# Patient Record
Sex: FEMALE | Race: WHITE | NOT HISPANIC OR LATINO | Employment: FULL TIME | URBAN - METROPOLITAN AREA
[De-identification: names, ages, dates, MRNs, and addresses within clinical notes are randomized per-mention and may not be internally consistent; named-entity substitution may affect disease eponyms.]

---

## 2017-03-10 ENCOUNTER — GENERIC CONVERSION - ENCOUNTER (OUTPATIENT)
Dept: OTHER | Facility: OTHER | Age: 62
End: 2017-03-10

## 2017-04-12 ENCOUNTER — GENERIC CONVERSION - ENCOUNTER (OUTPATIENT)
Dept: OTHER | Facility: OTHER | Age: 62
End: 2017-04-12

## 2017-05-04 DIAGNOSIS — E83.52 HYPERCALCEMIA: ICD-10-CM

## 2017-05-04 DIAGNOSIS — M81.0 AGE-RELATED OSTEOPOROSIS WITHOUT CURRENT PATHOLOGICAL FRACTURE: ICD-10-CM

## 2017-05-08 ENCOUNTER — GENERIC CONVERSION - ENCOUNTER (OUTPATIENT)
Dept: OTHER | Facility: OTHER | Age: 62
End: 2017-05-08

## 2017-05-31 ENCOUNTER — ALLSCRIPTS OFFICE VISIT (OUTPATIENT)
Dept: OTHER | Facility: OTHER | Age: 62
End: 2017-05-31

## 2017-10-04 ENCOUNTER — GENERIC CONVERSION - ENCOUNTER (OUTPATIENT)
Dept: OTHER | Facility: OTHER | Age: 62
End: 2017-10-04

## 2017-10-04 ENCOUNTER — GENERIC CONVERSION - ENCOUNTER (OUTPATIENT)
Dept: FAMILY MEDICINE CLINIC | Facility: MEDICAL CENTER | Age: 62
End: 2017-10-04

## 2017-11-02 ENCOUNTER — TRANSCRIBE ORDERS (OUTPATIENT)
Dept: ADMINISTRATIVE | Facility: HOSPITAL | Age: 62
End: 2017-11-02

## 2017-11-02 DIAGNOSIS — Z13.820 SCREENING FOR OSTEOPOROSIS: ICD-10-CM

## 2017-11-02 DIAGNOSIS — Z12.31 VISIT FOR SCREENING MAMMOGRAM: Primary | ICD-10-CM

## 2018-01-10 NOTE — PROGRESS NOTES
Assessment    1  Hypercalcemia (275 42) (E83 52)   2  Osteoporosis (733 00) (M81 0)   3  Encounter for preventive health examination (V70 0) (Z00 00)   4  Hemorrhoids (455 6) (K64 9)   5  Encounter for routine gynecological examination (V72 31) (Z01 419)    Plan  Health Maintenance    · Zoster (Zostavax) (Zoster (Zostavax)); To be given at pharmacy  PMH: Colonoscopy (Fiberoptic), Encounter for routine gynecological examination    · Begin or continue regular aerobic exercise  Gradually work up to at least {count1}  sessions of {dur1} of exercise a week ; Status:Complete;   Done: [id number]    Zostavax  REcheck 3 months  Discussion/Summary    1  Labile HTN-will get BP cuff and check at home  RV if over 140/85  2  Hypercalcemia-resovled  Secondary to Ca supplement? REviewed dietary calcium   May have lactose intolerance  Will try Lactaid tablets  3  Osteoporosis-on Prolia  F/u with Rheum  4  -up to date  Advised Tdap and Zostavax  History of Present Illness  HM, Adult Female: The patient is being seen for a health maintenance and gynecology evaluation  General Health: The patient's health since the last visit is described as good  She has regular dental visits  She complains of vision problems  Vision care includes an eye examination more than a year ago  Lifestyle:  She consumes a diverse and healthy diet  (Eats vegetables  Tries to eat more fruits  Avoids breads  Eats cereals  Limits red meat  )   She exercises regularly (She does power walking at work and stair climbing  )  She exercises 3 or more times per week  She consumes alcohol  She reports never drinking alcohol  Caffeine 1 tea daily  Rare soda  Reproductive health: the patient is postmenopausal   she is not sexually active  Screening: Additional History: [facility] Mammogram 2017  Dexa scan 2016  Colonoscopy 2017 10 year f/u  HPI: Mark Jolley says she has been doing well  Seeing rheumatology for her osteoporosis  On Prolia injections     Had elevated calcium  Stopped it and now calcium normal   She gets bloating and diarrhea with yogurt and milk  She has been taking her BP at work  Getting elevated BP's Systolic 156'X  162/85 highest  She thinks it was from her cochlear implant  Had it adjusted and thinks it is better  Cut back on salt  Review of Systems    Genitourinary: no dysuria, no pelvic pain, no vaginal discharge, no incontinence and no unexplained vaginal bleeding  Active Problems    1  Asthma (493 90) (J45 909)   2  Colon cancer screening (V76 51) (Z12 11)   3  Encounter for routine gynecological examination (V72 31) (Z01 419)   4  Encounter for vitamin deficiency screening (V77 99) (Z13 21)   5  Hypercalcemia (275 42) (E83 52)   6  Irritable bowel syndrome (564 1) (K58 9)   7  Loss of hearing (389 9) (H91 90)   8  Osteoporosis (733 00) (M81 0)   9  Other screening mammogram (V76 12) (Z12 31)   10  Screening for osteoporosis (V82 81) (Z13 820)    Past Medical History    · Encounter for vitamin deficiency screening (V77 99) (Z13 21)   · History of Flu vaccine need (V04 81) (Z23)   · History of Lipid screening (V77 91) (Z13 220)   · History of Need for 23-polyvalent pneumococcal polysaccharide vaccine (V03 82) (Z23)   · History of Need for pneumococcal vaccination (V03 82) (Z23)   · History of Thyroid disorder screen (V77 0) (Z13 29)    Surgical History    · History of Colonoscopy (Fiberoptic)   · History of Hysterectomy    Family History  Mother    · Family history of Osteoporosis (V17 81)  Father    · Family history of Diabetes Mellitus (V18 0)   · Family history of Heart Disease (V17 49)   · Family history of Hypertension (V17 49)  Family History    · Family history of Family Health Status Of Mother - Alive   · Family history of Father  At Age 70    Social History    · Denied: History of Alcohol Use (History)   · Caffeine Use   · Never A Smoker    Current Meds   1  No Reported Medications Recorded    Allergies    1   [employer] Aspirin TABS   2  Codeine Sulfate TABS    Vitals   Recorded: 29UCO4056 01:01PM   Heart Rate 76   Respiration 16   Systolic 266   Diastolic 80   Height 4 ft 10 in   Weight 119 lb    BMI Calculated 24 87   BSA Calculated 1 46     Physical Exam    Constitutional   General appearance: No acute distress, well appearing and well nourished  Neck   Neck: Supple, symmetric, trachea midline, no masses  Thyroid: Normal, no thyromegaly  Pulmonary   Respiratory effort: No increased work of breathing or signs of respiratory distress  Auscultation of lungs: Clear to auscultation  Cardiovascular   Auscultation of heart: Normal rate and rhythm, normal S1 and S2, no murmurs  Carotid pulses: 2+ bilaterally  Abdominal aorta: Normal     Femoral pulses: 2+ bilaterally  Pedal pulses: 2+ bilaterally  Peripheral vascular exam: Normal     Examination of extremities for edema and/or varicosities: Normal     Chest   Breasts: Normal, no dimpling or skin changes appreciated  Palpation of breasts and axillae: Normal, no masses palpated  Abdomen   Abdomen: Non-tender, no masses  Liver and spleen: No hepatomegaly or splenomegaly  Anus, perineum, and rectum: Normal sphincter tone, no masses, no prolapse  Stool sample for occult blood: Negative  Genitourinary   External genitalia and vagina: Normal, no lesions appreciated  cuff normal    Uterus: Normal size, no tenderness, no masses  The uterus was absent  Adnexa/Parametria: Normal, no masses or tenderness  Nonpalpable  Lymphatic   Palpation of lymph nodes in neck: No lymphadenopathy  Palpation of lymph nodes in axillae: No lymphadenopathy  Palpation of lymph nodes in groin: No lymphadenopathy  Health Management  Colon cancer screening   COLONOSCOPY; every 10 years; Last 81Ybg5384; Next Due: 73Ezl0812; Active  Other screening mammogram   Digital Bilateral Screening Mammogram With CAD; every 1 year; Last 68TIX7912;  Next  Due: 86NGV0620; Overdue    Future Appointments    Date/Time Provider Specialty Site   09/25/2017 02:15 PM ISABEL Reyez   [address] [facility]     Signatures   Electronically signed by : ISABEL Cowart ; May 31 2017  9:48PM EST                       (Author)

## 2018-01-14 VITALS
RESPIRATION RATE: 16 BRPM | WEIGHT: 119 LBS | HEIGHT: 58 IN | SYSTOLIC BLOOD PRESSURE: 132 MMHG | HEART RATE: 76 BPM | DIASTOLIC BLOOD PRESSURE: 80 MMHG | BODY MASS INDEX: 24.98 KG/M2

## 2018-01-15 NOTE — PROGRESS NOTES
Assessment    1  Encounter for preventive health examination (V70 0) (Z00 00)   2  Osteoporosis (733 00) (M81 0)   3  Encounter for routine gynecological examination (V72 31) (Z01 419)   4  Asthma (493 90) (J45 909)    Plan  Osteoporosis    · * DXA BONE DENSITY SPINE HIP AND PELVIS; Status:Active; Requested  for:26May2016;      Obtain BW  Check Dexa scan  History of Present Illness  HM, Adult Female: The patient is being seen for a health maintenance and gynecology evaluation  General Health: The patient's health since the last visit is described as good  Lifestyle:  She consumes a diverse and healthy diet  (Eats fruits and vegetables and whole grains  Avoids red meat  Eats chicken  Dietary calcium not much due to lactose intolerance  No coffee  Iced tea 1-2 daily  )   She has weight concerns  (She lost about 8 lbs  but 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 as well as several illnesses  )   She exercises regularly  (Very active at her job with walking but no regular exercise)   She does not use tobacco  She denies alcohol use  Reproductive health:  she is not sexually active  Screening: Additional History: Edy Fish Mammogram 2016  Dexa scan 2013  Colonoscopy 2010 f/u at age 63  HPI: 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  She is doing well with her cochlear implant as far as her hearing is good although having headaches and tinnitus  Sees Dr Dara Miller        Review of Systems    Genitourinary: no pelvic pain, no vaginal discharge, no incontinence and no unexplained vaginal bleeding  Active Problems    1  Asthma (493 90) (J45 909)   2  Encounter for routine gynecological examination (V72 31) (Z01 419)   3  Encounter for vitamin deficiency screening (V77 99) (Z13 21)   4  Irritable bowel syndrome (564 1) (K58 9)   5  Loss of hearing (389 9) (H91 90)   6  Osteoporosis (733 00) (M81 0)   7  Other screening mammogram (V76 12) (Z12 31)   8   Screening for osteoporosis (V82 81) (I87 237)    Past Medical History    · Encounter for vitamin deficiency screening (V77 99) (Z13 21)   · History of Flu vaccine need (V04 81) (Z23)   · History of Lipid screening (V77 91) (Z13 220)   · History of Need for 23-polyvalent pneumococcal polysaccharide vaccine (V03 82) (Z23)   · History of Need for pneumococcal vaccination (V03 82) (Z23)   · History of Thyroid disorder screen (V77 0) (Z13 29)    Surgical History    · History of Colonoscopy (Fiberoptic)   · History of Hysterectomy    Family History  Mother    · Family history of Osteoporosis (V17 81)  Father    · Family history of Diabetes Mellitus (V18 0)   · Family history of Heart Disease (V17 49)   · Family history of Hypertension (V17 49)  Family History    · Family history of Family Health Status Of Mother - Alive   · Family history of Father  At Age 70    Social History    · Denied: History of Alcohol Use (History)   · Caffeine Use   · Never A Smoker    Current Meds   1  No Reported Medications Recorded    Allergies    1  [employer] Aspirin TABS   2  Codeine Sulfate TABS    Vitals   Recorded: 49DEZ9419 12:35PM   Heart Rate 90   Respiration 16   Systolic 994   Diastolic 84   Height 4 ft 10 in   Weight 116 lb 8 0 oz   BMI Calculated 24 35   BSA Calculated 1 44   LMP 1997     Physical Exam    Constitutional   General appearance: No acute distress, well appearing and well nourished  Head and Face   Head and face: Normal     Palpation of the face and sinuses: No sinus tenderness  Eyes   Conjunctiva and lids: No swelling, erythema or discharge  Pupils and irises: Equal, round, reactive to light  Ophthalmoscopic examination: Normal fundi and optic discs  Ears, Nose, Mouth, and Throat   External inspection of ears and nose: Normal     Otoscopic examination: Tympanic membranes translucent with normal light reflex  Canals patent without erythema      Hearing: Normal     Nasal mucosa, septum, and turbinates: Normal without edema or erythema  Lips, teeth, and gums: Normal, good dentition  Oropharynx: Normal with no erythema, edema, exudate or lesions  Neck   Neck: Supple, symmetric, trachea midline, no masses  Thyroid: Normal, no thyromegaly  Pulmonary   Respiratory effort: No increased work of breathing or signs of respiratory distress  Auscultation of lungs: Clear to auscultation  Cardiovascular   Auscultation of heart: Normal rate and rhythm, normal S1 and S2, no murmurs  Abdominal aorta: Normal     Femoral pulses: 2+ bilaterally  Pedal pulses: 2+ bilaterally  Examination of extremities for edema and/or varicosities: Normal     Chest   Breasts: Normal, no dimpling or skin changes appreciated  Palpation of breasts and axillae: Normal, no masses palpated  Abdomen   Abdomen: Non-tender, no masses  Liver and spleen: No hepatomegaly or splenomegaly  Anus, perineum, and rectum: Normal sphincter tone, no masses, no prolapse  Stool sample for occult blood: Negative  Genitourinary   Urethra: Normal, no discharge  Bladder: Not distended, no tenderness  Cervix: Normal, no lesions  Uterus: Normal size, no tenderness, no masses  Adnexa/Parametria: Normal, no masses or tenderness  Lymphatic   Palpation of lymph nodes in neck: No lymphadenopathy  Palpation of lymph nodes in groin: No lymphadenopathy  Results/Data  PHQ-2 Adult Depression Screening 22CBU3760 01:29PM Ange Scale     Test Name Result Flag Reference   PHQ-2 Adult Depression Score 0     Over the last two weeks, how often have you been bothered by any of the following problems? Little interest or pleasure in doing things: Not at all - 0  Feeling down, depressed, or hopeless: Not at all - 0   PHQ-2 Adult Depression Screening Negative         Health Management  Other screening mammogram   Digital Bilateral Screening Mammogram With CAD; every 1 year; Last [id number]; Next  Due: 86QGO0536;  Overdue    Future Appointments    Date/Time Provider Specialty Site   05/31/2017 01:00 PM ISABEL Miller   [address] [facility]     Signatures   Electronically signed by : ISABEL Winters ; May 27 2016 12:08AM EST                       (Author)

## 2018-01-22 VITALS
HEART RATE: 70 BPM | SYSTOLIC BLOOD PRESSURE: 138 MMHG | WEIGHT: 123.38 LBS | HEIGHT: 58 IN | DIASTOLIC BLOOD PRESSURE: 80 MMHG | BODY MASS INDEX: 25.9 KG/M2 | RESPIRATION RATE: 16 BRPM

## 2018-01-22 VITALS — DIASTOLIC BLOOD PRESSURE: 78 MMHG | SYSTOLIC BLOOD PRESSURE: 130 MMHG

## 2018-01-22 VITALS — SYSTOLIC BLOOD PRESSURE: 122 MMHG | DIASTOLIC BLOOD PRESSURE: 80 MMHG

## 2018-02-19 ENCOUNTER — OFFICE VISIT (OUTPATIENT)
Dept: FAMILY MEDICINE CLINIC | Facility: MEDICAL CENTER | Age: 63
End: 2018-02-19
Payer: COMMERCIAL

## 2018-02-19 ENCOUNTER — APPOINTMENT (OUTPATIENT)
Dept: RADIOLOGY | Facility: MEDICAL CENTER | Age: 63
End: 2018-02-19
Payer: COMMERCIAL

## 2018-02-19 VITALS
HEART RATE: 60 BPM | DIASTOLIC BLOOD PRESSURE: 78 MMHG | BODY MASS INDEX: 26.33 KG/M2 | WEIGHT: 126 LBS | RESPIRATION RATE: 16 BRPM | SYSTOLIC BLOOD PRESSURE: 126 MMHG

## 2018-02-19 DIAGNOSIS — M54.6 ACUTE BILATERAL THORACIC BACK PAIN: Primary | ICD-10-CM

## 2018-02-19 DIAGNOSIS — R39.9 URINARY SYMPTOM OR SIGN: ICD-10-CM

## 2018-02-19 DIAGNOSIS — M54.6 ACUTE BILATERAL THORACIC BACK PAIN: ICD-10-CM

## 2018-02-19 LAB
SL AMB  POCT GLUCOSE, UA: NORMAL
SL AMB LEUKOCYTE ESTERASE,UA: NORMAL
SL AMB POCT BILIRUBIN,UA: NORMAL
SL AMB POCT BLOOD,UA: NORMAL
SL AMB POCT KETONES,UA: NORMAL
SL AMB POCT NITRITE,UA: NORMAL
SL AMB POCT PH,UA: 6
SL AMB POCT SPECIFIC GRAVITY,UA: 1.01
SL AMB POCT URINE PROTEIN: NORMAL
SL AMB POCT UROBILINOGEN: 0.2

## 2018-02-19 PROCEDURE — 99213 OFFICE O/P EST LOW 20 MIN: CPT | Performed by: FAMILY MEDICINE

## 2018-02-19 PROCEDURE — 72072 X-RAY EXAM THORAC SPINE 3VWS: CPT

## 2018-02-19 PROCEDURE — 74018 RADEX ABDOMEN 1 VIEW: CPT

## 2018-02-19 PROCEDURE — 81002 URINALYSIS NONAUTO W/O SCOPE: CPT | Performed by: FAMILY MEDICINE

## 2018-02-28 ENCOUNTER — TELEPHONE (OUTPATIENT)
Dept: FAMILY MEDICINE CLINIC | Facility: MEDICAL CENTER | Age: 63
End: 2018-02-28

## 2018-02-28 NOTE — TELEPHONE ENCOUNTER
----- Message from Gladys Goff MD sent at 2/25/2018 10:18 PM EST -----  Notify x-ray shows degenerative changes in thoracic spine in the area we discussed  How she doing?

## 2018-02-28 NOTE — TELEPHONE ENCOUNTER
Jeffry York says she still has the discomfort when she wakes up in the morning  Tylenol does not help at all  Advil helps but can upset her stomach  What do you advise she do  She has been using heat as well  Was not sure if you were going to give her a prescription for something or not?

## 2018-03-01 DIAGNOSIS — M54.6 ACUTE LEFT-SIDED THORACIC BACK PAIN: Primary | ICD-10-CM

## 2018-03-01 RX ORDER — CYCLOBENZAPRINE HCL 5 MG
5 TABLET ORAL 3 TIMES DAILY PRN
Qty: 20 TABLET | Refills: 0 | Status: SHIPPED | OUTPATIENT
Start: 2018-03-01 | End: 2018-08-27 | Stop reason: ALTCHOICE

## 2018-03-05 ENCOUNTER — TELEPHONE (OUTPATIENT)
Dept: FAMILY MEDICINE CLINIC | Facility: MEDICAL CENTER | Age: 63
End: 2018-03-05

## 2018-03-05 NOTE — TELEPHONE ENCOUNTER
Pt also left several vm over the weekend stating she looked at her records on line and has a question about her xray

## 2018-03-05 NOTE — TELEPHONE ENCOUNTER
Patient called  She said medicine was called into wrong pharmacy  She uses [facility] in [facility]  She believes it was for a muscle relaxer, please call her

## 2018-03-08 NOTE — TELEPHONE ENCOUNTER
Danielle called back  She looked at the results on MyChart and saw the mention of mild lower levoscoliosis, she said she spoke with her friend who is a chiropractor and she suggested physical therapy for this  Is this something you think would help?

## 2018-03-12 NOTE — TELEPHONE ENCOUNTER
Thi sis usually an incidental finding --many people show this on Xray --and not felt to be a cause  of the pain at this age  However can't hurt to try PT for her back pain  Could also consider ortho referral to further clarify

## 2018-03-26 ENCOUNTER — TELEPHONE (OUTPATIENT)
Dept: FAMILY MEDICINE CLINIC | Facility: MEDICAL CENTER | Age: 63
End: 2018-03-26

## 2018-03-27 DIAGNOSIS — Z12.39 ENCOUNTER FOR SCREENING FOR MALIGNANT NEOPLASM OF BREAST: Primary | ICD-10-CM

## 2018-03-28 ENCOUNTER — HOSPITAL ENCOUNTER (OUTPATIENT)
Dept: MAMMOGRAPHY | Facility: MEDICAL CENTER | Age: 63
Discharge: HOME | End: 2018-03-28
Payer: COMMERCIAL

## 2018-03-28 DIAGNOSIS — Z12.31 VISIT FOR SCREENING MAMMOGRAM: ICD-10-CM

## 2018-03-29 ENCOUNTER — HOSPITAL ENCOUNTER (OUTPATIENT)
Dept: MAMMOGRAPHY | Facility: MEDICAL CENTER | Age: 63
Discharge: HOME | End: 2018-03-29
Payer: COMMERCIAL

## 2018-03-29 DIAGNOSIS — Z12.39 ENCOUNTER FOR SCREENING FOR MALIGNANT NEOPLASM OF BREAST: ICD-10-CM

## 2018-03-29 PROCEDURE — 77067 SCR MAMMO BI INCL CAD: CPT

## 2018-04-27 LAB — CALCIUM SERPL-MCNC: 10.2 MG/DL (ref 8.4–10.2)

## 2018-06-04 ENCOUNTER — OFFICE VISIT (OUTPATIENT)
Dept: FAMILY MEDICINE CLINIC | Facility: MEDICAL CENTER | Age: 63
End: 2018-06-04
Payer: COMMERCIAL

## 2018-06-04 VITALS
WEIGHT: 128 LBS | BODY MASS INDEX: 26.75 KG/M2 | RESPIRATION RATE: 18 BRPM | HEART RATE: 68 BPM | SYSTOLIC BLOOD PRESSURE: 148 MMHG | DIASTOLIC BLOOD PRESSURE: 80 MMHG

## 2018-06-04 DIAGNOSIS — R03.0 ELEVATED BP WITHOUT DIAGNOSIS OF HYPERTENSION: ICD-10-CM

## 2018-06-04 DIAGNOSIS — Z00.00 ROUTINE ADULT HEALTH MAINTENANCE: ICD-10-CM

## 2018-06-04 DIAGNOSIS — Z01.419 ENCOUNTER FOR GYNECOLOGICAL EXAMINATION WITHOUT ABNORMAL FINDING: ICD-10-CM

## 2018-06-04 DIAGNOSIS — E83.52 HYPERCALCEMIA: ICD-10-CM

## 2018-06-04 DIAGNOSIS — M81.0 OSTEOPOROSIS, UNSPECIFIED OSTEOPOROSIS TYPE, UNSPECIFIED PATHOLOGICAL FRACTURE PRESENCE: Primary | ICD-10-CM

## 2018-06-04 PROCEDURE — 99396 PREV VISIT EST AGE 40-64: CPT | Performed by: FAMILY MEDICINE

## 2018-06-04 NOTE — PROGRESS NOTES
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  Mammogram 2018   Dexa scan soon      I have reviewed the patient's medical history in detail and updated the computerized patient record  Review of Systems   Cardiovascular: Negative for chest pain, palpitations and leg swelling  Gets BP checked at work  Has been good  Genitourinary: Negative for difficulty urinating, frequency, vaginal bleeding, vaginal discharge and vaginal pain  Not sexually active  Musculoskeletal: Positive for arthralgias  Achiness in knees with climbing stairs  O: /80 (BP Location: Left arm, Patient Position: Sitting, Cuff Size: Adult)   Pulse 68   Resp 18   Wt 58 1 kg (128 lb)   BMI 26 75 kg/m²   Physical Exam   Constitutional: She appears well-developed and well-nourished  No distress  Neck: Carotid bruit is not present  No thyromegaly present  Cardiovascular: Normal rate, regular rhythm, normal heart sounds and intact distal pulses  Pulmonary/Chest: Effort normal and breath sounds normal    Abdominal: Soft  Bowel sounds are normal  She exhibits no mass  There is no hepatomegaly  There is no tenderness  Musculoskeletal: She exhibits no edema  Lymphadenopathy:     She has no cervical adenopathy  Breasts no masses or discharge  External genitalia normal  Vagina normal  Cervix normal no lesions  Uterus normal size shape and consistency  Adnexae non palpable  Rectal exam no masses stool  heme negative     Assessment  1  Gyn exam  2  Osteoporosis-per rheum  On Prolia  3  HM-up to date  Encouraged exercise  Plan  Check BW

## 2018-06-20 LAB
ABSOL LYMPHOCYTES (HISTORICAL): 2.7 K/UL (ref 0.5–4)
ALBUMIN SERPL BCP-MCNC: 4.2 G/DL (ref 3–5.2)
ALP SERPL-CCNC: 50 U/L (ref 43–122)
ALT SERPL W P-5'-P-CCNC: 32 U/L (ref 9–52)
ANION GAP SERPL CALCULATED.3IONS-SCNC: 7 MMOL/L (ref 5–14)
AST SERPL W P-5'-P-CCNC: 24 U/L (ref 14–36)
BASOPHILS # BLD AUTO: 0 K/UL (ref 0–0.1)
BASOPHILS # BLD AUTO: 1 % (ref 0–1)
BILIRUB SERPL-MCNC: 0.7 MG/DL
BUN SERPL-MCNC: 14 MG/DL (ref 5–25)
CALCIUM SERPL-MCNC: 10.2 MG/DL (ref 8.4–10.2)
CHLORIDE SERPL-SCNC: 106 MEQ/L (ref 97–108)
CHOLEST SERPL-MCNC: 211 MG/DL
CHOLEST/HDLC SERPL: 2.7 {RATIO}
CO2 SERPL-SCNC: 31 MMOL/L (ref 22–30)
CREATINE, SERUM (HISTORICAL): 0.8 MG/DL (ref 0.6–1.2)
DEPRECATED RDW RBC AUTO: 13.1 %
EGFR (HISTORICAL): >60 ML/MIN/1.73 M2
EOSINOPHIL # BLD AUTO: 0.1 K/UL (ref 0–0.4)
EOSINOPHIL NFR BLD AUTO: 1 % (ref 0–6)
GLUCOSE FASTING (HISTORICAL): 86 MG/DL (ref 70–99)
HCT VFR BLD AUTO: 39 % (ref 36–46)
HDLC SERPL-MCNC: 77 MG/DL
HGB BLD-MCNC: 12.9 G/DL (ref 12–16)
LDL/HDL RATIO (HISTORICAL): 1.4
LDLC SERPL CALC-MCNC: 109 MG/DL
LYMPHOCYTES NFR BLD AUTO: 34 % (ref 25–45)
MCH RBC QN AUTO: 30.6 PG (ref 26–34)
MCHC RBC AUTO-ENTMCNC: 33.2 % (ref 31–36)
MCV RBC AUTO: 92 FL (ref 80–100)
MONOCYTES # BLD AUTO: 0.6 K/UL (ref 0.2–0.9)
MONOCYTES NFR BLD AUTO: 7 % (ref 1–10)
NEUTROPHILS ABS COUNT (HISTORICAL): 4.5 K/UL (ref 1.8–7.8)
NEUTS SEG NFR BLD AUTO: 57 % (ref 45–65)
PLATELET # BLD AUTO: 242 K/MCL (ref 150–450)
POTASSIUM SERPL-SCNC: 4.5 MEQ/L (ref 3.6–5)
RBC # BLD AUTO: 4.23 M/MCL (ref 4–5.2)
SODIUM SERPL-SCNC: 144 MEQ/L (ref 137–147)
TOTAL PROTEIN (HISTORICAL): 7.2 G/DL (ref 5.9–8.4)
TRIGL SERPL-MCNC: 123 MG/DL
TSH SERPL DL<=0.05 MIU/L-ACNC: 3.53 UIU/ML (ref 0.47–4.68)
VLDLC SERPL CALC-MCNC: 25 MG/DL (ref 0–40)
WBC # BLD AUTO: 7.9 K/MCL (ref 4.5–11)

## 2018-06-25 ENCOUNTER — HOSPITAL ENCOUNTER (OUTPATIENT)
Dept: BONE DENSITY | Facility: CLINIC | Age: 63
Discharge: HOME | End: 2018-06-25
Payer: COMMERCIAL

## 2018-06-25 DIAGNOSIS — M81.0 SENILE OSTEOPOROSIS: ICD-10-CM

## 2018-06-25 PROCEDURE — 77080 DXA BONE DENSITY AXIAL: CPT

## 2018-07-31 ENCOUNTER — TELEPHONE (OUTPATIENT)
Dept: FAMILY MEDICINE CLINIC | Facility: MEDICAL CENTER | Age: 63
End: 2018-07-31

## 2018-07-31 ENCOUNTER — APPOINTMENT (OUTPATIENT)
Dept: LAB | Facility: HOSPITAL | Age: 63
End: 2018-07-31

## 2018-07-31 ENCOUNTER — TRANSCRIBE ORDERS (OUTPATIENT)
Dept: ADMINISTRATIVE | Facility: HOSPITAL | Age: 63
End: 2018-07-31

## 2018-07-31 DIAGNOSIS — Z00.8 HEALTH EXAMINATION IN POPULATION SURVEY: Primary | ICD-10-CM

## 2018-07-31 DIAGNOSIS — Z00.8 HEALTH EXAMINATION IN POPULATION SURVEY: ICD-10-CM

## 2018-07-31 LAB
CHOLEST SERPL-MCNC: 229 MG/DL
EST. AVERAGE GLUCOSE BLD GHB EST-MCNC: 108 MG/DL
HBA1C MFR BLD: 5.4 % (ref 4.2–6.3)
HDLC SERPL-MCNC: 74 MG/DL (ref 40–59)
LDLC SERPL CALC-MCNC: 125 MG/DL
NONHDLC SERPL-MCNC: 155 MG/DL
TRIGL SERPL-MCNC: 150 MG/DL

## 2018-07-31 PROCEDURE — 36415 COLL VENOUS BLD VENIPUNCTURE: CPT

## 2018-07-31 PROCEDURE — 80061 LIPID PANEL: CPT

## 2018-07-31 PROCEDURE — 83036 HEMOGLOBIN GLYCOSYLATED A1C: CPT

## 2018-07-31 NOTE — TELEPHONE ENCOUNTER
Pt has been receiving Prolia injections from Dr Grecia Irwin  The office called her and told her that they will no longer be doing the Prolia injections at the office  She has a follow up with you for next month  She will be due for her injection 2 weeks later, can she get them here? What is your recommendation?

## 2018-08-01 NOTE — TELEPHONE ENCOUNTER
I do not give Prolia injections  Did Breana Amezcua  office have any suggestions?   If she get a new rheumatologist?  Could also refer Endocrinology

## 2018-08-27 ENCOUNTER — OFFICE VISIT (OUTPATIENT)
Dept: FAMILY MEDICINE CLINIC | Facility: MEDICAL CENTER | Age: 63
End: 2018-08-27
Payer: COMMERCIAL

## 2018-08-27 VITALS
WEIGHT: 126 LBS | DIASTOLIC BLOOD PRESSURE: 80 MMHG | RESPIRATION RATE: 18 BRPM | BODY MASS INDEX: 27.18 KG/M2 | HEIGHT: 57 IN | SYSTOLIC BLOOD PRESSURE: 136 MMHG | HEART RATE: 64 BPM

## 2018-08-27 DIAGNOSIS — M81.0 OSTEOPOROSIS, UNSPECIFIED OSTEOPOROSIS TYPE, UNSPECIFIED PATHOLOGICAL FRACTURE PRESENCE: ICD-10-CM

## 2018-08-27 DIAGNOSIS — E66.3 OVERWEIGHT: Primary | ICD-10-CM

## 2018-08-27 PROCEDURE — 3008F BODY MASS INDEX DOCD: CPT | Performed by: FAMILY MEDICINE

## 2018-08-27 PROCEDURE — 99213 OFFICE O/P EST LOW 20 MIN: CPT | Performed by: FAMILY MEDICINE

## 2018-08-27 RX ORDER — MULTIVITAMIN
1 CAPSULE ORAL DAILY
COMMUNITY

## 2018-08-27 NOTE — PROGRESS NOTES
Theo Altamirano had a Dexa scan in June  Has osteoporosis of the hip  She is getting  Prolia injections per Dr Yimi Gastelum  She is no longer giving them and is trying to find a pharmacy that does  Thinks it will be [facility]  She had a "wierd experience'  She got a strawberry rhubarb pie while traveling  She got a buzzing  sensation over her whole body  No throat symptoms or rash  Had eaten that day  O: /80 (BP Location: Left arm, Patient Position: Sitting, Cuff Size: Adult)   Pulse 64   Resp 18   Ht 4' 9" (1 448 m)   Wt 57 2 kg (126 lb)   BMI 27 27 kg/m²    Neck no adenopathy thyromegaly  Chest clear  Cardiac regular rate without murmur    BW 07/31/2018  A1c 5 4 cholesterol 229 HDL 74    Assessment  1  Hyperlipidemia-mild-watch diet; lose weight  2  Osteoporosis-current DEXA scan was done after several years of Prolia treatment  She will follow up with Rheumatology  Plan  As above  Recheck 1 year

## 2018-09-26 ENCOUNTER — OFFICE VISIT (OUTPATIENT)
Dept: URGENT CARE | Age: 63
End: 2018-09-26
Payer: COMMERCIAL

## 2018-09-26 VITALS
HEIGHT: 57 IN | BODY MASS INDEX: 27.57 KG/M2 | SYSTOLIC BLOOD PRESSURE: 130 MMHG | HEART RATE: 60 BPM | DIASTOLIC BLOOD PRESSURE: 80 MMHG | OXYGEN SATURATION: 98 % | WEIGHT: 127.8 LBS | RESPIRATION RATE: 16 BRPM | TEMPERATURE: 97 F

## 2018-09-26 DIAGNOSIS — J02.9 ACUTE PHARYNGITIS, UNSPECIFIED ETIOLOGY: Primary | ICD-10-CM

## 2018-09-26 PROCEDURE — 99213 OFFICE O/P EST LOW 20 MIN: CPT | Performed by: PHYSICIAN ASSISTANT

## 2018-09-26 PROCEDURE — S9088 SERVICES PROVIDED IN URGENT: HCPCS | Performed by: PHYSICIAN ASSISTANT

## 2018-09-26 RX ORDER — AMOXICILLIN 500 MG/1
1000 CAPSULE ORAL EVERY 24 HOURS
Qty: 20 CAPSULE | Refills: 0 | Status: SHIPPED | OUTPATIENT
Start: 2018-09-26 | End: 2018-10-06

## 2018-09-26 NOTE — PROGRESS NOTES
[facility]        NAME: Delroy Malagon is a 63 y o  female  : 1955    MRN: [id number]  DATE: 2018  TIME: 9:20 AM    Assessment and Plan   Acute pharyngitis, unspecified etiology [J02 9]  1  Acute pharyngitis, unspecified etiology  amoxicillin (AMOXIL) 500 mg capsule         Patient Instructions     Take medications as directed  Follow up with PCP in 3-5 days  Proceed to  ER if symptoms worsen  Chief Complaint     Chief Complaint   Patient presents with    Sore Throat     Pt c/o sore throat for a few days  History of Present Illness       51-year-old female presents with 2 day history of sore throat  Patient reports mild cough also started today  Sore Throat    This is a new problem  The current episode started yesterday  The problem has been waxing and waning  Neither side of throat is experiencing more pain than the other  There has been no fever  The pain is moderate  Associated symptoms include coughing (Mild started today)  Pertinent negatives include no abdominal pain, congestion, diarrhea, ear discharge, headaches, hoarse voice, stridor, swollen glands or trouble swallowing  She has tried NSAIDs for the symptoms  The treatment provided no relief  Review of Systems   Review of Systems   Constitutional: Negative  HENT: Positive for sore throat  Negative for congestion, ear discharge, hoarse voice and trouble swallowing  Eyes: Negative  Respiratory: Positive for cough (Mild started today)  Negative for stridor  Cardiovascular: Negative  Gastrointestinal: Negative  Negative for abdominal pain and diarrhea  Musculoskeletal: Negative  Skin: Negative  Neurological: Negative  Negative for headaches           Current Medications       Current Outpatient Prescriptions:     Calcium Citrate-Vitamin D (CITRACAL + D PO), Take by mouth, Disp: , Rfl:     Denosumab (PROLIA SC), Inject under the skin, Disp: , Rfl:     Multiple Vitamin (MULTIVITAMIN) capsule, Take 1 capsule by mouth daily, Disp: , Rfl:     amoxicillin (AMOXIL) 500 mg capsule, Take 2 capsules (1,000 mg total) by mouth every 24 hours for 10 days, Disp: 20 capsule, Rfl: 0    Current Allergies     Allergies as of 09/26/2018 - Reviewed 09/26/2018   Allergen Reaction Noted    Aspirin  04/21/2012    Codeine  04/21/2012            The following portions of the patient's history were reviewed and updated as appropriate: allergies, current medications, past family history, past medical history, past social history, past surgical history and problem list      History reviewed  No pertinent past medical history  Past Surgical History:   Procedure Laterality Date    COLONOSCOPY  2017    COLONOSCOPY  2010    fiberoptic; follow up at age; 2017 12 year f/u    HYSTERECTOMY         Family History   Problem Relation Age of Onset    Osteoporosis Mother     Diabetes Father         mellitus    Heart disease Father     Hypertension Father          Medications have been verified  Objective   /80   Pulse 60   Temp (!) 97 °F (36 1 °C) (Tympanic)   Resp 16   Ht 4' 9" (1 448 m)   Wt 58 kg (127 lb 12 8 oz)   SpO2 98%   BMI 27 66 kg/m²        Physical Exam     Physical Exam   Constitutional: She is oriented to person, place, and time  She appears well-developed and well-nourished  No distress  HENT:   Head: Normocephalic and atraumatic  Right Ear: External ear normal    Left Ear: External ear normal    Nose: Nose normal    Mouth/Throat: Uvula is midline and mucous membranes are normal  Posterior oropharyngeal edema and posterior oropharyngeal erythema (Bright red) present  No oropharyngeal exudate or tonsillar abscesses  Eyes: Conjunctivae are normal  Right eye exhibits no discharge  Left eye exhibits no discharge  Neck: Normal range of motion  Neck supple  Cardiovascular: Normal rate, regular rhythm and intact distal pulses      Pulmonary/Chest: Effort normal  No respiratory distress  She has no wheezes  She has no rales  Musculoskeletal: Normal range of motion  Lymphadenopathy:     She has no cervical adenopathy  Neurological: She is alert and oriented to person, place, and time  Skin: Skin is warm and dry  Psychiatric: She has a normal mood and affect  Nursing note and vitals reviewed

## 2018-09-26 NOTE — PATIENT INSTRUCTIONS
Take medications as directed  Follow up with PCP in 3-5 days  Proceed to  ER if symptoms worsen  Pharyngitis   AMBULATORY CARE:   Pharyngitis , or sore throat, is inflammation of the tissues and structures in your pharynx (throat)  Pharyngitis is most often caused by bacteria  It may also be caused by a cold or flu virus  Other causes include smoking, allergies, or acid reflux  Signs and symptoms that may occur with pharyngitis:   · Sore throat or pain when you swallow    · Fever, chills, and body aches    · Hoarse or raspy voice    · Cough, runny or stuffy nose, itchy or watery eyes    · Headache    · Upset stomach and loss of appetite    · Mild neck stiffness    · Swollen glands that feel like hard lumps when you touch your neck    · White and yellow pus-filled blisters in the back of your throat  Call 911 for any of the following:   · You have trouble breathing or swallowing because your throat is swollen or sore  Seek care immediately if:   · You are drooling because it hurts too much to swallow  · Your fever is higher than 102? F (39?C) or lasts longer than 3 days  · You are confused  · You taste blood in your throat  Contact your healthcare provider if:   · Your throat pain gets worse  · You have a painful lump in your throat that does not go away after 5 days  · Your symptoms do not improve after 5 days  · You have questions or concerns about your condition or care  Treatment for pharyngitis:  Viral pharyngitis will go away on its own without treatment  Your sore throat should start to feel better in 3 to 5 days for both viral and bacterial infections  You may need any of the following:  · Antibiotics  treat a bacterial infection  · NSAIDs , such as ibuprofen, help decrease swelling, pain, and fever  NSAIDs can cause stomach bleeding or kidney problems in certain people  If you take blood thinner medicine, always ask your healthcare provider if NSAIDs are safe for you   Always read the medicine label and follow directions  · Acetaminophen  decreases pain and fever  It is available without a doctor's order  Ask how much to take and how often to take it  Follow directions  Acetaminophen can cause liver damage if not taken correctly  Manage your symptoms:   · Gargle salt water  Mix ¼ teaspoon salt in an 8 ounce glass of warm water and gargle  This may help decrease swelling in your throat  · Drink liquids as directed  You may need to drink more liquids than usual  Liquids may help soothe your throat and prevent dehydration  Ask how much liquid to drink each day and which liquids are best for you  · Use a cool-steam humidifier  to help moisten the air in your room and calm your cough  · Soothe your throat  with cough drops, ice, soft foods, or popsicles  Prevent the spread of pharyngitis:  Cover your mouth and nose when you cough or sneeze  Do not share food or drinks  Wash your hands often  Use soap and water  If soap and water are unavailable, use an alcohol based hand   Follow up with your healthcare provider as directed:  Write down your questions so you remember to ask them during your visits  © 2017 [address]  Information is for End User's use only and may not be sold, redistributed or otherwise used for commercial purposes  All illustrations and images included in CareNotes® are the copyrighted property of [employer]  or [employer]  The above information is an  only  It is not intended as medical advice for individual conditions or treatments  Talk to your doctor, nurse or pharmacist before following any medical regimen to see if it is safe and effective for you

## 2018-10-31 ENCOUNTER — APPOINTMENT (OUTPATIENT)
Dept: LAB | Facility: HOSPITAL | Age: 63
End: 2018-10-31
Payer: COMMERCIAL

## 2018-10-31 ENCOUNTER — TRANSCRIBE ORDERS (OUTPATIENT)
Dept: ADMINISTRATIVE | Facility: HOSPITAL | Age: 63
End: 2018-10-31

## 2018-10-31 DIAGNOSIS — R03.0 ELEVATED BP WITHOUT DIAGNOSIS OF HYPERTENSION: ICD-10-CM

## 2018-10-31 DIAGNOSIS — Z01.419 ENCOUNTER FOR GYNECOLOGICAL EXAMINATION WITHOUT ABNORMAL FINDING: ICD-10-CM

## 2018-10-31 DIAGNOSIS — Z00.00 ROUTINE ADULT HEALTH MAINTENANCE: ICD-10-CM

## 2018-10-31 DIAGNOSIS — M81.0 AGE-RELATED OSTEOPOROSIS WITHOUT CURRENT PATHOLOGICAL FRACTURE: ICD-10-CM

## 2018-10-31 DIAGNOSIS — M81.0 AGE-RELATED OSTEOPOROSIS WITHOUT CURRENT PATHOLOGICAL FRACTURE: Primary | ICD-10-CM

## 2018-10-31 DIAGNOSIS — M81.0 OSTEOPOROSIS, UNSPECIFIED OSTEOPOROSIS TYPE, UNSPECIFIED PATHOLOGICAL FRACTURE PRESENCE: ICD-10-CM

## 2018-10-31 DIAGNOSIS — E83.52 HYPERCALCEMIA: ICD-10-CM

## 2018-10-31 DIAGNOSIS — M54.5 LOW BACK PAIN, UNSPECIFIED BACK PAIN LATERALITY, UNSPECIFIED CHRONICITY, WITH SCIATICA PRESENCE UNSPECIFIED: ICD-10-CM

## 2018-10-31 LAB — CALCIUM SERPL-MCNC: 10 MG/DL (ref 8.4–10.2)

## 2018-10-31 PROCEDURE — 36415 COLL VENOUS BLD VENIPUNCTURE: CPT

## 2018-10-31 PROCEDURE — 82310 ASSAY OF CALCIUM: CPT

## 2019-01-08 ENCOUNTER — TELEPHONE (OUTPATIENT)
Dept: OBGYN CLINIC | Facility: HOSPITAL | Age: 64
End: 2019-01-08

## 2019-02-22 ENCOUNTER — OFFICE VISIT (OUTPATIENT)
Dept: URGENT CARE | Age: 64
End: 2019-02-22
Payer: COMMERCIAL

## 2019-02-22 VITALS
HEIGHT: 58 IN | TEMPERATURE: 97.2 F | WEIGHT: 127 LBS | HEART RATE: 75 BPM | SYSTOLIC BLOOD PRESSURE: 168 MMHG | OXYGEN SATURATION: 99 % | RESPIRATION RATE: 16 BRPM | DIASTOLIC BLOOD PRESSURE: 88 MMHG | BODY MASS INDEX: 26.66 KG/M2

## 2019-02-22 DIAGNOSIS — J06.9 ACUTE URI: Primary | ICD-10-CM

## 2019-02-22 PROCEDURE — 99213 OFFICE O/P EST LOW 20 MIN: CPT | Performed by: NURSE PRACTITIONER

## 2019-02-22 PROCEDURE — S9088 SERVICES PROVIDED IN URGENT: HCPCS | Performed by: NURSE PRACTITIONER

## 2019-02-22 NOTE — LETTER
February 22, 2019     Patient: Mila Humphrey   YOB: 1955   Date of Visit: 2/22/2019       To Whom it May Concern:    Jose Carcamo was seen in my clinic on 2/22/2019  She may return to work on Monday, February 25, 2019  If you have any questions or concerns, please don't hesitate to call           Sincerely,          CHET Forrest        CC: No Recipients

## 2019-02-22 NOTE — PROGRESS NOTES
[facility]        NAME: Jose Queen is a 66 y o  female  : 1955    MRN: [id number]  DATE: 2019  TIME: 10:09 AM    Assessment and Plan   Acute URI [J06 9]  1  Acute URI           Patient Instructions     May alternate Tylenol and Ibuprofen as needed  Encourage fluids and rest    Saline nasal spray as needed  Humidify bedroom  Salt water gargles  Chloraseptic spray and lozenges as needed  Consider adding anti-histamines daily, ie  Claritin or Zyrtec  Follow up with PCP in 3-5 days  Proceed to  ER if symptoms worsen  Chief Complaint     Chief Complaint   Patient presents with    Cold Like Symptoms     Pt states she's had a raw throat and itchy ears for a few days, then yesterday woke up with congestion, runny nose and cough  History of Present Illness       Patient presents in office with cold symptoms and bilateral ear pain, and sore throat x 2 days  + low grade temp last night  No chills  + body aches  + asthma, has not used an inhaler since childhood  Nonsmoker  + seasonal allergies  + flu vaccine this season  No prn meds used  Review of Systems   Review of Systems   Constitutional: Positive for fever  Negative for chills and fatigue  HENT: Positive for congestion, ear pain, rhinorrhea, sinus pressure, sinus pain and sore throat  Respiratory: Positive for cough  Negative for chest tightness, shortness of breath and wheezing  Gastrointestinal: Negative  Musculoskeletal: Negative for myalgias  Skin: Negative for wound           Current Medications       Current Outpatient Medications:     Calcium Citrate-Vitamin D (CITRACAL + D PO), Take by mouth, Disp: , Rfl:     Denosumab (PROLIA SC), Inject under the skin, Disp: , Rfl:     Multiple Vitamin (MULTIVITAMIN) capsule, Take 1 capsule by mouth daily, Disp: , Rfl:     Current Allergies     Allergies as of 2019 - Reviewed 2019   Allergen Reaction Noted    Aspirin  2012  Codeine  04/21/2012            The following portions of the patient's history were reviewed and updated as appropriate: allergies, current medications, past family history, past medical history, past social history, past surgical history and problem list      History reviewed  No pertinent past medical history  Past Surgical History:   Procedure Laterality Date    COCHLEAR IMPLANT      COLONOSCOPY  2017    COLONOSCOPY  2010    fiberoptic; follow up at age; 2017 12 year f/u    HYSTERECTOMY         Family History   Problem Relation Age of Onset    Osteoporosis Mother     Diabetes Father         mellitus    Heart disease Father     Hypertension Father          Medications have been verified  Objective   /88 Comment: manual BP  Pulse 75   Temp (!) 97 2 °F (36 2 °C) (Tympanic)   Resp 16   Ht 4' 9 5" (1 461 m)   Wt 57 6 kg (127 lb)   SpO2 99%   BMI 27 01 kg/m²        Physical Exam     Physical Exam   Constitutional: She is oriented to person, place, and time  Vital signs are normal  She appears well-developed and well-nourished  She is cooperative  Non-toxic appearance  She does not have a sickly appearance  She does not appear ill  No distress  HENT:   Head: Normocephalic and atraumatic  Right Ear: Hearing, tympanic membrane, external ear and ear canal normal    Left Ear: Hearing, tympanic membrane, external ear and ear canal normal    Nose: Nose normal  No mucosal edema, rhinorrhea or sinus tenderness  Right sinus exhibits no maxillary sinus tenderness and no frontal sinus tenderness  Left sinus exhibits no maxillary sinus tenderness and no frontal sinus tenderness  Mouth/Throat: Uvula is midline and mucous membranes are normal  Normal dentition  Posterior oropharyngeal erythema (minimal) present  No oropharyngeal exudate or posterior oropharyngeal edema  Eyes: Pupils are equal, round, and reactive to light   Conjunctivae, EOM and lids are normal  Right eye exhibits no discharge  Left eye exhibits no discharge  Neck: Trachea normal and normal range of motion  No thyroid mass and no thyromegaly present  Cardiovascular: Normal rate, regular rhythm, S1 normal, S2 normal, normal heart sounds, intact distal pulses and normal pulses  Exam reveals no gallop and no friction rub  No murmur heard  Pulmonary/Chest: Effort normal and breath sounds normal  No respiratory distress  She has no wheezes  She has no rhonchi  She has no rales  She exhibits no tenderness  Musculoskeletal: Normal range of motion  She exhibits no edema  Lymphadenopathy:     She has no cervical adenopathy  Neurological: She is alert and oriented to person, place, and time  Skin: Skin is warm and dry  No rash noted  She is not diaphoretic  Psychiatric: She has a normal mood and affect  Her behavior is normal  Thought content normal    Nursing note and vitals reviewed

## 2019-02-22 NOTE — PATIENT INSTRUCTIONS
May alternate Tylenol and Ibuprofen as needed  Encourage fluids and rest    Saline nasal spray as needed  Humidify bedroom  Salt water gargles  Chloraseptic spray and lozenges as needed  Consider adding anti-histamines daily, ie  Claritin or Zyrtec  F/U with PCP if symptoms persist/worsen or go to nearest emergency department if any signs of distress  Cold Symptoms   WHAT YOU NEED TO KNOW:   A cold is an infection caused by a virus  The infection causes your upper respiratory system to become inflamed  Common symptoms of a cold include sneezing, dry throat, a stuffy nose, headache, watery eyes, and a cough  Your cough may be dry, or you may cough up mucus  You may also have muscle aches, joint pain, and tiredness  Rarely, you may have a fever  Most colds go away without treatment  DISCHARGE INSTRUCTIONS:   Return to the emergency department if:   · You have increased tiredness and weakness  · You are unable to eat  · Your heart is beating much faster than usual for you  · You see white spots in the back of your throat and your neck is swollen and sore to the touch  · You see pinpoint or larger reddish-purple dots on your skin  Contact your healthcare provider if:   · You have a fever higher than 102°F (38 9°C)  · You have new or worsening shortness of breath  · You have thick nasal drainage for more than 2 days  · Your symptoms do not improve or get worse within 5 days  · You have questions or concerns about your condition or care  Medicines: The following medicines may be suggested by your healthcare provider to decrease your cold symptoms  These medicines are available without a doctor's order  Ask which medicines to take and when to take them  Follow directions  · NSAIDs or acetaminophen  help to bring down a fever or decrease pain  · Decongestants  help decrease nasal stuffiness  · Antihistamines  help decrease sneezing and a runny nose       · Cough suppressants  help decrease how much you cough  · Expectorants  help loosen mucus so you can cough it up  · Take your medicine as directed  Contact your healthcare provider if you think your medicine is not helping or if you have side effects  Tell him of her if you are allergic to any medicine  Keep a list of the medicines, vitamins, and herbs you take  Include the amounts, and when and why you take them  Bring the list or the pill bottles to follow-up visits  Carry your medicine list with you in case of an emergency  Symptom relief: The following may help relieve cold symptoms, such as a dry throat and congestion:  · Gargle with mouthwash or warm salt water as directed  · Suck on throat lozenges or hard candy  · Use a cold or warm vaporizer or humidifier to ease your breathing  · Rest for at least 2 days and then as needed to decrease tiredness and weakness  · Use petroleum based jelly around your nostrils to decrease irritation from blowing your nose  Drink liquids:  Liquids will help thin and loosen thick mucus so you can cough it up  Liquids will also keep you hydrated  Ask your healthcare provider which liquids are best for you and how much to drink each day  Prevent the spread of germs: You can spread your cold germs to others for at least 3 days after your symptoms start  Wash your hands often  Do not share items, such as eating utensils  Cover your nose and mouth when you cough or sneeze using the crook of your elbow instead of your hands  Throw used tissues in the garbage  Do not smoke:  Smoking may worsen your symptoms and increase the length of time you feel sick  Talk with your healthcare provider if you need help to stop smoking  Follow up with your healthcare provider as directed:  Write down your questions so you remember to ask them during your visits     © 2017 [address] [employer] Information is for End User's use only and may not be sold, redistributed or otherwise used for commercial purposes  All illustrations and images included in CareNotes® are the copyrighted property of [employer]  or [employer]  The above information is an  only  It is not intended as medical advice for individual conditions or treatments  Talk to your doctor, nurse or pharmacist before following any medical regimen to see if it is safe and effective for you

## 2019-03-04 PROBLEM — K13.70 ORAL MUCOSAL LESION: Status: ACTIVE | Noted: 2019-03-04

## 2019-03-04 PROBLEM — H90.3 SENSORINEURAL HEARING LOSS (SNHL) OF BOTH EARS: Status: ACTIVE | Noted: 2019-03-04

## 2019-03-04 PROBLEM — R04.0 EPISTAXIS: Status: ACTIVE | Noted: 2019-03-04

## 2019-05-02 ENCOUNTER — TRANSCRIBE ORDERS (OUTPATIENT)
Dept: ADMINISTRATIVE | Facility: HOSPITAL | Age: 64
End: 2019-05-02

## 2019-05-02 ENCOUNTER — APPOINTMENT (OUTPATIENT)
Dept: LAB | Facility: HOSPITAL | Age: 64
End: 2019-05-02
Payer: COMMERCIAL

## 2019-05-02 DIAGNOSIS — E55.9 VITAMIN D DEFICIENCY: ICD-10-CM

## 2019-05-02 DIAGNOSIS — M81.0 OSTEOPOROSIS, UNSPECIFIED OSTEOPOROSIS TYPE, UNSPECIFIED PATHOLOGICAL FRACTURE PRESENCE: ICD-10-CM

## 2019-05-02 DIAGNOSIS — M81.0 OSTEOPOROSIS, UNSPECIFIED OSTEOPOROSIS TYPE, UNSPECIFIED PATHOLOGICAL FRACTURE PRESENCE: Primary | ICD-10-CM

## 2019-05-02 LAB
ALBUMIN SERPL BCP-MCNC: 4.4 G/DL (ref 3–5.2)
ALP SERPL-CCNC: 47 U/L (ref 43–122)
ALT SERPL W P-5'-P-CCNC: 19 U/L (ref 9–52)
ANION GAP SERPL CALCULATED.3IONS-SCNC: 10 MMOL/L (ref 5–14)
AST SERPL W P-5'-P-CCNC: 26 U/L (ref 14–36)
BASOPHILS # BLD AUTO: 0 THOUSANDS/ΜL (ref 0–0.1)
BASOPHILS NFR BLD AUTO: 0 % (ref 0–1)
BILIRUB SERPL-MCNC: 0.5 MG/DL
BUN SERPL-MCNC: 13 MG/DL (ref 5–25)
CALCIUM SERPL-MCNC: 9.4 MG/DL (ref 8.4–10.2)
CHLORIDE SERPL-SCNC: 105 MMOL/L (ref 97–108)
CHOLEST SERPL-MCNC: 229 MG/DL
CO2 SERPL-SCNC: 28 MMOL/L (ref 22–30)
CREAT SERPL-MCNC: 0.83 MG/DL (ref 0.6–1.2)
EOSINOPHIL # BLD AUTO: 0.1 THOUSAND/ΜL (ref 0–0.4)
EOSINOPHIL NFR BLD AUTO: 1 % (ref 0–6)
ERYTHROCYTE [DISTWIDTH] IN BLOOD BY AUTOMATED COUNT: 13.3 %
GFR SERPL CREATININE-BSD FRML MDRD: 76 ML/MIN/1.73SQ M
GLUCOSE SERPL-MCNC: 87 MG/DL (ref 70–99)
HCT VFR BLD AUTO: 38.7 % (ref 36–46)
HDLC SERPL-MCNC: 76 MG/DL (ref 40–59)
HGB BLD-MCNC: 12.8 G/DL (ref 12–16)
LDLC SERPL CALC-MCNC: 110 MG/DL
LYMPHOCYTES # BLD AUTO: 2.5 THOUSANDS/ΜL (ref 0.5–4)
LYMPHOCYTES NFR BLD AUTO: 36 % (ref 25–45)
MCH RBC QN AUTO: 30.4 PG (ref 26–34)
MCHC RBC AUTO-ENTMCNC: 33 G/DL (ref 31–36)
MCV RBC AUTO: 92 FL (ref 80–100)
MONOCYTES # BLD AUTO: 0.5 THOUSAND/ΜL (ref 0.2–0.9)
MONOCYTES NFR BLD AUTO: 7 % (ref 1–10)
NEUTROPHILS # BLD AUTO: 3.8 THOUSANDS/ΜL (ref 1.8–7.8)
NEUTS SEG NFR BLD AUTO: 55 % (ref 45–65)
NONHDLC SERPL-MCNC: 153 MG/DL
PLATELET # BLD AUTO: 261 THOUSANDS/UL (ref 150–450)
PMV BLD AUTO: 9.1 FL (ref 8.9–12.7)
POTASSIUM SERPL-SCNC: 4.3 MMOL/L (ref 3.6–5)
PROT SERPL-MCNC: 7.4 G/DL (ref 5.9–8.4)
RBC # BLD AUTO: 4.2 MILLION/UL (ref 4–5.2)
SODIUM SERPL-SCNC: 143 MMOL/L (ref 137–147)
TRIGL SERPL-MCNC: 215 MG/DL
TSH SERPL DL<=0.05 MIU/L-ACNC: 3.7 UIU/ML (ref 0.47–4.68)
WBC # BLD AUTO: 6.9 THOUSAND/UL (ref 4.5–11)

## 2019-05-02 PROCEDURE — 84443 ASSAY THYROID STIM HORMONE: CPT

## 2019-05-02 PROCEDURE — 36415 COLL VENOUS BLD VENIPUNCTURE: CPT

## 2019-05-02 PROCEDURE — 85025 COMPLETE CBC W/AUTO DIFF WBC: CPT

## 2019-05-02 PROCEDURE — 80053 COMPREHEN METABOLIC PANEL: CPT

## 2019-05-02 PROCEDURE — 80061 LIPID PANEL: CPT

## 2019-05-13 ENCOUNTER — HOSPITAL ENCOUNTER (OUTPATIENT)
Dept: MAMMOGRAPHY | Facility: MEDICAL CENTER | Age: 64
Discharge: HOME | End: 2019-05-13
Payer: COMMERCIAL

## 2019-05-13 VITALS — BODY MASS INDEX: 27.29 KG/M2 | HEIGHT: 58 IN | WEIGHT: 130 LBS

## 2019-05-13 DIAGNOSIS — Z12.39 SCREENING FOR BREAST CANCER: ICD-10-CM

## 2019-05-13 DIAGNOSIS — Z12.39 SCREENING FOR BREAST CANCER: Primary | ICD-10-CM

## 2019-05-13 PROCEDURE — 77067 SCR MAMMO BI INCL CAD: CPT

## 2019-05-20 ENCOUNTER — TELEPHONE (OUTPATIENT)
Dept: FAMILY MEDICINE CLINIC | Facility: MEDICAL CENTER | Age: 64
End: 2019-05-20

## 2019-05-22 ENCOUNTER — EVALUATION (OUTPATIENT)
Dept: PHYSICAL THERAPY | Facility: MEDICAL CENTER | Age: 64
End: 2019-05-22
Payer: COMMERCIAL

## 2019-05-22 DIAGNOSIS — G89.29 CHRONIC BILATERAL LOW BACK PAIN WITHOUT SCIATICA: ICD-10-CM

## 2019-05-22 DIAGNOSIS — M54.2 NECK PAIN: Primary | ICD-10-CM

## 2019-05-22 DIAGNOSIS — M54.50 CHRONIC BILATERAL LOW BACK PAIN WITHOUT SCIATICA: ICD-10-CM

## 2019-05-22 PROCEDURE — 97161 PT EVAL LOW COMPLEX 20 MIN: CPT | Performed by: PHYSICAL THERAPIST

## 2019-05-23 ENCOUNTER — TRANSCRIBE ORDERS (OUTPATIENT)
Dept: PHYSICAL THERAPY | Facility: MEDICAL CENTER | Age: 64
End: 2019-05-23

## 2019-05-23 DIAGNOSIS — M54.2 NECK PAIN: Primary | ICD-10-CM

## 2019-05-29 ENCOUNTER — OFFICE VISIT (OUTPATIENT)
Dept: PHYSICAL THERAPY | Facility: MEDICAL CENTER | Age: 64
End: 2019-05-29
Payer: COMMERCIAL

## 2019-05-29 DIAGNOSIS — M54.50 CHRONIC BILATERAL LOW BACK PAIN WITHOUT SCIATICA: ICD-10-CM

## 2019-05-29 DIAGNOSIS — G89.29 CHRONIC BILATERAL LOW BACK PAIN WITHOUT SCIATICA: ICD-10-CM

## 2019-05-29 DIAGNOSIS — M54.2 NECK PAIN: Primary | ICD-10-CM

## 2019-05-29 PROCEDURE — 97110 THERAPEUTIC EXERCISES: CPT | Performed by: PHYSICAL THERAPIST

## 2019-05-29 PROCEDURE — 97012 MECHANICAL TRACTION THERAPY: CPT | Performed by: PHYSICAL THERAPIST

## 2019-06-03 ENCOUNTER — OFFICE VISIT (OUTPATIENT)
Dept: PHYSICAL THERAPY | Facility: MEDICAL CENTER | Age: 64
End: 2019-06-03
Payer: COMMERCIAL

## 2019-06-03 DIAGNOSIS — G89.29 CHRONIC BILATERAL LOW BACK PAIN WITHOUT SCIATICA: ICD-10-CM

## 2019-06-03 DIAGNOSIS — M54.2 NECK PAIN: Primary | ICD-10-CM

## 2019-06-03 DIAGNOSIS — M54.50 CHRONIC BILATERAL LOW BACK PAIN WITHOUT SCIATICA: ICD-10-CM

## 2019-06-03 PROCEDURE — 97110 THERAPEUTIC EXERCISES: CPT | Performed by: PHYSICAL THERAPIST

## 2019-06-03 PROCEDURE — 97140 MANUAL THERAPY 1/> REGIONS: CPT | Performed by: PHYSICAL THERAPIST

## 2019-06-03 PROCEDURE — 97012 MECHANICAL TRACTION THERAPY: CPT | Performed by: PHYSICAL THERAPIST

## 2019-06-05 ENCOUNTER — OFFICE VISIT (OUTPATIENT)
Dept: PHYSICAL THERAPY | Facility: MEDICAL CENTER | Age: 64
End: 2019-06-05
Payer: COMMERCIAL

## 2019-06-05 ENCOUNTER — OFFICE VISIT (OUTPATIENT)
Dept: DERMATOLOGY | Facility: CLINIC | Age: 64
End: 2019-06-05
Payer: COMMERCIAL

## 2019-06-05 ENCOUNTER — OFFICE VISIT (OUTPATIENT)
Dept: FAMILY MEDICINE CLINIC | Facility: MEDICAL CENTER | Age: 64
End: 2019-06-05
Payer: COMMERCIAL

## 2019-06-05 VITALS
DIASTOLIC BLOOD PRESSURE: 80 MMHG | WEIGHT: 127.5 LBS | HEIGHT: 57 IN | HEART RATE: 60 BPM | SYSTOLIC BLOOD PRESSURE: 130 MMHG | BODY MASS INDEX: 27.51 KG/M2 | RESPIRATION RATE: 16 BRPM

## 2019-06-05 VITALS — BODY MASS INDEX: 27.59 KG/M2 | HEIGHT: 57 IN | TEMPERATURE: 98.7 F | WEIGHT: 127.87 LBS

## 2019-06-05 DIAGNOSIS — M54.50 CHRONIC BILATERAL LOW BACK PAIN WITHOUT SCIATICA: ICD-10-CM

## 2019-06-05 DIAGNOSIS — D48.9 NEOPLASM OF UNCERTAIN BEHAVIOR: Primary | ICD-10-CM

## 2019-06-05 DIAGNOSIS — L82.1 SEBORRHEIC KERATOSIS: ICD-10-CM

## 2019-06-05 DIAGNOSIS — L85.3 XEROSIS CUTIS: ICD-10-CM

## 2019-06-05 DIAGNOSIS — G89.29 CHRONIC BILATERAL LOW BACK PAIN WITHOUT SCIATICA: ICD-10-CM

## 2019-06-05 DIAGNOSIS — M54.2 NECK PAIN: Primary | ICD-10-CM

## 2019-06-05 DIAGNOSIS — Z01.419 ENCOUNTER FOR GYNECOLOGICAL EXAMINATION WITHOUT ABNORMAL FINDING: ICD-10-CM

## 2019-06-05 DIAGNOSIS — Z00.00 ROUTINE ADULT HEALTH MAINTENANCE: Primary | ICD-10-CM

## 2019-06-05 DIAGNOSIS — D18.01 CHERRY ANGIOMA: ICD-10-CM

## 2019-06-05 DIAGNOSIS — R03.0 ELEVATED BP WITHOUT DIAGNOSIS OF HYPERTENSION: ICD-10-CM

## 2019-06-05 DIAGNOSIS — E83.52 HYPERCALCEMIA: ICD-10-CM

## 2019-06-05 PROCEDURE — 97012 MECHANICAL TRACTION THERAPY: CPT | Performed by: PHYSICAL THERAPIST

## 2019-06-05 PROCEDURE — 97140 MANUAL THERAPY 1/> REGIONS: CPT | Performed by: PHYSICAL THERAPIST

## 2019-06-05 PROCEDURE — 11102 TANGNTL BX SKIN SINGLE LES: CPT | Performed by: DERMATOLOGY

## 2019-06-05 PROCEDURE — 88305 TISSUE EXAM BY PATHOLOGIST: CPT | Performed by: PATHOLOGY

## 2019-06-05 PROCEDURE — 99396 PREV VISIT EST AGE 40-64: CPT | Performed by: FAMILY MEDICINE

## 2019-06-05 PROCEDURE — 99204 OFFICE O/P NEW MOD 45 MIN: CPT | Performed by: DERMATOLOGY

## 2019-06-05 PROCEDURE — 11103 TANGNTL BX SKIN EA SEP/ADDL: CPT | Performed by: DERMATOLOGY

## 2019-06-05 PROCEDURE — 97110 THERAPEUTIC EXERCISES: CPT | Performed by: PHYSICAL THERAPIST

## 2019-06-07 ENCOUNTER — OFFICE VISIT (OUTPATIENT)
Dept: PHYSICAL THERAPY | Facility: MEDICAL CENTER | Age: 64
End: 2019-06-07
Payer: COMMERCIAL

## 2019-06-07 DIAGNOSIS — M54.2 NECK PAIN: Primary | ICD-10-CM

## 2019-06-07 DIAGNOSIS — M54.50 CHRONIC BILATERAL LOW BACK PAIN WITHOUT SCIATICA: ICD-10-CM

## 2019-06-07 DIAGNOSIS — G89.29 CHRONIC BILATERAL LOW BACK PAIN WITHOUT SCIATICA: ICD-10-CM

## 2019-06-07 PROCEDURE — 97140 MANUAL THERAPY 1/> REGIONS: CPT

## 2019-06-07 PROCEDURE — 97012 MECHANICAL TRACTION THERAPY: CPT

## 2019-06-07 PROCEDURE — 97110 THERAPEUTIC EXERCISES: CPT

## 2019-06-10 ENCOUNTER — OFFICE VISIT (OUTPATIENT)
Dept: PHYSICAL THERAPY | Facility: MEDICAL CENTER | Age: 64
End: 2019-06-10
Payer: COMMERCIAL

## 2019-06-10 DIAGNOSIS — M54.50 CHRONIC BILATERAL LOW BACK PAIN WITHOUT SCIATICA: ICD-10-CM

## 2019-06-10 DIAGNOSIS — G89.29 CHRONIC BILATERAL LOW BACK PAIN WITHOUT SCIATICA: ICD-10-CM

## 2019-06-10 DIAGNOSIS — M54.2 NECK PAIN: Primary | ICD-10-CM

## 2019-06-10 PROCEDURE — 97140 MANUAL THERAPY 1/> REGIONS: CPT | Performed by: PHYSICAL THERAPIST

## 2019-06-11 ENCOUNTER — TELEPHONE (OUTPATIENT)
Dept: DERMATOLOGY | Facility: CLINIC | Age: 64
End: 2019-06-11

## 2019-06-11 ENCOUNTER — HOSPITAL ENCOUNTER (EMERGENCY)
Facility: HOSPITAL | Age: 64
Discharge: HOME | End: 2019-06-11
Attending: EMERGENCY MEDICINE | Admitting: EMERGENCY MEDICINE
Payer: COMMERCIAL

## 2019-06-11 VITALS
SYSTOLIC BLOOD PRESSURE: 167 MMHG | WEIGHT: 129.19 LBS | TEMPERATURE: 96.5 F | DIASTOLIC BLOOD PRESSURE: 71 MMHG | RESPIRATION RATE: 18 BRPM | BODY MASS INDEX: 27.96 KG/M2 | HEART RATE: 67 BPM | OXYGEN SATURATION: 99 %

## 2019-06-11 DIAGNOSIS — T14.8XXA WOUND INFECTION: ICD-10-CM

## 2019-06-11 DIAGNOSIS — T14.8XXA INFECTED WOUND: Primary | ICD-10-CM

## 2019-06-11 DIAGNOSIS — L08.9 WOUND INFECTION: ICD-10-CM

## 2019-06-11 DIAGNOSIS — Z51.89 VISIT FOR WOUND CHECK: Primary | ICD-10-CM

## 2019-06-11 DIAGNOSIS — L08.9 INFECTED WOUND: Primary | ICD-10-CM

## 2019-06-11 PROCEDURE — 90715 TDAP VACCINE 7 YRS/> IM: CPT | Performed by: EMERGENCY MEDICINE

## 2019-06-11 PROCEDURE — 99283 EMERGENCY DEPT VISIT LOW MDM: CPT

## 2019-06-11 PROCEDURE — 87070 CULTURE OTHR SPECIMN AEROBIC: CPT | Performed by: EMERGENCY MEDICINE

## 2019-06-11 PROCEDURE — 87205 SMEAR GRAM STAIN: CPT | Performed by: EMERGENCY MEDICINE

## 2019-06-11 PROCEDURE — 90471 IMMUNIZATION ADMIN: CPT

## 2019-06-11 PROCEDURE — 99281 EMR DPT VST MAYX REQ PHY/QHP: CPT | Performed by: EMERGENCY MEDICINE

## 2019-06-11 RX ADMIN — TETANUS TOXOID, REDUCED DIPHTHERIA TOXOID AND ACELLULAR PERTUSSIS VACCINE, ADSORBED 0.5 ML: 5; 2.5; 8; 8; 2.5 SUSPENSION INTRAMUSCULAR at 12:47

## 2019-06-12 ENCOUNTER — OFFICE VISIT (OUTPATIENT)
Dept: DERMATOLOGY | Facility: CLINIC | Age: 64
End: 2019-06-12
Payer: COMMERCIAL

## 2019-06-12 ENCOUNTER — OFFICE VISIT (OUTPATIENT)
Dept: PHYSICAL THERAPY | Facility: MEDICAL CENTER | Age: 64
End: 2019-06-12
Payer: COMMERCIAL

## 2019-06-12 VITALS — TEMPERATURE: 97.7 F | BODY MASS INDEX: 27 KG/M2 | HEIGHT: 58 IN

## 2019-06-12 DIAGNOSIS — M54.50 CHRONIC BILATERAL LOW BACK PAIN WITHOUT SCIATICA: ICD-10-CM

## 2019-06-12 DIAGNOSIS — M54.2 NECK PAIN: Primary | ICD-10-CM

## 2019-06-12 DIAGNOSIS — G89.29 CHRONIC BILATERAL LOW BACK PAIN WITHOUT SCIATICA: ICD-10-CM

## 2019-06-12 DIAGNOSIS — T14.8XXA WOUND INFECTION: Primary | ICD-10-CM

## 2019-06-12 DIAGNOSIS — L08.9 WOUND INFECTION: Primary | ICD-10-CM

## 2019-06-12 PROCEDURE — 97110 THERAPEUTIC EXERCISES: CPT | Performed by: PHYSICAL THERAPIST

## 2019-06-12 PROCEDURE — 99214 OFFICE O/P EST MOD 30 MIN: CPT | Performed by: DERMATOLOGY

## 2019-06-12 PROCEDURE — 97140 MANUAL THERAPY 1/> REGIONS: CPT | Performed by: PHYSICAL THERAPIST

## 2019-06-12 RX ORDER — CEPHALEXIN 500 MG/1
CAPSULE ORAL
Qty: 42 CAPSULE | Refills: 0 | Status: SHIPPED | OUTPATIENT
Start: 2019-06-12 | End: 2019-06-26

## 2019-06-13 LAB
BACTERIA WND AEROBE CULT: NORMAL
GRAM STN SPEC: NORMAL

## 2019-06-17 ENCOUNTER — OFFICE VISIT (OUTPATIENT)
Dept: PHYSICAL THERAPY | Facility: MEDICAL CENTER | Age: 64
End: 2019-06-17
Payer: COMMERCIAL

## 2019-06-17 DIAGNOSIS — M54.50 CHRONIC BILATERAL LOW BACK PAIN WITHOUT SCIATICA: ICD-10-CM

## 2019-06-17 DIAGNOSIS — G89.29 CHRONIC BILATERAL LOW BACK PAIN WITHOUT SCIATICA: ICD-10-CM

## 2019-06-17 DIAGNOSIS — M54.2 NECK PAIN: Primary | ICD-10-CM

## 2019-06-17 PROCEDURE — 97110 THERAPEUTIC EXERCISES: CPT | Performed by: PHYSICAL THERAPIST

## 2019-06-17 PROCEDURE — 97140 MANUAL THERAPY 1/> REGIONS: CPT | Performed by: PHYSICAL THERAPIST

## 2019-06-21 ENCOUNTER — APPOINTMENT (OUTPATIENT)
Dept: PHYSICAL THERAPY | Facility: MEDICAL CENTER | Age: 64
End: 2019-06-21
Payer: COMMERCIAL

## 2019-06-24 ENCOUNTER — TELEPHONE (OUTPATIENT)
Dept: DERMATOLOGY | Facility: CLINIC | Age: 64
End: 2019-06-24

## 2019-06-24 ENCOUNTER — CONSULT (OUTPATIENT)
Dept: HEMATOLOGY ONCOLOGY | Facility: CLINIC | Age: 64
End: 2019-06-24
Payer: COMMERCIAL

## 2019-06-24 ENCOUNTER — TELEPHONE (OUTPATIENT)
Dept: FAMILY MEDICINE CLINIC | Facility: MEDICAL CENTER | Age: 64
End: 2019-06-24

## 2019-06-24 VITALS
WEIGHT: 126 LBS | HEIGHT: 57 IN | TEMPERATURE: 98.6 F | BODY MASS INDEX: 27.18 KG/M2 | RESPIRATION RATE: 14 BRPM | DIASTOLIC BLOOD PRESSURE: 80 MMHG | HEART RATE: 67 BPM | SYSTOLIC BLOOD PRESSURE: 142 MMHG

## 2019-06-24 DIAGNOSIS — C44.712 BASAL CELL CARCINOMA (BCC) OF SKIN OF RIGHT LOWER EXTREMITY INCLUDING HIP: Primary | ICD-10-CM

## 2019-06-24 DIAGNOSIS — D03.72 MELANOMA IN SITU OF LEFT LOWER EXTREMITY INCLUDING HIP (HCC): ICD-10-CM

## 2019-06-24 PROCEDURE — 99245 OFF/OP CONSLTJ NEW/EST HI 55: CPT | Performed by: PHYSICIAN ASSISTANT

## 2019-07-01 ENCOUNTER — OFFICE VISIT (OUTPATIENT)
Dept: PHYSICAL THERAPY | Facility: MEDICAL CENTER | Age: 64
End: 2019-07-01
Payer: COMMERCIAL

## 2019-07-01 DIAGNOSIS — G89.29 CHRONIC BILATERAL LOW BACK PAIN WITHOUT SCIATICA: ICD-10-CM

## 2019-07-01 DIAGNOSIS — M54.50 CHRONIC BILATERAL LOW BACK PAIN WITHOUT SCIATICA: ICD-10-CM

## 2019-07-01 DIAGNOSIS — M54.2 NECK PAIN: Primary | ICD-10-CM

## 2019-07-01 PROCEDURE — 97140 MANUAL THERAPY 1/> REGIONS: CPT | Performed by: PHYSICAL THERAPIST

## 2019-07-01 PROCEDURE — 97110 THERAPEUTIC EXERCISES: CPT | Performed by: PHYSICAL THERAPIST

## 2019-07-01 PROCEDURE — 97112 NEUROMUSCULAR REEDUCATION: CPT | Performed by: PHYSICAL THERAPIST

## 2019-07-01 NOTE — PROGRESS NOTES
Daily Note     Today's date: 2019  Patient name: Darlene Rodgers  : 1955  MRN: [id number]  Referring provider: Mario Hamlin MD  Dx:   Encounter Diagnosis     ICD-10-CM    1  Neck pain M54 2    2  Chronic bilateral low back pain without sciatica M54 5     G89 29                   Subjective: Patient reports she is doing well  Her LBP is doing better, but she is still having some neck discomfort  She reports decreased ROM with L side bending and L rotation on the L side of her neck  Patient reported she felt less stiff after heat, STM, and stretching  Objective: See treatment diary below      Assessment: Patient's LB is responding well to previous treatments for this episode of care  Her neck motion is still limited during L rotation and L side bending causing pain on the L side of her neck with both motions  Patient has soft tissue restrictions in her L UT and subocciptal muscles  Continuing to work on her soft tissue restrictions will help decrease her neck pain and increase her ROM  Tolerated treatment well including introduction of cervical retraction exercise and stretches  Patient would benefit from continued PT to improve her ROM, decrease her pain, and to have an optimal return to function  Plan: Continue per plan of care        Daily Treatment Diary     Manual   6/3 6/5 6/7 6/10 6/12 6/17 7/1     Cervical opening mobilizations  Af AF AF AF AF AF      Suboccipital Release        KD     UT STM        KD                                   Exercise Diary              LTR 30 30 30 30  30 30      Bridges 2x10 2x10 3X10 3x10  3X10 3X10      Supine clamshells Purple  2X10 Purple  3x10 Purple  3x10 Purple 3x10  3X10 3X10      Thoracic rotation seated  20 20 20 20  20 20      Seated scap retraction Pink  2X10 Pink   3X10 Pink   3X10 Pink 3x10  Green  3X10 Green  3x10 3x10     Self SNAG w/ towel        10x     Supine Cervical retraction        5sec hold 10x Modalities              Lumbar traction 10 min   80#/40# 10 min  10 min same  10 min same         MHP 10 min post   410 min post MHP with TENS 2

## 2019-07-08 ENCOUNTER — APPOINTMENT (OUTPATIENT)
Dept: PHYSICAL THERAPY | Facility: MEDICAL CENTER | Age: 64
End: 2019-07-08
Payer: COMMERCIAL

## 2019-07-11 ENCOUNTER — CONSULT (OUTPATIENT)
Dept: SURGICAL ONCOLOGY | Facility: CLINIC | Age: 64
End: 2019-07-11
Payer: COMMERCIAL

## 2019-07-11 VITALS
TEMPERATURE: 98 F | DIASTOLIC BLOOD PRESSURE: 84 MMHG | RESPIRATION RATE: 16 BRPM | BODY MASS INDEX: 27.4 KG/M2 | WEIGHT: 127 LBS | SYSTOLIC BLOOD PRESSURE: 126 MMHG | HEIGHT: 57 IN

## 2019-07-11 DIAGNOSIS — C44.712 BASAL CELL CARCINOMA (BCC) OF SKIN OF RIGHT LOWER EXTREMITY INCLUDING HIP: ICD-10-CM

## 2019-07-11 DIAGNOSIS — D03.72 MELANOMA IN SITU OF LEFT LOWER EXTREMITY INCLUDING HIP (HCC): ICD-10-CM

## 2019-07-11 DIAGNOSIS — D03.72 MELANOMA IN SITU OF LEFT LOWER LEG (HCC): Primary | ICD-10-CM

## 2019-07-11 DIAGNOSIS — C44.712 BASAL CELL CARCINOMA (BCC) OF RIGHT LOWER LEG: ICD-10-CM

## 2019-07-11 PROCEDURE — 99244 OFF/OP CNSLTJ NEW/EST MOD 40: CPT | Performed by: SURGERY

## 2019-07-11 NOTE — PROGRESS NOTES
Surgical Oncology Consult       [facility] [address] [address] [phone]    Dolph Sale  1955  [id number]  [facility]  [address] [facility] [place]  [facility] [id number] [phone]    Diagnoses and all orders for this visit:    Melanoma in situ of left lower leg ([address] )    Basal cell carcinoma (BCC) of right lower leg    Basal cell carcinoma (BCC) of skin of right lower extremity including hip  -     Ambulatory referral to Surgical Oncology    Melanoma in situ of left lower extremity including hip [facility])  -     Ambulatory referral to Surgical Oncology        Chief Complaint   Patient presents with    Consult     Basal cell RLL,  melanoma in situ left shin  Pt states the lesion on her right leg was there her whole life, her mother told her it was a birthmark  Return in about 1 week (around 7/18/2019) for Procedure  No history exists  History of Present Illness:  81-year-old female who had 2 lesions on her lower legs for many years  She brought this to the attention of her dermatologist   Biopsy was performed  On the right lower leg, basal cell carcinoma was I identified  There is no comment regarding the margins  On the by there was melanoma in situ that was close to one of the lateral margins  She comes in now to discuss further therapy  She does have a history of blistering sunburns in the past   She denies any new abdominal pain, nausea, vomiting, back pain, bone pain, headaches, or cough  Review of Systems  Complete ROS Surg Onc:   Constitutional: The patient denies new or recent history of general fatigue, no recent weight loss, no change in appetite  Eyes: No complaints of visual problems, no scleral icterus     ENT: no complaints of ear pain, no hoarseness, no difficulty swallowing,  no tinnitus and no new masses in head, oral cavity, or neck  Cardiovascular: No complaints of chest pain, no palpitations, no ankle edema  Respiratory: No complaints of shortness of breath, no cough  Gastrointestinal: No complaints of jaundice, no bloody stools, no pale stools  Genitourinary: No complaints of dysuria, no hematuria, no nocturia, no frequent urination, no urethral discharge  Musculoskeletal: No complaints of weakness, paralysis, joint stiffness or arthralgias  Integumentary: No complaints of rash, no new lesions  Neurological: No complaints of convulsions, no seizures, no dizziness  Hematologic/Lymphatic: No complaints of easy bruising  Endocrine:  No hot or cold intolerance  No polydipsia, polyphagia, or polyuria  Allergy/immunology:  No environmental allergies  No food allergies  Not immunocompromised  Skin:  No pallor or rash  No wound  Patient Active Problem List   Diagnosis    Elevated BP without diagnosis of hypertension    Hypercalcemia    Osteoporosis    Sensorineural hearing loss (SNHL) of both ears    Oral mucosal lesion    Epistaxis    Melanoma in situ of left lower leg (HCC)    Basal cell carcinoma (BCC) of right lower leg     History reviewed  No pertinent past medical history    Past Surgical History:   Procedure Laterality Date    COCHLEAR IMPLANT      COLONOSCOPY  2017    COLONOSCOPY  2010    fiberoptic; follow up at age; 2017 10 year f/u    HYSTERECTOMY      TONSILLECTOMY       Family History   Problem Relation Age of Onset    Osteoporosis Mother     Diabetes Father         mellitus    Heart disease Father     Hypertension Father     Lung cancer Maternal Grandmother         76s    No Known Problems Paternal Grandmother     No Known Problems Maternal Aunt     Lung cancer Maternal Grandfather     Prostate cancer Paternal Grandfather     Colon cancer Other     Pancreatic cancer Other      Social History     Socioeconomic History    Marital status: Single     Spouse name: Not on file    Number of children: Not on file    Years of education: Not on file    Highest education level: Not on file   Occupational History    Not on file   Social Needs    Financial resource strain: Not on file    Food insecurity:     Worry: Not on file     Inability: Not on file    Transportation needs:     Medical: Not on file     Non-medical: Not on file   Tobacco Use    Smoking status: Never Smoker    Smokeless tobacco: Never Used   Substance and Sexual Activity    Alcohol use: No    Drug use: No    Sexual activity: Not on file   Lifestyle    Physical activity:     Days per week: Not on file     Minutes per session: Not on file    Stress: Not on file   Relationships    Social connections:     Talks on phone: Not on file     Gets together: Not on file     Attends Congregation service: Not on file     Active member of club or organization: Not on file     Attends meetings of clubs or organizations: Not on file     Relationship status: Not on file    Intimate partner violence:     Fear of current or ex partner: Not on file     Emotionally abused: Not on file     Physically abused: Not on file     Forced sexual activity: Not on file   Other Topics Concern    Not on file   Social History Narrative    Caffeine use       Current Outpatient Medications:     Calcium Citrate-Vitamin D (CITRACAL + D PO), Take by mouth, Disp: , Rfl:     Denosumab (PROLIA SC), Inject under the skin, Disp: , Rfl:     Multiple Vitamin (MULTIVITAMIN) capsule, Take 1 capsule by mouth daily, Disp: , Rfl:     mupirocin (BACTROBAN) 2 % ointment, Apply topically three times a day to affected area , Disp: 30 g, Rfl: 1  Allergies   Allergen Reactions    Codeine      Reaction Date: [id number];     Darvon [Propoxyphene]     Demerol [Meperidine]     Valium [Diazepam]     Aspirin      Reaction Date: [id number];      Vitals:    07/11/19 1443   BP: 126/84   Resp: 16   Temp: 98 °F (36 7 °C)       Physical Exam   Constitutional: General appearance: The Patient is well-developed and well-nourished who appears the stated age in no acute distress  Patient is pleasant and talkative  HEENT:  Normocephalic  Sclerae are anicteric  Mucous membranes are moist  Neck is supple without adenopathy  No JVD  Chest: The lungs are clear to auscultation  Cardiac: Heart is regular rate  Abdomen: Abdomen is soft, non-tender, non-distended and without masses  Extremities: There is no clubbing or cyanosis  There is no edema  Symmetric  Neuro: Grossly nonfocal  Gait is normal      Lymphatic: No evidence of cervical adenopathy bilaterally  No evidence of axillary adenopathy bilaterally  No evidence of inguinal adenopathy bilaterally  Skin: Warm, anicteric    there are healing biopsy sites on both lower extremities  No evidence of satellite lesions  Psych:  Patient is pleasant and talkative  Breasts:      Pathology:  Final Diagnosis   A  Skin, Right Medial Shin, shave biopsy:  - Basal cell carcinoma, multicentric type       B  Skin, Left Anterior Shin, shave biopsy:  - Melanoma in-situ; see note      Note:  Sections show an irregular single and nested melanocytic proliferation  On section planes studied, lesional cells are close to one lateral tissue edge          Interpretation performed at [facility], [address] [place] [address], [address]            Electronically signed by Lenora Mendoza MD on 6/12/2019 at  7:20 PM         Labs:      Imaging  No results found  I reviewed the above laboratory and imaging data  Discussion/Summary:  81-year-old female with melanoma in situ of the left leg and basal cell carcinoma of the right leg  For the melanoma in situ, she needs this widely excised with 5 mm margins  For the basal cell carcinoma, it is unclear based on the pathology report if this has been completely removed with negative margins    We have contacted the pathology Department to see if they can comment on the margin status  If that margin is negative, she will just need wide excision of the left leg melanoma in situ  If that margin is positive she will need wider excision  I am not sure that the skin will come together without significant tension so she will likely need to see Plastic surgery for coverage/closure  I explained the risks of wide excision of both of the sites to include bleeding, infection, recurrence, need for further surgery, and wound complications  Informed consent was obtained  I will contact her with the results of new pathology and at that time we will coordinate the treatment plans  She is agreeable to this  All her questions were answered

## 2019-07-11 NOTE — LETTER
July 11, 2019     Mari Fuller MD  [address] [facility] [address]    Patient: Margo Lemus   YOB: 1955   Date of Visit: 7/11/2019       Dear Dr Clarisa Poon: Thank you for referring Cornel Landrumers to me for evaluation  Below are my notes for this consultation  If you have questions, please do not hesitate to call me  I look forward to following your patient along with you  Sincerely,        Jessica Shetty MD        CC: MD Nidia Mcmanus PA-C Jennelle Casa, MD  7/11/2019  3:25 PM  Sign at close encounter               Surgical Oncology Consult       [address] [facility] [address] [address] [phone]    Margo Lemus  1955  [id number]  [address] [facility] Genaro Sanchez[address] [address] [phone]    Diagnoses and all orders for this visit:    Melanoma in situ of left lower leg ([address] )    Basal cell carcinoma (BCC) of right lower leg    Basal cell carcinoma (BCC) of skin of right lower extremity including hip  -     Ambulatory referral to Surgical Oncology    Melanoma in situ of left lower extremity including hip [facility])  -     Ambulatory referral to Surgical Oncology        Chief Complaint   Patient presents with    Consult     Basal cell RLL,  melanoma in situ left shin  Pt states the lesion on her right leg was there her whole life, her mother told her it was a birthmark  Return in about 1 week (around 7/18/2019) for Procedure  No history exists  History of Present Illness:  46-year-old female who had 2 lesions on her lower legs for many years  She brought this to the attention of her dermatologist   Biopsy was performed  On the right lower leg, basal cell carcinoma was I identified  There is no comment regarding the margins    On the by there was melanoma in situ that was close to one of the lateral margins  She comes in now to discuss further therapy  She does have a history of blistering sunburns in the past   She denies any new abdominal pain, nausea, vomiting, back pain, bone pain, headaches, or cough  Review of Systems  Complete ROS Surg Onc:   Constitutional: The patient denies new or recent history of general fatigue, no recent weight loss, no change in appetite  Eyes: No complaints of visual problems, no scleral icterus  ENT: no complaints of ear pain, no hoarseness, no difficulty swallowing,  no tinnitus and no new masses in head, oral cavity, or neck  Cardiovascular: No complaints of chest pain, no palpitations, no ankle edema  Respiratory: No complaints of shortness of breath, no cough  Gastrointestinal: No complaints of jaundice, no bloody stools, no pale stools  Genitourinary: No complaints of dysuria, no hematuria, no nocturia, no frequent urination, no urethral discharge  Musculoskeletal: No complaints of weakness, paralysis, joint stiffness or arthralgias  Integumentary: No complaints of rash, no new lesions  Neurological: No complaints of convulsions, no seizures, no dizziness  Hematologic/Lymphatic: No complaints of easy bruising  Endocrine:  No hot or cold intolerance  No polydipsia, polyphagia, or polyuria  Allergy/immunology:  No environmental allergies  No food allergies  Not immunocompromised  Skin:  No pallor or rash  No wound  Patient Active Problem List   Diagnosis    Elevated BP without diagnosis of hypertension    Hypercalcemia    Osteoporosis    Sensorineural hearing loss (SNHL) of both ears    Oral mucosal lesion    Epistaxis    Melanoma in situ of left lower leg (HCC)    Basal cell carcinoma (BCC) of right lower leg     History reviewed  No pertinent past medical history    Past Surgical History:   Procedure Laterality Date    COCHLEAR IMPLANT      COLONOSCOPY  2017    COLONOSCOPY  2010    fiberoptic; follow up at age; 2017 12 year f/u    HYSTERECTOMY      TONSILLECTOMY       Family History   Problem Relation Age of Onset    Osteoporosis Mother     Diabetes Father         mellitus    Heart disease Father     Hypertension Father     Lung cancer Maternal Grandmother         76s    No Known Problems Paternal Grandmother     No Known Problems Maternal Aunt     Lung cancer Maternal Grandfather     Prostate cancer Paternal Grandfather     Colon cancer Other     Pancreatic cancer Other      Social History     Socioeconomic History    Marital status: Single     Spouse name: Not on file    Number of children: Not on file    Years of education: Not on file    Highest education level: Not on file   Occupational History    Not on file   Social Needs    Financial resource strain: Not on file    Food insecurity:     Worry: Not on file     Inability: Not on file    Transportation needs:     Medical: Not on file     Non-medical: Not on file   Tobacco Use    Smoking status: Never Smoker    Smokeless tobacco: Never Used   Substance and Sexual Activity    Alcohol use: No    Drug use: No    Sexual activity: Not on file   Lifestyle    Physical activity:     Days per week: Not on file     Minutes per session: Not on file    Stress: Not on file   Relationships    Social connections:     Talks on phone: Not on file     Gets together: Not on file     Attends Buddhism service: Not on file     Active member of club or organization: Not on file     Attends meetings of clubs or organizations: Not on file     Relationship status: Not on file    Intimate partner violence:     Fear of current or ex partner: Not on file     Emotionally abused: Not on file     Physically abused: Not on file     Forced sexual activity: Not on file   Other Topics Concern    Not on file   Social History Narrative    Caffeine use       Current Outpatient Medications:     Calcium Citrate-Vitamin D (CITRACAL + D PO), Take by mouth, Disp: , Rfl:     Denosumab (PROLIA SC), Inject under the skin, Disp: , Rfl:     Multiple Vitamin (MULTIVITAMIN) capsule, Take 1 capsule by mouth daily, Disp: , Rfl:     mupirocin (BACTROBAN) 2 % ointment, Apply topically three times a day to affected area , Disp: 30 g, Rfl: 1  Allergies   Allergen Reactions    Codeine      Reaction Date: [id number];     Darvon [Propoxyphene]     Demerol [Meperidine]     Valium [Diazepam]     Aspirin      Reaction Date: [id number];      Vitals:    07/11/19 1443   BP: 126/84   Resp: 16   Temp: 98 °F (36 7 °C)       Physical Exam   Constitutional: General appearance: The Patient is well-developed and well-nourished who appears the stated age in no acute distress  Patient is pleasant and talkative  HEENT:  Normocephalic  Sclerae are anicteric  Mucous membranes are moist  Neck is supple without adenopathy  No JVD  Chest: The lungs are clear to auscultation  Cardiac: Heart is regular rate  Abdomen: Abdomen is soft, non-tender, non-distended and without masses  Extremities: There is no clubbing or cyanosis  There is no edema  Symmetric  Neuro: Grossly nonfocal  Gait is normal      Lymphatic: No evidence of cervical adenopathy bilaterally  No evidence of axillary adenopathy bilaterally  No evidence of inguinal adenopathy bilaterally  Skin: Warm, anicteric     there are healing biopsy sites on both lower extremities  No evidence of satellite lesions  Psych:  Patient is pleasant and talkative  Breasts:      Pathology:  Final Diagnosis   A  Skin, Right Medial Shin, shave biopsy:  - Basal cell carcinoma, multicentric type       B  Skin, Left Anterior Shin, shave biopsy:  - Melanoma in-situ; see note      Note:  Sections show an irregular single and nested melanocytic proliferation    On section planes studied, lesional cells are close to one lateral tissue edge          Interpretation performed at [facility], [address] [place] [address]            Electronically signed by Celestina Holland MD on 6/12/2019 at  7:20 PM         Labs:      Imaging  No results found  I reviewed the above laboratory and imaging data  Discussion/Summary:  28-year-old female with melanoma in situ of the left leg and basal cell carcinoma of the right leg  For the melanoma in situ, she needs this widely excised with 5 mm margins  For the basal cell carcinoma, it is unclear based on the pathology report if this has been completely removed with negative margins  We have contacted the pathology Department to see if they can comment on the margin status  If that margin is negative, she will just need wide excision of the left leg melanoma in situ  If that margin is positive she will need wider excision  I am not sure that the skin will come together without significant tension so she will likely need to see Plastic surgery for coverage/closure  I explained the risks of wide excision of both of the sites to include bleeding, infection, recurrence, need for further surgery, and wound complications  Informed consent was obtained  I will contact her with the results of new pathology and at that time we will coordinate the treatment plans  She is agreeable to this  All her questions were answered

## 2019-07-17 ENCOUNTER — PROCEDURE VISIT (OUTPATIENT)
Dept: SURGICAL ONCOLOGY | Facility: CLINIC | Age: 64
End: 2019-07-17
Payer: COMMERCIAL

## 2019-07-17 VITALS
HEART RATE: 59 BPM | HEIGHT: 57 IN | DIASTOLIC BLOOD PRESSURE: 80 MMHG | BODY MASS INDEX: 27.18 KG/M2 | RESPIRATION RATE: 16 BRPM | TEMPERATURE: 97.8 F | WEIGHT: 126 LBS | SYSTOLIC BLOOD PRESSURE: 128 MMHG

## 2019-07-17 DIAGNOSIS — D03.72 MELANOMA IN SITU OF LEFT LOWER LEG (HCC): Primary | ICD-10-CM

## 2019-07-17 PROCEDURE — 88305 TISSUE EXAM BY PATHOLOGIST: CPT | Performed by: PATHOLOGY

## 2019-07-17 PROCEDURE — 11402 EXC TR-EXT B9+MARG 1.1-2 CM: CPT | Performed by: SURGERY

## 2019-07-17 PROCEDURE — 12032 INTMD RPR S/A/T/EXT 2.6-7.5: CPT | Performed by: SURGERY

## 2019-07-17 NOTE — LETTER
July 17, 2019     Patient: Jada Vargas   YOB: 1955   Date of Visit: 7/17/2019       To Whom it May Concern:    So Camacho is under my professional care  She was seen in my office on 7/17/2019  She may return to work on 7/18/2019 without restrictions  If you have any questions or concerns, please don't hesitate to call           Sincerely,          Maggie Michaels MD        CC: No Recipients

## 2019-07-17 NOTE — PROGRESS NOTES
Surgical Oncology Follow Up       [facility]  [facility] [place]  [address] [facility] [address]  [facility] [address]    Danielle Sandoval  1955  [id number]  [address]  [facility] [place]  [address] [id number]    Diagnoses and all orders for this visit:    Melanoma in situ of left lower leg [facility])        Chief Complaint   Patient presents with    Follow-up     Procedure       Return in about 3 weeks (around 8/7/2019) for Office Visit, with Yu Thompson  Melanoma in situ of left lower leg ([facility] [address] )    6/5/2019 Initial Diagnosis     Melanoma in situ of left lower leg (HCC)        Basal cell carcinoma (BCC) of right lower leg    7/11/2019 Initial Diagnosis     Basal cell carcinoma (BCC) of right lower leg             History of Present Illness:  Patient returns for wide excision of her melanoma in situ  The pathology on the basal cell was reviewed and the margin was focally positive  Review of Systems  Complete ROS Surg Onc:   Complete ROS Surg Onc:   Constitutional: The patient denies new or recent history of general fatigue, no recent weight loss, no change in appetite  Eyes: No complaints of visual problems, no scleral icterus  ENT: no complaints of ear pain, no hoarseness, no difficulty swallowing,  no tinnitus and no new masses in head, oral cavity, or neck  Cardiovascular: No complaints of chest pain, no palpitations, no ankle edema  Respiratory: No complaints of shortness of breath, no cough  Gastrointestinal: No complaints of jaundice, no bloody stools, no pale stools  Genitourinary: No complaints of dysuria, no hematuria, no nocturia, no frequent urination, no urethral discharge  Musculoskeletal: No complaints of weakness, paralysis, joint stiffness or arthralgias  Integumentary: No complaints of rash, no new lesions     Neurological: No complaints of convulsions, no seizures, no dizziness  Hematologic/Lymphatic: No complaints of easy bruising  Endocrine:  No hot or cold intolerance  No polydipsia, polyphagia, or polyuria  Allergy/immunology:  No environmental allergies  No food allergies  Not immunocompromised  Skin:  No pallor or rash  No wound  Patient Active Problem List   Diagnosis    Elevated BP without diagnosis of hypertension    Hypercalcemia    Osteoporosis    Sensorineural hearing loss (SNHL) of both ears    Oral mucosal lesion    Epistaxis    Melanoma in situ of left lower leg (HCC)    Basal cell carcinoma (BCC) of right lower leg     No past medical history on file    Past Surgical History:   Procedure Laterality Date    COCHLEAR IMPLANT      COLONOSCOPY  2017    COLONOSCOPY  2010    fiberoptic; follow up at age; 2017 10 year f/u    HYSTERECTOMY      TONSILLECTOMY       Family History   Problem Relation Age of Onset    Osteoporosis Mother     Diabetes Father         mellitus    Heart disease Father     Hypertension Father     Lung cancer Maternal Grandmother         76s    No Known Problems Paternal Grandmother     No Known Problems Maternal Aunt     Lung cancer Maternal Grandfather     Prostate cancer Paternal Grandfather     Colon cancer Other     Pancreatic cancer Other      Social History     Socioeconomic History    Marital status: Single     Spouse name: Not on file    Number of children: Not on file    Years of education: Not on file    Highest education level: Not on file   Occupational History    Not on file   Social Needs    Financial resource strain: Not on file    Food insecurity:     Worry: Not on file     Inability: Not on file    Transportation needs:     Medical: Not on file     Non-medical: Not on file   Tobacco Use    Smoking status: Never Smoker    Smokeless tobacco: Never Used   Substance and Sexual Activity    Alcohol use: No    Drug use: No    Sexual activity: Not on file   Lifestyle    Physical activity:     Days per week: Not on file     Minutes per session: Not on file    Stress: Not on file   Relationships    Social connections:     Talks on phone: Not on file     Gets together: Not on file     Attends Jainism service: Not on file     Active member of club or organization: Not on file     Attends meetings of clubs or organizations: Not on file     Relationship status: Not on file    Intimate partner violence:     Fear of current or ex partner: Not on file     Emotionally abused: Not on file     Physically abused: Not on file     Forced sexual activity: Not on file   Other Topics Concern    Not on file   Social History Narrative    Caffeine use       Current Outpatient Medications:     Calcium Citrate-Vitamin D (CITRACAL + D PO), Take by mouth, Disp: , Rfl:     Denosumab (PROLIA SC), Inject under the skin, Disp: , Rfl:     Multiple Vitamin (MULTIVITAMIN) capsule, Take 1 capsule by mouth daily, Disp: , Rfl:     mupirocin (BACTROBAN) 2 % ointment, Apply topically three times a day to affected area , Disp: 30 g, Rfl: 1  Allergies   Allergen Reactions    Codeine      Reaction Date: [id number];     Darvon [Propoxyphene]     Demerol [Meperidine]     Valium [Diazepam]     Aspirin      Reaction Date: [id number];      Vitals:    07/17/19 1029   BP: 128/80   Pulse: 59   Resp: 16   Temp: 97 8 °F (36 6 °C)       Physical Exam  Constitutional: General appearance: The Patient is well-developed and well-nourished who appears the stated age in no acute distress  Patient is pleasant and talkative  HEENT:  Normocephalic  Sclerae are anicteric  Mucous membranes are moist      Extremities: There is no clubbing or cyanosis  There is no edema  Symmetric  Neuro: Grossly nonfocal  Gait is normal       Skin: Warm, anicteric    healing biopsy sites on both lower extremities  Psych:  Patient is pleasant and talkative  Breasts:        Pathology:    Labs:      Imaging  No results found    I reviewed the above laboratory and imaging data  Procedure:  Lesion measured 0 2 x 0 5 cm  Wide excision measured 1 2 x 3 3 cm  0 5 cm margins were mapped out circumferentially around the lesion  Under sterile conditions and local anesthesia, an elliptical incision measuring 1 2 x 3 3 cm was made  Using sharp dissection this was taken through the skin, subcutaneous tissue and down to the underlying fat  This was excised in total   Specimen was oriented  Wound was copiously irrigated  There was excellent hemostasis  Incision was approximated interrupted 2 0 Vicryl suture subcutaneously and interrupted 3 0 nylon for the skin  Steri-Strips and sterile dressings were applied  She tolerated this well  Discussion/Summary:  72-year-old female with melanoma in situ of the left lower extremity  She tolerated wide excision well  She was instructed on signs and symptoms of infection  We will see her again in the next 2-3 weeks for a wound check and to remove the sutures  In regard to the basal cell carcinoma 1 margin was focally positive on re-review of the pathology  I discussed with her that Mohs surgery may be best for her  This is not possible I will set her up to see plastics and we will plan on performing wide excision with possible skin graft  I will discuss this with Dr Odalis Marin  All of her questions were answered

## 2019-07-17 NOTE — LETTER
July 17, 2019     Lashay Farooq MD  [address] [facility] [address]    Patient: Jose Barrera   YOB: 1955   Date of Visit: 7/17/2019       Dear Dr Elza Landeros: Thank you for referring Brock Awad to me for evaluation  Below are my notes for this consultation  If you have questions, please do not hesitate to call me  I look forward to following your patient along with you  Sincerely,        Leonardo Bay MD        CC: MD Leonardo George MD  7/17/2019 11:11 AM  Sign at close encounter               Surgical Oncology Follow Up       [address] [facility]  [address] [facility] [address]  1955  [id number]  [address] [facility]  [facility] [place]  [address] [facility] [id number]    Diagnoses and all orders for this visit:    Melanoma in situ of left lower leg [facility])        Chief Complaint   Patient presents with    Follow-up     Procedure       Return in about 3 weeks (around 8/7/2019) for Office Visit, with Yu Thompson  Melanoma in situ of left lower leg ([facility] [address] )    6/5/2019 Initial Diagnosis     Melanoma in situ of left lower leg (HCC)        Basal cell carcinoma (BCC) of right lower leg    7/11/2019 Initial Diagnosis     Basal cell carcinoma (BCC) of right lower leg             History of Present Illness:  Patient returns for wide excision of her melanoma in situ  The pathology on the basal cell was reviewed and the margin was focally positive  Review of Systems  Complete ROS Surg Onc:   Complete ROS Surg Onc:   Constitutional: The patient denies new or recent history of general fatigue, no recent weight loss, no change in appetite  Eyes: No complaints of visual problems, no scleral icterus     ENT: no complaints of ear pain, no hoarseness, no difficulty swallowing,  no tinnitus and no new masses in head, oral cavity, or neck  Cardiovascular: No complaints of chest pain, no palpitations, no ankle edema  Respiratory: No complaints of shortness of breath, no cough  Gastrointestinal: No complaints of jaundice, no bloody stools, no pale stools  Genitourinary: No complaints of dysuria, no hematuria, no nocturia, no frequent urination, no urethral discharge  Musculoskeletal: No complaints of weakness, paralysis, joint stiffness or arthralgias  Integumentary: No complaints of rash, no new lesions  Neurological: No complaints of convulsions, no seizures, no dizziness  Hematologic/Lymphatic: No complaints of easy bruising  Endocrine:  No hot or cold intolerance  No polydipsia, polyphagia, or polyuria  Allergy/immunology:  No environmental allergies  No food allergies  Not immunocompromised  Skin:  No pallor or rash  No wound  Patient Active Problem List   Diagnosis    Elevated BP without diagnosis of hypertension    Hypercalcemia    Osteoporosis    Sensorineural hearing loss (SNHL) of both ears    Oral mucosal lesion    Epistaxis    Melanoma in situ of left lower leg (HCC)    Basal cell carcinoma (BCC) of right lower leg     No past medical history on file    Past Surgical History:   Procedure Laterality Date    COCHLEAR IMPLANT      COLONOSCOPY  2017    COLONOSCOPY  2010    fiberoptic; follow up at age; 2017 12 year f/u    HYSTERECTOMY      TONSILLECTOMY       Family History   Problem Relation Age of Onset    Osteoporosis Mother     Diabetes Father         mellitus    Heart disease Father     Hypertension Father     Lung cancer Maternal Grandmother         76s    No Known Problems Paternal Grandmother     No Known Problems Maternal Aunt     Lung cancer Maternal Grandfather     Prostate cancer Paternal Grandfather     Colon cancer Other     Pancreatic cancer Other      Social History     Socioeconomic History    Marital status: Single     Spouse name: Not on file    Number of children: Not on file    Years of education: Not on file    Highest education level: Not on file   Occupational History    Not on file   Social Needs    Financial resource strain: Not on file    Food insecurity:     Worry: Not on file     Inability: Not on file    Transportation needs:     Medical: Not on file     Non-medical: Not on file   Tobacco Use    Smoking status: Never Smoker    Smokeless tobacco: Never Used   Substance and Sexual Activity    Alcohol use: No    Drug use: No    Sexual activity: Not on file   Lifestyle    Physical activity:     Days per week: Not on file     Minutes per session: Not on file    Stress: Not on file   Relationships    Social connections:     Talks on phone: Not on file     Gets together: Not on file     Attends Jew service: Not on file     Active member of club or organization: Not on file     Attends meetings of clubs or organizations: Not on file     Relationship status: Not on file    Intimate partner violence:     Fear of current or ex partner: Not on file     Emotionally abused: Not on file     Physically abused: Not on file     Forced sexual activity: Not on file   Other Topics Concern    Not on file   Social History Narrative    Caffeine use       Current Outpatient Medications:     Calcium Citrate-Vitamin D (CITRACAL + D PO), Take by mouth, Disp: , Rfl:     Denosumab (PROLIA SC), Inject under the skin, Disp: , Rfl:     Multiple Vitamin (MULTIVITAMIN) capsule, Take 1 capsule by mouth daily, Disp: , Rfl:     mupirocin (BACTROBAN) 2 % ointment, Apply topically three times a day to affected area , Disp: 30 g, Rfl: 1  Allergies   Allergen Reactions    Codeine      Reaction Date: [id number];     Darvon [Propoxyphene]     Demerol [Meperidine]     Valium [Diazepam]     Aspirin      Reaction Date: [id number];      Vitals:    07/17/19 1029   BP: 128/80   Pulse: 59   Resp: 16   Temp: 97 8 °F (36 6 °C)       Physical Exam  Constitutional: General appearance: The Patient is well-developed and well-nourished who appears the stated age in no acute distress  Patient is pleasant and talkative  HEENT:  Normocephalic  Sclerae are anicteric  Mucous membranes are moist      Extremities: There is no clubbing or cyanosis  There is no edema  Symmetric  Neuro: Grossly nonfocal  Gait is normal       Skin: Warm, anicteric     healing biopsy sites on both lower extremities  Psych:  Patient is pleasant and talkative  Breasts:        Pathology:    Labs:      Imaging  No results found  I reviewed the above laboratory and imaging data  Procedure:  Lesion measured 0 2 x 0 5 cm  Wide excision measured 1 2 x 3 3 cm  0 5 cm margins were mapped out circumferentially around the lesion  Under sterile conditions and local anesthesia, an elliptical incision measuring 1 2 x 3 3 cm was made  Using sharp dissection this was taken through the skin, subcutaneous tissue and down to the underlying fat  This was excised in total   Specimen was oriented  Wound was copiously irrigated  There was excellent hemostasis  Incision was approximated interrupted 2 0 Vicryl suture subcutaneously and interrupted 3 0 nylon for the skin  Steri-Strips and sterile dressings were applied  She tolerated this well  Discussion/Summary:  71-year-old female with melanoma in situ of the left lower extremity  She tolerated wide excision well  She was instructed on signs and symptoms of infection  We will see her again in the next 2-3 weeks for a wound check and to remove the sutures  In regard to the basal cell carcinoma 1 margin was focally positive on re-review of the pathology  I discussed with her that Mohs surgery may be best for her  This is not possible I will set her up to see plastics and we will plan on performing wide excision with possible skin graft  I will discuss this with Dr Cassandra Aranda  All of her questions were answered

## 2019-07-18 ENCOUNTER — TELEPHONE (OUTPATIENT)
Dept: ENDOCRINOLOGY | Facility: CLINIC | Age: 64
End: 2019-07-18

## 2019-07-18 ENCOUNTER — TELEPHONE (OUTPATIENT)
Dept: DERMATOLOGY | Facility: CLINIC | Age: 64
End: 2019-07-18

## 2019-07-18 NOTE — TELEPHONE ENCOUNTER
Patient left a voicemail asking if Dr Chris Jane has contacted Dr Dilcia Ortega about a procedure  Please give her a call back at [phone]

## 2019-07-18 NOTE — TELEPHONE ENCOUNTER
Spoke with Ms Lashaun Farfan regarding Mohs surgery  We discussed going to Dr Milagros Perry as we are not currently set up to do Mohs  I notified her that Dr Milagros Perry is on vacation and will not be back until July 22nd  She is concerned that Dr Milagros Perry does not take her insurance  I discussed that I will speak with the office on july 22nd and let her know the answer  Pt understands plan

## 2019-07-23 ENCOUNTER — TELEPHONE (OUTPATIENT)
Dept: DERMATOLOGY | Facility: CLINIC | Age: 64
End: 2019-07-23

## 2019-07-23 NOTE — TELEPHONE ENCOUNTER
Left message regarding message from Ms Leah Segovia yesterday  [facility] that [facility] Sit[facility] has been removed  I also notified her that her information was sent over the Dr Melquiades Carcamo office and she should be hearing from them in the near future  Any questions or concerns she could give me a call

## 2019-08-02 ENCOUNTER — OFFICE VISIT (OUTPATIENT)
Dept: SURGICAL ONCOLOGY | Facility: CLINIC | Age: 64
End: 2019-08-02
Payer: COMMERCIAL

## 2019-08-02 VITALS
HEIGHT: 57 IN | TEMPERATURE: 98.5 F | RESPIRATION RATE: 14 BRPM | DIASTOLIC BLOOD PRESSURE: 80 MMHG | SYSTOLIC BLOOD PRESSURE: 140 MMHG | WEIGHT: 126.4 LBS | BODY MASS INDEX: 27.27 KG/M2 | HEART RATE: 51 BPM

## 2019-08-02 DIAGNOSIS — D03.72 MELANOMA IN SITU OF LEFT LOWER LEG (HCC): Primary | ICD-10-CM

## 2019-08-02 PROCEDURE — 99213 OFFICE O/P EST LOW 20 MIN: CPT | Performed by: NURSE PRACTITIONER

## 2019-08-02 NOTE — PROGRESS NOTES
Surgical Oncology Follow Up       [address] [facility]    [facility]  1955  [id number]    Chief Complaint   Patient presents with    Follow-up     1  Melanoma in situ of left lower leg ([address] )  - six-month follow-up visit with Dr Dianne Lam      Discussion/Summary:  Patient is a 21-year-old female who presented to our office with a new diagnoses of a left shin melanoma in situ and a right shin basal cell carcinoma  She underwent a wide excision of the melanoma in situ by Dr Dianne Lam on July 17, 2019  There was no residual malignancy identified on surgical pathology  Her sutures were removed today and Steri-Strips applied  Her wound is healing well  She will be following with her dermatologist closely and states she has an appointment with the next 3 months  She has an appointment scheduled in September for a Mohs procedure of her right shin for the basal cell carcinoma   We will plan to see the patient back in 6 months for a follow-up visit  I did explain that typically after this visit, she is followed by her dermatologist and will see us ruma Stone She is in agreement this plan  I have instructed her to call with any new concerns or symptoms  All of her questions were answered today  She was provided a copy of her pathology report for her records        History of Present Illness:        Melanoma in situ of left lower leg ([address] )    6/5/2019 Initial Diagnosis     Melanoma in situ of left lower leg ([address] )      7/17/2019 Surgery     Wide excision left fay (Dr Dianne Lam)    - Evidence or prior biopsy is seen  - No malignancy/residual lesion is seen        Basal cell carcinoma (BCC) of right lower leg    7/11/2019 Initial Diagnosis     Basal cell carcinoma (BCC) of right lower leg          -Interval History:  Patient presents today for a postop appointment after a wide excision of a melanoma in situ of the left shin by   Fantarusty Samuel   Her pathology revealed no residual malignancy  She has no new complaints today  She states that she is scheduled for a Mohs procedure of her basal cell carcinoma of the right shin in September  She states she will be following with her dermatologist within the next 3 months  Dermatologist: Dr Saúl Ovalles    Review of Systems:  Review of Systems   Constitutional: Negative for activity change, appetite change, chills, fatigue, fever and unexpected weight change  Cardiovascular: Negative for leg swelling  Skin: Positive for wound  Negative for color change and rash  Hematological: Negative for adenopathy  All other systems reviewed and are negative  Patient Active Problem List   Diagnosis    Elevated BP without diagnosis of hypertension    Hypercalcemia    Osteoporosis    Sensorineural hearing loss (SNHL) of both ears    Oral mucosal lesion    Epistaxis    Melanoma in situ of left lower leg (HCC)    Basal cell carcinoma (BCC) of right lower leg     No past medical history on file    Past Surgical History:   Procedure Laterality Date    COCHLEAR IMPLANT      COLONOSCOPY  2017    COLONOSCOPY  2010    fiberoptic; follow up at age; 2017 12 year f/u    HYSTERECTOMY      TONSILLECTOMY       Family History   Problem Relation Age of Onset    Osteoporosis Mother     Diabetes Father         mellitus    Heart disease Father     Hypertension Father     Lung cancer Maternal Grandmother         76s    No Known Problems Paternal Grandmother     No Known Problems Maternal Aunt     Lung cancer Maternal Grandfather     Prostate cancer Paternal Grandfather     Colon cancer Other     Pancreatic cancer Other      Social History     Socioeconomic History    Marital status: Single     Spouse name: Not on file    Number of children: Not on file    Years of education: Not on file    Highest education level: Not on file   Occupational History    Not on file   Social Needs    Financial resource strain: Not on file    Food insecurity:     Worry: Not on file     Inability: Not on file    Transportation needs:     Medical: Not on file     Non-medical: Not on file   Tobacco Use    Smoking status: Never Smoker    Smokeless tobacco: Never Used   Substance and Sexual Activity    Alcohol use: No    Drug use: No    Sexual activity: Not on file   Lifestyle    Physical activity:     Days per week: Not on file     Minutes per session: Not on file    Stress: Not on file   Relationships    Social connections:     Talks on phone: Not on file     Gets together: Not on file     Attends Protestant service: Not on file     Active member of club or organization: Not on file     Attends meetings of clubs or organizations: Not on file     Relationship status: Not on file    Intimate partner violence:     Fear of current or ex partner: Not on file     Emotionally abused: Not on file     Physically abused: Not on file     Forced sexual activity: Not on file   Other Topics Concern    Not on file   Social History Narrative    Caffeine use       Current Outpatient Medications:     Calcium Citrate-Vitamin D (CITRACAL + D PO), Take by mouth, Disp: , Rfl:     Denosumab (PROLIA SC), Inject under the skin, Disp: , Rfl:     Multiple Vitamin (MULTIVITAMIN) capsule, Take 1 capsule by mouth daily, Disp: , Rfl:     mupirocin (BACTROBAN) 2 % ointment, Apply topically three times a day to affected area , Disp: 30 g, Rfl: 1  Allergies   Allergen Reactions    Codeine      Reaction Date: [id number];     Darvon [Propoxyphene]     Demerol [Meperidine]     Valium [Diazepam]     Aspirin      Reaction Date: [id number];      Vitals:    08/02/19 0809   BP: 140/80   Pulse: (!) 51   Resp: 14   Temp: 98 5 °F (36 9 °C)       Physical Exam   Constitutional: She is oriented to person, place, and time  She appears well-developed and well-nourished  No distress  HENT:   Head: Normocephalic     Pulmonary/Chest: Effort normal  Neurological: She is alert and oriented to person, place, and time  Skin: Skin is warm and dry  No erythema  Left shin incision is well approximated without erythema or drainage  Nylon sutures were removed today and Steri-Strips and benzoin applied  Right shin wound  (BCC)without evidence of infection  Psychiatric: She has a normal mood and affect  Results:    Case Report   Surgical Pathology Report                         Case: [id number]                                    Authorizing Provider: Bettina Peacock MD           Collected:           07/17/2019 1227               Ordering Location:     [facility]     Received:            07/17/2019 1227                                      Surgical Oncology [place]                                                      Pathologist:           Monica Meza MD                                                             Specimen:    Skin, Other, left shin                                                                     Final Diagnosis   A  Skin of left shin:  - Evidence or prior biopsy is seen  - No malignancy/residual lesion is seen           Advance Care Planning/Advance Directives:  Discussed disease status, cancer treatment plans and/or cancer treatment goals with the patient

## 2019-09-03 ENCOUNTER — TRANSCRIBE ORDERS (OUTPATIENT)
Dept: ADMINISTRATIVE | Facility: HOSPITAL | Age: 64
End: 2019-09-03

## 2019-09-03 ENCOUNTER — APPOINTMENT (OUTPATIENT)
Dept: LAB | Facility: HOSPITAL | Age: 64
End: 2019-09-03

## 2019-09-03 DIAGNOSIS — Z00.8 HEALTH EXAMINATION IN POPULATION SURVEY: Primary | ICD-10-CM

## 2019-09-03 DIAGNOSIS — Z00.8 HEALTH EXAMINATION IN POPULATION SURVEY: ICD-10-CM

## 2019-09-03 LAB
CHOLEST SERPL-MCNC: 243 MG/DL
EST. AVERAGE GLUCOSE BLD GHB EST-MCNC: 105 MG/DL
HBA1C MFR BLD: 5.3 % (ref 4.2–6.3)
HDLC SERPL-MCNC: 65 MG/DL (ref 40–59)
LDLC SERPL CALC-MCNC: 153 MG/DL
NONHDLC SERPL-MCNC: 178 MG/DL
TRIGL SERPL-MCNC: 125 MG/DL

## 2019-09-03 PROCEDURE — 80061 LIPID PANEL: CPT

## 2019-09-03 PROCEDURE — 36415 COLL VENOUS BLD VENIPUNCTURE: CPT

## 2019-09-03 PROCEDURE — 83036 HEMOGLOBIN GLYCOSYLATED A1C: CPT

## 2019-09-10 ENCOUNTER — OFFICE VISIT (OUTPATIENT)
Dept: HEMATOLOGY ONCOLOGY | Facility: CLINIC | Age: 64
End: 2019-09-10
Payer: COMMERCIAL

## 2019-09-10 VITALS
WEIGHT: 127 LBS | BODY MASS INDEX: 27.4 KG/M2 | RESPIRATION RATE: 18 BRPM | OXYGEN SATURATION: 98 % | TEMPERATURE: 97.6 F | HEIGHT: 57 IN | DIASTOLIC BLOOD PRESSURE: 82 MMHG | HEART RATE: 68 BPM | SYSTOLIC BLOOD PRESSURE: 132 MMHG

## 2019-09-10 DIAGNOSIS — D03.72 MELANOMA IN SITU OF LEFT LOWER LEG (HCC): ICD-10-CM

## 2019-09-10 DIAGNOSIS — C44.712 BASAL CELL CARCINOMA (BCC) OF RIGHT LOWER LEG: Primary | ICD-10-CM

## 2019-09-10 PROCEDURE — 99214 OFFICE O/P EST MOD 30 MIN: CPT | Performed by: INTERNAL MEDICINE

## 2019-09-10 NOTE — PROGRESS NOTES
HEMATOLOGY / ONCOLOGY CLINIC NOTE    Primary Care Provider: Ratna Flores MD  Referring Provider:    MRN: [id number]  : 1955    Reason for Encounter:  Chief Complaint   Patient presents with    Follow-up         History of Hematology / Oncology Illness:     Guilherme Oro is a 66 y o  female who came in  to establish care with oncology      1, melanoma in the situ, right anterior shin  2, basal cell carcinoma , right medial shin, multicentric,  low risk overall     Assessment / Plan:       1  Basal cell carcinoma (BCC) of right lower leg  - continue to follow with Dermatology, no further workup or treatment needed from Oncology standpoint  Follow-up as needed the future  2  Melanoma in situ of left lower leg ([address] )  - as above    3, cancer screening  - up-to-date, done mammogram, had colonoscopy about 5 years ago number finding  Patient's family history of pancreatic cancer,  lung cancers in grandparents, who were smokers  25      minutes were spent face to face with patient with greater than 50% of the time spent in counseling or coordination of care including discussions of treatment instructions  All of the patient's questions were answered to their satisfactory during this discussion  Advised pt to call if there is any further questions  Interval History:     9/10/2019 :  Came for follow-up  Reported has been overall doing well, no lumps bumps, no other constitutional symptoms  No new skin lesion  Problem list:     Patient Active Problem List   Diagnosis    Elevated BP without diagnosis of hypertension    Hypercalcemia    Osteoporosis    Sensorineural hearing loss (SNHL) of both ears    Oral mucosal lesion    Epistaxis    Melanoma in situ of left lower leg (HCC)    Basal cell carcinoma (BCC) of right lower leg         PHYSICIAL EXAMINATION:     Vital Signs:   [unfilled]  Body mass index is 27 48 kg/m²    Body surface area is 1 48 meters squared  No major finding on physical examination    GEN: Alert, awake oriented x3, in no acute distress  HEENT- No pallor, icterus, cyanosis, no oral mucosal lesions,   LAD - no palpable cervical, clavicle, axillary, inguinal LAD  Heart- normal S1 S2, regular rate and rhythm, No murmur, rubs  Lungs- decreased breathing sound bilateral    Abdomen- soft, Non tender, bowel sounds present  Extremities- No cyanosis, clubbing, edema  Neuro- No focal neurological deficit           PAST MEDICAL HISTORY:   has no past medical history on file  PAST SURGICAL HISTORY:   has a past surgical history that includes Colonoscopy (2017); Colonoscopy (2010); Hysterectomy; Cochlear implant; and Tonsillectomy  CURRENT MEDICATIONS:   Current Outpatient Medications   Medication Sig Dispense Refill    Calcium Citrate-Vitamin D (CITRACAL + D PO) Take by mouth      Denosumab (PROLIA SC) Inject under the skin      Multiple Vitamin (MULTIVITAMIN) capsule Take 1 capsule by mouth daily       No current facility-administered medications for this visit  [unfilled]    SOCIAL HISTORY:   reports that she has never smoked  She has never used smokeless tobacco  She reports that she does not drink alcohol or use drugs  FAMILY HISTORY:  family history includes Colon cancer in her other; Diabetes in her father; Heart disease in her father; Hypertension in her father; Lung cancer in her maternal grandfather and maternal grandmother; No Known Problems in her maternal aunt and paternal grandmother; Osteoporosis in her mother; Pancreatic cancer in her other; Prostate cancer in her paternal grandfather  ALLERGIES:  is allergic to codeine; darvon [propoxyphene]; demerol [meperidine]; valium [diazepam]; and aspirin      REVIEW OF SYSTEMS:  Please note that a 14-point review of systems was performed to include Constitutional, HEENT, Respiratory, CVS, GI, , Musculoskeletal, Integumentary, Neurologic, Rheumatologic, Endocrinologic, Psychiatric, Lymphatic, and Hematologic/Oncologic systems were reviewed and are negative unless otherwise stated in HPI  Positive and negative findings pertinent to this evaluation are incorporated into the history of present illness              LAB:  Lab Results   Component Value Date    WBC 6 90 05/02/2019    HGB 12 8 05/02/2019    HCT 38 7 05/02/2019    MCV 92 05/02/2019     05/02/2019     Lab Results   Component Value Date     06/20/2018    SODIUM 143 05/02/2019    K 4 3 05/02/2019     05/02/2019    CO2 28 05/02/2019    ANIONGAP 7 06/20/2018    AGAP 10 05/02/2019    BUN 13 05/02/2019    CREATININE 0 83 05/02/2019    GLUC 87 05/02/2019    GLUF 86 06/20/2018    CALCIUM 9 4 05/02/2019    AST 26 05/02/2019    ALT 19 05/02/2019    ALKPHOS 47 05/02/2019    PROT 7 2 06/20/2018    TP 7 4 05/02/2019    BILITOT 0 7 06/20/2018    TBILI 0 50 05/02/2019    EGFR 76 05/02/2019       CBC with diff:       Invalid input(s):  RBC, TOTALCELLSCOUNTED, SEGS%, GRANS%, LYMPHS%, EOS%, BASO%, ABNEUT, ABGRANS, ABLYMPHS, ABMOMOS, ABEOS, ABBASO    CMP:      Invalid input(s): ALBUMIN    IMAGING:  No orders to display     No results found ]

## 2019-09-23 ENCOUNTER — TELEPHONE (OUTPATIENT)
Dept: FAMILY MEDICINE CLINIC | Facility: MEDICAL CENTER | Age: 64
End: 2019-09-23

## 2019-09-23 NOTE — TELEPHONE ENCOUNTER
----- Message from [employer] sent at 9/23/2019  1:39 PM EDT -----  Regarding: Non-Urgent Medical Question  Contact: [phone]  Hi  Was wondering if I can get an prescription for physical therapy for my neck,back and right hip     I had one previously from Dr Akilah Devlin,  but I am going back in for re evaluation on 10/07     Thank you   Danielle

## 2019-09-26 ENCOUNTER — TELEPHONE (OUTPATIENT)
Dept: SURGICAL ONCOLOGY | Facility: CLINIC | Age: 64
End: 2019-09-26

## 2019-09-26 NOTE — TELEPHONE ENCOUNTER
----- Message from Peterson Cook RN sent at 9/24/2019 12:30 PM EDT -----  Dr Suzie Chu said she should come in for f/u to talk about the other spot she needs taken care of  Please call her to make an appt     ----- Message -----  From: Erlin Francis MD  Sent: 9/20/2019   2:44 PM EDT  To: Peterson Cook RN    No   ----- Message -----  From: Peterson Cook RN  Sent: 9/20/2019   2:01 PM EDT  To: Erlin Francis MD    Pt went to Dr Calvin Rao for procedure and states Dr Calvin Rao refused to do it, is sending back to you  She said he was going to call you  I do see a note scanned into Epic  Did you speak to him?

## 2019-10-07 ENCOUNTER — EVALUATION (OUTPATIENT)
Dept: PHYSICAL THERAPY | Facility: MEDICAL CENTER | Age: 64
End: 2019-10-07
Payer: COMMERCIAL

## 2019-10-07 DIAGNOSIS — M25.551 RIGHT HIP PAIN: ICD-10-CM

## 2019-10-07 DIAGNOSIS — M54.2 NECK PAIN: Primary | ICD-10-CM

## 2019-10-07 PROCEDURE — 97161 PT EVAL LOW COMPLEX 20 MIN: CPT | Performed by: PHYSICAL THERAPIST

## 2019-10-08 NOTE — PROGRESS NOTES
PT Evaluation     Today's date: 10/7/2019  Patient name: Tri Juarez  : 1955  MRN: [id number]  Referring provider: Neal Navarro PT  Dx:   Encounter Diagnosis     ICD-10-CM    1  Neck pain M54 2    2  Right hip pain M25 551                   Assessment  Assessment details: Tri Juarez is a 66 y o  female was evaluated on 10/7/2019  for Neck pain  (primary encounter diagnosis)  Right hip pain  Tri Juarez has the above listed impairments resulting in functional deficits and negative impact to quality of life  Patient is appropriate for skilled PT intervention to promote maximal return to function and patient specific goals  Patient agrees with outlined treatment plan and all questions were answered to their satisfaction  Impairments: abnormal muscle firing, abnormal muscle tone, abnormal or restricted ROM, impaired physical strength, lacks appropriate home exercise program and pain with function  Understanding of Dx/Px/POC: good   Prognosis: good    Goals  Patient will successfully transition to home exercise program   Patient will be able to manage symptoms independently   '  No pain with work day  No pain with sleeping     Plan  Patient would benefit from: skilled PT  Referral necessary: No  Planned modality interventions: thermotherapy: hydrocollator packs  Planned therapy interventions: home exercise program, manual therapy, neuromuscular re-education, patient education, functional ROM exercises, strengthening, stretching, joint mobilization, graded activity, graded exercise, therapeutic exercise, body mechanics training, motor coordination training and activity modification  Frequency: 2x week  Duration in weeks: 12  Treatment plan discussed with: patient        Subjective Evaluation    History of Present Illness  Mechanism of injury: Tri Juarez is a 66 y o  female presenting to therapy with complaints of neck pain and right hip pain      Date of onset:  A month ago  Symptom AGGS:  Standing, squatting, turning head, looking down   Symptom EASES: rest  Prior Treatment:  None  Relevant PMH:  None     Occupation:    Activity goals: Pain free work, pain free sleep           Pain  Current pain ratin  At best pain ratin  At worst pain ratin  Quality: dull ache          Objective  Red flag screen (-)  Neurologic screen WNL  Cervical AROM: Moderate limitation in all directions with pain in flexion   TTP to UT and LS with muscle spasm  Poor DNF endurance and control    Hip ROM WNL  Pain with JOAQUIN Positioning  Poor hip extension and abduction strength   Severe TTP to glute medius and min         Precautions: NONE      Manual                                                                                   Exercise Diary                                                                                                                                                                                                                                                                                      Modalities

## 2019-10-09 ENCOUNTER — OFFICE VISIT (OUTPATIENT)
Dept: PHYSICAL THERAPY | Facility: MEDICAL CENTER | Age: 64
End: 2019-10-09
Payer: COMMERCIAL

## 2019-10-09 DIAGNOSIS — M54.2 NECK PAIN: Primary | ICD-10-CM

## 2019-10-09 DIAGNOSIS — M25.551 RIGHT HIP PAIN: ICD-10-CM

## 2019-10-09 PROCEDURE — 97140 MANUAL THERAPY 1/> REGIONS: CPT | Performed by: PHYSICAL THERAPIST

## 2019-10-09 PROCEDURE — 97110 THERAPEUTIC EXERCISES: CPT | Performed by: PHYSICAL THERAPIST

## 2019-10-09 NOTE — PROGRESS NOTES
Daily Note     Today's date: 10/9/2019  Patient name: Alyse Clemente  : 1955  MRN: [id number]  Referring provider: Riana Valle, PT  Dx:   Encounter Diagnosis     ICD-10-CM    1  Neck pain M54 2    2  Right hip pain M25 551                   Pt 1 on 1 from 530 to 600    Subjective: States her pain in her R buttox is the only thing that bothers her right now  Says its worse with prolonged sitting  Has numbness and tingling in her R foot as well  Objective: See treatment diary below        Assessment: Tolerated treatment fair  Considerable neural tension noted during session which was addressed with abby  Had some difficulty with exercises due to pain, also has some difficulty lying supine, was unable to describe why this is difficulty for her  Need frequent cues to perform exercises properly  During session pt reported onset of neck pain while lying down  Reduced pain with moist heat post session  Would benefit from continued PT      Plan: Continue per plan of care        Precautions: NONE      Manual  10/9            Glut, piriformis stretching L 6'            Sciatic nerve glides L 4'                                                       Exercise Diary  10/9            Hamstring stretch 10"x6            Chin tucks 10x 5" standing            Rows, ext              Bridge             Hip abd 2x10             Clamshell  2x10 in SL            Levator scap stretch 10" x4 b/l                                                                                                                                                                                         Modalities  10/9            Hot pack 10' post

## 2019-10-11 ENCOUNTER — APPOINTMENT (OUTPATIENT)
Dept: PHYSICAL THERAPY | Facility: MEDICAL CENTER | Age: 64
End: 2019-10-11
Payer: COMMERCIAL

## 2019-10-14 ENCOUNTER — OFFICE VISIT (OUTPATIENT)
Dept: PHYSICAL THERAPY | Facility: MEDICAL CENTER | Age: 64
End: 2019-10-14
Payer: COMMERCIAL

## 2019-10-14 DIAGNOSIS — M54.2 NECK PAIN: ICD-10-CM

## 2019-10-14 DIAGNOSIS — M25.551 RIGHT HIP PAIN: Primary | ICD-10-CM

## 2019-10-14 PROCEDURE — 97140 MANUAL THERAPY 1/> REGIONS: CPT | Performed by: PHYSICAL THERAPIST

## 2019-10-14 PROCEDURE — 97110 THERAPEUTIC EXERCISES: CPT | Performed by: PHYSICAL THERAPIST

## 2019-10-14 NOTE — PROGRESS NOTES
Daily Note     Today's date: 10/14/2019  Patient name: Mary Menezes  : 1955  MRN: [id number]  Referring provider: Angelina Ford PT  Dx:   Encounter Diagnosis     ICD-10-CM    1  Right hip pain M25 551    2  Neck pain M54 2                   Subjective: Danielle reports that her hip has not been bothering her as much  Neck continues to bother her       Objective: See treatment diary below      Assessment: Tolerated treatment well  Patient with difficulty with stretching due to muscle spasm  Will progress as pain allows      Plan: Continue per plan of care        Precautions: NONE      Manual  10/9 10/14           Glut, piriformis stretching L 6' AF           Sciatic nerve glides L 4'            UT TPR  AF                                         Exercise Diary  10/9            Hamstring stretch 10"x6            Chin tucks 10x 5" standing            Rows, ext              Bridge             Hip abd 2x10             Clamshell  2x10 in SL            Levator scap stretch 10" x4 b/l            Supine cervical rotations  20           Seated UT stretch  10 sec  X5             Seated LS stretch  10 sec  X5                                                                                                                                                 Modalities  10/9            Hot pack 10' post 10

## 2019-10-16 ENCOUNTER — OFFICE VISIT (OUTPATIENT)
Dept: PHYSICAL THERAPY | Facility: MEDICAL CENTER | Age: 64
End: 2019-10-16
Payer: COMMERCIAL

## 2019-10-16 DIAGNOSIS — M54.2 NECK PAIN: Primary | ICD-10-CM

## 2019-10-16 DIAGNOSIS — M25.551 RIGHT HIP PAIN: ICD-10-CM

## 2019-10-16 PROCEDURE — 97140 MANUAL THERAPY 1/> REGIONS: CPT | Performed by: PHYSICAL THERAPIST

## 2019-10-16 PROCEDURE — 97110 THERAPEUTIC EXERCISES: CPT | Performed by: PHYSICAL THERAPIST

## 2019-10-16 NOTE — PROGRESS NOTES
Daily Note     Today's date: 10/16/2019  Patient name: Camryn Chandler  : 1955  MRN: [id number]  Referring provider: Hola Hargrove PT  Dx:   Encounter Diagnosis     ICD-10-CM    1  Neck pain M54 2    2  Right hip pain M25 551                   Subjective: Danielle reports that she continues to struggle with muscle spasms       Objective: See treatment diary below      Assessment: Tolerated treatment well  Patient gradually improving neck mobility and reduced spasm  progress as able      Plan: Continue per plan of care        Precautions: NONE      Manual  10/9 10/14 10/16          Glut, piriformis stretching L 6' AF           Sciatic nerve glides L 4'            UT TPR  AF AF                                        Exercise Diary  10/9            Hamstring stretch 10"x6            Chin tucks 10x 5" standing            Rows, ext              Bridge             Hip abd 2x10             Clamshell  2x10 in SL            Levator scap stretch 10" x4 b/l            Supine cervical rotations  20 30          Seated UT stretch  10 sec  X5   10X5          Seated LS stretch  10 sec  X5 10X5          Scap retractions   30                                                                                                                                   Modalities  10/9            Hot pack 10' post 10

## 2019-10-18 ENCOUNTER — OFFICE VISIT (OUTPATIENT)
Dept: PHYSICAL THERAPY | Facility: MEDICAL CENTER | Age: 64
End: 2019-10-18
Payer: COMMERCIAL

## 2019-10-18 DIAGNOSIS — M54.2 NECK PAIN: Primary | ICD-10-CM

## 2019-10-18 DIAGNOSIS — M25.551 RIGHT HIP PAIN: ICD-10-CM

## 2019-10-18 PROCEDURE — 97110 THERAPEUTIC EXERCISES: CPT | Performed by: PHYSICAL THERAPIST

## 2019-10-18 PROCEDURE — 97140 MANUAL THERAPY 1/> REGIONS: CPT | Performed by: PHYSICAL THERAPIST

## 2019-10-18 NOTE — PROGRESS NOTES
Daily Note     Today's date: 10/18/2019  Patient name: Elver Ballesteros  : 1955  MRN: [id number]  Referring provider: Krzysztof Rodriguez, PT  Dx:   Encounter Diagnosis     ICD-10-CM    1  Neck pain M54 2    2  Right hip pain M25 551                   Subjective: Danielle reports that she is getting some relief from the spasms but still feels very limited       Objective: See treatment diary below      Assessment: Tolerated treatment well  Patient exhibited good technique with therapeutic exercises and would benefit from continued PT      Plan: Continue per plan of care        Precautions: NONE      Manual  10/9 10/14 10/16 10/18         Glut, piriformis stretching L 6' AF           Sciatic nerve glides L 4'            UT TPR  AF AF AF                                       Exercise Diary  10/9            Hamstring stretch 10"x6            Chin tucks 10x 5" standing            Rows, ext              Bridge             Hip abd 2x10             Clamshell  2x10 in SL            Levator scap stretch 10" x4 b/l            Supine cervical rotations  20 30 30         Seated UT stretch  10 sec  X5   10X5 10 sec  x5         Seated LS stretch  10 sec  X5 10X5 10X5         Scap retractions   30 30                                                                                                                                  Modalities  10/9            Hot pack 10' post 10

## 2019-10-21 ENCOUNTER — APPOINTMENT (OUTPATIENT)
Dept: PHYSICAL THERAPY | Facility: MEDICAL CENTER | Age: 64
End: 2019-10-21
Payer: COMMERCIAL

## 2019-10-25 ENCOUNTER — OFFICE VISIT (OUTPATIENT)
Dept: SURGICAL ONCOLOGY | Facility: CLINIC | Age: 64
End: 2019-10-25
Payer: COMMERCIAL

## 2019-10-25 VITALS
HEIGHT: 57 IN | TEMPERATURE: 96.1 F | RESPIRATION RATE: 16 BRPM | DIASTOLIC BLOOD PRESSURE: 80 MMHG | BODY MASS INDEX: 28.05 KG/M2 | HEART RATE: 64 BPM | SYSTOLIC BLOOD PRESSURE: 140 MMHG | WEIGHT: 130 LBS

## 2019-10-25 DIAGNOSIS — C44.712 BASAL CELL CARCINOMA (BCC) OF RIGHT LOWER LEG: Primary | ICD-10-CM

## 2019-10-25 PROCEDURE — 99213 OFFICE O/P EST LOW 20 MIN: CPT | Performed by: SURGERY

## 2019-10-25 NOTE — LETTER
October 25, 2019     Kevin Sicard, MD  [facility] [address]    Patient: Jose Marks   YOB: 1955   Date of Visit: 10/25/2019       Dear Dr Darshana Adame: Thank you for referring Billie Porter to me for evaluation  Below are my notes for this consultation  If you have questions, please do not hesitate to call me  I look forward to following your patient along with you  Sincerely,        Oscar Pinedo MD        CC: MD Oscar Piña MD  10/25/2019  9:12 AM  Incomplete               Surgical Oncology Follow Up       [address] [facility]  [facility] [place]  [address]  [facility]  [place] [address]    Danielle Sandoval  1955  [id number]  [address]  [facility] [place]  [address]  [facility] [place] [address]    Diagnoses and all orders for this visit:    Basal cell carcinoma (BCC) of right lower leg        Chief Complaint   Patient presents with    Follow-up     Pt is here for f/u to discuss right chin BCC       No follow-ups on file  Melanoma in situ of left lower leg ([address] )    6/5/2019 Initial Diagnosis     Melanoma in situ of left lower leg ([address] )      7/17/2019 Surgery     Wide excision left fay (Dr Reyes Romero)    - Evidence or prior biopsy is seen  - No malignancy/residual lesion is seen        Basal cell carcinoma (BCC) of right lower leg    7/11/2019 Initial Diagnosis     Basal cell carcinoma (BCC) of right lower leg             History of Present Illness:  Patient returns because she was going to be having Mohs surgery for basal cell carcinoma on the right medial shin  Unfortunately, this could not be performed secondary to with the patient states were insurance issues  She comes in now to discuss excision      Review of Systems  Complete ROS Surg Onc:   Complete ROS Surg Onc:   Constitutional: The patient denies new or recent history of general fatigue, no recent weight loss, no change in appetite  Eyes: No complaints of visual problems, no scleral icterus  ENT: no complaints of ear pain, no hoarseness, no difficulty swallowing,  no tinnitus and no new masses in head, oral cavity, or neck  Cardiovascular: No complaints of chest pain, no palpitations, no ankle edema  Respiratory: No complaints of shortness of breath, no cough  Gastrointestinal: No complaints of jaundice, no bloody stools, no pale stools  Genitourinary: No complaints of dysuria, no hematuria, no nocturia, no frequent urination, no urethral discharge  Musculoskeletal: No complaints of weakness, paralysis, joint stiffness or arthralgias  Integumentary: No complaints of rash, no new lesions  Neurological: No complaints of convulsions, no seizures, no dizziness  Hematologic/Lymphatic: No complaints of easy bruising  Endocrine:  No hot or cold intolerance  No polydipsia, polyphagia, or polyuria  Allergy/immunology:  No environmental allergies  No food allergies  Not immunocompromised  Skin:  No pallor or rash  No wound  Patient Active Problem List   Diagnosis    Elevated BP without diagnosis of hypertension    Hypercalcemia    Osteoporosis    Sensorineural hearing loss (SNHL) of both ears    Oral mucosal lesion    Epistaxis    Melanoma in situ of left lower leg (HCC)    Basal cell carcinoma (BCC) of right lower leg     No past medical history on file    Past Surgical History:   Procedure Laterality Date    COCHLEAR IMPLANT      COLONOSCOPY  2017    COLONOSCOPY  2010    fiberoptic; follow up at age; 2017 10 year f/u    HYSTERECTOMY      TONSILLECTOMY       Family History   Problem Relation Age of Onset    Osteoporosis Mother     Diabetes Father         mellitus    Heart disease Father     Hypertension Father     Lung cancer Maternal Grandmother         76s    No Known Problems Paternal Grandmother     No Known Problems Maternal Aunt     Lung cancer Maternal Grandfather     Prostate cancer Paternal Grandfather     Colon cancer Other     Pancreatic cancer Other      Social History     Socioeconomic History    Marital status: Single     Spouse name: Not on file    Number of children: Not on file    Years of education: Not on file    Highest education level: Not on file   Occupational History    Not on file   Social Needs    Financial resource strain: Not on file    Food insecurity:     Worry: Not on file     Inability: Not on file    Transportation needs:     Medical: Not on file     Non-medical: Not on file   Tobacco Use    Smoking status: Never Smoker    Smokeless tobacco: Never Used   Substance and Sexual Activity    Alcohol use: No    Drug use: No    Sexual activity: Not on file   Lifestyle    Physical activity:     Days per week: Not on file     Minutes per session: Not on file    Stress: Not on file   Relationships    Social connections:     Talks on phone: Not on file     Gets together: Not on file     Attends [employer] service: Not on file     Active member of club or organization: Not on file     Attends meetings of clubs or organizations: Not on file     Relationship status: Not on file    Intimate partner violence:     Fear of current or ex partner: Not on file     Emotionally abused: Not on file     Physically abused: Not on file     Forced sexual activity: Not on file   Other Topics Concern    Not on file   Social History Narrative    Caffeine use       Current Outpatient Medications:     Calcium Citrate-Vitamin D (CITRACAL + D PO), Take by mouth, Disp: , Rfl:     Denosumab (PROLIA SC), Inject under the skin, Disp: , Rfl:     Multiple Vitamin (MULTIVITAMIN) capsule, Take 1 capsule by mouth daily, Disp: , Rfl:   Allergies   Allergen Reactions    Codeine      Reaction Date: [id number];     Darvon [Propoxyphene]     Demerol [Meperidine]     Valium [Diazepam]     Aspirin      Reaction Date: [id number];      Vitals: 10/25/19 0834   BP: 140/80   Pulse: 64   Resp: 16   Temp: (!) 96 1 °F (35 6 °C)       Physical Exam  Constitutional: General appearance: The Patient is well-developed and well-nourished who appears the stated age in no acute distress  Patient is pleasant and talkative  HEENT:  Normocephalic  Sclerae are anicteric  Mucous membranes are moist  Neck is supple without adenopathy  No JVD  Chest: The lungs are clear to auscultation  Cardiac: Heart is regular rate  Abdomen: Abdomen is soft, non-tender, non-distended and without masses  Extremities: There is no clubbing or cyanosis  There is no edema  Symmetric  Neuro: Grossly nonfocal  Gait is normal      Lymphatic: No evidence of cervical adenopathy bilaterally  Skin: Warm, anicteric  There is a patch of raised scaled skin on the right lower leg consistent with her biopsy site of basal cell carcinoma  Psych:  Patient is pleasant and talkative  Breasts:        Pathology:  Final Diagnosis   A  Skin, Right Medial Shin, shave biopsy:  - Basal cell carcinoma, multicentric type       B  Skin, Left Anterior Shin, shave biopsy:  - Melanoma in-situ; see note      Note:  Sections show an irregular single and nested melanocytic proliferation  On section planes studied, lesional cells are close to one lateral tissue edge          Interpretation performed at [facility], [address] [place] [address], [address]            Electronically signed by Anuradha Whalen MD on 6/12/2019 at  7:20 PM         Labs:      Imaging  No results found  I reviewed the above laboratory and imaging data  Discussion/Summary:  A 82-year-old female who underwent excision of a melanoma in situ of her left lower extremity  She comes in now with a basal cell carcinoma that was unable to be removed with Moh's  I have recommended that she undergo wide excision of the basal cell carcinoma of the right leg    The risks were explained including bleeding, infection, need for further surgery, recurrence, and wound complications  Informed consent was obtained  She will need Plastic surgery for wound coverage, either skin graft or rotation flap  We will arrange for this and I will see her again at the time surgery  She is agreeable to this  All her questions were answered

## 2019-10-25 NOTE — PROGRESS NOTES
Surgical Oncology Follow Up       [address] [facility]  [facility] [place]  [address] [facility] [address]  [place] [address]    Danielle Sandoval  1955  [id number]  [address] [facility]  [facility] [place]  [address] [address] [place] [address]    Diagnoses and all orders for this visit:    Basal cell carcinoma (BCC) of right lower leg        Chief Complaint   Patient presents with    Follow-up     Pt is here for f/u to discuss right chin BCC       No follow-ups on file  Melanoma in situ of left lower leg ([address] )    6/5/2019 Initial Diagnosis     Melanoma in situ of left lower leg ([facility] [address] )      7/17/2019 Surgery     Wide excision left fay (Dr Altagracia Desai)    - Evidence or prior biopsy is seen  - No malignancy/residual lesion is seen        Basal cell carcinoma (BCC) of right lower leg    7/11/2019 Initial Diagnosis     Basal cell carcinoma (BCC) of right lower leg             History of Present Illness:  Patient returns because she was going to be having Mohs surgery for basal cell carcinoma on the right medial shin  Unfortunately, this could not be performed secondary to with the patient states were insurance issues  She comes in now to discuss excision  Review of Systems  Complete ROS Surg Onc:   Complete ROS Surg Onc:   Constitutional: The patient denies new or recent history of general fatigue, no recent weight loss, no change in appetite  Eyes: No complaints of visual problems, no scleral icterus  ENT: no complaints of ear pain, no hoarseness, no difficulty swallowing,  no tinnitus and no new masses in head, oral cavity, or neck  Cardiovascular: No complaints of chest pain, no palpitations, no ankle edema  Respiratory: No complaints of shortness of breath, no cough  Gastrointestinal: No complaints of jaundice, no bloody stools, no pale stools     Genitourinary: No complaints of dysuria, no hematuria, no nocturia, no frequent urination, no urethral discharge  Musculoskeletal: No complaints of weakness, paralysis, joint stiffness or arthralgias  Integumentary: No complaints of rash, no new lesions  Neurological: No complaints of convulsions, no seizures, no dizziness  Hematologic/Lymphatic: No complaints of easy bruising  Endocrine:  No hot or cold intolerance  No polydipsia, polyphagia, or polyuria  Allergy/immunology:  No environmental allergies  No food allergies  Not immunocompromised  Skin:  No pallor or rash  No wound  Patient Active Problem List   Diagnosis    Elevated BP without diagnosis of hypertension    Hypercalcemia    Osteoporosis    Sensorineural hearing loss (SNHL) of both ears    Oral mucosal lesion    Epistaxis    Melanoma in situ of left lower leg (HCC)    Basal cell carcinoma (BCC) of right lower leg     No past medical history on file    Past Surgical History:   Procedure Laterality Date    COCHLEAR IMPLANT      COLONOSCOPY  2017    COLONOSCOPY  2010    fiberoptic; follow up at age; 2017 12 year f/u    HYSTERECTOMY      TONSILLECTOMY       Family History   Problem Relation Age of Onset    Osteoporosis Mother     Diabetes Father         mellitus    Heart disease Father     Hypertension Father     Lung cancer Maternal Grandmother         76s    No Known Problems Paternal Grandmother     No Known Problems Maternal Aunt     Lung cancer Maternal Grandfather     Prostate cancer Paternal Grandfather     Colon cancer Other     Pancreatic cancer Other      Social History     Socioeconomic History    Marital status: Single     Spouse name: Not on file    Number of children: Not on file    Years of education: Not on file    Highest education level: Not on file   Occupational History    Not on file   Social Needs    Financial resource strain: Not on file    Food insecurity:     Worry: Not on file     Inability: Not on file    Transportation needs:     Medical: Not on file Non-medical: Not on file   Tobacco Use    Smoking status: Never Smoker    Smokeless tobacco: Never Used   Substance and Sexual Activity    Alcohol use: No    Drug use: No    Sexual activity: Not on file   Lifestyle    Physical activity:     Days per week: Not on file     Minutes per session: Not on file    Stress: Not on file   Relationships    Social connections:     Talks on phone: Not on file     Gets together: Not on file     Attends [employer] service: Not on file     Active member of club or organization: Not on file     Attends meetings of clubs or organizations: Not on file     Relationship status: Not on file    Intimate partner violence:     Fear of current or ex partner: Not on file     Emotionally abused: Not on file     Physically abused: Not on file     Forced sexual activity: Not on file   Other Topics Concern    Not on file   Social History Narrative    Caffeine use       Current Outpatient Medications:     Calcium Citrate-Vitamin D (CITRACAL + D PO), Take by mouth, Disp: , Rfl:     Denosumab (PROLIA SC), Inject under the skin, Disp: , Rfl:     Multiple Vitamin (MULTIVITAMIN) capsule, Take 1 capsule by mouth daily, Disp: , Rfl:   Allergies   Allergen Reactions    Codeine      Reaction Date: [id number];     Darvon [Propoxyphene]     Demerol [Meperidine]     Valium [Diazepam]     Aspirin      Reaction Date: [id number];      Vitals:    10/25/19 0834   BP: 140/80   Pulse: 64   Resp: 16   Temp: (!) 96 1 °F (35 6 °C)       Physical Exam  Constitutional: General appearance: The Patient is well-developed and well-nourished who appears the stated age in no acute distress  Patient is pleasant and talkative  HEENT:  Normocephalic  Sclerae are anicteric  Mucous membranes are moist  Neck is supple without adenopathy  No JVD  Chest: The lungs are clear to auscultation  Cardiac: Heart is regular rate  Abdomen: Abdomen is soft, non-tender, non-distended and without masses  Extremities: There is no clubbing or cyanosis  There is no edema  Symmetric  Neuro: Grossly nonfocal  Gait is normal      Lymphatic: No evidence of cervical adenopathy bilaterally  Skin: Warm, anicteric  There is a patch of raised scaled skin on the right lower leg consistent with her biopsy site of basal cell carcinoma  Psych:  Patient is pleasant and talkative  Breasts:        Pathology:  Final Diagnosis   A  Skin, Right Medial Shin, shave biopsy:  - Basal cell carcinoma, multicentric type       B  Skin, Left Anterior Shin, shave biopsy:  - Melanoma in-situ; see note      Note:  Sections show an irregular single and nested melanocytic proliferation  On section planes studied, lesional cells are close to one lateral tissue edge          Interpretation performed at [facility], [address] [place] [address], [address]            Electronically signed by Michelle Valentine MD on 6/12/2019 at  7:20 PM         Labs:      Imaging  No results found  I reviewed the above laboratory and imaging data  Discussion/Summary:  A 72-year-old female who underwent excision of a melanoma in situ of her left lower extremity  She comes in now with a basal cell carcinoma that was unable to be removed with Moh's  I have recommended that she undergo wide excision of the basal cell carcinoma of the right leg  The risks were explained including bleeding, infection, need for further surgery, recurrence, and wound complications  Informed consent was obtained  She will need Plastic surgery for wound coverage, either skin graft or rotation flap  We will arrange for this and I will see her again at the time surgery  She is agreeable to this  All her questions were answered

## 2019-10-28 ENCOUNTER — APPOINTMENT (OUTPATIENT)
Dept: PHYSICAL THERAPY | Facility: MEDICAL CENTER | Age: 64
End: 2019-10-28
Payer: COMMERCIAL

## 2019-10-30 ENCOUNTER — OFFICE VISIT (OUTPATIENT)
Dept: PHYSICAL THERAPY | Facility: MEDICAL CENTER | Age: 64
End: 2019-10-30
Payer: COMMERCIAL

## 2019-10-30 DIAGNOSIS — M54.2 NECK PAIN: Primary | ICD-10-CM

## 2019-10-30 DIAGNOSIS — M25.551 RIGHT HIP PAIN: ICD-10-CM

## 2019-10-30 PROCEDURE — 97110 THERAPEUTIC EXERCISES: CPT | Performed by: PHYSICAL THERAPIST

## 2019-10-30 PROCEDURE — 97140 MANUAL THERAPY 1/> REGIONS: CPT | Performed by: PHYSICAL THERAPIST

## 2019-10-31 ENCOUNTER — APPOINTMENT (OUTPATIENT)
Dept: LAB | Facility: HOSPITAL | Age: 64
End: 2019-10-31
Payer: COMMERCIAL

## 2019-10-31 ENCOUNTER — TRANSCRIBE ORDERS (OUTPATIENT)
Dept: ADMINISTRATIVE | Facility: HOSPITAL | Age: 64
End: 2019-10-31

## 2019-10-31 DIAGNOSIS — M81.0 AGE-RELATED OSTEOPOROSIS WITHOUT CURRENT PATHOLOGICAL FRACTURE: Primary | ICD-10-CM

## 2019-10-31 DIAGNOSIS — M81.0 AGE-RELATED OSTEOPOROSIS WITHOUT CURRENT PATHOLOGICAL FRACTURE: ICD-10-CM

## 2019-10-31 LAB — CALCIUM SERPL-MCNC: 8.8 MG/DL (ref 8.4–10.2)

## 2019-10-31 PROCEDURE — 82310 ASSAY OF CALCIUM: CPT

## 2019-10-31 PROCEDURE — 36415 COLL VENOUS BLD VENIPUNCTURE: CPT

## 2019-10-31 NOTE — PROGRESS NOTES
Daily Note     Today's date: 10/30/2019  Patient name: Caroline Toro  : 1955  MRN: [id number]  Referring provider: Dina Oliver PT  Dx:   Encounter Diagnosis     ICD-10-CM    1  Neck pain M54 2    2  Right hip pain M25 551                   Subjective: Danielle reports her neck spasmed at work today       Objective: See treatment diary below      Assessment: Tolerated treatment fair  Patient remains high irritability with low tolerance  If not improvement in next 1-2 weeks, refer to pain management      Plan: Continue per plan of care        Precautions: NONE      Manual  10/9 10/14 10/16 10/18 10        Glut, piriformis stretching L 6' AF           Sciatic nerve glides L 4'            UT TPR  AF AF AF AF                                      Exercise Diary  10/9            Hamstring stretch 10"x6            Chin tucks 10x 5" standing            Rows, ext              Bridge             Hip abd 2x10             Clamshell  2x10 in SL            Levator scap stretch 10" x4 b/l            Supine cervical rotations  20 30 30         Seated UT stretch  10 sec  X5   10X5 10 sec  x5 10 sec  5        Seated LS stretch  10 sec  X5 10X5 10X5 10 sec X5        Scap retractions   30 30                                                                                                                                  Modalities  10/9            Hot pack 10' post 10

## 2019-11-06 ENCOUNTER — OFFICE VISIT (OUTPATIENT)
Dept: PHYSICAL THERAPY | Facility: MEDICAL CENTER | Age: 64
End: 2019-11-06
Payer: COMMERCIAL

## 2019-11-06 DIAGNOSIS — M54.2 NECK PAIN: Primary | ICD-10-CM

## 2019-11-06 PROCEDURE — 97110 THERAPEUTIC EXERCISES: CPT | Performed by: PHYSICAL THERAPIST

## 2019-11-06 PROCEDURE — 97140 MANUAL THERAPY 1/> REGIONS: CPT | Performed by: PHYSICAL THERAPIST

## 2019-11-07 ENCOUNTER — TELEPHONE (OUTPATIENT)
Dept: FAMILY MEDICINE CLINIC | Facility: MEDICAL CENTER | Age: 64
End: 2019-11-07

## 2019-11-07 NOTE — TELEPHONE ENCOUNTER
----- Message from Dagoberto Velasquez MD sent at 11/7/2019  9:02 AM EST -----  This calcium was ordered by Ibeth Guillen  Will this go to her?

## 2019-11-07 NOTE — PROGRESS NOTES
Daily Note     Today's date: 2019  Patient name: Alyse Clemente  : 1955  MRN: [id number]  Referring provider: Riana Valle, PT  Dx:   Encounter Diagnosis     ICD-10-CM    1  Neck pain M54 2                   Subjective: Danielle reports that her neck has been feeling pretty good, hip is worsened along with low back      Objective: See treatment diary below      Assessment: Tolerated treatment well  Patient exhibited good technique with therapeutic exercises and would benefit from continued PT      Plan: Continue per plan of care        Precautions: NONE      Manual  10/9 10/14 10/16 10/18 10/30 11/6       Glut, piriformis stretching L 6' AF    AF       Sciatic nerve glides L 4'            UT TPR  AF AF AF AF                                      Exercise Diary  10/9            Hamstring stretch 10"x6     20 sec  X5       Chin tucks 10x 5" standing            Rows, ext              Bridge      20       Hip abd 2x10             Clamshell  2x10 in SL            Levator scap stretch 10" x4 b/l            Supine cervical rotations  20 30 30         Seated UT stretch  10 sec  X5   10X5 10 sec  x5 10 sec  5        Seated LS stretch  10 sec  X5 10X5 10X5 10 sec X5        Scap retractions   30 30         LTR on towel roll      30       SKC      10 sec  X5                                                                                                      Modalities  10/9            Hot pack 10' post 10

## 2019-11-08 ENCOUNTER — APPOINTMENT (OUTPATIENT)
Dept: PHYSICAL THERAPY | Facility: MEDICAL CENTER | Age: 64
End: 2019-11-08
Payer: COMMERCIAL

## 2019-11-11 NOTE — TELEPHONE ENCOUNTER
Blood work from September shows an A1c and lipid profile  Was this from [address] [place] [address]? I just happened to see it when looking at the calcium  If she was not notify by Employee Health then let her know it was 200 which is higher than it has been  Will need to discuss this further and we can do that at her appointment in February if she wants  In the meantime she should be watching her diet

## 2019-11-11 NOTE — TELEPHONE ENCOUNTER
Marcio Schrader does not have an appointment with you until June of next year  Feb appt is with Dr Lipscomb Been  She is aware of cholesterol level and yes this was recommended labs from Employee health  They did not address  So if you need to discuss she will need to schedule a sooner appointment  Please advise?

## 2019-12-16 ENCOUNTER — TELEPHONE (OUTPATIENT)
Dept: DERMATOLOGY | Facility: CLINIC | Age: 64
End: 2019-12-16

## 2020-01-17 ENCOUNTER — TELEPHONE (OUTPATIENT)
Dept: DERMATOLOGY | Facility: CLINIC | Age: 65
End: 2020-01-17

## 2020-01-17 NOTE — TELEPHONE ENCOUNTER
Called patient and LVM to call us back to schedule a follow up appointment with Dr Christian Prather

## 2020-01-28 ENCOUNTER — OFFICE VISIT (OUTPATIENT)
Dept: FAMILY MEDICINE CLINIC | Facility: MEDICAL CENTER | Age: 65
End: 2020-01-28
Payer: COMMERCIAL

## 2020-01-28 VITALS
BODY MASS INDEX: 28.22 KG/M2 | SYSTOLIC BLOOD PRESSURE: 132 MMHG | HEIGHT: 57 IN | WEIGHT: 130.8 LBS | HEART RATE: 87 BPM | OXYGEN SATURATION: 98 % | DIASTOLIC BLOOD PRESSURE: 84 MMHG

## 2020-01-28 DIAGNOSIS — E78.5 DYSLIPIDEMIA: Primary | ICD-10-CM

## 2020-01-28 PROCEDURE — 99213 OFFICE O/P EST LOW 20 MIN: CPT | Performed by: FAMILY MEDICINE

## 2020-01-28 PROCEDURE — 1036F TOBACCO NON-USER: CPT | Performed by: FAMILY MEDICINE

## 2020-01-28 NOTE — PROGRESS NOTES
Casey Tinoco is here for for f/u of her BW  Avoids pork  Red meat 3 days a week  Not much sweets  Admits to too much fast food  FH: Heart attack age 69    O: /84 (BP Location: Left arm, Patient Position: Sitting, Cuff Size: Adult)   Pulse 87   Ht 4' 9" (1 448 m)   Wt 59 3 kg (130 lb 12 8 oz)   LMP 06/01/1989 (Exact Date) Comment: hysterectomy, total  SpO2 98%   BMI 28 30 kg/m²     BW blood work 9/3/19   Cholesterol 243   HDL 65       Assessment  Hyperlipidemia-her cardiovascular risk is 4 5%  We discussed importance of low-fat diet and regular exercise  Will continue to monitor  Plan  Lipid profile in the fall

## 2020-01-29 ENCOUNTER — TELEPHONE (OUTPATIENT)
Dept: SURGICAL ONCOLOGY | Facility: CLINIC | Age: 65
End: 2020-01-29

## 2020-02-11 ENCOUNTER — OFFICE VISIT (OUTPATIENT)
Dept: DERMATOLOGY | Facility: CLINIC | Age: 65
End: 2020-02-11
Payer: COMMERCIAL

## 2020-02-11 VITALS — HEIGHT: 57 IN | WEIGHT: 130.2 LBS | BODY MASS INDEX: 28.09 KG/M2 | TEMPERATURE: 97.8 F

## 2020-02-11 DIAGNOSIS — D18.01 CHERRY ANGIOMA: ICD-10-CM

## 2020-02-11 DIAGNOSIS — Z86.006 HISTORY OF MELANOMA IN SITU: ICD-10-CM

## 2020-02-11 DIAGNOSIS — D48.9 NEOPLASM OF UNCERTAIN BEHAVIOR: ICD-10-CM

## 2020-02-11 DIAGNOSIS — C44.91 SUPERFICIAL BASAL CELL CARCINOMA: Primary | ICD-10-CM

## 2020-02-11 DIAGNOSIS — L82.1 SEBORRHEIC KERATOSIS: ICD-10-CM

## 2020-02-11 PROCEDURE — 11102 TANGNTL BX SKIN SINGLE LES: CPT | Performed by: DERMATOLOGY

## 2020-02-11 PROCEDURE — 88305 TISSUE EXAM BY PATHOLOGIST: CPT | Performed by: STUDENT IN AN ORGANIZED HEALTH CARE EDUCATION/TRAINING PROGRAM

## 2020-02-11 PROCEDURE — 99214 OFFICE O/P EST MOD 30 MIN: CPT | Performed by: DERMATOLOGY

## 2020-02-11 RX ORDER — FLUOROURACIL 50 MG/G
CREAM TOPICAL
Qty: 40 G | Refills: 0 | Status: SHIPPED | OUTPATIENT
Start: 2020-02-11 | End: 2020-06-08 | Stop reason: ALTCHOICE

## 2020-02-11 NOTE — PATIENT INSTRUCTIONS
1  SEBORRHEIC KERATOSIS; NON-INFLAMED      Assessment and Plan:  Based on a thorough discussion of this condition and the management approach to it (including a comprehensive discussion of the known risks, side effects and potential benefits of treatment), the patient (family) agrees to implement the following specific plan:   Benign no treatment required  Seborrheic Keratosis  A seborrheic keratosis is a harmless warty spot that appears during adult life as a common sign of skin aging  Seborrheic keratoses can arise on any area of skin, covered or uncovered, with the usual exception of the palms and soles  They do not arise from mucous membranes  Seborrheic keratoses can have highly variable appearance  Seborrheic keratoses are extremely common  It has been estimated that over 90% of adults over the age of 61 years have one or more of them  They occur in males and females of all races, typically beginning to erupt in the 35s or 45s  They are uncommon under the age of 21 years  The precise cause of seborrhoeic keratoses is not known  Seborrhoeic keratoses are considered degenerative in nature  As time goes by, seborrheic keratoses tend to become more numerous  Some people inherit a tendency to develop a very large number of them; some people may have hundreds of them  The name "seborrheic keratosis" is misleading, because these lesions are not limited to a seborrhoeic distribution (scalp, mid-face, chest, upper back), nor are they formed from sebaceous glands, nor are they associated with sebum -- which is greasy    Seborrheic keratosis may also be called "SK," "Seb K," "basal cell papilloma," "senile wart," or "barnacle "      Researchers have noted:   Eruptive seborrhoeic keratoses can follow sunburn or dermatitis   Skin friction may be the reason they appear in body folds   Viral cause (e g , human papillomavirus) seems unlikely   Stable and clonal mutations or activation of FRFR3, PIK3CA, SAÚL, AKT1 and EGFR genes are found in seborrhoeic keratoses   Seborrhoeic keratosis can arise from solar lentigo   FRFR3 mutations also arise in solar lentigines  These mutations are associated with increased age and location on the head and neck, suggesting a role of ultraviolet radiation in these lesions   Seborrheic keratoses do not harbour tumour suppressor gene mutations   Epidermal growth factor receptor inhibitors, which are used to treat some cancers, often result in an increase in verrucal (warty) keratoses  There is no easy way to remove multiple lesions on a single occasion  Unless a specific lesion is "inflamed" and is causing pain or stinging/burning or is bleeding, most insurance companies do not authorize treatment  2  CHERRY ANGIOMAS      Assessment and Plan:  Based on a thorough discussion of this condition and the management approach to it (including a comprehensive discussion of the known risks, side effects and potential benefits of treatment), the patient (family) agrees to implement the following specific plan:   Benign no treatment required  Assessment and Plan:    Cherry angioma, also known as [employer] spots, are benign vascular skin lesions  A "cherry angioma" is a firm red, blue or purple papule, 0 1-1 cm in diameter  When thrombosed, they can appear black in colour until evaluated with a dermatoscope when the red or purple colour is more easily seen  Cherry angioma may develop on any part of the body but most often appear on the scalp, face, lips and trunk  An angioma is due to proliferating endothelial cells; these are the cells that line the inside of a blood vessel  Angiomas can arise in early life or later in life; the most common type of angioma is a cherry angioma  Cherry angiomas are very common in males and females of any age or race  They are more noticeable in white skin than in skin of colour   They markedly increase in number from about the age of 40  There may be a family history of similar lesions  Eruptive cherry angiomas have been rarely reported to be associated with internal malignancy  The cause of angiomas is unknown  Genetic analysis of cherry angiomas has shown that they frequently carry specific somatic missense mutations in the GNAQ and GNA11 (Q209H) genes, which are involved in other vascular and melanocytic proliferations  Cherry angioma is usually diagnosed clinically and no investigations are necessary for the majority of lesions  It has a characteristic red-clod or lobular pattern on dermatoscopy (called lacunar pattern using conventional pattern analysis)  When there is uncertainty about the diagnosis, a biopsy may be performed  The angioma is composed of venules in a thickened papillary dermis  Collagen bundles may be prominent between the lobules  Cherry angiomas are harmless, so they do not usually have to be treated  Occasionally, they are removed to exclude a malignant skin lesion such as a nodular melanoma or because they are irritated or bleeding (and a subsequent risk for infection)  To decrease friction over the lesions, we recommend Neutrogena Daily Defense SPF 50+ at least 3 times a day  3  NEOPLASM OF UNCERTAIN BEHAVIOR OF SKIN        Assessment and Plan:   I have discussed with the patient that a sample of skin via a "skin biopsy would be potentially helpful to further make a specific diagnosis under the microscope   Based on a thorough discussion of this condition and the management approach to it (including a comprehensive discussion of the known risks, side effects and potential benefits of treatment), the patient (family) agrees to implement the following specific plan:    o Procedure:  Skin Biopsy    After a thorough discussion of treatment options and risk/benefits/alternatives (including but not limited to local pain, scarring, dyspigmentation, blistering, possible superinfection, and inability to confirm a diagnosis via histopathology), verbal and written consent were obtained and portion of the rash was biopsied for tissue sample  See below for consent that was obtained from patient and subsequent Procedure Note  PROCEDURE SHAVE BIOPSY NOTE:        After obtaining informed consent  at which time there was a discussion about the purpose of biopsy  and low risks of infection and bleeding  The area was prepped and draped in the usual fashion  Anesthesia was obtained with 1% lidocaine with epinephrine  A shave biopsy to an appropriate sampling depth was obtained with a sterile blade (such as a 15-blade or DermaBlade)  The resulting wound was covered with surgical ointment and bandaged appropriately  The patient tolerated the procedure well without complications and was without signs of functional compromise  Specimen has been sent for review by Dermatopathology  Standard post-procedure care has been explained and has been included in written form within the patient's copy of Informed Consent  INFORMED CONSENT DISCUSSION AND POST-OPERATIVE INSTRUCTIONS FOR PATIENT    I   RATIONALE FOR PROCEDURE  I understand that a skin biopsy allows the Dermatologist to test a lesion or rash under the microscope to obtain a diagnosis  It usually involves numbing the area with numbing medication and removing a small piece of skin; sometimes the area will be closed with sutures  In this specific procedure, sutures are not usually needed  If any sutures are placed, then they are usually need to be removed in 2 weeks or less  I understand that my Dermatologist recommends that a skin "shave" biopsy be performed today  A local anesthetic, similar to the kind that a dentist uses when filling a cavity, will be injected with a very small needle into the skin area to be sampled  The injected skin and tissue underneath "will go to sleep and become numb so no pain should be felt afterwards    An instrument shaped like a tiny "razor blade" (shave biopsy instrument) will be used to cut a small piece of tissue and skin from the area so that a sample of tissue can be taken and examined more closely under the microscope  A slight amount of bleeding will occur, but it will be stopped with direct pressure and a pressure bandage and any other appropriate methods  I understands that a scar will form where the wound was created  Surgical ointment will be applied to help protect the wound  Sutures are not usually needed  II   RISKS AND POTENTIAL COMPLICATIONS   I understand the risks and potential complications of a skin biopsy include but are not limited to the following:   Bleeding   Infection   Pain   Scar/keloid   Skin discoloration   Incomplete Removal   Recurrence   Nerve Damage/Numbness/Loss of Function   Allergic Reaction to Anesthesia   Biopsies are diagnostic procedures and based on findings additional treatment or evaluation may be required   Loss or destruction of specimen resulting in no additional findings    My Dermatologist has explained to me the nature of the condition, the nature of the procedure, and the benefits to be reasonably expected compared with alternative approaches  My Dermatologist has discussed the likelihood of major risks or complications of this procedure including the specific risks listed above, such as bleeding, infection, and scarring/keloid  I understand that a scar is expected after this procedure  I understand that my physician cannot predict if the scar will form a "keloid," which extends beyond the borders of the wound that is created  A keloid is a thick, painful, and bumpy scar  A keloid can be difficult to treat, as it does not always respond well to therapy, which includes injecting cortisone directly into the keloid every few weeks  While this usually reduces the pain and size of the scar, it does not eliminate it        I understand that photographs may be taken before and after the procedure  These will be maintained as part of the medical providers confidential records and may not be made available to me  I further authorize the medical provider to use the photographs for teaching purposes or to illustrate scientific papers, books, or lectures if in his/her judgment, medical research, education, or science may benefit from its use  I have had an opportunity to fully inquire about the risks and benefits of this procedure and its alternatives  I have been given ample time and opportunity to ask questions and to seek a second opinion if I wished to do so  I acknowledge that there have specifically been no guarantees as to the cosmetic results from the procedure  I am aware that with any procedure there is always the possibility of an unexpected complication  III  POST-PROCEDURAL CARE (WHAT YOU WILL NEED TO DO "AFTER THE BIOPSY" TO OPTIMIZE HEALING)     Keep the area clean and dry  Try NOT to remove the bandage or get it wet for the first 24 hours   Gently clean the area and apply surgical ointment (such as Vaseline petrolatum ointment, which is available "over the counter" and not a prescription) to the biopsy site for up to 2 weeks straight  This acts to protect the wound from the outside world   Sutures are not usually placed in this procedure  If any sutures were placed, return for suture removal as instructed (generally 1 week for the face, 2 weeks for the body)   Take Acetaminophen (Tylenol) for discomfort, if no contraindications  Ibuprofen or aspirin could make bleeding worse   Call our office immediately for signs of infection: fever, chills, increased redness, warmth, tenderness, discomfort/pain, or pus or foul smell coming from the wound  WHAT TO DO IF THERE IS ANY BLEEDING? If a small amount of bleeding is noticed, place a clean cloth over the area and apply firm pressure for ten minutes    Check the wound after 10 minutes of direct pressure  If bleeding persists, try one more time for an additional 10 minutes of direct pressure on the area  If the bleeding becomes heavier or does not stop after the second attempt, or if you have any other questions about this procedure, then please call your [facility] Dermatologist by calling [phone] (SKIN)  I hereby acknowledge that I have reviewed and verified the site with my Dermatologist and have requested and authorized my Dermatologist to proceed with the procedure  4 Superficial basal cell carcinoma      Assessment and Plan:  Based on a thorough discussion of this condition and the management approach to it (including a comprehensive discussion of the known risks, side effects and potential benefits of treatment), the patient (family) agrees to implement the following specific plan:   Prescribed EFUDEX 5% cream  Apply topically to right inner shin TWICE a day for TWO - FOUR weeks  Please call us back after treating area for TWO weeks to check on how area is doing  Assessment and Plan:    What is basal cell carcinoma? Basal cell carcinoma (BCC) is a common, locally invasive, keratinocytic, or non-melanoma, skin cancer  It is also known as rodent ulcer and basalioma  Patients with BCC often develop multiple primary tumours over time  Who gets basal cell carcinoma? Risk factors for BCC include:  Wendi Cohn Age and sex: BCCs are particularly prevalent in elderly males   However, they also affect females and younger adults    Previous BCC or other form of skin cancer (squamous cell carcinoma, melanoma)    Sun damage (photoaging, actinic keratoses)    Repeated prior episodes of sunburn    Fair skin, blue eyes and blond or red hair--note; BCC can also affect darker skin types    Previous cutaneous injury, thermal burn, disease (eg cutaneous lupus, sebaceous naevus)    Inherited syndromes: BCC is a particular problem for families with basal cell naevus syndrome (Gorlin syndrome), Waaob-Gneaé-Odrvngxg syndrome, Rombo syndrome, Oley syndrome and xeroderma pigmentosum    Other risk factors include ionising radiation, exposure to arsenic, and immune suppression due to disease or medicines    What causes basal cell carcinoma? The cause of BCC is multifactorial    Most often, there are DNA mutations in the patched [facility]) tumour suppressor gene, part of hedgehog signalling pathway    These may be triggered by exposure to ultraviolet radiation    Various spontaneous and inherited gene defects predispose to [address]    What are the clinical features of basal cell carcinoma? BCC is a locally invasive skin tumour  The main characteristics are:   Slowly growing plaque or nodule    Skin coloured, pink or pigmented    Varies in size from a few millimetres to several centimetres in diameter    Spontaneous bleeding or ulceration  BCC is very rarely a threat to life  A tiny proportion of BCCs grow rapidly, invade deeply, and/or metastasise to local lymph nodes  Types of basal cell carcinoma  There are several distinct clinical types of BCC, and over 20 histological growth patterns of BCC    Nodular BCC   Most common type of facial BCC    Shiny or pearly nodule with a smooth surface    May have central depression or ulceration, so its edges appear rolled    Blood vessels cross its surface    Cystic variant is soft, with jelly-like contents    Micronodular, microcystic and infiltrative types are potentially aggressive subtypes    Also known as nodulocystic carcinoma  Superficial BCC   Most common type in younger adults    Most common type on upper trunk and shoulders    Slightly scaly, irregular plaque    Thin, translucent rolled border    Multiple microerosions  Morphoeaform BCC   Usually found in mid-facial sites    Waxy, scar-like plaque with indistinct borders    Wide and deep subclinical extension    May infiltrate cutaneous nerves (perineural spread)    Also known as morpheic, morphoeiform or sclerosing BCC  Basosquamous carcinoma   Mixed basal cell carcinoma (BCC) and squamous cell carcinoma (SCC)    Infiltrative growth pattern    Potentially more aggressive than other forms of BCC    Also known as basisquamous carcinoma and mixed basal-squamous cell carcinoma       Complications of basal cell carcinoma    Recurrent BCC  Recurrence of BCC after initial treatment is not uncommon  Characteristics of recurrent BCC often include:  o Incomplete excision or narrow margins at primary excision   o Morphoeic, micronodular, and infiltrative subtypes   o Location on head and neck    Advanced BCC  Advanced BCCs are large, often neglected tumours  o They may be several centimetres in diameter   o They may be deeply infiltrating into tissues below the skin   o They are difficult or impossible to treat surgically    Metastatic BCC  o Very rare   o Primary tumour is often large, neglected or recurrent, located on head and neck, with aggressive subtype   o May have had multiple prior treatments   o May arise in site exposed to ionising radiation   o Can be fatal    How is basal cell carcinoma diagnosed? BCC is diagnosed clinically by the presence of a slowly enlarging skin lesion with typical appearance  The diagnosis and  by a diagnostic biopsy or following excision  Some typical superficial BCCs on trunk and limbs are clinically diagnosed and have non-surgical treatment without histology  What is the treatment for primary basal cell carcinoma? The treatment for a [address] depends on its type, size and location, the number to be treated, patient factors, and the preference or expertise of the doctor  Most BCCs are treated surgically  Long-term follow-up is recommended to check for new lesions and recurrence; the latter may be unnecessary if histology has reported wide clear margins  Excision biopsy  Excision means the lesion is cut out and the skin stitched up     Most appropriate treatment for nodular, infiltrative and morphoeic BCCs    Should include 3 to 5 mm margin of normal skin around the tumour    Very large lesions may require flap or skin graft to repair the defect    Pathologist will report deep and lateral margins    Further surgery is recommended for lesions that are incompletely excised    Mohs micrographically controlled excision  Mohs micrographically controlled surgery involves examining carefully marked excised tissue under the microscope, layer by layer, to ensure complete excision   Very high cure rates achieved by trained Mohs surgeons    Used in high-risk areas of the face around eyes, lips and nose    Suitable for ill-defined, morphoeic, infiltrative and recurrent subtypes    Large defects are repaired by flap or skin graft    Superficial skin surgery  Superficial skin surgery comprises shave, curettage, and electrocautery  It is a rapid technique using local anaesthesia and does not require sutures   Suitable for small, well-defined nodular or superficial BCCs    Lesions are usually located on trunk or limbs    Wound is left open to heal by secondary intention    Moist wound dressings lead to healing within a few weeks    Eventual scar quality variable    Cryotherapy  Cryotherapy is the treatment of a superficial skin lesion by freezing it, usually with liquid nitrogen   Suitable for small superficial BCCs on covered areas of trunk and limbs    Best avoided for BCCs on head and neck, and distal to knees    Double freeze-thaw technique    Results in a blister that crusts over and heals within several weeks   Leaves permanent white mendoza    Photodynamic therapy  Photodynamic therapy (PDT) refers to a technique in which [address] is treated with a photosensitising chemical, and exposed to light several hours later     Topical photosensitisers include aminolevulinic acid lotion and methyl aminolevulinate cream    Suitable for low-risk small, superficial BCCs    Best avoided if tumour in site at high risk of recurrence    Results in inflammatory reaction, maximal 3-4 days after procedure    Treatment repeated 7 days after initial treatment    Excellent cosmetic results    Imiquimod cream  Imiquimod is an immune response modifier   Best used for superficial BCCs less than 2 cm diameter    Applied three to five times each week, for 6-16 weeks    Results in a variable inflammatory reaction, maximal at three weeks    Minimal scarring is usual    Fluorouracil cream  5-Fluorouracil cream is a topical cytotoxic agent   Used to treat small superficial basal cell carcinomas    Requires prolonged course, eg twice daily for 6-12 weeks    Causes inflammatory reaction    Has high recurrence rates    Radiotherapy  Radiotherapy or X-ray treatment can be used to treat primary BCCs or as adjunctive treatment if margins are incomplete   Mainly used if surgery is not suitable    Best avoided in young patients and in genetic conditions predisposing to skin cancer    Best cosmetic results achieved using multiple fractions    Typically, patient attends once-weekly for several weeks    Causes inflammatory reaction followed by scar    Risk of radiodermatitis, late recurrence, and new tumours    What is the treatment for advanced or metastatic basal cell carcinoma? Locally advanced primary, recurrent or metastatic BCC requires multidisciplinary consultation  Often a combination of treatments is used   Surgery    Radiotherapy    Targeted therapy  Targeted therapy refers to the hedgehog signalling pathway inhibitors, vismodegib and sonidegib  These drugs have some important risks and side effects  How can basal cell carcinoma be prevented? The most important way to prevent BCC is to avoid sunburn  This is especially important in childhood and early life   Fair skinned individuals and those with a personal or family history of BCC should protect their skin from sun exposure daily, year-round and lifelong   Stay indoors or under the shade in the middle of the day    Wear covering clothing    Apply high protection factor SPF50+ broad-spectrum sunscreens generously to exposed skin if outdoors    Avoid indoor tanning (sun beds, solaria)   Oral nicotinamide (vitamin B3) in a dose of 500 mg twice daily may reduce the number and severity of BCCs  What is the outlook for basal cell carcinoma? Most BCCs are cured by treatment  Cure is most likely if treatment is undertaken when the lesion is small  About 50% of people with BCC develop a second one within 3 years of the first  They are also at increased risk of other skin cancers, especially melanoma  Regular self-skin examinations and long-term annual skin checks by an experienced health professional are recommended  Treatment options for this lesion were reviewed and discussed  It was elected to proceed with Efudex 5% cream and will procced if needed with MOHS surgery in May  Treatment was scheduled  Start Efudex today for 2-4 weeks  5  HISTORY OF MELANOMA        Assessment and Plan:  Based on a thorough discussion of this condition and the management approach to it (including a comprehensive discussion of the known risks, side effects and potential benefits of treatment), the patient (family) agrees to implement the following specific plan:   Monitor for any changes   Follow up every 3 months for skin exams  What happens at follow-up? The main purpose of follow-up is to detect recurrences early (metastatic melanoma), but it also offers an opportunity to diagnose a new primary melanoma at the first possible opportunity  A second invasive melanoma occurs in 5-10% of melanoma patients and a new melanoma in situ is diagnosed in more than 20% of melanoma patients  Our practice makes the following recommendations for follow-up for patients with invasive melanoma     At-least "monthly" self-skin examinations    Routine skin checks by a board certified dermatologist   Follow-up intervals are "every 3 months" within 2 years of a new melanoma diagnosis; "every 6 months" between 2-4 years of a new melanoma diagnosis; and "annually" after 4 years of a new melanoma diagnosis   Individual patient's needs should be considered before an appropriate follow-up is offered   Provide education and support to help the patient adjust to their illness    Follow-up appointments should include:   A check of the scar where the primary melanoma was removed   Checking the regional lymph nodes   A general skin examination   A full physical examination at least annually by your primary care physician    In those with more advanced primary disease, follow-up may include:   Blood tests   Imaging: ultrasound, X-ray, CT, MRI and PET scan  Most tests are not worthwhile for patients with stage 1 or 2 melanoma unless there are signs or symptoms of disease recurrence or metastasis  No tests are necessary for healthy patients who have remained well for five years or longer after removal of their melanoma  What is the outlook for patients with melanoma?  Melanoma in situ is cured by excision because it has no potential to spread around the body   The risk of spread and ultimate death from invasive melanoma depends on several factors, but the main one is the Breslow thickness of the melanoma at the time it was surgically removed   Metastases are rare for melanomas < 0 75 mm and the risk for tumours 0 75-1 mm thick is about 5%  The risk steadily increases with thickness so that melanomas > 4 mm have a risk of metastasis of about 40%  Melanoma is a potentially serious type of skin cancer, in which there is uncontrolled growth of melanocytes (pigment cells)  Melanoma is sometimes called malignant melanoma  Normal melanocytes are found in the basal layer of the epidermis (the outer layer of skin)   Melanocytes produce a protein called melanin, which protects skin cells by absorbing ultraviolet (UV) radiation  Melanocytes are found in equal numbers in black and white skin, but melanocytes in black skin produce much more melanin  People with dark brown or black skin are very much less likely to be damaged by UV radiation than those with white skin

## 2020-02-11 NOTE — PROGRESS NOTES
[facility] Follow Up Note    Patient Name: Jose Marks  Encounter Date: 02/11/2020    Today's Chief Concerns:   Concern #1:  Follow up skin exam       Current Medications:    Current Outpatient Medications:     Calcium Citrate-Vitamin D (CITRACAL + D PO), Take by mouth, Disp: , Rfl:     Denosumab (PROLIA SC), Inject under the skin, Disp: , Rfl:     Multiple Vitamin (MULTIVITAMIN) capsule, Take 1 capsule by mouth daily, Disp: , Rfl:     CONSTITUTIONAL:   Vitals:    02/11/20 0801   Temp: 97 8 °F (36 6 °C)   TempSrc: Tympanic   Weight: 59 1 kg (130 lb 3 2 oz)   Height: 4' 9" (1 448 m)         Specific Alerts:    Have you been seen by a [facility] Dermatologist in the last 3 years? YES    Are you pregnant or planning to become pregnant? No    Are you currently or planning to be nursing or breast feeding? No    Allergies   Allergen Reactions    Codeine      Reaction Date: [id number];     Darvon [Propoxyphene]     Demerol [Meperidine]     Valium [Diazepam]     Aspirin      Reaction Date: [id number]; May we call your Preferred Phone number to discuss your specific medical information? YES    May we leave a detailed message that includes your specific medical information? YES    Have you traveled outside of the [facility] in the past 3 months? No    Do you currently have a pacemaker or defibrillator? No    Do you have any artificial heart valves, joints, plates, screws, rods, stents, pins, etc? No   - If Yes, were any placed within the last 2 years? Do you require any medications prior to a surgical procedure? No   - If Yes, for which procedure? - If Yes, what medications to you require? Are you taking any medications that cause you to bleed more easily ("blood thinners") No    Have you ever experienced a rapid heartbeat with epinephrine? No    Have you ever been treated with "gold" (gold sodium thiomalate) therapy?  No    [facility] can help with wrinkles, "laugh lines," facial volume loss, "double chin," "love handles," age spots, and more  Are you interested in learning today about some of the skin enhancement procedures that we offer? (If Yes, please provide more detail) No    Review of Systems:  Have you recently had or currently have any of the following? · Fever or chills: No  · Night Sweats: No  · Headaches: YES  · Weight Gain: No  · Weight Loss: No  · Blurry Vision: No  · Nausea: No  · Vomiting: No  · Diarrhea: No  · Blood in Stool: No  · Abdominal Pain: No  · Itchy Skin: No  · Painful Joints: No  · Swollen Joints: No  · Muscle Pain: No  · Irregular Mole: No  · Sun Burn: YES  · Dry Skin: No  · Skin Color Changes: No  · Scar or Keloid: No  · Cold Sores/Fever Blisters: No  · Bacterial Infections/MRSA: No  · Anxiety: No  · Depression: No  · Suicidal or Homicidal Thoughts: No      PSYCH: Normal mood and affect  EYES: Normal conjunctiva  ENT: Normal lips and oral mucosa  CARDIOVASCULAR: No edema  RESPIRATORY: Normal respirations  HEME/LYMPH/IMMUNO:  No regional lymphadenopathy except as noted below in ASSESSMENT AND PLAN BY DIAGNOSIS    FULL ORGAN SYSTEM SKIN EXAM (SKIN)  Hair, Scalp, Ears, Face Normal except as noted below in Assessment   Neck, Cervical Chain Nodes Normal except as noted below in Assessment   Right Arm/Hand/Fingers Normal except as noted below in Assessment   Left Arm/Hand/Fingers Normal except as noted below in Assessment   Chest/Breasts/Axillae Viewed areas Normal except as noted below in Assessment   Abdomen, Umbilicus Normal except as noted below in Assessment   Back/Spine Normal except as noted below in Assessment       Right Leg, Foot, Toes Normal except as noted below in Assessment   Left Leg, Foot, Toes Normal except as noted below in Assessment         1   SEBORRHEIC KERATOSIS; NON-INFLAMED    Physical Exam:   Anatomic Location Affected:  Multiple on [place] and back area   Morphological Description:  Flat and raised, waxy, smooth to warty textured, yellow to brownish-grey to dark brown to blackish, discrete, "stuck-on" appearing papules   Pertinent Positives:   Pertinent Negatives: Additional History of Present Condition:  Patient reports new bumps on the skin  Denies itch, burn, pain, bleeding or ulceration  Present constantly; nothing seems to make it worse or better  No prior treatment  Assessment and Plan:  Based on a thorough discussion of this condition and the management approach to it (including a comprehensive discussion of the known risks, side effects and potential benefits of treatment), the patient (family) agrees to implement the following specific plan:   Benign no treatment required  Seborrheic Keratosis  A seborrheic keratosis is a harmless warty spot that appears during adult life as a common sign of skin aging  Seborrheic keratoses can arise on any area of skin, covered or uncovered, with the usual exception of the palms and soles  They do not arise from mucous membranes  Seborrheic keratoses can have highly variable appearance  Seborrheic keratoses are extremely common  It has been estimated that over 90% of adults over the age of 61 years have one or more of them  They occur in males and females of all races, typically beginning to erupt in the 35s or 45s  They are uncommon under the age of 21 years  The precise cause of seborrhoeic keratoses is not known  Seborrhoeic keratoses are considered degenerative in nature  As time goes by, seborrheic keratoses tend to become more numerous  Some people inherit a tendency to develop a very large number of them; some people may have hundreds of them  The name "seborrheic keratosis" is misleading, because these lesions are not limited to a seborrhoeic distribution (scalp, mid-face, chest, upper back), nor are they formed from sebaceous glands, nor are they associated with sebum -- which is greasy    Seborrheic keratosis may also be called "SK," "Seb K," "basal cell papilloma," "senile wart," or "barnacle "      Researchers have noted:   Eruptive seborrhoeic keratoses can follow sunburn or dermatitis   Skin friction may be the reason they appear in body folds   Viral cause (e g , human papillomavirus) seems unlikely   Stable and clonal mutations or activation of FRFR3, PIK3CA, SAÚL, AKT1 and EGFR genes are found in seborrhoeic keratoses   Seborrhoeic keratosis can arise from solar lentigo   FRFR3 mutations also arise in solar lentigines  These mutations are associated with increased age and location on the head and neck, suggesting a role of ultraviolet radiation in these lesions   Seborrheic keratoses do not harbour tumour suppressor gene mutations   Epidermal growth factor receptor inhibitors, which are used to treat some cancers, often result in an increase in verrucal (warty) keratoses  There is no easy way to remove multiple lesions on a single occasion  Unless a specific lesion is "inflamed" and is causing pain or stinging/burning or is bleeding, most insurance companies do not authorize treatment  2  LINDSEY ANGIOMAS    Physical Exam:   Anatomic Location Affected:  Trunk and extremities    Morphological Description:  Scattered cherry red, 1-4 mm papules   Pertinent Positives:   Pertinent Negatives: Additional History of Present Condition:  Present for years  Assessment and Plan:  Based on a thorough discussion of this condition and the management approach to it (including a comprehensive discussion of the known risks, side effects and potential benefits of treatment), the patient (family) agrees to implement the following specific plan:   Benign no treatment required  Assessment and Plan:    Cherry angioma, also known as [employer] spots, are benign vascular skin lesions  A "cherry angioma" is a firm red, blue or purple papule, 0 1-1 cm in diameter   When thrombosed, they can appear black in colour until evaluated with a dermatoscope when the red or purple colour is more easily seen  Cherry angioma may develop on any part of the body but most often appear on the scalp, face, lips and trunk  An angioma is due to proliferating endothelial cells; these are the cells that line the inside of a blood vessel  Angiomas can arise in early life or later in life; the most common type of angioma is a cherry angioma  Cherry angiomas are very common in males and females of any age or race  They are more noticeable in white skin than in skin of colour  They markedly increase in number from about the age of 36  There may be a family history of similar lesions  Eruptive cherry angiomas have been rarely reported to be associated with internal malignancy  The cause of angiomas is unknown  Genetic analysis of cherry angiomas has shown that they frequently carry specific somatic missense mutations in the GNAQ and GNA11 (Q209H) genes, which are involved in other vascular and melanocytic proliferations  Cherry angioma is usually diagnosed clinically and no investigations are necessary for the majority of lesions  It has a characteristic red-clod or lobular pattern on dermatoscopy (called lacunar pattern using conventional pattern analysis)  When there is uncertainty about the diagnosis, a biopsy may be performed  The angioma is composed of venules in a thickened papillary dermis  Collagen bundles may be prominent between the lobules  Cherry angiomas are harmless, so they do not usually have to be treated  Occasionally, they are removed to exclude a malignant skin lesion such as a nodular melanoma or because they are irritated or bleeding (and a subsequent risk for infection)  To decrease friction over the lesions, we recommend Neutrogena Daily Defense SPF 50+ at least 3 times a day        3  NEOPLASM OF UNCERTAIN BEHAVIOR OF SKIN    Physical Exam:   (Anatomic Location); (Size and Morphological Description); (Differential Diagnosis):  o A; Right Alar Groove;  0 3cm lucent papule  Differential Diagnosis: Basal Cell Carcinoma versus Nevus   Pertinent Positives:   Pertinent Negatives: Additional History of Present Condition:  Patient has had lesion for over a year and stated it appeared as a pimple but has not gone away since it appeared  Assessment and Plan:   I have discussed with the patient that a sample of skin via a "skin biopsy would be potentially helpful to further make a specific diagnosis under the microscope   Based on a thorough discussion of this condition and the management approach to it (including a comprehensive discussion of the known risks, side effects and potential benefits of treatment), the patient (family) agrees to implement the following specific plan:    o Procedure:  Skin Biopsy  After a thorough discussion of treatment options and risk/benefits/alternatives (including but not limited to local pain, scarring, dyspigmentation, blistering, possible superinfection, and inability to confirm a diagnosis via histopathology), verbal and written consent were obtained and portion of the rash was biopsied for tissue sample  See below for consent that was obtained from patient and subsequent Procedure Note  PROCEDURE SHAVE BIOPSY NOTE:     Performing Physician: Dr Rcok   Anatomic Location; Clinical Description with size (cm); Pre-Op Diagnosis:   o A; Right Alar Groove;  0 3cm lucent papule  Differential Diagnosis: Basal Cell Carcinoma versus Nevus   Post-op diagnosis: Same      Local anesthesia: 1% xylocaine with epi       Topical anesthesia: None     Hemostasis: Aluminum chloride       After obtaining informed consent  at which time there was a discussion about the purpose of biopsy  and low risks of infection and bleeding  The area was prepped and draped in the usual fashion  Anesthesia was obtained with 1% lidocaine with epinephrine   A shave biopsy to an appropriate sampling depth was obtained with a sterile blade (such as a 15-blade or DermaBlade)  The resulting wound was covered with surgical ointment and bandaged appropriately  The patient tolerated the procedure well without complications and was without signs of functional compromise  Specimen has been sent for review by Dermatopathology  Standard post-procedure care has been explained and has been included in written form within the patient's copy of Informed Consent  INFORMED CONSENT DISCUSSION AND POST-OPERATIVE INSTRUCTIONS FOR PATIENT    I   RATIONALE FOR PROCEDURE  I understand that a skin biopsy allows the Dermatologist to test a lesion or rash under the microscope to obtain a diagnosis  It usually involves numbing the area with numbing medication and removing a small piece of skin; sometimes the area will be closed with sutures  In this specific procedure, sutures are not usually needed  If any sutures are placed, then they are usually need to be removed in 2 weeks or less  I understand that my Dermatologist recommends that a skin "shave" biopsy be performed today  A local anesthetic, similar to the kind that a dentist uses when filling a cavity, will be injected with a very small needle into the skin area to be sampled  The injected skin and tissue underneath "will go to sleep and become numb so no pain should be felt afterwards  An instrument shaped like a tiny "razor blade" (shave biopsy instrument) will be used to cut a small piece of tissue and skin from the area so that a sample of tissue can be taken and examined more closely under the microscope  A slight amount of bleeding will occur, but it will be stopped with direct pressure and a pressure bandage and any other appropriate methods  I understands that a scar will form where the wound was created  Surgical ointment will be applied to help protect the wound  Sutures are not usually needed      II   RISKS AND POTENTIAL COMPLICATIONS   I understand the risks and potential complications of a skin biopsy include but are not limited to the following:   Bleeding   Infection   Pain   Scar/keloid   Skin discoloration   Incomplete Removal   Recurrence   Nerve Damage/Numbness/Loss of Function   Allergic Reaction to Anesthesia   Biopsies are diagnostic procedures and based on findings additional treatment or evaluation may be required   Loss or destruction of specimen resulting in no additional findings    My Dermatologist has explained to me the nature of the condition, the nature of the procedure, and the benefits to be reasonably expected compared with alternative approaches  My Dermatologist has discussed the likelihood of major risks or complications of this procedure including the specific risks listed above, such as bleeding, infection, and scarring/keloid  I understand that a scar is expected after this procedure  I understand that my physician cannot predict if the scar will form a "keloid," which extends beyond the borders of the wound that is created  A keloid is a thick, painful, and bumpy scar  A keloid can be difficult to treat, as it does not always respond well to therapy, which includes injecting cortisone directly into the keloid every few weeks  While this usually reduces the pain and size of the scar, it does not eliminate it  I understand that photographs may be taken before and after the procedure  These will be maintained as part of the medical providers confidential records and may not be made available to me  I further authorize the medical provider to use the photographs for teaching purposes or to illustrate scientific papers, books, or lectures if in his/her judgment, medical research, education, or science may benefit from its use  I have had an opportunity to fully inquire about the risks and benefits of this procedure and its alternatives     I have been given ample time and opportunity to ask questions and to seek a second opinion if I wished to do so  I acknowledge that there have specifically been no guarantees as to the cosmetic results from the procedure  I am aware that with any procedure there is always the possibility of an unexpected complication  III  POST-PROCEDURAL CARE (WHAT YOU WILL NEED TO DO "AFTER THE BIOPSY" TO OPTIMIZE HEALING)     Keep the area clean and dry  Try NOT to remove the bandage or get it wet for the first 24 hours   Gently clean the area and apply surgical ointment (such as Vaseline petrolatum ointment, which is available "over the counter" and not a prescription) to the biopsy site for up to 2 weeks straight  This acts to protect the wound from the outside world   Sutures are not usually placed in this procedure  If any sutures were placed, return for suture removal as instructed (generally 1 week for the face, 2 weeks for the body)   Take Acetaminophen (Tylenol) for discomfort, if no contraindications  Ibuprofen or aspirin could make bleeding worse   Call our office immediately for signs of infection: fever, chills, increased redness, warmth, tenderness, discomfort/pain, or pus or foul smell coming from the wound  WHAT TO DO IF THERE IS ANY BLEEDING? If a small amount of bleeding is noticed, place a clean cloth over the area and apply firm pressure for ten minutes  Check the wound after 10 minutes of direct pressure  If bleeding persists, try one more time for an additional 10 minutes of direct pressure on the area  If the bleeding becomes heavier or does not stop after the second attempt, or if you have any other questions about this procedure, then please call your [facility]'s Dermatologist by calling [phone] (SKIN)  I hereby acknowledge that I have reviewed and verified the site with my Dermatologist and have requested and authorized my Dermatologist to proceed with the procedure                4 Superficial basal cell carcinoma  Physical Exam:   Anatomic Location Affected:  Right inner shin    Morphological Description:  2 3cm superfical basal cell right inner calf   Pertinent Positives:   Pertinent Negatives: Additional History of Present Condition:  Present for over 6 months patient states she was going to get removed but wants MOHS     Assessment and Plan:  Based on a thorough discussion of this condition and the management approach to it (including a comprehensive discussion of the known risks, side effects and potential benefits of treatment), the patient (family) agrees to implement the following specific plan:   Prescribed EFUDEX 5% cream  Apply topically to right inner shin TWICE a day for TWO - FOUR weeks  Please call us back after treating area for TWO weeks to check on how area is doing   I dicussed excisional surgery especially MOHS would have highest cure rate  However given that its superficial 5FU has potential to treat lesion  I noted blistering and redness can occur with this agent and if no response MOHS will still be required  Assessment and Plan:    What is basal cell carcinoma? Basal cell carcinoma (BCC) is a common, locally invasive, keratinocytic, or non-melanoma, skin cancer  It is also known as rodent ulcer and basalioma  Patients with BCC often develop multiple primary tumours over time  Who gets basal cell carcinoma? Risk factors for BCC include:  Jason Avery Age and sex: BCCs are particularly prevalent in elderly males   However, they also affect females and younger adults    Previous BCC or other form of skin cancer (squamous cell carcinoma, melanoma)    Sun damage (photoaging, actinic keratoses)    Repeated prior episodes of sunburn    Fair skin, blue eyes and blond or red hair--note; BCC can also affect darker skin types    Previous cutaneous injury, thermal burn, disease (eg cutaneous lupus, sebaceous naevus)    Inherited syndromes: BCC is a particular problem for families with basal cell naevus syndrome (Gorlin syndrome), Ajnnw-Udosé-Fpowluyq syndrome, Rombo syndrome, Oley syndrome and xeroderma pigmentosum    Other risk factors include ionising radiation, exposure to arsenic, and immune suppression due to disease or medicines    What causes basal cell carcinoma? The cause of BCC is multifactorial    Most often, there are DNA mutations in the patched [facility]) tumour suppressor gene, part of hedgehog signalling pathway    These may be triggered by exposure to ultraviolet radiation    Various spontaneous and inherited gene defects predispose to [facility]    What are the clinical features of basal cell carcinoma? BCC is a locally invasive skin tumour  The main characteristics are:   Slowly growing plaque or nodule    Skin coloured, pink or pigmented    Varies in size from a few millimetres to several centimetres in diameter    Spontaneous bleeding or ulceration  BCC is very rarely a threat to life  A tiny proportion of BCCs grow rapidly, invade deeply, and/or metastasise to local lymph nodes  Types of basal cell carcinoma  There are several distinct clinical types of BCC, and over 20 histological growth patterns of BCC    Nodular BCC   Most common type of facial BCC    Shiny or pearly nodule with a smooth surface    May have central depression or ulceration, so its edges appear rolled    Blood vessels cross its surface    Cystic variant is soft, with jelly-like contents    Micronodular, microcystic and infiltrative types are potentially aggressive subtypes    Also known as nodulocystic carcinoma  Superficial BCC   Most common type in younger adults    Most common type on upper trunk and shoulders    Slightly scaly, irregular plaque    Thin, translucent rolled border    Multiple microerosions  Morphoeaform BCC   Usually found in mid-facial sites    Waxy, scar-like plaque with indistinct borders    Wide and deep subclinical extension    May infiltrate cutaneous nerves (perineural spread)  Also known as morpheic, morphoeiform or sclerosing BCC  Basosquamous carcinoma   Mixed basal cell carcinoma (BCC) and squamous cell carcinoma (SCC)    Infiltrative growth pattern    Potentially more aggressive than other forms of BCC    Also known as basisquamous carcinoma and mixed basal-squamous cell carcinoma       Complications of basal cell carcinoma    Recurrent BCC  Recurrence of BCC after initial treatment is not uncommon  Characteristics of recurrent BCC often include:  o Incomplete excision or narrow margins at primary excision   o Morphoeic, micronodular, and infiltrative subtypes   o Location on head and neck    Advanced BCC  Advanced BCCs are large, often neglected tumours  o They may be several centimetres in diameter   o They may be deeply infiltrating into tissues below the skin   o They are difficult or impossible to treat surgically    Metastatic BCC  o Very rare   o Primary tumour is often large, neglected or recurrent, located on head and neck, with aggressive subtype   o May have had multiple prior treatments   o May arise in site exposed to ionising radiation   o Can be fatal    How is basal cell carcinoma diagnosed? BCC is diagnosed clinically by the presence of a slowly enlarging skin lesion with typical appearance  The diagnosis and  by a diagnostic biopsy or following excision  Some typical superficial BCCs on trunk and limbs are clinically diagnosed and have non-surgical treatment without histology  What is the treatment for primary basal cell carcinoma? The treatment for a [address] depends on its type, size and location, the number to be treated, patient factors, and the preference or expertise of the doctor  Most BCCs are treated surgically  Long-term follow-up is recommended to check for new lesions and recurrence; the latter may be unnecessary if histology has reported wide clear margins      Excision biopsy  Excision means the lesion is cut out and the skin stitched up   Gupta Most appropriate treatment for nodular, infiltrative and morphoeic BCCs    Should include 3 to 5 mm margin of normal skin around the tumour    Very large lesions may require flap or skin graft to repair the defect    Pathologist will report deep and lateral margins    Further surgery is recommended for lesions that are incompletely excised    Mohs micrographically controlled excision  Mohs micrographically controlled surgery involves examining carefully marked excised tissue under the microscope, layer by layer, to ensure complete excision   Very high cure rates achieved by trained Mohs surgeons    Used in high-risk areas of the face around eyes, lips and nose    Suitable for ill-defined, morphoeic, infiltrative and recurrent subtypes    Large defects are repaired by flap or skin graft    Superficial skin surgery  Superficial skin surgery comprises shave, curettage, and electrocautery  It is a rapid technique using local anaesthesia and does not require sutures   Suitable for small, well-defined nodular or superficial BCCs    Lesions are usually located on trunk or limbs    Wound is left open to heal by secondary intention    Moist wound dressings lead to healing within a few weeks    Eventual scar quality variable    Cryotherapy  Cryotherapy is the treatment of a superficial skin lesion by freezing it, usually with liquid nitrogen   Suitable for small superficial BCCs on covered areas of trunk and limbs    Best avoided for BCCs on head and neck, and distal to knees    Double freeze-thaw technique    Results in a blister that crusts over and heals within several weeks   Leaves permanent white mendoza    Photodynamic therapy  Photodynamic therapy (PDT) refers to a technique in which [address] is treated with a photosensitising chemical, and exposed to light several hours later     Topical photosensitisers include aminolevulinic acid lotion and methyl aminolevulinate cream    Suitable for low-risk small, superficial BCCs    Best avoided if tumour in site at high risk of recurrence    Results in inflammatory reaction, maximal 3-4 days after procedure    Treatment repeated 7 days after initial treatment    Excellent cosmetic results    Imiquimod cream  Imiquimod is an immune response modifier   Best used for superficial BCCs less than 2 cm diameter    Applied three to five times each week, for 6-16 weeks    Results in a variable inflammatory reaction, maximal at three weeks    Minimal scarring is usual    Fluorouracil cream  5-Fluorouracil cream is a topical cytotoxic agent   Used to treat small superficial basal cell carcinomas    Requires prolonged course, eg twice daily for 6-12 weeks    Causes inflammatory reaction    Has high recurrence rates    Radiotherapy  Radiotherapy or X-ray treatment can be used to treat primary BCCs or as adjunctive treatment if margins are incomplete   Mainly used if surgery is not suitable    Best avoided in young patients and in genetic conditions predisposing to skin cancer    Best cosmetic results achieved using multiple fractions    Typically, patient attends once-weekly for several weeks    Causes inflammatory reaction followed by scar    Risk of radiodermatitis, late recurrence, and new tumours    What is the treatment for advanced or metastatic basal cell carcinoma? Locally advanced primary, recurrent or metastatic BCC requires multidisciplinary consultation  Often a combination of treatments is used   Surgery    Radiotherapy    Targeted therapy  Targeted therapy refers to the hedgehog signalling pathway inhibitors, vismodegib and sonidegib  These drugs have some important risks and side effects  How can basal cell carcinoma be prevented? The most important way to prevent BCC is to avoid sunburn  This is especially important in childhood and early life   Fair skinned individuals and those with a personal or family history of BCC should protect their skin from sun exposure daily, year-round and lifelong   Stay indoors or under the shade in the middle of the day    Wear covering clothing    Apply high protection factor SPF50+ broad-spectrum sunscreens generously to exposed skin if outdoors    Avoid indoor tanning (sun beds, solaria)   Oral nicotinamide (vitamin B3) in a dose of 500 mg twice daily may reduce the number and severity of BCCs  What is the outlook for basal cell carcinoma? Most BCCs are cured by treatment  Cure is most likely if treatment is undertaken when the lesion is small  About 50% of people with BCC develop a second one within 3 years of the first  They are also at increased risk of other skin cancers, especially melanoma  Regular self-skin examinations and long-term annual skin checks by an experienced health professional are recommended  Treatment options for this lesion were reviewed and discussed  It was elected to proceed with Efudex 5% cream for 2-4 weeks and then will proceed with MOHS surgery in the future  Treatment was scheduled  Start Efudex 5% cream today  5  HISTORY OF MELANOMA OF IN SITU     Physical Exam:   Anatomic Location Affected:  Left Anterior [place] Morphological Description of Scar:  Well healed    Year Treated: 07/17/2019   TNM Classification: Not available    Suspected Recurrence: no   Regional adenopathy: no    Additional History of Present Condition:      Assessment and Plan:  Based on a thorough discussion of this condition and the management approach to it (including a comprehensive discussion of the known risks, side effects and potential benefits of treatment), the patient (family) agrees to implement the following specific plan:   Monitor for any changes of ne growths  What happens at follow-up?   The main purpose of follow-up is to detect recurrences early (metastatic melanoma), but it also offers an opportunity to diagnose a new primary melanoma at the first possible opportunity  A second invasive melanoma occurs in 5-10% of melanoma patients and a new melanoma in situ is diagnosed in more than 20% of melanoma patients  Our practice makes the following recommendations for follow-up for patients with invasive melanoma   At-least "monthly" self-skin examinations    Routine skin checks by a board certified dermatologist   Follow-up intervals are "every 3 months" within 2 years of a new melanoma diagnosis; "every 6 months" between 2-4 years of a new melanoma diagnosis; and "annually" after 4 years of a new melanoma diagnosis   Individual patient's needs should be considered before an appropriate follow-up is offered   Provide education and support to help the patient adjust to their illness    Follow-up appointments should include:   A check of the scar where the primary melanoma was removed   Checking the regional lymph nodes   A general skin examination   A full physical examination at least annually by your primary care physician    In those with more advanced primary disease, follow-up may include:   Blood tests   Imaging: ultrasound, X-ray, CT, MRI and PET scan  Most tests are not worthwhile for patients with stage 1 or 2 melanoma unless there are signs or symptoms of disease recurrence or metastasis  No tests are necessary for healthy patients who have remained well for five years or longer after removal of their melanoma  What is the outlook for patients with melanoma?  Melanoma in situ is cured by excision because it has no potential to spread around the body   The risk of spread and ultimate death from invasive melanoma depends on several factors, but the main one is the Breslow thickness of the melanoma at the time it was surgically removed   Metastases are rare for melanomas < 0 75 mm and the risk for tumours 0 75-1 mm thick is about 5%   The risk steadily increases with thickness so that melanomas > 4 mm have a risk of metastasis of about 40%  Melanoma is a potentially serious type of skin cancer, in which there is uncontrolled growth of melanocytes (pigment cells)  Melanoma is sometimes called malignant melanoma  Normal melanocytes are found in the basal layer of the epidermis (the outer layer of skin)  Melanocytes produce a protein called melanin, which protects skin cells by absorbing ultraviolet (UV) radiation  Melanocytes are found in equal numbers in black and white skin, but melanocytes in black skin produce much more melanin  People with dark brown or black skin are very much less likely to be damaged by UV radiation than those with white skin      Scribe Attestation    I,:   Genna Roman MA am acting as a scribe while in the presence of the attending physician :        I,:   Reji Machado MD personally performed the services described in this documentation    as scribed in my presence :

## 2020-02-11 NOTE — LETTER
February 11, 2020     Patient: Paul Colbert   YOB: 1955   Date of Visit: 2/11/2020       To Whom it May Concern:    Sulma Vidal is under my professional care  She was seen in my office on 2/11/2020  She may return to work on 2/11/2020  If you have any questions or concerns, please don't hesitate to call           Sincerely,          Andrea Harper MD        CC: No Recipients

## 2020-02-11 NOTE — LETTER
February 11, 2020     Patient: Randall Herzog   YOB: 1955   Date of Visit: 2/11/2020       To Whom it May Concern:    Gia Stevenson is under my professional care  She was seen in my office on 2/11/2020  She may return to school on 2/11/2020  If you have any questions or concerns, please don't hesitate to call           Sincerely,          Janay Busby MD        CC: No Recipients

## 2020-02-14 ENCOUNTER — TELEPHONE (OUTPATIENT)
Dept: DERMATOLOGY | Facility: CLINIC | Age: 65
End: 2020-02-14

## 2020-02-14 NOTE — TELEPHONE ENCOUNTER
Patient was called to follow up on her visit with Dr Gianna Quiñones on 02 14 2020  Message was left for patient to call us back with any questions or concerns

## 2020-02-18 NOTE — RESULT ENCOUNTER NOTE
I left message with patients voice mail that there is additional lesion to treat  When she calls back can advised that biopsy of right nostril did show a basal cell  I appears to be slow growing and has been present for years  At some point it will need treated as it will continue to slowly grow  Options include referral to outside [facility] provider or wait till [facility] lab opens in May    (I tentatively place on MOHS list for [facility])

## 2020-03-03 ENCOUNTER — OFFICE VISIT (OUTPATIENT)
Dept: URGENT CARE | Age: 65
End: 2020-03-03
Payer: COMMERCIAL

## 2020-03-03 VITALS
BODY MASS INDEX: 27.83 KG/M2 | OXYGEN SATURATION: 98 % | DIASTOLIC BLOOD PRESSURE: 80 MMHG | TEMPERATURE: 98.1 F | WEIGHT: 129 LBS | HEIGHT: 57 IN | HEART RATE: 62 BPM | RESPIRATION RATE: 18 BRPM | SYSTOLIC BLOOD PRESSURE: 187 MMHG

## 2020-03-03 DIAGNOSIS — J01.90 ACUTE NON-RECURRENT SINUSITIS, UNSPECIFIED LOCATION: Primary | ICD-10-CM

## 2020-03-03 LAB
FLUAV RNA NPH QL NAA+PROBE: NORMAL
FLUBV RNA NPH QL NAA+PROBE: NORMAL
RSV RNA NPH QL NAA+PROBE: NORMAL

## 2020-03-03 PROCEDURE — G0382 LEV 3 HOSP TYPE B ED VISIT: HCPCS | Performed by: PHYSICIAN ASSISTANT

## 2020-03-03 PROCEDURE — 87631 RESP VIRUS 3-5 TARGETS: CPT | Performed by: PHYSICIAN ASSISTANT

## 2020-03-03 RX ORDER — AMOXICILLIN AND CLAVULANATE POTASSIUM 875; 125 MG/1; MG/1
1 TABLET, FILM COATED ORAL EVERY 12 HOURS SCHEDULED
Qty: 20 TABLET | Refills: 0 | Status: SHIPPED | OUTPATIENT
Start: 2020-03-03 | End: 2020-03-13

## 2020-03-03 NOTE — PATIENT INSTRUCTIONS

## 2020-03-03 NOTE — PROGRESS NOTES
[facility]        NAME: Chauncey Peacock is a 60 y o  female  : 1955    MRN: [id number]  DATE: March 3, 2020  TIME: 9:02 AM    Assessment and Plan   Acute non-recurrent sinusitis, unspecified location [J01 90]  1  Acute non-recurrent sinusitis, unspecified location  Influenza A/B and RSV by PCR    amoxicillin-clavulanate (AUGMENTIN) 875-125 mg per tablet         Patient Instructions       Follow up with PCP in 3-5 days  Proceed to  ER if symptoms worsen  Chief Complaint     Chief Complaint   Patient presents with    Cold Like Symptoms     pt reports head and chest congestion for 2 days  +sore throat  No fever reported  History of Present Illness       Pt with sinus congestion and pressurefor about 1 week  Pt with several episodes of this over past winter     Sinusitis   This is a new problem  The current episode started in the past 7 days  The problem is unchanged  There has been no fever  The pain is mild  Associated symptoms include coughing and sinus pressure  Pertinent negatives include no congestion, diaphoresis, ear pain, headaches, hoarse voice, neck pain, shortness of breath, sneezing, sore throat or swollen glands  Past treatments include nothing  The treatment provided no relief  Review of Systems   Review of Systems   Constitutional: Negative  Negative for diaphoresis  HENT: Positive for rhinorrhea, sinus pressure and sinus pain  Negative for congestion, ear pain, hoarse voice, sneezing and sore throat  Eyes: Negative  Respiratory: Positive for cough  Negative for shortness of breath  Cardiovascular: Negative  Gastrointestinal: Negative  Endocrine: Negative  Genitourinary: Negative  Musculoskeletal: Negative  Negative for neck pain  Skin: Negative  Allergic/Immunologic: Negative  Neurological: Negative  Negative for headaches  Hematological: Negative  Psychiatric/Behavioral: Negative      All other systems reviewed and are negative  Current Medications       Current Outpatient Medications:     Calcium Citrate-Vitamin D (CITRACAL + D PO), Take by mouth, Disp: , Rfl:     Denosumab (PROLIA SC), Inject under the skin, Disp: , Rfl:     fluorouracil (EFUDEX) 5 % cream, Apply topically to right inner shin Two times a day for 2-4 weeks  , Disp: 40 g, Rfl: 0    Multiple Vitamin (MULTIVITAMIN) capsule, Take 1 capsule by mouth daily, Disp: , Rfl:     amoxicillin-clavulanate (AUGMENTIN) 875-125 mg per tablet, Take 1 tablet by mouth every 12 (twelve) hours for 10 days, Disp: 20 tablet, Rfl: 0    Current Allergies     Allergies as of 03/03/2020 - Reviewed 03/03/2020   Allergen Reaction Noted    Codeine  04/21/2012    Darvon [propoxyphene]  07/11/2019    Demerol [meperidine]  07/11/2019    Valium [diazepam]  07/11/2019    Aspirin  04/21/2012            The following portions of the patient's history were reviewed and updated as appropriate: allergies, current medications, past family history, past medical history, past social history, past surgical history and problem list      History reviewed  No pertinent past medical history  Past Surgical History:   Procedure Laterality Date    COCHLEAR IMPLANT      COLONOSCOPY  2017    COLONOSCOPY  2010    fiberoptic; follow up at age; 2017 12 year f/u    HYSTERECTOMY      TONSILLECTOMY         Family History   Problem Relation Age of Onset    Osteoporosis Mother     Diabetes Father         mellitus    Heart disease Father     Hypertension Father     Lung cancer Maternal Grandmother         76s    No Known Problems Paternal Grandmother     No Known Problems Maternal Aunt     Lung cancer Maternal Grandfather     Prostate cancer Paternal Grandfather     Colon cancer Other     Pancreatic cancer Other          Medications have been verified          Objective   BP (!) 187/80   Pulse 62   Temp 98 1 °F (36 7 °C)   Resp 18   Ht 4' 9" (1 448 m)   Wt 58 5 kg (129 lb)   LMP 06/01/1989 (Exact Date) Comment: hysterectomy, total  SpO2 98%   BMI 27 92 kg/m²        Physical Exam     Physical Exam   Constitutional: She is oriented to person, place, and time  She appears well-developed and well-nourished  HENT:   Head: Normocephalic and atraumatic  Right Ear: External ear normal    Left Ear: External ear normal    Mouth/Throat: Oropharynx is clear and moist    Boggy nasal mucosa max sinus tender to palp    Eyes: Pupils are equal, round, and reactive to light  Conjunctivae and EOM are normal    Neck: Normal range of motion  Neck supple  Cardiovascular: Normal rate, regular rhythm and normal heart sounds  Pulmonary/Chest: Effort normal and breath sounds normal    Abdominal: Soft  Bowel sounds are normal    Musculoskeletal: Normal range of motion  Neurological: She is alert and oriented to person, place, and time  Skin: Skin is warm  Capillary refill takes less than 2 seconds  Psychiatric: She has a normal mood and affect  Her behavior is normal    Nursing note and vitals reviewed

## 2020-03-03 NOTE — LETTER
March 3, 2020     Patient: Camryn Chandler   YOB: 1955   Date of Visit: 3/3/2020       To Whom It May Concern: It is my medical opinion that Jackie Canas may return to work on 3/4/2020  If you have any questions or concerns, please don't hesitate to call           Sincerely,        Afua Pizarro PA-C    CC: No Recipients

## 2020-03-04 ENCOUNTER — TELEPHONE (OUTPATIENT)
Dept: FAMILY MEDICINE CLINIC | Facility: MEDICAL CENTER | Age: 65
End: 2020-03-04

## 2020-03-04 DIAGNOSIS — H90.3 SENSORINEURAL HEARING LOSS (SNHL) OF BOTH EARS: Primary | ICD-10-CM

## 2020-03-04 NOTE — TELEPHONE ENCOUNTER
----- Message from [address] [place] [address] sent at 3/4/2020  8:43 AM EST -----  Regarding: Referral Request  Contact: [phone]  Hi Dr Federico Arboleda    I just found out that I need a referral for me to be seen @ [facility]  I have a appointment on 03/16/2020   I am seen a new doctor since Dr Terry Warren is no longer there      Cecelia Santos

## 2020-03-21 NOTE — TELEPHONE ENCOUNTER
Need to know what concern is REQUESTING PHYSICIAN  Jacob Blanco MD    REASON FOR CONSULTATION  I am seeing Dieter LAZO Humberto at the request of Dr. Blanco/Dr. Echeverria for my opinion regarding evaluation and management of prostate cancer.    HISTORY OF PRESENT ILLNESS    The patient is a 76 year old male with the following oncology history    1. The patient is a 76 year old male with multiple pulmonary details above who presented to primary care physician with complaints of right shoulder pain x-ray from 02/06/2020 showed ovoid 2 cm expansile sclerotic area involving the right 6th rib.  Subsequently nuclear medicine bone scan was obtained 02/21/2020 and showed findings suggestive of osseous metastatic disease involving the spine and number of ribs and likely left femur.  PET-CT scan was ordered by primary care physician 02/24/2020, PET scan reported as very large mildly hypermetabolic mass in the pelvis with epicenter around the prostate gland seems to yasmeen invade the adjacent rectum as well as urinary bladder most compatible with prostate cancer, findings of fairly extensive mildly hypermetabolic osseous metastatic disease involving the axial skeleton.  Numerous pathologically enlarged lymph nodes throughout the pelvis.  Patient was referred to Urology by primary care physician and initiated on Casodex with plans for Lupron in the near future.  MRI lumbar spine from 02/26/2020 reported as large mass involving the L1-L2 L3 vertebral bodies with large extraosseous component of the right side resulting in severe narrowing of the right lateral neural foramina L1-L2.  Severe narrowing of L2-L3 neuro phenomena as well as spinal canal narrowing.  PSA from 02/06/2020 is 1160 ng/mL  2. IR guided right pelvic mass biopsy 03/02/2020 consistent with prostatic adenocarcinoma.  3. On palliative radiotherapy to cervical and lumbar spine.  4. Patient was initiated on Lupron on 03/10/2020.    Subjective:   Patient is admitted with weakness, dehydration, diarrhea.   Primary team has consulted Oncology for ongoing management of prostate cancer.      PAST MEDICAL HISTORY:    Malignant neoplasm (CMS/HCC)                                    Comment: Prostate    Metastasis (CMS/HCC)                                          Arthritis                                                     Prostate cancer metastatic to multiple sites (* 2/18/2020       Comment:  2/06/20: 2 cm sclerotic area R. 6th rib. bone                scan 2/21/20 osseous mets dis - spine, ribs, L.               femur.  PET-CT 2/24/20,  hypermetabolic mass                 Pelvis,prostate gland ,invade  rectum ,urinary                bladder c/w prostate CA, osseous mets dis-                axial skeleton ,LN's  pelvis.  MRI LS 2/26/20                rmass  L1-L2 L3 vertebral bodies with large                extraosseous component o, L1-L2.   PSA f                02/06/20 :1160 ng/mL .IR guided right     Metastasis to spinal cord (CMS/HCC)             3/4/2020        Comment:  2/06/20: 2 cm sclerotic area R. 6th rib. bone                scan 2/21/20 osseous mets dis - spine, ribs, L.               femur.  PET-CT 2/24/20,  hypermetabolic mass                 Pelvis,prostate gland ,invade  rectum ,urinary                bladder c/w prostate CA, osseous mets dis-                axial skeleton ,LN's  pelvis.  MRI LS 2/26/20                rmass  L1-L2 L3 vertebral bodies with large                extraosseous component o, L1-L2.   PSA f                02/06/20 :1160 ng/mL .IR guided right     S/P palliative radiation therapy                3/18/2020       Comment:  2/06/20: 2 cm sclerotic area R. 6th rib. bone                scan 2/21/20 osseous mets dis - spine, ribs, L.               femur.  PET-CT 2/24/20,  hypermetabolic mass                 Pelvis,prostate gland ,invade  rectum ,urinary                bladder c/w prostate CA, osseous mets dis-                axial skeleton ,LN's  pelvis.  MRI LS 2/26/20                 rmass  L1-L2 L3 vertebral bodies with large                extraosseous component o, L1-L2.   PSA f                02/06/20 :1160 ng/mL .IR guided right      PAST SURGICAL HISTORY:    TONSILLECTOMY                                                 CYST REMOVAL                                                  CYST REMOVAL                                                    Comment: chest     FAMILY HISTORY:  Family History   Problem Relation Age of Onset   • Cancer Mother         breast   • Patient is unaware of any medical problems Father    • Patient is unaware of any medical problems Sister    • Patient is unaware of any medical problems Brother    • Cancer Paternal Aunt         leukemia       SOCIAL HISTORY:  Social History     Tobacco Use   • Smoking status: Never Smoker   • Smokeless tobacco: Never Used   Substance Use Topics   • Alcohol use: Not Currently     Alcohol/week: 2.0 standard drinks     Types: 2 Cans of beer per week     Frequency: 2-3 times a week     Drinks per session: 1 or 2     Binge frequency: Daily or almost daily   • Drug use: Never       REVIEW OF SYSTEMS:  Dyan Velasco RN  3/20/2020  5:47 AM  Signed  Pt found to have fine rhonchi lung sounds in beginning of writer's shift, this morning pt found to have coarse crackles throughout lung fields. Pt receiving 0.9% NS at 100ml/hr continuous. Pt tolerating PO fluids well, although minimal appetite. Dr Blanco paged at this time regarding new pt status. Continuous fluids paused until page returned, only IV ABX infusing at this time.   Awaiting orders, will continue to monitor.     Shaunna Guillory RN  3/19/2020  2:49 PM  Signed  Writer spoke with pts wife who was concerned that her  would not take his morning medications. Pts wife talked to the pt on the phone and was able to talk him into taking his medications.    Shaunna Guillory RN  3/19/2020 12:03 PM  Signed  Pt refusing to take morning medications. Dr. Echeverria notified. Pt  educated on importance of taking his medications.       MEDICATIONS:  Current Facility-Administered Medications   Medication Dose Route Frequency Provider Last Rate Last Dose   • loperamide (IMODIUM) capsule 2 mg  2 mg Oral PRN Sergey Echeverria MD   2 mg at 03/19/20 1707   • cholestyramine/aspartame (QUESTRAN LIGHT) packet 4 g  4 g Oral BID Sergey Echeverria MD   4 g at 03/20/20 0759   • dexamethasone (DECADRON) tablet 4 mg  4 mg Oral Daily with breakfast Jacob Blanco MD   4 mg at 03/20/20 0759   • sodium chloride (PF) 0.9 % injection 2 mL  2 mL Intracatheter 2 times per day Jacob Blanco MD   2 mL at 03/20/20 2042   • sodium chloride 0.9 % flush bag 25 mL  25 mL Intravenous PRN Jacob Blanco MD       • ondansetron (ZOFRAN) injection 4 mg  4 mg Intravenous BID PRN Jacob Blanco MD       • HYDROcodone-acetaminophen (NORCO) 5-325 MG per tablet 1 tablet  1 tablet Oral Q4H PRN Jacob Blanco MD       • acetaminophen (TYLENOL) tablet 650 mg  650 mg Oral Q4H PRN Jacob Blanco MD       • famotidine (PEPCID) tablet 20 mg  20 mg Oral Daily Jacob Blanco MD   20 mg at 03/20/20 0759       ALLERGIES:  ALLERGIES:  No Known Allergies    OBJECTIVE:    Vitals:     Vital Last Value 24 Hour Range   Temperature 97.4 °F (36.3 °C) (03/20/20 2217) Temp  Min: 97.4 °F (36.3 °C)  Max: 98.2 °F (36.8 °C)   Pulse 88 (03/20/20 2217) Pulse  Min: 85  Max: 96   Respiratory 18 (03/20/20 2217) Resp  Min: 18  Max: 18   Non-Invasive  Blood Pressure 135/66 (03/20/20 2217) BP  Min: 111/64  Max: 135/66   Pulse Oximetry 97 % (03/20/20 2217) SpO2  Min: 95 %  Max: 97 %     Vital Today Admitted   Weight 73.2 kg (03/21/20 0526) Weight: 65.8 kg (03/18/20 1710)   Height N/A Height: 5' 10\" (177.8 cm) (03/18/20 1710)       Physical exam not performed due to current coronavirus outbreak, social distancing.    LAB:  Lab Results   Component Value Date    WBC 1.5 (L) 03/21/2020    HGB 13.9 03/21/2020    HCT 41.2 03/21/2020    PLT 33 (L) 03/21/2020    GLUCOSE 137 (H)  03/21/2020    CREATININE 0.60 (L) 03/21/2020    GPT 26 03/18/2020          DIAGNOSTIC STUDIES:  Chest x-ray 03/18/2020-no acute process.      Patient Active Problem List   Diagnosis   • Prostate cancer metastatic to multiple sites (CMS/HCC)-Bones, lymph nodes, rectum, urinary bladder   • Metastasis to spinal cord (CMS/HCC)   • Generalized weakness   • Enteritis secondary to radiation therapy   • S/P palliative radiation therapy   • Anorexia   • Protein calorie malnutrition (CMS/HCC)   • Fall at home   • Thrombocytopenia (CMS/HCC)   • Neutropenia due to irradiation (CMS/HCC)   • Dehydration   • Acute kidney injury (CMS/HCC)   • Hyponatremia       ASSESSMENT:  1. Metastatic prostate cancer, status post palliative radiotherapy to the spine.  2. Diarrhea.  3. Dehydration secondary to diarrhea.  4. Acute kidney injury.  5. Generalized weakness.  6. Leukopenia, grade 2 neutropenia.  7. Thrombocytopenia.      PLAN:  1. For metastatic prostate cancer, patient is status post palliative radiotherapy to spine, on Lupron and Casodex.  2. For diarrhea, continue symptomatic management.  Infectious workup as per the hospitalist service.  On IV fluids.  3. For acute kidney injury, likely secondary to diarrhea-dehydration, continue management as per hospitalist service.  4. For leukopenia, thrombocytopenia, patient was initially empirically covered with antibiotic therapy.  Cultures have remained negative.  Continue to monitor CBC with diff daily.  This could be secondary to recent diarrhea/enteritis.  His total leukocyte count, ANC is trending up.  Additional workup will be obtained if continues remain cytopenic.  5. For generalized weakness, recommend PT OT evaluation.      Plan of care was discussed with Dr. Echeverria.    Thank you for letting me participate in the patient's care.  Please feel free to contact me with any questions and/or concerns            Please do not bill.

## 2020-04-30 ENCOUNTER — TRANSCRIBE ORDERS (OUTPATIENT)
Dept: ADMINISTRATIVE | Facility: HOSPITAL | Age: 65
End: 2020-04-30

## 2020-04-30 DIAGNOSIS — Z12.31 ENCOUNTER FOR SCREENING MAMMOGRAM FOR MALIGNANT NEOPLASM OF BREAST: Primary | ICD-10-CM

## 2020-05-11 ENCOUNTER — PROCEDURE VISIT (OUTPATIENT)
Dept: DERMATOLOGY | Facility: CLINIC | Age: 65
End: 2020-05-11
Payer: COMMERCIAL

## 2020-05-11 VITALS
HEART RATE: 64 BPM | BODY MASS INDEX: 26.45 KG/M2 | WEIGHT: 126 LBS | TEMPERATURE: 97.4 F | DIASTOLIC BLOOD PRESSURE: 72 MMHG | HEIGHT: 58 IN | SYSTOLIC BLOOD PRESSURE: 122 MMHG

## 2020-05-11 DIAGNOSIS — C44.311 BASAL CELL CARCINOMA (BCC) OF RIGHT ALA NASI: Primary | ICD-10-CM

## 2020-05-11 PROCEDURE — 14060 TIS TRNFR E/N/E/L 10 SQ CM/<: CPT | Performed by: DERMATOLOGY

## 2020-05-11 PROCEDURE — 17311 MOHS 1 STAGE H/N/HF/G: CPT | Performed by: DERMATOLOGY

## 2020-05-11 PROCEDURE — 17312 MOHS ADDL STAGE: CPT | Performed by: DERMATOLOGY

## 2020-05-12 ENCOUNTER — TELEPHONE (OUTPATIENT)
Dept: DERMATOLOGY | Facility: CLINIC | Age: 65
End: 2020-05-12

## 2020-05-20 ENCOUNTER — OFFICE VISIT (OUTPATIENT)
Dept: DERMATOLOGY | Facility: CLINIC | Age: 65
End: 2020-05-20

## 2020-05-20 DIAGNOSIS — Z48.02 ENCOUNTER FOR REMOVAL OF SUTURES: Primary | ICD-10-CM

## 2020-05-20 PROCEDURE — RECHECK: Performed by: DERMATOLOGY

## 2020-05-29 ENCOUNTER — TELEPHONE (OUTPATIENT)
Dept: FAMILY MEDICINE CLINIC | Facility: MEDICAL CENTER | Age: 65
End: 2020-05-29

## 2020-06-01 DIAGNOSIS — E78.5 HYPERLIPIDEMIA, UNSPECIFIED HYPERLIPIDEMIA TYPE: Primary | ICD-10-CM

## 2020-06-01 DIAGNOSIS — R03.0 ELEVATED BP WITHOUT DIAGNOSIS OF HYPERTENSION: ICD-10-CM

## 2020-06-05 ENCOUNTER — APPOINTMENT (OUTPATIENT)
Dept: LAB | Facility: HOSPITAL | Age: 65
End: 2020-06-05
Payer: COMMERCIAL

## 2020-06-05 DIAGNOSIS — R03.0 ELEVATED BP WITHOUT DIAGNOSIS OF HYPERTENSION: ICD-10-CM

## 2020-06-05 DIAGNOSIS — E78.5 HYPERLIPIDEMIA, UNSPECIFIED HYPERLIPIDEMIA TYPE: ICD-10-CM

## 2020-06-05 LAB
ALBUMIN SERPL BCP-MCNC: 4.4 G/DL (ref 3–5.2)
ALP SERPL-CCNC: 73 U/L (ref 43–122)
ALT SERPL W P-5'-P-CCNC: 31 U/L (ref 9–52)
ANION GAP SERPL CALCULATED.3IONS-SCNC: 8 MMOL/L (ref 5–14)
AST SERPL W P-5'-P-CCNC: 31 U/L (ref 14–36)
BASOPHILS # BLD AUTO: 0 THOUSANDS/ΜL (ref 0–0.1)
BASOPHILS NFR BLD AUTO: 0 % (ref 0–1)
BILIRUB SERPL-MCNC: 0.6 MG/DL
BUN SERPL-MCNC: 14 MG/DL (ref 5–25)
CALCIUM SERPL-MCNC: 9.4 MG/DL (ref 8.4–10.2)
CHLORIDE SERPL-SCNC: 103 MMOL/L (ref 97–108)
CHOLEST SERPL-MCNC: 245 MG/DL
CO2 SERPL-SCNC: 27 MMOL/L (ref 22–30)
CREAT SERPL-MCNC: 0.82 MG/DL (ref 0.6–1.2)
EOSINOPHIL # BLD AUTO: 0.1 THOUSAND/ΜL (ref 0–0.4)
EOSINOPHIL NFR BLD AUTO: 2 % (ref 0–6)
ERYTHROCYTE [DISTWIDTH] IN BLOOD BY AUTOMATED COUNT: 12.9 %
GFR SERPL CREATININE-BSD FRML MDRD: 77 ML/MIN/1.73SQ M
GLUCOSE P FAST SERPL-MCNC: 97 MG/DL (ref 70–99)
HCT VFR BLD AUTO: 36.2 % (ref 36–46)
HDLC SERPL-MCNC: 67 MG/DL
HGB BLD-MCNC: 12.2 G/DL (ref 12–16)
LDLC SERPL CALC-MCNC: 144 MG/DL
LYMPHOCYTES # BLD AUTO: 3.4 THOUSANDS/ΜL (ref 0.5–4)
LYMPHOCYTES NFR BLD AUTO: 51 % (ref 25–45)
MCH RBC QN AUTO: 30.8 PG (ref 26–34)
MCHC RBC AUTO-ENTMCNC: 33.8 G/DL (ref 31–36)
MCV RBC AUTO: 91 FL (ref 80–100)
MONOCYTES # BLD AUTO: 0.4 THOUSAND/ΜL (ref 0.2–0.9)
MONOCYTES NFR BLD AUTO: 6 % (ref 1–10)
NEUTROPHILS # BLD AUTO: 2.7 THOUSANDS/ΜL (ref 1.8–7.8)
NEUTS SEG NFR BLD AUTO: 41 % (ref 45–65)
NONHDLC SERPL-MCNC: 178 MG/DL
PLATELET # BLD AUTO: 255 THOUSANDS/UL (ref 150–450)
PMV BLD AUTO: 9.4 FL (ref 8.9–12.7)
POTASSIUM SERPL-SCNC: 3.4 MMOL/L (ref 3.6–5)
PROT SERPL-MCNC: 7.5 G/DL (ref 5.9–8.4)
RBC # BLD AUTO: 3.98 MILLION/UL (ref 4–5.2)
SODIUM SERPL-SCNC: 138 MMOL/L (ref 137–147)
TRIGL SERPL-MCNC: 171 MG/DL
TSH SERPL DL<=0.05 MIU/L-ACNC: 7.81 UIU/ML (ref 0.47–4.68)
WBC # BLD AUTO: 6.7 THOUSAND/UL (ref 4.5–11)

## 2020-06-05 PROCEDURE — 84443 ASSAY THYROID STIM HORMONE: CPT

## 2020-06-05 PROCEDURE — 80053 COMPREHEN METABOLIC PANEL: CPT

## 2020-06-05 PROCEDURE — 36415 COLL VENOUS BLD VENIPUNCTURE: CPT

## 2020-06-05 PROCEDURE — 85025 COMPLETE CBC W/AUTO DIFF WBC: CPT

## 2020-06-05 PROCEDURE — 80061 LIPID PANEL: CPT

## 2020-06-08 ENCOUNTER — OFFICE VISIT (OUTPATIENT)
Dept: FAMILY MEDICINE CLINIC | Facility: MEDICAL CENTER | Age: 65
End: 2020-06-08
Payer: COMMERCIAL

## 2020-06-08 VITALS
SYSTOLIC BLOOD PRESSURE: 118 MMHG | WEIGHT: 124 LBS | BODY MASS INDEX: 26.75 KG/M2 | TEMPERATURE: 98.5 F | DIASTOLIC BLOOD PRESSURE: 70 MMHG | HEART RATE: 87 BPM | HEIGHT: 57 IN

## 2020-06-08 DIAGNOSIS — Z23 NEED FOR SHINGLES VACCINE: ICD-10-CM

## 2020-06-08 DIAGNOSIS — E78.5 DYSLIPIDEMIA: ICD-10-CM

## 2020-06-08 DIAGNOSIS — Z13.29 THYROID DISORDER SCREEN: ICD-10-CM

## 2020-06-08 DIAGNOSIS — E78.5 HYPERLIPIDEMIA, UNSPECIFIED HYPERLIPIDEMIA TYPE: Primary | ICD-10-CM

## 2020-06-08 DIAGNOSIS — Z00.00 ROUTINE ADULT HEALTH MAINTENANCE: ICD-10-CM

## 2020-06-08 DIAGNOSIS — E66.3 OVERWEIGHT: ICD-10-CM

## 2020-06-08 DIAGNOSIS — M81.0 OSTEOPOROSIS, UNSPECIFIED OSTEOPOROSIS TYPE, UNSPECIFIED PATHOLOGICAL FRACTURE PRESENCE: ICD-10-CM

## 2020-06-08 PROCEDURE — 99396 PREV VISIT EST AGE 40-64: CPT | Performed by: FAMILY MEDICINE

## 2020-06-15 ENCOUNTER — APPOINTMENT (OUTPATIENT)
Dept: LAB | Facility: HOSPITAL | Age: 65
End: 2020-06-15
Payer: COMMERCIAL

## 2020-06-15 ENCOUNTER — TRANSCRIBE ORDERS (OUTPATIENT)
Dept: LAB | Facility: HOSPITAL | Age: 65
End: 2020-06-15

## 2020-06-15 DIAGNOSIS — E78.5 HYPERLIPIDEMIA, UNSPECIFIED HYPERLIPIDEMIA TYPE: ICD-10-CM

## 2020-06-15 DIAGNOSIS — M81.0 SENILE OSTEOPOROSIS: ICD-10-CM

## 2020-06-15 DIAGNOSIS — Z13.29 THYROID DISORDER SCREEN: ICD-10-CM

## 2020-06-15 DIAGNOSIS — M81.0 SENILE OSTEOPOROSIS: Primary | ICD-10-CM

## 2020-06-15 LAB
CALCIUM SERPL-MCNC: 10 MG/DL (ref 8.4–10.2)
T3FREE SERPL-MCNC: 2.65 PG/ML (ref 2.3–4.2)
T4 FREE SERPL-MCNC: 1.06 NG/DL (ref 0.76–1.46)
TSH SERPL DL<=0.05 MIU/L-ACNC: 7.7 UIU/ML (ref 0.47–4.68)

## 2020-06-15 PROCEDURE — 84439 ASSAY OF FREE THYROXINE: CPT

## 2020-06-15 PROCEDURE — 86800 THYROGLOBULIN ANTIBODY: CPT

## 2020-06-15 PROCEDURE — 84443 ASSAY THYROID STIM HORMONE: CPT | Performed by: FAMILY MEDICINE

## 2020-06-15 PROCEDURE — 84481 FREE ASSAY (FT-3): CPT

## 2020-06-15 PROCEDURE — 82310 ASSAY OF CALCIUM: CPT

## 2020-06-15 PROCEDURE — 36415 COLL VENOUS BLD VENIPUNCTURE: CPT

## 2020-06-15 PROCEDURE — 86376 MICROSOMAL ANTIBODY EACH: CPT

## 2020-06-16 LAB
THYROGLOB AB SERPL-ACNC: <1 IU/ML (ref 0–0.9)
THYROPEROXIDASE AB SERPL-ACNC: 9 IU/ML (ref 0–34)

## 2020-06-17 ENCOUNTER — HOSPITAL ENCOUNTER (OUTPATIENT)
Dept: MAMMOGRAPHY | Facility: MEDICAL CENTER | Age: 65
Discharge: HOME | End: 2020-06-17
Payer: COMMERCIAL

## 2020-06-17 VITALS — WEIGHT: 124 LBS | BODY MASS INDEX: 26.75 KG/M2 | HEIGHT: 57 IN

## 2020-06-17 DIAGNOSIS — Z12.31 ENCOUNTER FOR SCREENING MAMMOGRAM FOR MALIGNANT NEOPLASM OF BREAST: ICD-10-CM

## 2020-06-17 PROCEDURE — 77063 BREAST TOMOSYNTHESIS BI: CPT

## 2020-06-17 PROCEDURE — 77067 SCR MAMMO BI INCL CAD: CPT

## 2020-06-25 ENCOUNTER — TELEPHONE (OUTPATIENT)
Dept: FAMILY MEDICINE CLINIC | Facility: MEDICAL CENTER | Age: 65
End: 2020-06-25

## 2020-07-06 ENCOUNTER — TELEPHONE (OUTPATIENT)
Dept: FAMILY MEDICINE CLINIC | Facility: MEDICAL CENTER | Age: 65
End: 2020-07-06

## 2020-07-06 DIAGNOSIS — Z13.29 THYROID DISORDER SCREEN: ICD-10-CM

## 2020-07-06 DIAGNOSIS — E78.5 HYPERLIPIDEMIA, UNSPECIFIED HYPERLIPIDEMIA TYPE: Primary | ICD-10-CM

## 2020-07-06 DIAGNOSIS — R79.89 ABNORMAL TSH: ICD-10-CM

## 2020-07-06 NOTE — TELEPHONE ENCOUNTER
----- Message from [address]  Lori sent at 7/3/2020 10:15 PM EDT -----  Regarding: RE:lab results  Contact: [phone]  Hi Charna Boas for not responding sooner, need to know what symptoms I need to look for? The only symptoms I gave Dr Claudia Looney on my last visit was fatigue and stressed out from my working the 3rd shift and not getting enough sleep or should I say irregular sleep patterns   Thanks  ----- Message -----  From: Melinda Yoon  Sent: 6/30/2020  2:27 PM EDT  To: Samantha Sandoval  Subject: lab results        [address]  Lori  Female, 60 y  o , 1955  MRN:   [id number]  Phone:   [phone] (OTHER PHONE)  PCP:   Eliecer Becker MD  Coverage:   [address] ADMIN/ST [address] [facility] Eliecer Becker MD)  06/16/2021 at 8:15 AM  Status Change Notification       You 22 hours ago (3:48 PM)             Please  Call the office or respond back to this message   ----- Message from Eliecer Becker MD sent at 6/25/2020  9:28 AM EDT -----  Notify repeat thyr oid studies still show elevated TSH but antibodies a T3 and T4 do not confirm hypothyroidism  If she has no symptoms I would recommend we follow it  And also consult endocrinology if she would like  If not I would repeat a TSH in 6 months or notify me if she gets symptoms

## 2020-07-14 ENCOUNTER — HOSPITAL ENCOUNTER (OUTPATIENT)
Dept: MAMMOGRAPHY | Facility: CLINIC | Age: 65
Discharge: HOME | End: 2020-07-14
Payer: COMMERCIAL

## 2020-07-14 DIAGNOSIS — R92.8 ABNORMAL SCREENING MAMMOGRAM: ICD-10-CM

## 2020-07-14 PROCEDURE — 76642 ULTRASOUND BREAST LIMITED: CPT

## 2020-08-17 ENCOUNTER — HOSPITAL ENCOUNTER (OUTPATIENT)
Dept: BONE DENSITY | Facility: CLINIC | Age: 65
Discharge: HOME | End: 2020-08-17
Payer: COMMERCIAL

## 2020-08-17 DIAGNOSIS — Z13.820 SCREENING FOR OSTEOPOROSIS: ICD-10-CM

## 2020-08-17 PROCEDURE — 77080 DXA BONE DENSITY AXIAL: CPT

## 2020-09-21 ENCOUNTER — TELEPHONE (OUTPATIENT)
Dept: FAMILY MEDICINE CLINIC | Facility: MEDICAL CENTER | Age: 65
End: 2020-09-21

## 2020-09-21 NOTE — TELEPHONE ENCOUNTER
----- Message from [place] [address] sent at 9/20/2020  1:44 AM EDT -----  Regarding: Non-Urgent Medical Question  Contact: [phone]  Hi  I was wondering if I can get a script for an urinalysis,  I think I may have UTI  I am showing symptoms urination burn/itching and discomfort with discoloration in my urine plus my lower back/pelvic has been hurting too       Thanks  [employer]

## 2020-09-22 NOTE — TELEPHONE ENCOUNTER
LMOM to let pt know she would need an appt  Offered to set up an appt with Dr Marina Flores, or she could seek treatment at urgent care  Asked her to call office if she would like to see Dr Marina Flores today

## 2020-10-13 ENCOUNTER — APPOINTMENT (OUTPATIENT)
Dept: LAB | Facility: HOSPITAL | Age: 65
End: 2020-10-13
Payer: COMMERCIAL

## 2020-10-13 DIAGNOSIS — R79.89 ABNORMAL TSH: ICD-10-CM

## 2020-10-13 LAB — TSH SERPL DL<=0.05 MIU/L-ACNC: 2.15 UIU/ML (ref 0.47–4.68)

## 2020-10-13 PROCEDURE — 36415 COLL VENOUS BLD VENIPUNCTURE: CPT

## 2020-10-13 PROCEDURE — 84443 ASSAY THYROID STIM HORMONE: CPT

## 2020-10-19 ENCOUNTER — TELEPHONE (OUTPATIENT)
Dept: FAMILY MEDICINE CLINIC | Facility: MEDICAL CENTER | Age: 65
End: 2020-10-19

## 2020-12-18 ENCOUNTER — TRANSCRIBE ORDERS (OUTPATIENT)
Dept: ADMINISTRATIVE | Facility: HOSPITAL | Age: 65
End: 2020-12-18

## 2020-12-18 ENCOUNTER — HOSPITAL ENCOUNTER (OUTPATIENT)
Dept: RADIOLOGY | Facility: HOSPITAL | Age: 65
Discharge: HOME | End: 2020-12-18
Payer: COMMERCIAL

## 2020-12-18 DIAGNOSIS — M25.569 ARTHRALGIA OF LOWER LEG, UNSPECIFIED LATERALITY: Primary | ICD-10-CM

## 2020-12-18 DIAGNOSIS — M25.569 ARTHRALGIA OF LOWER LEG, UNSPECIFIED LATERALITY: ICD-10-CM

## 2020-12-18 PROCEDURE — 73562 X-RAY EXAM OF KNEE 3: CPT

## 2020-12-21 ENCOUNTER — IMMUNIZATIONS (OUTPATIENT)
Dept: FAMILY MEDICINE CLINIC | Facility: HOSPITAL | Age: 65
End: 2020-12-21
Payer: COMMERCIAL

## 2020-12-21 DIAGNOSIS — Z23 ENCOUNTER FOR IMMUNIZATION: ICD-10-CM

## 2020-12-21 PROCEDURE — 0001A SARS-COV-2 / COVID-19 MRNA VACCINE (PFIZER-BIONTECH) 30 MCG: CPT

## 2020-12-21 PROCEDURE — 91300 SARS-COV-2 / COVID-19 MRNA VACCINE (PFIZER-BIONTECH) 30 MCG: CPT

## 2021-01-04 ENCOUNTER — LAB (OUTPATIENT)
Dept: LAB | Facility: HOSPITAL | Age: 66
End: 2021-01-04
Payer: COMMERCIAL

## 2021-01-04 ENCOUNTER — TRANSCRIBE ORDERS (OUTPATIENT)
Dept: LAB | Facility: HOSPITAL | Age: 66
End: 2021-01-04

## 2021-01-04 DIAGNOSIS — M81.0 AGE-RELATED OSTEOPOROSIS WITHOUT CURRENT PATHOLOGICAL FRACTURE: Primary | ICD-10-CM

## 2021-01-04 DIAGNOSIS — M81.0 AGE-RELATED OSTEOPOROSIS WITHOUT CURRENT PATHOLOGICAL FRACTURE: ICD-10-CM

## 2021-01-04 LAB — CALCIUM SERPL-MCNC: 9.6 MG/DL (ref 8.4–10.2)

## 2021-01-04 PROCEDURE — 82310 ASSAY OF CALCIUM: CPT

## 2021-01-04 PROCEDURE — 36415 COLL VENOUS BLD VENIPUNCTURE: CPT

## 2021-01-11 ENCOUNTER — IMMUNIZATIONS (OUTPATIENT)
Dept: FAMILY MEDICINE CLINIC | Facility: HOSPITAL | Age: 66
End: 2021-01-11

## 2021-01-11 DIAGNOSIS — Z23 ENCOUNTER FOR IMMUNIZATION: ICD-10-CM

## 2021-01-11 PROCEDURE — 91300 SARS-COV-2 / COVID-19 MRNA VACCINE (PFIZER-BIONTECH) 30 MCG: CPT

## 2021-01-11 PROCEDURE — 0002A SARS-COV-2 / COVID-19 MRNA VACCINE (PFIZER-BIONTECH) 30 MCG: CPT

## 2021-03-25 ENCOUNTER — TELEPHONE (OUTPATIENT)
Dept: FAMILY MEDICINE CLINIC | Facility: MEDICAL CENTER | Age: 66
End: 2021-03-25

## 2021-03-25 DIAGNOSIS — R03.0 ELEVATED BP WITHOUT DIAGNOSIS OF HYPERTENSION: ICD-10-CM

## 2021-03-25 DIAGNOSIS — E83.52 HYPERCALCEMIA: Primary | ICD-10-CM

## 2021-06-10 ENCOUNTER — OFFICE VISIT (OUTPATIENT)
Dept: DENTISTRY | Facility: CLINIC | Age: 66
End: 2021-06-10

## 2021-06-10 DIAGNOSIS — Z01.20 ENCOUNTER FOR DENTAL EXAMINATION: Primary | ICD-10-CM

## 2021-06-10 PROCEDURE — D0140 LIMITED ORAL EVALUATION - PROBLEM FOCUSED: HCPCS | Performed by: DENTIST

## 2021-06-10 PROCEDURE — D0220 INTRAORAL - PERIAPICAL FIRST RADIOGRAPHIC IMAGE: HCPCS | Performed by: DENTIST

## 2021-06-10 NOTE — PROGRESS NOTES
Pt presented to clinic for CHARLY with CC:"My tooth on the upper right hurts after eating some smoothies "    Reviewed med hx  Pt is ASA II  Pt states they have sensitivity after eating some smoothies  Sensitivity to cold  PA taken and clinical exam performed  No pathology found on radiograph  Large mesial/distal fracture found clinically  Lingual portion is mobile  Pt informed that tooth is not restorable  Pt given treatment options including no treatment, extraction, and restoration of space with bridge, essix retainer, implant, etc  Pt would like an implant down the road and an essix for the time being  Pt given referral to OS for extraction, list of offices, and copy of radiograph  U/L impressions taken with alginate for fabrication of essix retainer  We will call pt for  of retainer once finished  Palatal cusp, while mobile, was not deemed an aspiration risk       Dr Jarrett Courser  Dr Paz Coto

## 2021-06-29 ENCOUNTER — APPOINTMENT (OUTPATIENT)
Dept: LAB | Facility: HOSPITAL | Age: 66
End: 2021-06-29
Payer: COMMERCIAL

## 2021-06-29 LAB
ALBUMIN SERPL BCP-MCNC: 4.3 G/DL (ref 3–5.2)
ALP SERPL-CCNC: 56 U/L (ref 43–122)
ALT SERPL W P-5'-P-CCNC: 20 U/L
ANION GAP SERPL CALCULATED.3IONS-SCNC: 11 MMOL/L (ref 5–14)
AST SERPL W P-5'-P-CCNC: 28 U/L (ref 14–36)
BASOPHILS # BLD AUTO: 0 THOUSANDS/ΜL (ref 0–0.1)
BASOPHILS NFR BLD AUTO: 0 % (ref 0–1)
BILIRUB SERPL-MCNC: 0.4 MG/DL
BUN SERPL-MCNC: 15 MG/DL (ref 5–25)
CALCIUM SERPL-MCNC: 9.5 MG/DL (ref 8.4–10.2)
CHLORIDE SERPL-SCNC: 105 MMOL/L (ref 97–108)
CHOLEST SERPL-MCNC: 244 MG/DL
CO2 SERPL-SCNC: 28 MMOL/L (ref 22–30)
CREAT SERPL-MCNC: 0.77 MG/DL (ref 0.6–1.2)
EOSINOPHIL # BLD AUTO: 0.1 THOUSAND/ΜL (ref 0–0.4)
EOSINOPHIL NFR BLD AUTO: 1 % (ref 0–6)
ERYTHROCYTE [DISTWIDTH] IN BLOOD BY AUTOMATED COUNT: 13.5 %
GFR SERPL CREATININE-BSD FRML MDRD: 82 ML/MIN/1.73SQ M
GLUCOSE P FAST SERPL-MCNC: 94 MG/DL (ref 70–99)
HCT VFR BLD AUTO: 38.3 % (ref 36–46)
HDLC SERPL-MCNC: 72 MG/DL
HGB BLD-MCNC: 12.5 G/DL (ref 12–16)
LDLC SERPL CALC-MCNC: 144 MG/DL
LYMPHOCYTES # BLD AUTO: 3.1 THOUSANDS/ΜL (ref 0.5–4)
LYMPHOCYTES NFR BLD AUTO: 37 % (ref 25–45)
MCH RBC QN AUTO: 31.7 PG (ref 26–34)
MCHC RBC AUTO-ENTMCNC: 32.7 G/DL (ref 31–36)
MCV RBC AUTO: 97 FL (ref 80–100)
MONOCYTES # BLD AUTO: 0.5 THOUSAND/ΜL (ref 0.2–0.9)
MONOCYTES NFR BLD AUTO: 7 % (ref 1–10)
NEUTROPHILS # BLD AUTO: 4.7 THOUSANDS/ΜL (ref 1.8–7.8)
NEUTS SEG NFR BLD AUTO: 56 % (ref 45–65)
NONHDLC SERPL-MCNC: 172 MG/DL
PLATELET # BLD AUTO: 263 THOUSANDS/UL (ref 150–450)
PMV BLD AUTO: 9.2 FL (ref 8.9–12.7)
POTASSIUM SERPL-SCNC: 4.1 MMOL/L (ref 3.6–5)
PROT SERPL-MCNC: 7.7 G/DL (ref 5.9–8.4)
RBC # BLD AUTO: 3.95 MILLION/UL (ref 4–5.2)
SODIUM SERPL-SCNC: 144 MMOL/L (ref 137–147)
TRIGL SERPL-MCNC: 138 MG/DL
TSH SERPL DL<=0.05 MIU/L-ACNC: 4.93 UIU/ML (ref 0.47–4.68)
WBC # BLD AUTO: 8.5 THOUSAND/UL (ref 4.5–11)

## 2021-06-29 PROCEDURE — 85025 COMPLETE CBC W/AUTO DIFF WBC: CPT

## 2021-06-29 PROCEDURE — 80053 COMPREHEN METABOLIC PANEL: CPT

## 2021-06-29 PROCEDURE — 80061 LIPID PANEL: CPT

## 2021-06-29 PROCEDURE — 84443 ASSAY THYROID STIM HORMONE: CPT

## 2021-06-29 PROCEDURE — 36415 COLL VENOUS BLD VENIPUNCTURE: CPT

## 2021-06-30 ENCOUNTER — OFFICE VISIT (OUTPATIENT)
Dept: FAMILY MEDICINE CLINIC | Facility: MEDICAL CENTER | Age: 66
End: 2021-06-30
Payer: COMMERCIAL

## 2021-06-30 VITALS
WEIGHT: 120.4 LBS | BODY MASS INDEX: 25.97 KG/M2 | DIASTOLIC BLOOD PRESSURE: 80 MMHG | SYSTOLIC BLOOD PRESSURE: 158 MMHG | HEIGHT: 57 IN | RESPIRATION RATE: 16 BRPM | TEMPERATURE: 98.4 F | HEART RATE: 76 BPM

## 2021-06-30 DIAGNOSIS — Z00.00 ROUTINE ADULT HEALTH MAINTENANCE: ICD-10-CM

## 2021-06-30 DIAGNOSIS — E78.5 HYPERLIPIDEMIA, UNSPECIFIED HYPERLIPIDEMIA TYPE: ICD-10-CM

## 2021-06-30 DIAGNOSIS — M81.0 OSTEOPOROSIS, UNSPECIFIED OSTEOPOROSIS TYPE, UNSPECIFIED PATHOLOGICAL FRACTURE PRESENCE: ICD-10-CM

## 2021-06-30 DIAGNOSIS — R20.0 ARM NUMBNESS LEFT: Primary | ICD-10-CM

## 2021-06-30 DIAGNOSIS — R79.89 ABNORMAL TSH: ICD-10-CM

## 2021-06-30 DIAGNOSIS — Z12.31 SCREENING MAMMOGRAM, ENCOUNTER FOR: ICD-10-CM

## 2021-06-30 PROCEDURE — 99396 PREV VISIT EST AGE 40-64: CPT | Performed by: FAMILY MEDICINE

## 2021-06-30 NOTE — PROGRESS NOTES
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  HH: No soda  Caffeine occ Dietary calcium some daily  Exercise very active at work  Mammogram 2020  Dexa scan 2020  Eye checkup due   Colonoscopy 2017 No polyps     She complains of tingling in her left arm from above the elbow to her wrist  Doesn't wake from sleep    No aggravating factors  Occurs frequently  Uncomfortable  No hand symptoms  Has a history of neck pain  Bad tight pain  Has been to PT for that  Occurs frequently  She gets occasional discomfort in the lower right rib which is brief and positional   Review of Systems   Constitutional: Negative for chills and fever  HENT: Negative for congestion  Cardiovascular: Negative for chest pain and palpitations  Gastrointestinal: Positive for diarrhea  Gets occasional diarrhea related to the colloid supplement   Skin: Negative for rash  Neurological: Negative for dizziness  O: /80 (Cuff Size: Standard)   Pulse 76   Temp 98 4 °F (36 9 °C)   Resp 16   Ht 4' 8 75" (1 441 m)   Wt 54 6 kg (120 lb 6 4 oz)   LMP 06/01/1989 (Exact Date) Comment: hysterectomy, total  BMI 26 28 kg/m²    BP by me 144/78 left   146/78 right  Physical Exam  Constitutional:       Appearance: She is well-developed  HENT:      Head: Normocephalic  Right Ear: Tympanic membrane and external ear normal       Left Ear: Tympanic membrane and external ear normal       Nose: Nose normal    Eyes:      General: No scleral icterus  Conjunctiva/sclera: Conjunctivae normal       Pupils: Pupils are equal, round, and reactive to light  Neck:      Thyroid: No thyroid mass or thyromegaly  Vascular: No carotid bruit  Cardiovascular:      Rate and Rhythm: Normal rate and regular rhythm  Pulses: Normal pulses  Femoral pulses are 2+ on the right side and 2+ on the left side         Popliteal pulses are 2+ on the right side and 2+ on the left side  Dorsalis pedis pulses are 2+ on the right side and 2+ on the left side  Posterior tibial pulses are 2+ on the right side and 2+ on the left side  Heart sounds: Normal heart sounds  Pulmonary:      Effort: Pulmonary effort is normal  No respiratory distress  Breath sounds: Normal breath sounds  No wheezing or rales  Abdominal:      General: Bowel sounds are normal  There is no distension  Palpations: Abdomen is soft  There is no mass  Tenderness: There is no abdominal tenderness  Musculoskeletal:         General: Normal range of motion  Cervical back: Normal range of motion and neck supple  Right lower leg: No edema  Left lower leg: No edema  Lymphadenopathy:      Head:      Right side of head: No submandibular or occipital adenopathy  Left side of head: No submandibular or occipital adenopathy  Cervical: No cervical adenopathy  Upper Body:      Right upper body: No supraclavicular adenopathy  Left upper body: No supraclavicular adenopathy  Skin:     Findings: No lesion or rash  Neurological:      Mental Status: She is oriented to person, place, and time  Psychiatric:         Behavior: Behavior normal       Ext  Motor and reflexes are normal in the upper extremities  She does have reduced pinprick sensation over the forearm    BW   06/29/2021   CBC CMP lipid profile TSH   TSH   4 93   cholesterol 244  LDL  144    Assessment   1  Health maintenance - up-to-date with breast and colorectal cancer screening  Immunizations are up-to-date including COVID and Shingrix      She is due for routine gynecologic exam   2  Dyslipidemia- stable  ASCVD risk is 6 6%  Continue to watch diet  Repeat 1 year   3  Subclinical hypothyroidism -TSH is actually improved;  Negative thyroid antibodies   4  Elevated blood pressure -noted on exam today however   Is in pain due to dental problems     Continue her weight loss  Will continue to monitor  Reassess in 6 months  5  Hearing loss -cochlear implant on the left-sees ENT   6    Left upper extremity paresthesia- -will refer Orthopedics    7  Osteoporosis-management per Rheumatology with Prolia injections; Will be getting a DEXA scan next year    Plan      Mammogram Ortho referral   Recheck 6 months for gyn exam and follow-up of blood pressure

## 2021-07-08 ENCOUNTER — APPOINTMENT (OUTPATIENT)
Dept: LAB | Facility: HOSPITAL | Age: 66
End: 2021-07-08
Payer: COMMERCIAL

## 2021-07-08 DIAGNOSIS — M81.0 AGE-RELATED OSTEOPOROSIS WITHOUT CURRENT PATHOLOGICAL FRACTURE: ICD-10-CM

## 2021-07-08 LAB — CALCIUM SERPL-MCNC: 10.3 MG/DL (ref 8.4–10.2)

## 2021-07-08 PROCEDURE — 36415 COLL VENOUS BLD VENIPUNCTURE: CPT

## 2021-07-08 PROCEDURE — 82310 ASSAY OF CALCIUM: CPT

## 2021-07-30 ENCOUNTER — APPOINTMENT (OUTPATIENT)
Dept: LAB | Facility: HOSPITAL | Age: 66
End: 2021-07-30
Payer: COMMERCIAL

## 2021-07-30 DIAGNOSIS — M81.0 AGE-RELATED OSTEOPOROSIS WITHOUT CURRENT PATHOLOGICAL FRACTURE: ICD-10-CM

## 2021-07-30 LAB — CALCIUM SERPL-MCNC: 10.2 MG/DL (ref 8.4–10.2)

## 2021-07-30 PROCEDURE — 36415 COLL VENOUS BLD VENIPUNCTURE: CPT

## 2021-07-30 PROCEDURE — 82310 ASSAY OF CALCIUM: CPT

## 2021-09-10 ENCOUNTER — CONSULT (OUTPATIENT)
Dept: OBGYN CLINIC | Facility: HOSPITAL | Age: 66
End: 2021-09-10
Payer: COMMERCIAL

## 2021-09-10 VITALS
HEART RATE: 54 BPM | BODY MASS INDEX: 26.41 KG/M2 | DIASTOLIC BLOOD PRESSURE: 73 MMHG | SYSTOLIC BLOOD PRESSURE: 166 MMHG | WEIGHT: 122.4 LBS | HEIGHT: 57 IN

## 2021-09-10 DIAGNOSIS — M54.12 RADICULOPATHY, CERVICAL REGION: Primary | ICD-10-CM

## 2021-09-10 DIAGNOSIS — R20.0 ARM NUMBNESS LEFT: ICD-10-CM

## 2021-09-10 PROCEDURE — 99244 OFF/OP CNSLTJ NEW/EST MOD 40: CPT | Performed by: ORTHOPAEDIC SURGERY

## 2021-09-10 NOTE — PROGRESS NOTES
ASSESSMENT/PLAN:    Assessment:   Cervical radiculopathy LUE    Plan:   The patient has an examination consistent with cervical radiculopathy  I have discussed with the patient the pathophysiology of this diagnosis and reviewed how the examination correlates with this diagnosis  Treatment options were discussed at length and after discussing these treatment options,I recommend the patient start a course of physical therapy  Referral was placed for the patient to follow up with Dr Simba Valadez one of our spine specialists     Follow Up:  PRN    _____________________________________________________  CHIEF COMPLAINT:  Chief Complaint   Patient presents with    Left Elbow - Numbness, Tingling         SUBJECTIVE:  Jose Elias Guerrero is a 63 y o  female who presents with Numbness to the left arm  This started  9 month(s) ago as Insidious  Patient was seen by her PCP who referred her here today  Patient does report a history of thoracic and cervical pain         PAST MEDICAL HISTORY:  Past Medical History:   Diagnosis Date    Basal cell carcinoma 02/11/2020    Right alar groove       PAST SURGICAL HISTORY:  Past Surgical History:   Procedure Laterality Date    COCHLEAR IMPLANT      COLONOSCOPY  2017    COLONOSCOPY  2010    fiberoptic; follow up at age; 2017 10 year f/u    HYSTERECTOMY      age 30    MOHS SURGERY  05/11/2020     Right alar groove    OOPHORECTOMY      age 30   Fausto Reno SKIN BIOPSY      TONSILLECTOMY         FAMILY HISTORY:  Family History   Problem Relation Age of Onset    Osteoporosis Mother     Diabetes Father         mellitus    Heart disease Father     Hypertension Father     Lung cancer Maternal Grandmother         76s    No Known Problems Paternal Grandmother     No Known Problems Maternal Aunt     Lung cancer Maternal Grandfather     Prostate cancer Paternal Grandfather     Colon cancer Other     Pancreatic cancer Other        SOCIAL HISTORY:  Social History     Tobacco Use    Smoking status: Never Smoker    Smokeless tobacco: Never Used   Vaping Use    Vaping Use: Never used   Substance Use Topics    Alcohol use: No    Drug use: No       MEDICATIONS:    Current Outpatient Medications:     Calcium Carbonate-Vit D-Min (CALCIUM 600+D PLUS MINERALS PO), , Disp: , Rfl:     Denosumab (PROLIA SC), Inject under the skin, Disp: , Rfl:     Multiple Vitamin (MULTIVITAMIN) capsule, Take 1 capsule by mouth daily, Disp: , Rfl:     ALLERGIES:  Allergies   Allergen Reactions    Codeine      Reaction Date: [id number];     Darvon [Propoxyphene]     Demerol [Meperidine]     Valium [Diazepam]     Aspirin      Reaction Date: [id number];        REVIEW OF SYSTEMS:  Pertinent items are noted in HPI  A comprehensive review of systems was negative      LABS:  HgA1c:   Lab Results   Component Value Date    HGBA1C 5 3 09/03/2019     BMP:   Lab Results   Component Value Date    CALCIUM 10 2 07/30/2021     06/20/2018    K 4 1 06/29/2021    CO2 28 06/29/2021     06/29/2021    BUN 15 06/29/2021    CREATININE 0 77 06/29/2021         _____________________________________________________  PHYSICAL EXAMINATION:  Vital signs: Ht 4' 9" (1 448 m)   Wt 55 5 kg (122 lb 6 4 oz)   LMP 06/01/1989 (Exact Date) Comment: hysterectomy, total  BMI 26 49 kg/m²   General: well developed and well nourished, alert, oriented times 3 and appears comfortable  Psychiatric: Normal  HEENT: Trachea Midline, No torticollis  Cardiovascular: No discernable arrhythmia  Pulmonary: No wheezing or stridor  Abdomen: No rebound or guarding  Extremities: No peripheral edema  Skin: No masses, erythema, lacerations, fluctation, ulcerations  Neurovascular: Pulses Intact    MUSCULOSKELETAL EXAMINATION:  Left upper extremity:  Full elbow, hand and wrist ROM    Cervical WNL, Thoracic outlet WNL  deltoid 5/5, biceps 5/5, triceps 5/5, wrist flexion 4+/5, wrist extension 5/5, full elbow and wrist ROM, AIN 4/5, intrinsic 4-/5 (5/5 R)  FDS right 5/5, Left 4+/5, positive edmonds's sign left, Negative lia's  Right ring trigger  _____________________________________________________  STUDIES REVIEWED:  No Studies to review      PROCEDURES PERFORMED:  Procedures  No Procedures performed today   Scribe Attestation    I,:  Prakash Whitten am acting as a scribe while in the presence of the attending physician :       I,:  Rad Baez MD personally performed the services described in this documentation    as scribed in my presence :

## 2021-09-17 ENCOUNTER — EVALUATION (OUTPATIENT)
Dept: PHYSICAL THERAPY | Facility: MEDICAL CENTER | Age: 66
End: 2021-09-17
Payer: COMMERCIAL

## 2021-09-17 DIAGNOSIS — M54.12 RADICULOPATHY, CERVICAL REGION: Primary | ICD-10-CM

## 2021-09-17 DIAGNOSIS — R20.0 ARM NUMBNESS LEFT: ICD-10-CM

## 2021-09-17 PROCEDURE — 97110 THERAPEUTIC EXERCISES: CPT | Performed by: PHYSICAL THERAPIST

## 2021-09-17 PROCEDURE — 97161 PT EVAL LOW COMPLEX 20 MIN: CPT | Performed by: PHYSICAL THERAPIST

## 2021-09-17 PROCEDURE — 97140 MANUAL THERAPY 1/> REGIONS: CPT | Performed by: PHYSICAL THERAPIST

## 2021-09-17 NOTE — PROGRESS NOTES
PT Evaluation     Today's date: 2021  Patient name: Darlene Rodgers  : 1955  MRN: [id number]  Referring provider: Erinn Torre MD  Dx:   Encounter Diagnoses   Name Primary? 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  Pt  will benefit from skilled PT services that includes manual therapy techniques to enhance tissue extensibility, neuromuscular re-education to facilitate motor control, therapeutic exercise to increase functional mobility, and modalities prn to reduce pain and inflammation  Impairments: abnormal muscle firing, abnormal or restricted ROM, abnormal movement, impaired physical strength, lacks appropriate home exercise program, pain with function, poor posture  and poor body mechanics  Barriers to therapy: Chronicity of symptoms  Understanding of Dx/Px/POC: good   Prognosis: good    Goals  Short Term Goals: to be achieved by 4 weeks  1) Patient to be independent with basic HEP  2) Decrease pain to 3/10 at its worst   3) Increase cervical spine ROM to [facility] in all deficient planes  4) Patient to report decreased pain at work  Long Term Goals: to be achieved by discharge  1) FOTO equal to or greater than goal status  2) Patient to be independent with comprehensive HEP  3) Abolish pain for improved quality of life  4) Cervical spine ROM WNL all planes to improve a/iadls  5) Patient to report return to PLOF and job requirements without pain at time of discharge      Plan  Patient would benefit from: PT eval  Planned modality interventions: thermotherapy: hydrocollator packs  Planned therapy interventions: joint mobilization, manual therapy, neuromuscular re-education, patient education, postural training, therapeutic exercise, flexibility, functional ROM exercises, home exercise program, body mechanics training, strengthening and stretching  Frequency: 1-2x/week  Duration in weeks: 8  Treatment plan discussed with: patient      Subjective Evaluation    History of Present Illness  Mechanism of injury: Patient reports that she began having L UE pain at work ~9 months ago intermittently  Patient notes pain from the left lateral shoulder to the left forearm region  Patient describes the pain as squeezing and throbbing  It comes and goes throughout the day  Patient also states that she has occasional neck pain, depending on how she sleeps  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  Pain  Current pain rating: 3  At best pain ratin  At worst pain rating: 10  Location: L UE  Quality: tight, squeezing and throbbing  Alleviating factors: Alleve  Diagnostic Tests  No diagnostic tests performed  Treatments  Previous treatment: physical therapy  Current treatment: physical therapy  Patient Goals  Patient goals for therapy: decreased pain, independence with ADLs/IADLs, increased motion and increased strength        Objective     Concurrent Complaints  Negative for night pain, disturbed sleep, dizziness, faints, headaches, nausea/motion sickness, tinnitus, trouble swallowing, difficulty breathing, shortness of breath, respiratory pain, visual change, cardiac problem, kidney problem, gallbladder problem, stomach problem, ulcer, appendix problem, spleen problem, pancreas problem, history of cancer, history of trauma and infection    Static Posture     Shoulders  Rounded  Postural Observations  Seated posture: poor  Standing posture: fair        Palpation   Left   No palpable tenderness to the deltoid  Hypertonic in the levator scapulae and upper trapezius  Tenderness of the suboccipitals  Trigger point to levator scapulae and upper trapezius  Tenderness   Cervical Spine   Tenderness in the facet joint (C5-C7 L)       Neurological Testing     Sensation   Cervical/Thoracic   Left   Intact: light touch    Right   Intact: light touch    Reflexes   Left   Biceps (C5/C6): normal (2+)  Brachioradialis (C6): normal (2+)  Triceps (C7): normal (2+)  Oates's reflex: negative    Right   Biceps (C5/C6): normal (2+)  Brachioradialis (C6): normal (2+)  Triceps (C7): normal (2+)  Oates's reflex: negative    Active Range of Motion   Cervical/Thoracic Spine       Cervical    Flexion:  with pain Restriction level: minimal  Extension:  Restriction level: minimal  Left lateral flexion:  Restriction level: minimal  Right lateral flexion:  with pain Restriction level moderate  Left rotation:  with pain Restriction level: minimal  Right rotation:  [facility]    Thoracic    Extension:  Restriction level: moderate    Passive Range of Motion     Additional Passive Range of Motion Details  Pain and hypomobility at C5-T1 c/ UPAs on the left    Joint Play   Joints within functional limits: C2, C3 and C4     Hypomobile: C5, C6, C7, T1, T2, T3, T4, T5, T6 and T7     Pain: C5, C6, C7 and T1     Strength/Myotome Testing   Cervical Spine   Neck flexion: 5    Left   Interossei strength (t1): 5  Neck lateral flexion (C3): 5    Right   Interossei strength (t1): 5  Neck lateral flexion (C3): 5    Left Shoulder     Planes of Motion   Abduction: 5     Isolated Muscles   Upper trapezius: 5     Right Shoulder     Planes of Motion   Abduction: 5     Isolated Muscles   Upper trapezius: 5     Left Elbow   Flexion: 5  Extension: 5    Right Elbow   Flexion: 5  Extension: 5    Left Wrist/Hand   Wrist extension: 5  Wrist flexion: 5  Thumb extension: 5    Right Wrist/Hand   Wrist extension: 5  Wrist flexion: 5  Thumb extension: 5    Tests   Cervical Positive lumbar distraction test   Negative alar ligament test and Sharp-Edgardo test      Left   Positive Spurling's Test A     Negative cervical flexion-rotation test      Right   Negative cervical flexion-rotation test      General Comments:    Upper quarter screen   Shoulder: unremarkable  Elbow: unremarkable  Hand/wrist: unremarkable  Neuro Exam:     Headaches   Patient reports headaches: No         Precautions:  Osteoporosis    Manuals 9/17            UPAs C5-T1 AZ                                                   Neuro Re-Ed             Scap ret 20x5s                                                   Ther Ex             UT/LS str 3x10s                                                                                                       Ther Activity                                       Gait Training                                       Modalities             RAZIA Allen, PT  9/17/2021,7:17 AM

## 2021-09-20 ENCOUNTER — OFFICE VISIT (OUTPATIENT)
Dept: URGENT CARE | Age: 66
End: 2021-09-20
Payer: COMMERCIAL

## 2021-09-20 VITALS
DIASTOLIC BLOOD PRESSURE: 72 MMHG | RESPIRATION RATE: 18 BRPM | SYSTOLIC BLOOD PRESSURE: 122 MMHG | BODY MASS INDEX: 26.32 KG/M2 | OXYGEN SATURATION: 99 % | HEART RATE: 74 BPM | HEIGHT: 57 IN | WEIGHT: 122 LBS | TEMPERATURE: 97.2 F

## 2021-09-20 DIAGNOSIS — L03.213 PERIORBITAL CELLULITIS OF RIGHT EYE: Primary | ICD-10-CM

## 2021-09-20 PROCEDURE — G0382 LEV 3 HOSP TYPE B ED VISIT: HCPCS | Performed by: PHYSICIAN ASSISTANT

## 2021-09-20 RX ORDER — SULFAMETHOXAZOLE AND TRIMETHOPRIM 800; 160 MG/1; MG/1
1 TABLET ORAL EVERY 12 HOURS SCHEDULED
Qty: 10 TABLET | Refills: 0 | Status: SHIPPED | OUTPATIENT
Start: 2021-09-20 | End: 2021-09-25

## 2021-09-20 RX ORDER — OFLOXACIN 3 MG/ML
1 SOLUTION/ DROPS OPHTHALMIC 4 TIMES DAILY
Qty: 5 ML | Refills: 0 | Status: SHIPPED | OUTPATIENT
Start: 2021-09-20 | End: 2021-11-29

## 2021-09-20 NOTE — PATIENT INSTRUCTIONS
Use ofloxacin drops as directed 4 times a day for the next 5 days  Bactrim as prescribed 1 tablet every 12 hours for the next 5 days  Apply warm compresses or ice as needed for swelling and pain  if symptoms are not improved in 2-3 days follow-up with PCP, or eye care professional     If symptoms worsen report to the emergency room  Periorbital Cellulitis in Adults   AMBULATORY CARE:   Periorbital cellulitis  is an infection of the skin and tissues in the front of your eye  The infection can quickly cause vision problems  It can spread to your brain and cause meningitis  Periorbital cellulitis must be treated immediately to prevent serious complications  Common signs and symptoms:  Signs and symptoms are usually seen on one eye  You may have any of the following:  · Red, swollen eyelids    · Difficulty opening your eye    · Eyelids that feel warm and hard    · Tenderness of your eyelids    · Fever    Seek care immediately if:   · You lose vision in your infected eye  · You have vision problems, such as double vision  · You have difficulty moving your eyeball  · You cannot close your eye due to swelling  · You have a fever  · You develop a headache and are vomiting  · You have a stiff neck  Contact your healthcare provider if:   · Your symptoms do not get better within 24 hours of treatment  · Your symptoms return  · You have questions or concerns about your condition or care  Treatment for periorbital cellulitis  is antibiotic medicine to fight a bacterial infection  You may need to be admitted to the hospital if you develop a severe infection  Follow up with your healthcare provider as directed: You may be referred to other healthcare providers  You may also need more treatment  Write down your questions so you remember to ask them during your visits  Keep your eyes clean:  Clean your eyes with warm water daily and as needed   Wash your hands with soap and water before and after you clean your eyes  Protect your eyes:  Do not scratch or rub your eyes  © Copyright [employer] 2021 Information is for End User's use only and may not be sold, redistributed or otherwise used for commercial purposes  All illustrations and images included in CareNotes® are the copyrighted property of [employer]  or Alphonso Cerna  The above information is an  only  It is not intended as medical advice for individual conditions or treatments  Talk to your doctor, nurse or pharmacist before following any medical regimen to see if it is safe and effective for you

## 2021-09-20 NOTE — PROGRESS NOTES
[facility]        NAME: Sejal Clarke is a 63 y o  female  : 1955    MRN: [id number]  DATE: 2021  TIME: 11:09 AM    Assessment and Plan   Periorbital cellulitis of right eye [S90 378]  1  Periorbital cellulitis of right eye  sulfamethoxazole-trimethoprim (BACTRIM DS) 800-160 mg per tablet    ofloxacin (OCUFLOX) 0 3 % ophthalmic solution     Patient with apparent periorbital cellulitis  Will be started on Bactrim and ofloxacin drops to treat  Patient is instructed to follow up with her primary care doctor in 2-3 days if symptoms are improved  She reports emergency room if symptoms worsen or any visual disturbance develops  Patient Instructions     Patient Instructions     Use ofloxacin drops as directed 4 times a day for the next 5 days  Bactrim as prescribed 1 tablet every 12 hours for the next 5 days  Apply warm compresses or ice as needed for swelling and pain  if symptoms are not improved in 2-3 days follow-up with PCP, or eye care professional     If symptoms worsen report to the emergency room  Periorbital Cellulitis in Adults   AMBULATORY CARE:   Periorbital cellulitis  is an infection of the skin and tissues in the front of your eye  The infection can quickly cause vision problems  It can spread to your brain and cause meningitis  Periorbital cellulitis must be treated immediately to prevent serious complications  Common signs and symptoms:  Signs and symptoms are usually seen on one eye  You may have any of the following:  · Red, swollen eyelids    · Difficulty opening your eye    · Eyelids that feel warm and hard    · Tenderness of your eyelids    · Fever    Seek care immediately if:   · You lose vision in your infected eye  · You have vision problems, such as double vision  · You have difficulty moving your eyeball  · You cannot close your eye due to swelling  · You have a fever  · You develop a headache and are vomiting      · You have a stiff neck  Contact your healthcare provider if:   · Your symptoms do not get better within 24 hours of treatment  · Your symptoms return  · You have questions or concerns about your condition or care  Treatment for periorbital cellulitis  is antibiotic medicine to fight a bacterial infection  You may need to be admitted to the hospital if you develop a severe infection  Follow up with your healthcare provider as directed: You may be referred to other healthcare providers  You may also need more treatment  Write down your questions so you remember to ask them during your visits  Keep your eyes clean:  Clean your eyes with warm water daily and as needed  Wash your hands with soap and water before and after you clean your eyes  Protect your eyes:  Do not scratch or rub your eyes  © Copyright [employer] 2021 Information is for End User's use only and may not be sold, redistributed or otherwise used for commercial purposes  All illustrations and images included in CareNotes® are the copyrighted property of [employer] D A M , Inc  or [facility]  The above information is an  only  It is not intended as medical advice for individual conditions or treatments  Talk to your doctor, nurse or pharmacist before following any medical regimen to see if it is safe and effective for you  Follow up with PCP in 3-5 days  Proceed to  ER if symptoms worsen  Chief Complaint     Chief Complaint   Patient presents with    Eye Problem     right eye pain since yesterday with some swelling below eye  Denies double vision or blurred vision   Denies injury         History of Present Illness        57-year-old female presents with complaint of swelling /bruising to the right lower eyelid since yesterday  Patient reports minimal pain and states she has some discomfort in the area  Patient denies any itching redness, photophobia, vision changes, or upper respiratory symptoms    Patient denies it reports some mild watery discharge from the eye  She states that she has a 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 but these symptoms are different than what they were then  She denies any fevers chills no other concerns or complaints today  Review of Systems   Review of Systems   Constitutional: Negative for chills and fever  HENT: Negative for congestion, rhinorrhea and sore throat  Eyes: Positive for pain and discharge  Negative for photophobia, redness, itching and visual disturbance  Respiratory: Negative for cough            Current Medications       Current Outpatient Medications:     Calcium Carbonate-Vit D-Min (CALCIUM 600+D PLUS MINERALS PO), , Disp: , Rfl:     Denosumab (PROLIA SC), Inject under the skin, Disp: , Rfl:     Multiple Vitamin (MULTIVITAMIN) capsule, Take 1 capsule by mouth daily, Disp: , Rfl:     ofloxacin (OCUFLOX) 0 3 % ophthalmic solution, Administer 1 drop to the right eye 4 (four) times a day, Disp: 5 mL, Rfl: 0    sulfamethoxazole-trimethoprim (BACTRIM DS) 800-160 mg per tablet, Take 1 tablet by mouth every 12 (twelve) hours for 5 days, Disp: 10 tablet, Rfl: 0    Current Allergies     Allergies as of 09/20/2021 - Reviewed 09/17/2021   Allergen Reaction Noted    Codeine  04/21/2012    Darvon [propoxyphene]  07/11/2019    Demerol [meperidine]  07/11/2019    Valium [diazepam]  07/11/2019    Aspirin  04/21/2012            The following portions of the patient's history were reviewed and updated as appropriate: allergies, current medications, past family history, past medical history, past social history, past surgical history and problem list      Past Medical History:   Diagnosis Date    Basal cell carcinoma 02/11/2020    Right alar groove    HL (hearing loss)     Osteoporosis        Past Surgical History:   Procedure Laterality Date    COCHLEAR IMPLANT      COLONOSCOPY  2017    COLONOSCOPY  2010    fiberoptic; follow up at age; 2017 10 year f/u    HYSTERECTOMY      age 30    MOHS SURGERY  05/11/2020     Right alar groove    OOPHORECTOMY      age 30    SKIN BIOPSY      TONSILLECTOMY         Family History   Problem Relation Age of Onset    Osteoporosis Mother     Diabetes Father         mellitus    Heart disease Father     Hypertension Father     Lung cancer Maternal Grandmother         76s    No Known Problems Paternal Grandmother     No Known Problems Maternal Aunt     Lung cancer Maternal Grandfather     Prostate cancer Paternal Grandfather     Colon cancer Other     Pancreatic cancer Other          Medications have been verified  Objective   /72 (BP Location: Right arm, Patient Position: Sitting)   Pulse 74   Temp (!) 97 2 °F (36 2 °C) (Tympanic)   Resp 18   Ht 4' 9" (1 448 m)   Wt 55 3 kg (122 lb)   LMP 06/01/1989 (Exact Date) Comment: hysterectomy, total  SpO2 99%   BMI 26 40 kg/m²   Patient's last menstrual period was 06/01/1989 (exact date)  Physical Exam     Physical Exam  Vitals and nursing note reviewed  Constitutional:       General: She is awake  She is not in acute distress  Appearance: Normal appearance  She is well-developed and well-groomed  She is not ill-appearing, toxic-appearing or diaphoretic  HENT:      Head: Normocephalic and atraumatic  Right Ear: Hearing and external ear normal       Left Ear: Hearing and external ear normal    Eyes:      General: Lids are normal  Vision grossly intact  Gaze aligned appropriately  Cardiovascular:      Rate and Rhythm: Normal rate  Pulmonary:      Effort: Pulmonary effort is normal       Comments: Patient is speaking in full sentences with no increased respiratory effort  No audible wheezing or stridor  Musculoskeletal:      Cervical back: Normal range of motion  Skin:     General: Skin is warm and dry  Neurological:      Mental Status: She is alert and oriented to person, place, and time        Coordination: Coordination is intact  Gait: Gait is intact  Psychiatric:         Attention and Perception: Attention and perception normal          Mood and Affect: Mood and affect normal          Speech: Speech normal          Behavior: Behavior normal  Behavior is cooperative  Note: Portions of this record may have been created with voice recognition software  Occasional wrong word or "sound a like" substitutions may have occurred due to the inherent limitations of voice recognition software  Please read the chart carefully and recognize, using context, where substitutions have occurred  *

## 2021-09-27 ENCOUNTER — HOSPITAL ENCOUNTER (OUTPATIENT)
Dept: MAMMOGRAPHY | Facility: CLINIC | Age: 66
Discharge: HOME | End: 2021-09-27
Payer: COMMERCIAL

## 2021-09-27 DIAGNOSIS — Z12.31 ENCOUNTER FOR SCREENING MAMMOGRAM FOR MALIGNANT NEOPLASM OF BREAST: ICD-10-CM

## 2021-09-27 DIAGNOSIS — Z12.31 SCREENING MAMMOGRAM, ENCOUNTER FOR: ICD-10-CM

## 2021-09-27 PROCEDURE — 77067 SCR MAMMO BI INCL CAD: CPT

## 2021-09-27 PROCEDURE — 77063 BREAST TOMOSYNTHESIS BI: CPT

## 2021-10-07 ENCOUNTER — APPOINTMENT (OUTPATIENT)
Dept: PHYSICAL THERAPY | Facility: MEDICAL CENTER | Age: 66
End: 2021-10-07
Payer: COMMERCIAL

## 2021-10-07 ENCOUNTER — HOSPITAL ENCOUNTER (OUTPATIENT)
Dept: ULTRASOUND IMAGING | Facility: CLINIC | Age: 66
Discharge: HOME | End: 2021-10-07
Payer: COMMERCIAL

## 2021-10-07 VITALS — BODY MASS INDEX: 26.32 KG/M2 | HEIGHT: 57 IN | WEIGHT: 122 LBS

## 2021-10-07 DIAGNOSIS — R92.8 ABNORMAL SCREENING MAMMOGRAM: ICD-10-CM

## 2021-10-07 PROCEDURE — 76642 ULTRASOUND BREAST LIMITED: CPT

## 2021-10-12 ENCOUNTER — APPOINTMENT (OUTPATIENT)
Dept: PHYSICAL THERAPY | Facility: MEDICAL CENTER | Age: 66
End: 2021-10-12
Payer: COMMERCIAL

## 2021-10-25 ENCOUNTER — APPOINTMENT (OUTPATIENT)
Dept: PHYSICAL THERAPY | Facility: MEDICAL CENTER | Age: 66
End: 2021-10-25
Payer: COMMERCIAL

## 2021-10-28 ENCOUNTER — OFFICE VISIT (OUTPATIENT)
Dept: PHYSICAL THERAPY | Facility: MEDICAL CENTER | Age: 66
End: 2021-10-28
Payer: COMMERCIAL

## 2021-10-28 DIAGNOSIS — M54.12 CERVICAL RADICULOPATHY: Primary | ICD-10-CM

## 2021-10-28 PROCEDURE — 97110 THERAPEUTIC EXERCISES: CPT | Performed by: PHYSICAL THERAPIST

## 2021-10-28 PROCEDURE — 97140 MANUAL THERAPY 1/> REGIONS: CPT | Performed by: PHYSICAL THERAPIST

## 2021-11-01 ENCOUNTER — OFFICE VISIT (OUTPATIENT)
Dept: PHYSICAL THERAPY | Facility: MEDICAL CENTER | Age: 66
End: 2021-11-01
Payer: COMMERCIAL

## 2021-11-01 DIAGNOSIS — M54.12 CERVICAL RADICULOPATHY: Primary | ICD-10-CM

## 2021-11-01 PROCEDURE — 97110 THERAPEUTIC EXERCISES: CPT | Performed by: PHYSICAL THERAPIST

## 2021-11-01 PROCEDURE — 97012 MECHANICAL TRACTION THERAPY: CPT | Performed by: PHYSICAL THERAPIST

## 2021-11-01 PROCEDURE — 97140 MANUAL THERAPY 1/> REGIONS: CPT | Performed by: PHYSICAL THERAPIST

## 2021-11-05 ENCOUNTER — OFFICE VISIT (OUTPATIENT)
Dept: PHYSICAL THERAPY | Facility: MEDICAL CENTER | Age: 66
End: 2021-11-05
Payer: COMMERCIAL

## 2021-11-05 DIAGNOSIS — M54.12 CERVICAL RADICULOPATHY: Primary | ICD-10-CM

## 2021-11-05 PROCEDURE — 97140 MANUAL THERAPY 1/> REGIONS: CPT | Performed by: PHYSICAL THERAPIST

## 2021-11-05 PROCEDURE — 97110 THERAPEUTIC EXERCISES: CPT | Performed by: PHYSICAL THERAPIST

## 2021-11-08 ENCOUNTER — APPOINTMENT (OUTPATIENT)
Dept: PHYSICAL THERAPY | Facility: MEDICAL CENTER | Age: 66
End: 2021-11-08
Payer: COMMERCIAL

## 2021-11-12 ENCOUNTER — OFFICE VISIT (OUTPATIENT)
Dept: PHYSICAL THERAPY | Facility: MEDICAL CENTER | Age: 66
End: 2021-11-12
Payer: COMMERCIAL

## 2021-11-12 DIAGNOSIS — M54.12 CERVICAL RADICULOPATHY: Primary | ICD-10-CM

## 2021-11-12 PROCEDURE — 97110 THERAPEUTIC EXERCISES: CPT | Performed by: PHYSICAL THERAPIST

## 2021-11-12 PROCEDURE — 97140 MANUAL THERAPY 1/> REGIONS: CPT | Performed by: PHYSICAL THERAPIST

## 2021-11-12 PROCEDURE — 97112 NEUROMUSCULAR REEDUCATION: CPT | Performed by: PHYSICAL THERAPIST

## 2021-11-15 ENCOUNTER — OFFICE VISIT (OUTPATIENT)
Dept: PHYSICAL THERAPY | Facility: MEDICAL CENTER | Age: 66
End: 2021-11-15
Payer: COMMERCIAL

## 2021-11-15 DIAGNOSIS — M54.12 CERVICAL RADICULOPATHY: Primary | ICD-10-CM

## 2021-11-15 PROCEDURE — 97110 THERAPEUTIC EXERCISES: CPT | Performed by: PHYSICAL THERAPIST

## 2021-11-15 PROCEDURE — 97140 MANUAL THERAPY 1/> REGIONS: CPT | Performed by: PHYSICAL THERAPIST

## 2021-11-19 ENCOUNTER — OFFICE VISIT (OUTPATIENT)
Dept: PHYSICAL THERAPY | Facility: MEDICAL CENTER | Age: 66
End: 2021-11-19
Payer: COMMERCIAL

## 2021-11-19 DIAGNOSIS — M54.12 CERVICAL RADICULOPATHY: Primary | ICD-10-CM

## 2021-11-19 PROCEDURE — 97110 THERAPEUTIC EXERCISES: CPT | Performed by: PHYSICAL THERAPIST

## 2021-11-19 PROCEDURE — 97140 MANUAL THERAPY 1/> REGIONS: CPT | Performed by: PHYSICAL THERAPIST

## 2021-11-19 PROCEDURE — 97012 MECHANICAL TRACTION THERAPY: CPT | Performed by: PHYSICAL THERAPIST

## 2021-11-22 ENCOUNTER — OFFICE VISIT (OUTPATIENT)
Dept: PHYSICAL THERAPY | Facility: MEDICAL CENTER | Age: 66
End: 2021-11-22
Payer: COMMERCIAL

## 2021-11-22 DIAGNOSIS — M54.12 CERVICAL RADICULOPATHY: Primary | ICD-10-CM

## 2021-11-22 PROCEDURE — 97012 MECHANICAL TRACTION THERAPY: CPT | Performed by: PHYSICAL THERAPIST

## 2021-11-22 PROCEDURE — 97140 MANUAL THERAPY 1/> REGIONS: CPT | Performed by: PHYSICAL THERAPIST

## 2021-11-22 PROCEDURE — 97110 THERAPEUTIC EXERCISES: CPT | Performed by: PHYSICAL THERAPIST

## 2021-11-26 ENCOUNTER — OFFICE VISIT (OUTPATIENT)
Dept: PHYSICAL THERAPY | Facility: MEDICAL CENTER | Age: 66
End: 2021-11-26
Payer: COMMERCIAL

## 2021-11-26 DIAGNOSIS — M54.12 CERVICAL RADICULOPATHY: Primary | ICD-10-CM

## 2021-11-26 PROCEDURE — 97110 THERAPEUTIC EXERCISES: CPT | Performed by: PHYSICAL THERAPIST

## 2021-11-26 PROCEDURE — 97140 MANUAL THERAPY 1/> REGIONS: CPT | Performed by: PHYSICAL THERAPIST

## 2021-11-26 PROCEDURE — 97012 MECHANICAL TRACTION THERAPY: CPT | Performed by: PHYSICAL THERAPIST

## 2021-11-29 ENCOUNTER — OFFICE VISIT (OUTPATIENT)
Dept: PHYSICAL THERAPY | Facility: MEDICAL CENTER | Age: 66
End: 2021-11-29
Payer: COMMERCIAL

## 2021-11-29 DIAGNOSIS — M54.12 CERVICAL RADICULOPATHY: Primary | ICD-10-CM

## 2021-11-29 PROCEDURE — 97110 THERAPEUTIC EXERCISES: CPT | Performed by: PHYSICAL THERAPIST

## 2021-11-29 PROCEDURE — 97012 MECHANICAL TRACTION THERAPY: CPT | Performed by: PHYSICAL THERAPIST

## 2021-11-29 PROCEDURE — 97112 NEUROMUSCULAR REEDUCATION: CPT | Performed by: PHYSICAL THERAPIST

## 2021-11-29 PROCEDURE — 97140 MANUAL THERAPY 1/> REGIONS: CPT | Performed by: PHYSICAL THERAPIST

## 2021-12-01 ENCOUNTER — CONSULT (OUTPATIENT)
Dept: PAIN MEDICINE | Facility: CLINIC | Age: 66
End: 2021-12-01
Payer: COMMERCIAL

## 2021-12-01 ENCOUNTER — HOSPITAL ENCOUNTER (OUTPATIENT)
Dept: RADIOLOGY | Facility: HOSPITAL | Age: 66
Discharge: HOME | End: 2021-12-01
Attending: ANESTHESIOLOGY
Payer: COMMERCIAL

## 2021-12-01 VITALS
BODY MASS INDEX: 27.7 KG/M2 | HEART RATE: 67 BPM | DIASTOLIC BLOOD PRESSURE: 70 MMHG | WEIGHT: 128 LBS | SYSTOLIC BLOOD PRESSURE: 136 MMHG

## 2021-12-01 DIAGNOSIS — M50.120 CERVICAL DISC DISORDER WITH RADICULOPATHY OF MID-CERVICAL REGION: Primary | ICD-10-CM

## 2021-12-01 DIAGNOSIS — M46.1 SACROILIITIS (HCC): ICD-10-CM

## 2021-12-01 DIAGNOSIS — R20.0 ARM NUMBNESS LEFT: ICD-10-CM

## 2021-12-01 PROCEDURE — 73522 X-RAY EXAM HIPS BI 3-4 VIEWS: CPT

## 2021-12-01 PROCEDURE — 99244 OFF/OP CNSLTJ NEW/EST MOD 40: CPT | Performed by: ANESTHESIOLOGY

## 2021-12-01 PROCEDURE — 72110 X-RAY EXAM L-2 SPINE 4/>VWS: CPT

## 2021-12-05 ENCOUNTER — HOSPITAL ENCOUNTER (OUTPATIENT)
Dept: CT IMAGING | Facility: HOSPITAL | Age: 66
Discharge: HOME | End: 2021-12-05
Payer: COMMERCIAL

## 2021-12-05 DIAGNOSIS — M50.120 CERVICAL DISC DISORDER WITH RADICULOPATHY OF MID-CERVICAL REGION: ICD-10-CM

## 2021-12-05 PROCEDURE — 72125 CT NECK SPINE W/O DYE: CPT

## 2021-12-05 PROCEDURE — G1004 CDSM NDSC: HCPCS

## 2021-12-07 ENCOUNTER — TELEPHONE (OUTPATIENT)
Dept: PAIN MEDICINE | Facility: CLINIC | Age: 66
End: 2021-12-07

## 2021-12-18 ENCOUNTER — IMMUNIZATIONS (OUTPATIENT)
Dept: FAMILY MEDICINE CLINIC | Facility: HOSPITAL | Age: 66
End: 2021-12-18

## 2021-12-18 DIAGNOSIS — Z23 ENCOUNTER FOR IMMUNIZATION: Primary | ICD-10-CM

## 2021-12-18 PROCEDURE — 0001A COVID-19 PFIZER VACC 0.3 ML: CPT

## 2021-12-18 PROCEDURE — 91300 COVID-19 PFIZER VACC 0.3 ML: CPT

## 2022-01-05 ENCOUNTER — OFFICE VISIT (OUTPATIENT)
Dept: FAMILY MEDICINE CLINIC | Facility: MEDICAL CENTER | Age: 67
End: 2022-01-05
Payer: COMMERCIAL

## 2022-01-05 VITALS
BODY MASS INDEX: 27.61 KG/M2 | HEIGHT: 57 IN | SYSTOLIC BLOOD PRESSURE: 132 MMHG | RESPIRATION RATE: 16 BRPM | HEART RATE: 68 BPM | DIASTOLIC BLOOD PRESSURE: 74 MMHG | TEMPERATURE: 98.6 F | WEIGHT: 128 LBS

## 2022-01-05 DIAGNOSIS — R03.0 ELEVATED BP WITHOUT DIAGNOSIS OF HYPERTENSION: Primary | ICD-10-CM

## 2022-01-05 PROCEDURE — 99213 OFFICE O/P EST LOW 20 MIN: CPT | Performed by: FAMILY MEDICINE

## 2022-01-05 NOTE — PROGRESS NOTES
Margarito Varghese is here for 6 month followup  She was advised 6 months recheck 4 gyn exam and follow-up of a elevated blood pressure  She declines gyn exam today  She said her BP has been good at other doctors visits  She has been seeing Rheumatology for her osteoporosis  Getting Prolia injections  She has been seeing pain management doctorJaylen for cervical radiculopathy     Recent CT scan done  Was offered steroid injection which she declined  She questions if her symptoms are exacerbated by the tight band for her mask  She is going to visit her family in [place] and Brandi is hoping to see if this helps her neck pain without the mask  O: /74 (BP Location: Left arm, Patient Position: Sitting, Cuff Size: Adult)   Pulse 68   Temp 98 6 °F (37 °C)   Resp 16   Ht 4' 9" (1 448 m)   Wt 58 1 kg (128 lb)   LMP 06/01/1989 (Exact Date) Comment: hysterectomy, total  BMI 27 70 kg/m²   BP by me 132/78    Assessment  Elevated blood pressure-improved  Advised to continue to monitor    Plan  As above    Revisit for routine  gyn exam

## 2022-01-17 ENCOUNTER — HOSPITAL ENCOUNTER (EMERGENCY)
Facility: HOSPITAL | Age: 67
Discharge: HOME | End: 2022-01-17
Attending: EMERGENCY MEDICINE | Admitting: EMERGENCY MEDICINE
Payer: COMMERCIAL

## 2022-01-17 ENCOUNTER — APPOINTMENT (EMERGENCY)
Dept: CT IMAGING | Facility: HOSPITAL | Age: 67
End: 2022-01-17
Payer: COMMERCIAL

## 2022-01-17 VITALS
SYSTOLIC BLOOD PRESSURE: 157 MMHG | WEIGHT: 128 LBS | HEART RATE: 66 BPM | OXYGEN SATURATION: 99 % | DIASTOLIC BLOOD PRESSURE: 82 MMHG | RESPIRATION RATE: 16 BRPM | TEMPERATURE: 97.8 F | BODY MASS INDEX: 27.7 KG/M2
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
## 2022-01-17 DIAGNOSIS — S09.90XA CLOSED HEAD INJURY: ICD-10-CM

## 2022-01-17 DIAGNOSIS — S00.03XA SCALP HEMATOMA: ICD-10-CM

## 2022-01-17 PROCEDURE — 99282 EMERGENCY DEPT VISIT SF MDM: CPT | Performed by: EMERGENCY MEDICINE

## 2022-01-17 PROCEDURE — 99283 EMERGENCY DEPT VISIT LOW MDM: CPT

## 2022-01-17 PROCEDURE — G1004 CDSM NDSC: HCPCS

## 2022-01-17 PROCEDURE — 70450 CT HEAD/BRAIN W/O DYE: CPT

## 2022-01-17 RX ORDER — NAPROXEN 500 MG/1
250 TABLET ORAL ONCE
Status: COMPLETED | OUTPATIENT
Start: 2022-01-17 | End: 2022-01-17

## 2022-01-17 RX ADMIN — NAPROXEN 250 MG: 500 TABLET ORAL at 07:37

## 2022-01-17 NOTE — Clinical Note
Harvey Ulrich was seen and treated in our emergency department on 1/17/2022  Diagnosis:     Hailehadleyn    She may return on this date: 01/20/2022         If you have any questions or concerns, please don't hesitate to call        Ulises Carcamo, DO    ______________________________           _______________          _______________  Hospital Representative                              Date                                Time

## 2022-01-17 NOTE — ED PROVIDER NOTES
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 and fell  She states she fell backwards hitting the back of her head  She has had a significant headache since then, no loss of consciousness  She notes no bleeding but says she has a large hematoma on the back of her head  She was able to walk back to this facility to get checked into the ER for care  Denies taking any blood thinners  No neck pain  No chest pain shortness of breath nausea vomiting or diarrhea  Pain is worse with touching the area, slightly better with rest   No pain medicine taken for arrival           Prior to Admission Medications   Prescriptions Last Dose Informant Patient Reported? Taking?    Calcium Carbonate-Vit D-Min (CALCIUM 600+D PLUS MINERALS PO)   Yes No   Denosumab (PROLIA SC)  Self Yes No   Sig: Inject under the skin   Multiple Vitamin (MULTIVITAMIN) capsule  Self Yes No   Sig: Take 1 capsule by mouth daily      Facility-Administered Medications: None       Past Medical History:   Diagnosis Date    Basal cell carcinoma 02/11/2020    Right alar groove    HL (hearing loss)     Osteoporosis        Past Surgical History:   Procedure Laterality Date    COCHLEAR IMPLANT      COLONOSCOPY  2017    COLONOSCOPY  2010    fiberoptic; follow up at age; 2017 10 year f/u    HYSTERECTOMY      age 30    MOHS SURGERY  05/11/2020     Right alar groove    OOPHORECTOMY      age 30   [facility] SKIN BIOPSY      TONSILLECTOMY         Family History   Problem Relation Age of Onset    Osteoporosis Mother     Diabetes Father         mellitus    Heart disease Father     Hypertension Father     Lung cancer Maternal Grandmother         76s    No Known Problems Paternal Grandmother     No Known Problems Maternal Aunt     Lung cancer Maternal Grandfather     Prostate cancer Paternal Grandfather     Colon cancer Other     Pancreatic cancer Other      I have reviewed and agree with the history as documented  E-Cigarette/Vaping    E-Cigarette Use Never User      E-Cigarette/Vaping Substances    Nicotine No     THC No     CBD No     Flavoring No     Other No     Unknown No      Social History     Tobacco Use    Smoking status: Never Smoker    Smokeless tobacco: Never Used   Vaping Use    Vaping Use: Never used   Substance Use Topics    Alcohol use: No    Drug use: No       Review of Systems   Constitutional: Negative  Negative for chills and fever  HENT: Negative  Negative for rhinorrhea, sore throat, trouble swallowing and voice change  Eyes: Negative  Negative for pain and visual disturbance  Respiratory: Negative  Negative for cough, shortness of breath and wheezing  Cardiovascular: Negative  Negative for chest pain and palpitations  Gastrointestinal: Negative for abdominal pain, diarrhea, nausea and vomiting  Genitourinary: Negative  Negative for dysuria and frequency  Musculoskeletal: Negative  Negative for neck pain and neck stiffness  Skin: Negative  Negative for rash  Neurological: Positive for headaches  Negative for dizziness, speech difficulty, weakness, light-headedness and numbness  Physical Exam  Physical Exam  Vitals and nursing note reviewed  Constitutional:       General: She is not in acute distress  Appearance: She is well-developed  HENT:      Head: Normocephalic and atraumatic  Eyes:      Conjunctiva/sclera: Conjunctivae normal       Pupils: Pupils are equal, round, and reactive to light  Neck:      Trachea: No tracheal deviation  Cardiovascular:      Rate and Rhythm: Normal rate and regular rhythm     Pulmonary:      Effort: Pulmonary effort is normal  No respiratory distress  Breath sounds: Normal breath sounds  No wheezing or rales  Abdominal:      General: Bowel sounds are normal  There is no distension  Palpations: Abdomen is soft  Tenderness: There is no abdominal tenderness  There is no guarding or rebound  Musculoskeletal:         General: No tenderness or deformity  Normal range of motion  Cervical back: Normal range of motion and neck supple  Skin:     General: Skin is warm and dry  Capillary Refill: Capillary refill takes less than 2 seconds  Findings: No rash  Neurological:      Mental Status: She is alert and oriented to person, place, and time  Psychiatric:         Behavior: Behavior normal          Vital Signs  ED Triage Vitals   Temperature Pulse Respirations Blood Pressure SpO2   01/17/22 0713 01/17/22 0713 01/17/22 0713 01/17/22 0713 01/17/22 0713   97 8 °F (36 6 °C) 75 18 (!) 219/96 99 %      Temp Source Heart Rate Source Patient Position - Orthostatic VS BP Location FiO2 (%)   01/17/22 0713 01/17/22 0713 01/17/22 0713 01/17/22 0713 --   Oral Monitor Sitting Left arm       Pain Score       01/17/22 0737       10 - Worst Possible Pain           Vitals:    01/17/22 0713 01/17/22 0743 01/17/22 0833   BP: (!) 219/96 (!) 193/78 157/82   Pulse: 75 85 66   Patient Position - Orthostatic VS: Sitting Lying Lying         Visual Acuity      ED Medications  Medications   naproxen (NAPROSYN) tablet 250 mg (250 mg Oral Given 1/17/22 0737)       Diagnostic Studies  Results Reviewed     None                 CT head without contrast   Final Result by Jennifer Sal MD (01/17 2153)      1  Mild occipital scalp swelling  No subjacent skull fracture, intracranial hemorrhage, or other acute intracranial findings  2   Moderate periventricular white matter hypodensities have progressed from 2013  These are still most likely chronic given symmetry                      Workstation performed: [id number] Procedures  Procedures         ED Course                               SBIRT 22yo+      Most Recent Value   SBIRT (22 yo +)    In order to provide better care to our patients, we are screening all of our patients for alcohol and drug use  Would it be okay to ask you these screening questions? No Filed at: 01/17/2022 2065                    [facility]  Number of Diagnoses or Management Options  Closed head injury  Fall  Scalp hematoma  Diagnosis management comments: Patient requesting naproxen for pain management  Will obtain CT of the head  No CT evaluation of the neck as it has been cleared by nexus criteria  I have reviewed the patient's visit and any testing done in the emergency department  They have verbalized their understanding of any testing done today and have no further questions or concerns regarding their care in the emergency room  They will follow up with their primary care physician as well as with any specialist in their discharge instructions  Strict return precautions were discussed  Amount and/or Complexity of Data Reviewed  Tests in the radiology section of CPT®: ordered and reviewed        Disposition  Final diagnoses:   Fall   Closed head injury   Scalp hematoma     Time reflects when diagnosis was documented in both MDM as applicable and the Disposition within this note     Time User Action Codes Description Comment    1/17/2022  8:27 AM [facility] Add [H47  XXXA] Fall     1/17/2022  8:27 AM [facility] Add [S09 90XA] Closed head injury     1/17/2022  8:27 AM [facility] Add [S00 03XA] Scalp hematoma       ED Disposition     ED Disposition Condition Date/Time Comment    Discharge Stable Mon Jan 17, 2022  8:27 AM Kristina Sandoval discharge to home/self care              Follow-up Information     Follow up With Specialties Details Why Contact Info    Gordo Vogt MD [place] [address]  [address] [address]  [phone] Discharge Medication List as of 1/17/2022  8:30 AM      CONTINUE these medications which have NOT CHANGED    Details   Calcium Carbonate-Vit D-Min (CALCIUM 600+D PLUS MINERALS PO) Historical Med      Denosumab (PROLIA SC) Inject under the skin, Historical Med      Multiple Vitamin (MULTIVITAMIN) capsule Take 1 capsule by mouth daily, Historical Med             No discharge procedures on file      PDMP Review     None          ED Provider  Electronically Signed by           Kenyetta Mancera DO  01/17/22 2468

## 2022-01-21 ENCOUNTER — APPOINTMENT (OUTPATIENT)
Dept: LAB | Facility: HOSPITAL | Age: 67
End: 2022-01-21
Payer: COMMERCIAL

## 2022-01-21 DIAGNOSIS — M81.0 AGE-RELATED OSTEOPOROSIS WITHOUT CURRENT PATHOLOGICAL FRACTURE: ICD-10-CM

## 2022-01-21 LAB — CALCIUM SERPL-MCNC: 10 MG/DL (ref 8.4–10.2)

## 2022-01-21 PROCEDURE — 82310 ASSAY OF CALCIUM: CPT

## 2022-01-21 PROCEDURE — 36415 COLL VENOUS BLD VENIPUNCTURE: CPT

## 2022-03-23 ENCOUNTER — OFFICE VISIT (OUTPATIENT)
Dept: PAIN MEDICINE | Facility: CLINIC | Age: 67
End: 2022-03-23
Payer: COMMERCIAL

## 2022-03-23 VITALS
WEIGHT: 128 LBS | DIASTOLIC BLOOD PRESSURE: 75 MMHG | BODY MASS INDEX: 27.7 KG/M2 | SYSTOLIC BLOOD PRESSURE: 169 MMHG | HEART RATE: 53 BPM

## 2022-03-23 DIAGNOSIS — M46.1 SACROILIITIS (HCC): ICD-10-CM

## 2022-03-23 DIAGNOSIS — G89.4 CHRONIC PAIN SYNDROME: Primary | ICD-10-CM

## 2022-03-23 DIAGNOSIS — M25.511 ACUTE PAIN OF RIGHT SHOULDER: ICD-10-CM

## 2022-03-23 PROCEDURE — 99214 OFFICE O/P EST MOD 30 MIN: CPT | Performed by: ANESTHESIOLOGY

## 2022-03-23 RX ORDER — MELOXICAM 7.5 MG/1
7.5 TABLET ORAL DAILY
Qty: 30 TABLET | Refills: 2 | Status: SHIPPED | OUTPATIENT
Start: 2022-03-23 | End: 2022-06-28

## 2022-03-23 NOTE — PROGRESS NOTES
Assessment:  1  Chronic pain syndrome    2  Sacroiliitis ([address] )    3  Acute pain of right shoulder        Plan:  The patient is experiencing chronic pain in her lower back but also acute pain of her right shoulder following a recent fall  At this time, I suspect rotator cuff tendinitis as she does have full range of motion but pain with extremes of motion  I will start her on meloxicam 7 5 mg daily with a meal to help with inflammation and pain  She has had elevated blood pressure on recent office visit so I advised her to check her blood pressure once a day at the same time to ensure that she is not noticing an increase in the pressure  My impressions and treatment recommendations were discussed in detail with the patient who verbalized understanding and had no further questions  Discharge instructions were provided  I personally saw and examined the patient and I agree with the above discussed plan of care  No orders of the defined types were placed in this encounter  New Medications Ordered This Visit   Medications    meloxicam (Mobic) 7 5 mg tablet     Sig: Take 1 tablet (7 5 mg total) by mouth daily     Dispense:  30 tablet     Refill:  2       History of Present Illness:  Jose Pena is a 61 y o  female who presents for a follow up office visit in regards to Back Pain and Shoulder Pain (right shoulder)  The patient fell last month on an outstretched arm and has been experiencing pain in her right shoulder especially with reaching for overhead items  Symptoms are moderate to severe rated 5/10 on numeric rating scale  She also continues to experience lower back pain that is dull/aching  I have personally reviewed and/or updated the patient's past medical history, past surgical history, family history, social history, current medications, allergies, and vital signs today  Review of Systems   Respiratory: Negative for shortness of breath  Cardiovascular: Negative for chest pain  Gastrointestinal: Negative for constipation, diarrhea, nausea and vomiting  Musculoskeletal: Positive for back pain, gait problem and joint swelling  Negative for arthralgias and myalgias  Skin: Negative for rash  Neurological: Negative for dizziness, seizures and weakness  All other systems reviewed and are negative        Patient Active Problem List   Diagnosis    Elevated BP without diagnosis of hypertension    Hypercalcemia    Osteoporosis    Sensorineural hearing loss (SNHL) of both ears    Oral mucosal lesion    Epistaxis    Melanoma in situ of left lower leg (HCC)    Basal cell carcinoma (BCC) of right lower leg       Past Medical History:   Diagnosis Date    Basal cell carcinoma 02/11/2020    Right alar groove    HL (hearing loss)     Osteoporosis        Past Surgical History:   Procedure Laterality Date    COCHLEAR IMPLANT      COLONOSCOPY  2017    COLONOSCOPY  2010    fiberoptic; follow up at age; 2017 10 year f/u    HYSTERECTOMY      age 30    MOHS SURGERY  05/11/2020     Right alar groove    OOPHORECTOMY      age 30   [facility] SKIN BIOPSY      TONSILLECTOMY         Family History   Problem Relation Age of Onset    Osteoporosis Mother     Diabetes Father         mellitus    Heart disease Father     Hypertension Father     Lung cancer Maternal Grandmother         76s    No Known Problems Paternal Grandmother     No Known Problems Maternal Aunt     Lung cancer Maternal Grandfather     Prostate cancer Paternal Grandfather     Colon cancer Other     Pancreatic cancer Other        Social History     Occupational History    Not on file   Tobacco Use    Smoking status: Never Smoker    Smokeless tobacco: Never Used   Vaping Use    Vaping Use: Never used   Substance and Sexual Activity    Alcohol use: No    Drug use: No    Sexual activity: Not on file       Current Outpatient Medications on File Prior to Visit   Medication Sig    Calcium Carbonate-Vit D-Min (CALCIUM 600+D PLUS MINERALS PO)     Denosumab (PROLIA SC) Inject under the skin    Multiple Vitamin (MULTIVITAMIN) capsule Take 1 capsule by mouth daily     No current facility-administered medications on file prior to visit  Allergies   Allergen Reactions    Codeine      Reaction Date: [id number];     Darvon [Propoxyphene]     Demerol [Meperidine]     Valium [Diazepam]     Aspirin      Reaction Date: [id number];        Physical Exam:    /75   Pulse (!) 53   Wt 58 1 kg (128 lb)   LMP 06/01/1989 (Exact Date) Comment: hysterectomy, total  BMI 27 70 kg/m²     Constitutional:normal, well developed, well nourished, alert, in no distress and non-toxic and no overt pain behavior    Eyes:anicteric  HEENT:grossly intact  Neck:supple, symmetric, trachea midline and no masses   Pulmonary:even and unlabored  Cardiovascular:No edema or pitting edema present  Skin:Normal without rashes or lesions and well hydrated  Psychiatric:Mood and affect appropriate  Neurologic:Cranial Nerves II-XII grossly intact  Musculoskeletal:Examination of the right shoulder reveals full range of motion but pain with flexion greater than 90° and extension greater than 105°

## 2022-03-28 ENCOUNTER — OFFICE VISIT (OUTPATIENT)
Dept: FAMILY MEDICINE CLINIC | Facility: MEDICAL CENTER | Age: 67
End: 2022-03-28
Payer: COMMERCIAL

## 2022-03-28 VITALS
SYSTOLIC BLOOD PRESSURE: 124 MMHG | HEART RATE: 62 BPM | DIASTOLIC BLOOD PRESSURE: 78 MMHG | HEIGHT: 57 IN | TEMPERATURE: 98.2 F | OXYGEN SATURATION: 98 % | BODY MASS INDEX: 28.61 KG/M2 | WEIGHT: 132.6 LBS

## 2022-03-28 DIAGNOSIS — Z12.31 SCREENING MAMMOGRAM, ENCOUNTER FOR: ICD-10-CM

## 2022-03-28 DIAGNOSIS — M81.0 OSTEOPOROSIS, UNSPECIFIED OSTEOPOROSIS TYPE, UNSPECIFIED PATHOLOGICAL FRACTURE PRESENCE: Primary | ICD-10-CM

## 2022-03-28 DIAGNOSIS — Z01.419 ENCOUNTER FOR GYNECOLOGICAL EXAMINATION WITHOUT ABNORMAL FINDING: ICD-10-CM

## 2022-03-28 PROCEDURE — S0610 ANNUAL GYNECOLOGICAL EXAMINA: HCPCS | Performed by: FAMILY MEDICINE

## 2022-03-28 NOTE — PROGRESS NOTES
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  Colonoscopy 2017 No polyps 10 year f/u   Dexa scan 2020  Mammogram 2021    GYN  No vaginal discharge, bleeding, itching, burnign  Feels like she gets a vaginal discharge bu there is none  No urinary incontinence   Had hysterectomy age 30      O: /78   Pulse 62   Temp 98 2 °F (36 8 °C)   Ht 4' 9" (1 448 m)   Wt 60 1 kg (132 lb 9 6 oz)   LMP 06/01/1989 (Exact Date) Comment: hysterectomy, total  SpO2 98%   BMI 28 69 kg/m²    BP by me 124/78  Physical Exam  Constitutional:       General: She is not in acute distress  Appearance: She is well-developed  Neck:      Thyroid: No thyromegaly  Vascular: No carotid bruit  Cardiovascular:      Rate and Rhythm: Normal rate and regular rhythm  Heart sounds: Normal heart sounds  Pulmonary:      Effort: Pulmonary effort is normal       Breath sounds: Normal breath sounds  Abdominal:      General: Bowel sounds are normal       Palpations: Abdomen is soft  There is no hepatomegaly or mass  Tenderness: There is no abdominal tenderness  Lymphadenopathy:      Cervical: No cervical adenopathy  Breasts no masses or discharge  External genitalia normal  Vagina normal  Cervix cuff normal  Uterus normal size shape and consistency  Adnexae non palpable  Rectal exam no masses stool  heme negative    Assessment  1  Postmenopausal female-routine gyn exam   Reviewed Pap smear screening diet lines as well as breast cancer screening guidelines  No Pap due to hysterectomy  2 HM-immunizations are up-to-date  Blood work will be done per Nubiason  3  Osteoporosis-management per Rheumatology  Due for DEXA scan this year; requests order  Plan  Mammogram   DEXA scan  Wellness blood work  Recheck 1 year

## 2022-04-27 PROBLEM — Z96.21 COCHLEAR IMPLANT STATUS: Status: ACTIVE | Noted: 2022-04-27

## 2022-06-16 ENCOUNTER — OFFICE VISIT (OUTPATIENT)
Dept: OBGYN CLINIC | Facility: CLINIC | Age: 67
End: 2022-06-16
Payer: COMMERCIAL

## 2022-06-16 VITALS
BODY MASS INDEX: 28.48 KG/M2 | SYSTOLIC BLOOD PRESSURE: 155 MMHG | HEIGHT: 57 IN | HEART RATE: 67 BPM | WEIGHT: 132 LBS | DIASTOLIC BLOOD PRESSURE: 79 MMHG

## 2022-06-16 DIAGNOSIS — M65.341 TRIGGER FINGER, RIGHT RING FINGER: Primary | ICD-10-CM

## 2022-06-16 PROCEDURE — 99214 OFFICE O/P EST MOD 30 MIN: CPT | Performed by: PHYSICIAN ASSISTANT

## 2022-06-16 NOTE — PROGRESS NOTES
Patient Name:  El Oh  MRN:  [id number]    Assessment & Plan     Right ring finger trigger finger  1  Offered corticosteroid injection into the right ring finger A1 pulley  Patient declined  2  Patient wishes to discuss surgical intervention with regards to the ring finger trigger finger  Recommend evaluation by Dr Caty Levine for definitive management  3  Patient also requested blood work to test for rheumatoid arthritis  RF factor ordered today  If positive recommend evaluation by Rheumatology    Chief Complaint     Right hand pain    History of the Present Illness     Danielle Milan is a 61 y o  right-hand-dominant female reports to the office today for evaluation of her right ring finger  She notes an onset of pain approximately one year ago  She denies any injury or trauma  She notes pain at the base of the finger with associated swelling  She notes associated clicking and popping with range of motion and occasional locking and triggering as well  She currently takes nothing for pain  Pain is worse with increased use  No numbness or tingling  No fevers or chills  Patient is requesting a right hand x-ray today  Physical Exam     /79   Pulse 67   Ht 4' 9" (1 448 m)   Wt 59 9 kg (132 lb)   LMP 06/01/1989 (Exact Date) Comment: hysterectomy, total  BMI 28 56 kg/m²     Right hand:  No gross deformity  Skin intact  No erythema ecchymosis or swelling  Tenderness to palpation at the base of the right ring finger with associated palpable nodule with a locally  Full digital range of motion with clicking and locking and triggering and locking evident  Sensation intact distally  Brisk capillary refill  Eyes: Anicteric sclerae  ENT: Trachea midline  Lungs: Normal respiratory effort  CV: Capillary refill is less than 2 seconds  Skin: Intact without erythema  Lymph: No palpable lymphadenopathy  Neuro: Sensation is grossly intact to light touch    Psych: Mood and affect are appropriate  Data Review     I have personally reviewed pertinent films in PACS, and my interpretation follows:    X-rays right hand 22:  No acute osseous abnormalities  No fracture or dislocation noted  Mild degenerative changes thumb MCP joint  Past Medical History:   Diagnosis Date    Arthritis     Asthma     Basal cell carcinoma 2020    Right alar groove    HL (hearing loss)     Osteoporosis     Shingles        Past Surgical History:   Procedure Laterality Date    COCHLEAR IMPLANT      COLONOSCOPY      COLONOSCOPY      fiberoptic; follow up at age; 2017 10 year f/u    HYSTERECTOMY      age 30    MOHS SURGERY  2020     Right alar groove    OOPHORECTOMY      age 30   [facility] SKIN BIOPSY      TONSILLECTOMY         Allergies   Allergen Reactions    Codeine      Reaction Date: ;     Darvon [Propoxyphene]     Demerol [Meperidine]     Valium [Diazepam]     Aspirin      Reaction Date: ;        Current Outpatient Medications on File Prior to Visit   Medication Sig Dispense Refill    Calcium Carbonate-Vit D-Min (CALCIUM 600+D PLUS MINERALS PO)       Denosumab (PROLIA SC) Inject under the skin      meloxicam (Mobic) 7 5 mg tablet Take 1 tablet (7 5 mg total) by mouth daily 30 tablet 2    Multiple Vitamin (MULTIVITAMIN) capsule Take 1 capsule by mouth daily       No current facility-administered medications on file prior to visit         Social History     Tobacco Use    Smoking status: Never Smoker    Smokeless tobacco: Never Used   Vaping Use    Vaping Use: Never used   Substance Use Topics    Alcohol use: No    Drug use: No       Family History   Problem Relation Age of Onset    Osteoporosis Mother     Hearing loss Mother         she just found out    Diabetes Father             Heart disease Father             Hypertension Father             Alcohol abuse Father             Dementia Father     Stroke Father     Lung cancer Maternal Grandmother         76s    Cancer Maternal Grandmother             Heart disease Paternal Grandmother             Diabetes Paternal Grandmother             No Known Problems Maternal Aunt     Lung cancer Maternal Grandfather     Cancer Maternal Grandfather             Hearing loss Maternal Grandfather     Prostate cancer Paternal Grandfather             Cancer Paternal Grandfather             Colon cancer Other     Pancreatic cancer Other     Alcohol abuse Brother     Hypertension Brother     Diabetes Brother        Review of Systems     As stated in the HPI  All other systems reviewed and are negative

## 2022-06-17 ENCOUNTER — APPOINTMENT (OUTPATIENT)
Dept: LAB | Facility: HOSPITAL | Age: 67
End: 2022-06-17
Payer: COMMERCIAL

## 2022-06-17 DIAGNOSIS — M65.341 TRIGGER FINGER, RIGHT RING FINGER: Primary | ICD-10-CM

## 2022-06-17 LAB — RHEUMATOID FACT SER QL LA: NEGATIVE

## 2022-06-17 PROCEDURE — 36415 COLL VENOUS BLD VENIPUNCTURE: CPT

## 2022-06-17 PROCEDURE — 86430 RHEUMATOID FACTOR TEST QUAL: CPT

## 2022-06-28 ENCOUNTER — OFFICE VISIT (OUTPATIENT)
Dept: FAMILY MEDICINE CLINIC | Facility: CLINIC | Age: 67
End: 2022-06-28
Payer: COMMERCIAL

## 2022-06-28 VITALS
HEART RATE: 55 BPM | DIASTOLIC BLOOD PRESSURE: 80 MMHG | WEIGHT: 137.6 LBS | RESPIRATION RATE: 18 BRPM | OXYGEN SATURATION: 99 % | SYSTOLIC BLOOD PRESSURE: 140 MMHG | BODY MASS INDEX: 29.68 KG/M2 | HEIGHT: 57 IN | TEMPERATURE: 97.1 F

## 2022-06-28 DIAGNOSIS — Z11.4 SCREENING FOR HIV (HUMAN IMMUNODEFICIENCY VIRUS): ICD-10-CM

## 2022-06-28 DIAGNOSIS — M81.0 OSTEOPOROSIS, UNSPECIFIED OSTEOPOROSIS TYPE, UNSPECIFIED PATHOLOGICAL FRACTURE PRESENCE: ICD-10-CM

## 2022-06-28 DIAGNOSIS — H90.3 SENSORINEURAL HEARING LOSS (SNHL) OF BOTH EARS: ICD-10-CM

## 2022-06-28 DIAGNOSIS — R03.0 ELEVATED BP WITHOUT DIAGNOSIS OF HYPERTENSION: Primary | ICD-10-CM

## 2022-06-28 DIAGNOSIS — Z11.59 NEED FOR HEPATITIS C SCREENING TEST: ICD-10-CM

## 2022-06-28 DIAGNOSIS — C44.712 BASAL CELL CARCINOMA (BCC) OF RIGHT LOWER LEG: ICD-10-CM

## 2022-06-28 DIAGNOSIS — D03.72 MELANOMA IN SITU OF LEFT LOWER LEG (HCC): ICD-10-CM

## 2022-06-28 DIAGNOSIS — Z00.00 ANNUAL PHYSICAL EXAM: ICD-10-CM

## 2022-06-28 DIAGNOSIS — E78.2 MIXED HYPERLIPIDEMIA: ICD-10-CM

## 2022-06-28 DIAGNOSIS — E03.8 SUBCLINICAL HYPOTHYROIDISM: ICD-10-CM

## 2022-06-28 PROCEDURE — 99204 OFFICE O/P NEW MOD 45 MIN: CPT | Performed by: FAMILY MEDICINE

## 2022-06-28 NOTE — PROGRESS NOTES
FAMILY PRACTICE OFFICE VISIT    NAME: Danielle Sandoval    AGE: 61 y o  SEX: female  : 1955   MRN: [id number]    DATE: 2022  TIME: 11:10 AM    Assessment and Plan   1  Elevated BP without diagnosis of hypertension  No h/o taking meds in the past  Limit salt and caffeine intake  Pt reports to [facility] htn  Return in 6 mos  Try to keep track as outpt in the interim  CAN DO PE AT RETURN VISIT AS WELL      2  Melanoma in situ of left lower leg ([address] )  Follows with derm q 6 mos  3  Basal cell carcinoma (BCC) of right lower leg      4  Osteoporosis, unspecified osteoporosis type, unspecified pathological fracture presence  Gets prolia injections  And scheduled for dexa next week      5  Sensorineural hearing loss (SNHL) of both ears  Sees ent  H/o cochlear implant  - 2014  Discussed need for vaccines  - ie: meningitis - pt states that she has had vaccines against encapsulated orgnanisms    6  Subclinical hypothyroidism  Slightly elevated TSH  Repeat ordered  (-) thyroid antibodies in 2020      7  Mixed hyperlipidemia  Fasting labs  Patient to call for results if he/she does not hear from us      Colonoscopy up to date- last done   mammo and pap up to date    Patient Instructions     Would discourage use of ariadna on a regular basis - as it can increase risk of GI bleed    Consider discussing other treatment options with pain management  Could also consider AQUA therapy    And can also consider CBD cream topically applied to painful areas    Try to get cc of vaccines that were given for cochlear implant to dr Daniel Soria: pneumonia, meningitis  We need to have them in the chart    Fasting labs  Patient to call for results if he/she does not hear from us        Wellness Visit for Adults   AMBULATORY CARE:   A wellness visit  is when you see your healthcare provider to get screened for health problems  Your healthcare provider will also give you advice on how to stay healthy   Write down your questions so you remember to ask them  Ask your healthcare provider how often you should have a wellness visit  What happens at a wellness visit:  Your healthcare provider will ask about your health, and your family history of health problems  This includes high blood pressure, heart disease, and cancer  He or she will ask if you have symptoms that concern you, if you smoke, and about your mood  You may also be asked about your intake of medicines, supplements, food, and alcohol  Any of the following may be done:  · Your weight  will be checked  Your height may also be checked so your body mass index (BMI) can be calculated  Your BMI shows if you are at a healthy weight  · Your blood pressure  and heart rate will be checked  Your temperature may also be checked  · Blood and urine tests  may be done  Blood tests may be done to check your cholesterol levels  Abnormal cholesterol levels increase your risk for heart disease and stroke  You may also need a blood or urine test to check for diabetes if you are at increased risk  Urine tests may be done to look for signs of an infection or kidney disease  · A physical exam  includes checking your heartbeat and lungs with a stethoscope  Your healthcare provider may also check your skin to look for sun damage  · Screening tests  may be recommended  A screening test is done to check for diseases that may not cause symptoms  The screening tests you may need depend on your age, gender, family history, and lifestyle habits  For example, colorectal screening may be recommended if you are 48years old or older  Screening tests you need if you are a woman:   · A Pap smear  is used to screen for cervical cancer  Pap smears are usually done every 3 to 5 years depending on your age  You may need them more often if you have had abnormal Pap smear test results in the past  Ask your healthcare provider how often you should have a Pap smear      · A mammogram  is an x-ray of your breasts to screen for breast cancer  Experts recommend mammograms every 2 years starting at age 48 years  You may need a mammogram at age 52 years or younger if you have an increased risk for breast cancer  Talk to your healthcare provider about when you should start having mammograms and how often you need them  Vaccines you may need:   · Get an influenza vaccine  every year  The influenza vaccine protects you from the flu  Several types of viruses cause the flu  The viruses change over time, so new vaccines are made each year  · Get a tetanus-diphtheria (Td) booster vaccine  every 10 years  This vaccine protects you against tetanus and diphtheria  Tetanus is a severe infection that may cause painful muscle spasms and lockjaw  Diphtheria is a severe bacterial infection that causes a thick covering in the back of your mouth and throat  · Get a human papillomavirus (HPV) vaccine  if you are female and aged 23 to 32 or male 23 to 24 and never received it  This vaccine protects you from HPV infection  HPV is the most common infection spread by sexual contact  HPV may also cause vaginal, penile, and anal cancers  · Get a pneumococcal vaccine  if you are aged 72 years or older  The pneumococcal vaccine is an injection given to protect you from pneumococcal disease  Pneumococcal disease is an infection caused by pneumococcal bacteria  The infection may cause pneumonia, meningitis, or an ear infection  · Get a shingles vaccine  if you are 60 or older, even if you have had shingles before  The shingles vaccine is an injection to protect you from the varicella-zoster virus  This is the same virus that causes chickenpox  Shingles is a painful rash that develops in people who had chickenpox or have been exposed to the virus  How to eat healthy:  My Plate is a model for planning healthy meals  It shows the types and amounts of foods that should go on your plate   Fruits and vegetables make up about half of your plate, and grains and protein make up the other half  A serving of dairy is included on the side of your plate  The amount of calories and serving sizes you need depends on your age, gender, weight, and height  Examples of healthy foods are listed below:  · Eat a variety of vegetables  such as dark green, red, and orange vegetables  You can also include canned vegetables low in sodium (salt) and frozen vegetables without added butter or sauces  · Eat a variety of fresh fruits , canned fruit in 100% juice, frozen fruit, and dried fruit  · Include whole grains  At least half of the grains you eat should be whole grains  Examples include whole-wheat bread, wheat pasta, brown rice, and whole-grain cereals such as oatmeal     · Eat a variety of protein foods such as seafood (fish and shellfish), lean meat, and poultry without skin (turkey and chicken)  Examples of lean meats include pork leg, shoulder, or tenderloin, and beef round, sirloin, tenderloin, and extra lean ground beef  Other protein foods include eggs and egg substitutes, beans, peas, soy products, nuts, and seeds  · Choose low-fat dairy products such as skim or 1% milk or low-fat yogurt, cheese, and cottage cheese  · Limit unhealthy fats  such as butter, hard margarine, and shortening  Exercise:  Exercise at least 30 minutes per day on most days of the week  Some examples of exercise include walking, biking, dancing, and swimming  You can also fit in more physical activity by taking the stairs instead of the elevator or parking farther away from stores  Include muscle strengthening activities 2 days each week  Regular exercise provides many health benefits  It helps you manage your weight, and decreases your risk for type 2 diabetes, heart disease, stroke, and high blood pressure  Exercise can also help improve your mood  Ask your healthcare provider about the best exercise plan for you         General health and safety guidelines: · Do not smoke  Nicotine and other chemicals in cigarettes and cigars can cause lung damage  Ask your healthcare provider for information if you currently smoke and need help to quit  E-cigarettes or smokeless tobacco still contain nicotine  Talk to your healthcare provider before you use these products  · Limit alcohol  A drink of alcohol is 12 ounces of beer, 5 ounces of wine, or 1½ ounces of liquor  · Lose weight, if needed  Being overweight increases your risk of certain health conditions  These include heart disease, high blood pressure, type 2 diabetes, and certain types of cancer  · Protect your skin  Do not sunbathe or use tanning beds  Use sunscreen with a SPF 15 or higher  Apply sunscreen at least 15 minutes before you go outside  Reapply sunscreen every 2 hours  Wear protective clothing, hats, and sunglasses when you are outside  · Drive safely  Always wear your seatbelt  Make sure everyone in your car wears a seatbelt  A seatbelt can save your life if you are in an accident  Do not use your cell phone when you are driving  This could distract you and cause an accident  Pull over if you need to make a call or send a text message  · Practice safe sex  Use latex condoms if are sexually active and have more than one partner  Your healthcare provider may recommend screening tests for sexually transmitted infections (STIs)  · Wear helmets, lifejackets, and protective gear  Always wear a helmet when you ride a bike or motorcycle, go skiing, or play sports that could cause a head injury  Wear protective equipment when you play sports  Wear a lifejacket when you are on a boat or doing water sports  © Copyright [employer] 2022 Information is for End User's use only and may not be sold, redistributed or otherwise used for commercial purposes   All illustrations and images included in CareNotes® are the copyrighted property of A D A M , Inc  or Alphonso Cerna  The above information is an  only  It is not intended as medical advice for individual conditions or treatments  Talk to your doctor, nurse or pharmacist before following any medical regimen to see if it is safe and effective for you  Chief Complaint     Chief Complaint   Patient presents with    New Patient Visit       History of Present Illness   Danielle Sandoval is a 61y o -year-old female who presents today for routine visit - is a new patient  Follows with rheumatology for osteoporosis  And sees spine specialist - went for PT for neck pain (radiating into arm left)  Pt states that PT did not help  Takes ASA with caffeine (ariadna) which has 500 mg ASA in it - and is taking once daily  She was given rx for mobic by spine center in 3/2022- but she felt queasy or sleepy - so stopped taking  Pt denies h/o GI bleed or ulcer        Review of Systems   Review of Systems   Constitutional: Negative for fever  HENT: Positive for hearing loss  Respiratory: Negative for cough, shortness of breath and wheezing  Cardiovascular: Negative for chest pain and palpitations  Musculoskeletal: Positive for arthralgias and neck pain  Pain from neck to base of spine  Also seeing dr mandel: trigger finger - 4th digit right hand  Pt has pain at work if not taking anything for pain prior    Pt works in [employer] for [employer]  Has known osteoporosis - no h/o fractures  Psychiatric/Behavioral: Negative for dysphoric mood, self-injury and suicidal ideas  The patient is not nervous/anxious          Active Problem List     Patient Active Problem List   Diagnosis    Elevated BP without diagnosis of hypertension    Hypercalcemia    Osteoporosis    Sensorineural hearing loss (SNHL) of both ears    Oral mucosal lesion    Epistaxis    Melanoma in situ of left lower leg (HCC)    Basal cell carcinoma (BCC) of right lower leg    Cochlear implant status         Past Medical History:  Past Medical History:   Diagnosis Date    Arthritis     Asthma     Basal cell carcinoma 2020    Right alar groove    HL (hearing loss)     Osteoporosis     Shingles        Past Surgical History:  Past Surgical History:   Procedure Laterality Date    COCHLEAR IMPLANT      COLONOSCOPY      COLONOSCOPY      fiberoptic; follow up at age; 2017 10 year f/u    HYSTERECTOMY      age 30    MOHS SURGERY  2020     Right alar groove    OOPHORECTOMY      age 30   [facility] SKIN BIOPSY      TONSILLECTOMY         Family History:  Family History   Problem Relation Age of Onset    Osteoporosis Mother     Hearing loss Mother         she just found out    Diabetes Father             Heart disease Father             Hypertension Father             Alcohol abuse Father             Dementia Father     Stroke Father             Lung cancer Maternal Grandmother         76s    Cancer Maternal Grandmother             Heart disease Paternal Grandmother             Diabetes Paternal Grandmother             No Known Problems Maternal Aunt     Lung cancer Maternal Grandfather     Cancer Maternal Grandfather             Hearing loss Maternal Grandfather     Prostate cancer Paternal Grandfather             Cancer Paternal Grandfather             Colon cancer Other     Pancreatic cancer Other     Alcohol abuse Brother     Hypertension Brother     Diabetes Brother        Social History:  Social History     Socioeconomic History    Marital status: Single     Spouse name: Not on file    Number of children: Not on file    Years of education: Not on file    Highest education level: Not on file   Occupational History    Not on file   Tobacco Use    Smoking status: Never Smoker    Smokeless tobacco: Never Used   Vaping Use    Vaping Use: Never used   Substance and Sexual Activity    Alcohol use: No    Drug use: No    Sexual activity: Never   Other Topics Concern    Not on file   Social History Narrative    Caffeine use     Social Determinants of Health     Financial Resource Strain: Not on file   Food Insecurity: Not on file   Transportation Needs: Not on file   Physical Activity: Not on file   Stress: Not on file   Social Connections: Not on file   Intimate Partner Violence: Not on file   Housing Stability: Not on file       Objective     Vitals:    06/28/22 1101   BP: 140/80   Pulse: 55   Resp: 18   Temp: (!) 97 1 °F (36 2 °C)   SpO2: 99%     Wt Readings from Last 3 Encounters:   06/28/22 62 4 kg (137 lb 9 6 oz)   06/16/22 59 9 kg (132 lb)   05/05/22 59 9 kg (132 lb)       Physical Exam  Vitals and nursing note reviewed  Constitutional:       General: She is not in acute distress  Appearance: Normal appearance  She is not ill-appearing or toxic-appearing  Eyes:      General: No scleral icterus  Neck:      Vascular: No carotid bruit  Comments: No thyromegaly  Cardiovascular:      Rate and Rhythm: Normal rate and regular rhythm  Pulses: Normal pulses  Heart sounds: Normal heart sounds  No murmur heard  Pulmonary:      Effort: Pulmonary effort is normal  No respiratory distress  Breath sounds: Normal breath sounds  No wheezing, rhonchi or rales  Abdominal:      General: Abdomen is flat  There is no distension  Palpations: Abdomen is soft  There is no mass  Tenderness: There is no abdominal tenderness  There is no guarding or rebound  Musculoskeletal:      Cervical back: Neck supple  Right lower leg: No edema  Left lower leg: No edema  Lymphadenopathy:      Cervical: No cervical adenopathy  Skin:     General: Skin is warm  Coloration: Skin is not jaundiced  Neurological:      General: No focal deficit present  Mental Status: She is alert and oriented to person, place, and time     Psychiatric:         Mood and Affect: Mood normal          Behavior: Behavior normal  Thought Content: Thought content normal          Judgment: Judgment normal          Pertinent Laboratory/Diagnostic Studies:  Lab Results   Component Value Date    BUN 15 06/29/2021    CREATININE 0 77 06/29/2021    CALCIUM 10 0 01/21/2022     06/20/2018    K 4 1 06/29/2021    CO2 28 06/29/2021     06/29/2021     Lab Results   Component Value Date    ALT 20 06/29/2021    AST 28 06/29/2021    ALKPHOS 56 06/29/2021    BILITOT 0 7 06/20/2018       Lab Results   Component Value Date    WBC 8 50 06/29/2021    HGB 12 5 06/29/2021    HCT 38 3 06/29/2021    MCV 97 06/29/2021     06/29/2021       No results found for: TSH    Lab Results   Component Value Date    CHOL 211 (H) 06/20/2018     Lab Results   Component Value Date    TRIG 138 06/29/2021     Lab Results   Component Value Date    HDL 72 06/29/2021     Lab Results   Component Value Date    LDLCALC 144 (H) 06/29/2021     Lab Results   Component Value Date    HGBA1C 5 3 09/03/2019       Results for orders placed or performed in visit on 06/17/22   RF Screen w/ Reflex to Titer   Result Value Ref Range    Rheumatoid Factor Negative Negative       No orders of the defined types were placed in this encounter  ALLERGIES:  Allergies   Allergen Reactions    Codeine      Reaction Date: [id number];     Darvon [Propoxyphene]     Demerol [Meperidine]     Valium [Diazepam]     Aspirin      Reaction Date: [id number];        Current Medications     Current Outpatient Medications   Medication Sig Dispense Refill    Calcium Carbonate-Vit D-Min (CALCIUM 600+D PLUS MINERALS PO)       Denosumab (PROLIA SC) Inject under the skin      meloxicam (Mobic) 7 5 mg tablet Take 1 tablet (7 5 mg total) by mouth daily 30 tablet 2    Multiple Vitamin (MULTIVITAMIN) capsule Take 1 capsule by mouth daily       No current facility-administered medications for this visit           Health Maintenance     Health Maintenance   Topic Date Due    Hepatitis C Screening  Never done  HIV Screening  Never done    BMI: Followup Plan  06/08/2021    PT PLAN OF CARE  10/17/2021    COVID-19 Vaccine (4 - Booster for cliniq.ly series) 04/18/2022    Annual Physical  06/30/2022    Pneumococcal Vaccine: Pediatrics (0 to 5 Years) and At-Risk Patients (6 to 59 Years) (3 - PPSV23 or PCV20) 10/11/2022    Depression Screening  01/05/2023    BMI: Adult  06/16/2023    Breast Cancer Screening: Mammogram  09/27/2023    Colorectal Cancer Screening  04/12/2027    DTaP,Tdap,and Td Vaccines (3 - Td or Tdap) 06/11/2029    Influenza Vaccine  Completed    HIB Vaccine  Aged Out    Hepatitis B Vaccine  Aged Out    IPV Vaccine  Aged Out    Hepatitis A Vaccine  Aged Out    Meningococcal ACWY Vaccine  Aged Out    HPV Vaccine  Aged Out     Immunization History   Administered Date(s) Administered    COVID-19 PFIZER VACCINE 0 3 ML IM 12/21/2020, 01/11/2021, 12/18/2021    H1N1, All Formulations 11/10/2009    INFLUENZA 10/24/2018, 10/20/2020, 10/03/2021    Influenza, seasonal, injectable 10/13/2017    Pneumococcal Conjugate 13-Valent 04/17/2014    Pneumococcal Polysaccharide PPV23 05/29/2014    Tdap 06/03/2017, 06/11/2019    Zoster 10/13/2017    Zoster Vaccine Recombinant 10/27/2020, 02/11/2021          Heaven Moore DO

## 2022-06-28 NOTE — PATIENT INSTRUCTIONS
Would discourage use of ariadna on a regular basis - as it can increase risk of GI bleed    Consider discussing other treatment options with pain management  Could also consider AQUA therapy    And can also consider CBD cream topically applied to painful areas    Try to get cc of vaccines that were given for cochlear implant to dr Wiliam Maldonado: pneumonia, meningitis  We need to have them in the chart    Fasting labs  Patient to call for results if he/she does not hear from us        Wellness Visit for Adults   AMBULATORY CARE:   A wellness visit  is when you see your healthcare provider to get screened for health problems  Your healthcare provider will also give you advice on how to stay healthy  Write down your questions so you remember to ask them  Ask your healthcare provider how often you should have a wellness visit  What happens at a wellness visit:  Your healthcare provider will ask about your health, and your family history of health problems  This includes high blood pressure, heart disease, and cancer  He or she will ask if you have symptoms that concern you, if you smoke, and about your mood  You may also be asked about your intake of medicines, supplements, food, and alcohol  Any of the following may be done: Your weight  will be checked  Your height may also be checked so your body mass index (BMI) can be calculated  Your BMI shows if you are at a healthy weight  Your blood pressure  and heart rate will be checked  Your temperature may also be checked  Blood and urine tests  may be done  Blood tests may be done to check your cholesterol levels  Abnormal cholesterol levels increase your risk for heart disease and stroke  You may also need a blood or urine test to check for diabetes if you are at increased risk  Urine tests may be done to look for signs of an infection or kidney disease  A physical exam  includes checking your heartbeat and lungs with a stethoscope   Your healthcare provider may also check your skin to look for sun damage  Screening tests  may be recommended  A screening test is done to check for diseases that may not cause symptoms  The screening tests you may need depend on your age, gender, family history, and lifestyle habits  For example, colorectal screening may be recommended if you are 48years old or older  Screening tests you need if you are a woman:   A Pap smear  is used to screen for cervical cancer  Pap smears are usually done every 3 to 5 years depending on your age  You may need them more often if you have had abnormal Pap smear test results in the past  Ask your healthcare provider how often you should have a Pap smear  A mammogram  is an x-ray of your breasts to screen for breast cancer  Experts recommend mammograms every 2 years starting at age 48 years  You may need a mammogram at age 52 years or younger if you have an increased risk for breast cancer  Talk to your healthcare provider about when you should start having mammograms and how often you need them  Vaccines you may need:   Get an influenza vaccine  every year  The influenza vaccine protects you from the flu  Several types of viruses cause the flu  The viruses change over time, so new vaccines are made each year  Get a tetanus-diphtheria (Td) booster vaccine  every 10 years  This vaccine protects you against tetanus and diphtheria  Tetanus is a severe infection that may cause painful muscle spasms and lockjaw  Diphtheria is a severe bacterial infection that causes a thick covering in the back of your mouth and throat  Get a human papillomavirus (HPV) vaccine  if you are female and aged 23 to 32 or male 23 to 24 and never received it  This vaccine protects you from HPV infection  HPV is the most common infection spread by sexual contact  HPV may also cause vaginal, penile, and anal cancers  Get a pneumococcal vaccine  if you are aged 72 years or older   The pneumococcal vaccine is an injection given to protect you from pneumococcal disease  Pneumococcal disease is an infection caused by pneumococcal bacteria  The infection may cause pneumonia, meningitis, or an ear infection  Get a shingles vaccine  if you are 60 or older, even if you have had shingles before  The shingles vaccine is an injection to protect you from the varicella-zoster virus  This is the same virus that causes chickenpox  Shingles is a painful rash that develops in people who had chickenpox or have been exposed to the virus  How to eat healthy:  My Plate is a model for planning healthy meals  It shows the types and amounts of foods that should go on your plate  Fruits and vegetables make up about half of your plate, and grains and protein make up the other half  A serving of dairy is included on the side of your plate  The amount of calories and serving sizes you need depends on your age, gender, weight, and height  Examples of healthy foods are listed below:  Eat a variety of vegetables  such as dark green, red, and orange vegetables  You can also include canned vegetables low in sodium (salt) and frozen vegetables without added butter or sauces  Eat a variety of fresh fruits , canned fruit in 100% juice, frozen fruit, and dried fruit  Include whole grains  At least half of the grains you eat should be whole grains  Examples include whole-wheat bread, wheat pasta, brown rice, and whole-grain cereals such as oatmeal     Eat a variety of protein foods such as seafood (fish and shellfish), lean meat, and poultry without skin (turkey and chicken)  Examples of lean meats include pork leg, shoulder, or tenderloin, and beef round, sirloin, tenderloin, and extra lean ground beef  Other protein foods include eggs and egg substitutes, beans, peas, soy products, nuts, and seeds  Choose low-fat dairy products such as skim or 1% milk or low-fat yogurt, cheese, and cottage cheese      Limit unhealthy fats  such as butter, hard margarine, and shortening  Exercise:  Exercise at least 30 minutes per day on most days of the week  Some examples of exercise include walking, biking, dancing, and swimming  You can also fit in more physical activity by taking the stairs instead of the elevator or parking farther away from stores  Include muscle strengthening activities 2 days each week  Regular exercise provides many health benefits  It helps you manage your weight, and decreases your risk for type 2 diabetes, heart disease, stroke, and high blood pressure  Exercise can also help improve your mood  Ask your healthcare provider about the best exercise plan for you  General health and safety guidelines:   Do not smoke  Nicotine and other chemicals in cigarettes and cigars can cause lung damage  Ask your healthcare provider for information if you currently smoke and need help to quit  E-cigarettes or smokeless tobacco still contain nicotine  Talk to your healthcare provider before you use these products  Limit alcohol  A drink of alcohol is 12 ounces of beer, 5 ounces of wine, or 1½ ounces of liquor  Lose weight, if needed  Being overweight increases your risk of certain health conditions  These include heart disease, high blood pressure, type 2 diabetes, and certain types of cancer  Protect your skin  Do not sunbathe or use tanning beds  Use sunscreen with a SPF 15 or higher  Apply sunscreen at least 15 minutes before you go outside  Reapply sunscreen every 2 hours  Wear protective clothing, hats, and sunglasses when you are outside  Drive safely  Always wear your seatbelt  Make sure everyone in your car wears a seatbelt  A seatbelt can save your life if you are in an accident  Do not use your cell phone when you are driving  This could distract you and cause an accident  Pull over if you need to make a call or send a text message  Practice safe sex    Use latex condoms if are sexually active and have more than one partner  Your healthcare provider may recommend screening tests for sexually transmitted infections (STIs)  Wear helmets, lifejackets, and protective gear  Always wear a helmet when you ride a bike or motorcycle, go skiing, or play sports that could cause a head injury  Wear protective equipment when you play sports  Wear a lifejacket when you are on a boat or doing water sports  © Copyright [address] 2022 Information is for End User's use only and may not be sold, redistributed or otherwise used for commercial purposes  All illustrations and images included in CareNotes® are the copyrighted property of A D A M [employer]  or [address] [place], [place] [address]   The above information is an  only  It is not intended as medical advice for individual conditions or treatments  Talk to your doctor, nurse or pharmacist before following any medical regimen to see if it is safe and effective for you

## 2022-07-13 ENCOUNTER — HOSPITAL ENCOUNTER (OUTPATIENT)
Dept: RADIOLOGY | Facility: HOSPITAL | Age: 67
Discharge: HOME | End: 2022-07-13
Payer: COMMERCIAL

## 2022-07-13 DIAGNOSIS — M54.2 CERVICALGIA: ICD-10-CM

## 2022-07-13 DIAGNOSIS — M54.59 OTHER LOW BACK PAIN: ICD-10-CM

## 2022-07-13 PROCEDURE — 72050 X-RAY EXAM NECK SPINE 4/5VWS: CPT

## 2022-07-13 PROCEDURE — 72110 X-RAY EXAM L-2 SPINE 4/>VWS: CPT

## 2022-07-14 ENCOUNTER — OFFICE VISIT (OUTPATIENT)
Dept: OBGYN CLINIC | Facility: CLINIC | Age: 67
End: 2022-07-14
Payer: COMMERCIAL

## 2022-07-14 VITALS
DIASTOLIC BLOOD PRESSURE: 85 MMHG | BODY MASS INDEX: 30.63 KG/M2 | HEIGHT: 57 IN | WEIGHT: 142 LBS | HEART RATE: 62 BPM | SYSTOLIC BLOOD PRESSURE: 170 MMHG

## 2022-07-14 DIAGNOSIS — M65.341 TRIGGER FINGER, RIGHT RING FINGER: Primary | ICD-10-CM

## 2022-07-14 PROCEDURE — 99213 OFFICE O/P EST LOW 20 MIN: CPT | Performed by: ORTHOPAEDIC SURGERY

## 2022-07-14 NOTE — PROGRESS NOTES
Assessment:  1  Trigger finger, right ring finger       Patient Active Problem List   Diagnosis    Elevated BP without diagnosis of hypertension    Hypercalcemia    Osteoporosis    Sensorineural hearing loss (SNHL) of both ears    Oral mucosal lesion    Epistaxis    Melanoma in situ of left lower leg (HCC)    Basal cell carcinoma (BCC) of right lower leg    Cochlear implant status    Subclinical hypothyroidism    Mixed hyperlipidemia    Trigger finger, right ring finger           Plan      Etiology of trigger finger was discussed at length today with use of a hand drawn diagram  Both surgical and nonsurgical treatments of trigger finger including risks and benefits were discussed at length with patient  Post op expectations were discussed at length with patient as well  She would like to take some time to consider her options prior to proceeding because she does not have a ride for surgery and she does not want to consider a CSI due to being unable to determine the exact relief she will have  She does express her concern about anaesthesia causing nausea post op and she was advised to discuss this with the anaesthesiologist if she would like to proceed with trigger finger release  She was advised that to set up surgery she would need to set up another apt to sign a consent  Subjective:     Patient ID:    Chief Complaint:Danielle Sandoval 61 y o  female      HPI    Patient comes in today with regards to right ring tigger finger  She reports painful catching at times  She states that she manually unlocks her finger often  The patient reports that the pain is in the right ring finger  and has been going on for years and worsening  The pain is rated at 0 at its best and 10 at its worst  The pain is described as painful  It is worsened with typing, and is made better with limiting use  The patient has taken ASA for treatment        The following portions of the patient's history were reviewed and updated as appropriate: allergies, current medications, past family history, past social history, past surgical history and problem list         Social History     Socioeconomic History    Marital status: Single     Spouse name: Not on file    Number of children: Not on file    Years of education: Not on file    Highest education level: Not on file   Occupational History    Not on file   Tobacco Use    Smoking status: Never Smoker    Smokeless tobacco: Never Used   Vaping Use    Vaping Use: Never used   Substance and Sexual Activity    Alcohol use: No    Drug use: No    Sexual activity: Never   Other Topics Concern    Not on file   Social History Narrative    Caffeine use     Social Determinants of Health     Financial Resource Strain: Not on file   Food Insecurity: Not on file   Transportation Needs: Not on file   Physical Activity: Not on file   Stress: Not on file   Social Connections: Not on file   Intimate Partner Violence: Not on file   Housing Stability: Not on file     Past Medical History:   Diagnosis Date    Arthritis     Asthma     Basal cell carcinoma 02/11/2020    Right alar groove    HL (hearing loss)     Osteoporosis     Shingles      Past Surgical History:   Procedure Laterality Date    COCHLEAR IMPLANT      COLONOSCOPY  2017    COLONOSCOPY  2010    fiberoptic; follow up at age; 2017 10 year f/u    HYSTERECTOMY      age 30    MOHS SURGERY  05/11/2020     Right alar groove    OOPHORECTOMY      age 30   Tomie Coop SKIN BIOPSY      TONSILLECTOMY       Allergies   Allergen Reactions    Codeine      Reaction Date: [id number];     Darvon [Propoxyphene]     Demerol [Meperidine]     Valium [Diazepam]     Aspirin      Reaction Date: [id number];      Current Outpatient Medications on File Prior to Visit   Medication Sig Dispense Refill    Calcium Carbonate-Vit D-Min (CALCIUM 600+D PLUS MINERALS PO)       Denosumab (PROLIA SC) Inject under the skin      Multiple Vitamin (MULTIVITAMIN) capsule Take 1 capsule by mouth daily       No current facility-administered medications on file prior to visit  Objective:    Review of Systems   Constitutional: Negative for chills, fever and unexpected weight change  HENT: Negative for hearing loss, nosebleeds and sore throat  Eyes: Negative for pain, redness and visual disturbance  Respiratory: Negative for cough, shortness of breath and wheezing  Cardiovascular: Negative for chest pain, palpitations and leg swelling  Gastrointestinal: Negative for abdominal pain, nausea and vomiting  Endocrine: Negative for polydipsia and polyuria  Genitourinary: Negative for dysuria and hematuria  Skin: Negative for rash and wound  Neurological: Negative for dizziness and headaches  Psychiatric/Behavioral: Negative for decreased concentration, dysphoric mood and suicidal ideas  The patient is not nervous/anxious  Right Hand Exam     Comments:  Tender to palpation over the A1 pully of the right ring finger  No obvious catching or locking on exam            Physical Exam  Constitutional:       Appearance: She is well-developed  HENT:      Head: Normocephalic  Eyes:      Conjunctiva/sclera: Conjunctivae normal    Cardiovascular:      Rate and Rhythm: Normal rate  Pulmonary:      Effort: Pulmonary effort is normal       Breath sounds: Normal breath sounds  Musculoskeletal:      Cervical back: Normal range of motion  Skin:     General: Skin is warm and dry  Neurological:      Mental Status: She is alert        Motor: Weakness:    Psychiatric:         Behavior: Behavior normal          Procedures  No Procedures performed today        Portions of the record may have been created with voice recognition software   Occasional wrong word or "sound a like" substitutions may have occurred due to the inherent limitations of voice recognition software   Read the chart carefully and recognize, using context, where substitutions have occurred

## 2022-07-15 ENCOUNTER — OFFICE VISIT (OUTPATIENT)
Dept: URGENT CARE | Age: 67
End: 2022-07-15
Payer: COMMERCIAL

## 2022-07-15 VITALS
RESPIRATION RATE: 16 BRPM | DIASTOLIC BLOOD PRESSURE: 82 MMHG | OXYGEN SATURATION: 99 % | TEMPERATURE: 98.8 F | HEART RATE: 69 BPM | SYSTOLIC BLOOD PRESSURE: 130 MMHG

## 2022-07-15 DIAGNOSIS — J06.9 ACUTE URI: Primary | ICD-10-CM

## 2022-07-15 DIAGNOSIS — R52 BODY ACHES: ICD-10-CM

## 2022-07-15 LAB
SARS-COV-2 AG UPPER RESP QL IA: NEGATIVE
VALID CONTROL: NORMAL

## 2022-07-15 PROCEDURE — 87811 SARS-COV-2 COVID19 W/OPTIC: CPT | Performed by: PHYSICIAN ASSISTANT

## 2022-07-15 PROCEDURE — 99213 OFFICE O/P EST LOW 20 MIN: CPT | Performed by: PHYSICIAN ASSISTANT

## 2022-07-15 RX ORDER — BENZONATATE 100 MG/1
100 CAPSULE ORAL 3 TIMES DAILY PRN
Qty: 20 CAPSULE | Refills: 0 | Status: SHIPPED | OUTPATIENT
Start: 2022-07-15 | End: 2022-09-09 | Stop reason: ALTCHOICE

## 2022-07-15 NOTE — LETTER
July 15, 2022     Patient: Edgar Calvillo   YOB: 1955   Date of Visit: 7/15/2022       To Whom it May Concern:    Felecia John was seen in my clinic on 7/15/2022  Please excuse illness  If you have any questions or concerns, please don't hesitate to call           Sincerely,          Fanny Noyola PA-C        CC: No Recipients

## 2022-07-15 NOTE — PROGRESS NOTES
[facility]        NAME: Kelsey Bautista is a 61 y o  female  : 1955    MRN: [id number]  DATE: July 15, 2022  TIME: 2:16 PM    Assessment and Plan   Acute URI [J06 9]  1  Acute URI  benzonatate (TESSALON PERLES) 100 mg capsule   2  Body aches  Poct Covid 19 Rapid Antigen Test         Patient Instructions       Follow up with PCP in 3-5 days  Proceed to  ER if symptoms worsen  Chief Complaint     Chief Complaint   Patient presents with    Cold Like Symptoms     Yesterday had nausea  This AM has body aches, headaches, sore throat starting  Covid vaccinated  History of Present Illness       HPI    Review of Systems   Review of Systems   Constitutional: Negative for chills, fatigue and fever  HENT: Positive for congestion, postnasal drip and sore throat  Negative for ear pain, hearing loss, sinus pressure and sinus pain  Eyes: Negative for pain and discharge  Respiratory: Negative for chest tightness and shortness of breath  Cardiovascular: Negative for chest pain  Gastrointestinal: Negative for abdominal pain, constipation, nausea and vomiting  Genitourinary: Negative for difficulty urinating  Musculoskeletal: Negative for arthralgias and myalgias  Skin: Negative for rash  Neurological: Negative for dizziness and headaches  Psychiatric/Behavioral: Negative for behavioral problems           Current Medications       Current Outpatient Medications:     benzonatate (TESSALON PERLES) 100 mg capsule, Take 1 capsule (100 mg total) by mouth 3 (three) times a day as needed for cough, Disp: 20 capsule, Rfl: 0    Denosumab (PROLIA SC), Inject under the skin, Disp: , Rfl:     Multiple Vitamin (MULTIVITAMIN) capsule, Take 1 capsule by mouth daily, Disp: , Rfl:     Calcium Carbonate-Vit D-Min (CALCIUM 600+D PLUS MINERALS PO), , Disp: , Rfl:     Current Allergies     Allergies as of 07/15/2022 - Reviewed 07/15/2022   Allergen Reaction Noted    Codeine  2012    Darvon [propoxyphene]  2019    Demerol [meperidine]  2019    Valium [diazepam]  2019    Aspirin  2012            The following portions of the patient's history were reviewed and updated as appropriate: allergies, current medications, past family history, past medical history, past social history, past surgical history and problem list      Past Medical History:   Diagnosis Date    Arthritis     Asthma     Basal cell carcinoma 2020    Right alar groove    HL (hearing loss)     Osteoporosis     Shingles        Past Surgical History:   Procedure Laterality Date    COCHLEAR IMPLANT      COLONOSCOPY      COLONOSCOPY      fiberoptic; follow up at age; 2017 10 year f/u    HYSTERECTOMY      age 30    MOHS SURGERY  2020     Right alar groove    OOPHORECTOMY      age 30   Charlene Stout SKIN BIOPSY      TONSILLECTOMY         Family History   Problem Relation Age of Onset    Osteoporosis Mother     Hearing loss Mother         she just found out    Diabetes Father             Heart disease Father             Hypertension Father             Alcohol abuse Father             Dementia Father     Stroke Father             Lung cancer Maternal Grandmother         76s    Cancer Maternal Grandmother             Heart disease Paternal Grandmother             Diabetes Paternal Grandmother             No Known Problems Maternal Aunt     Lung cancer Maternal Grandfather     Cancer Maternal Grandfather             Hearing loss Maternal Grandfather     Prostate cancer Paternal Grandfather             Cancer Paternal Grandfather             Colon cancer Other     Pancreatic cancer Other     Alcohol abuse Brother     Hypertension Brother     Diabetes Brother          Medications have been verified          Objective   /82   Pulse 69   Temp 98 8 °F (37 1 °C)   Resp 16   LMP 1989 (Exact Date) Comment: hysterectomy, total  SpO2 99%   Patient's last menstrual period was 06/01/1989 (exact date)  Physical Exam     Physical Exam  Vitals and nursing note reviewed  Constitutional:       Appearance: Normal appearance  HENT:      Head: Normocephalic and atraumatic  Mouth/Throat:      Mouth: Mucous membranes are moist       Pharynx: Posterior oropharyngeal erythema present  No pharyngeal swelling  Eyes:      Extraocular Movements: Extraocular movements intact  Cardiovascular:      Rate and Rhythm: Normal rate and regular rhythm  Pulses: Normal pulses  Heart sounds: Normal heart sounds  Pulmonary:      Effort: Pulmonary effort is normal       Breath sounds: Normal breath sounds  Skin:     General: Skin is warm  Capillary Refill: Capillary refill takes less than 2 seconds  Neurological:      Mental Status: She is alert

## 2022-07-15 NOTE — PATIENT INSTRUCTIONS
Upper Respiratory Infection   WHAT YOU NEED TO KNOW:   An upper respiratory infection is also called a cold  It can affect your nose, throat, ears, and sinuses  Cold symptoms are usually worst for the first 3 to 5 days  Most people get better in 7 to 14 days  You may continue to cough for 2 to 3 weeks  Colds are caused by viruses and do not get better with antibiotics  DISCHARGE INSTRUCTIONS:   Call your local emergency number (911 in the [address] [place] [address],[address]) if:   You have chest pain or trouble breathing  Return to the emergency department if:   You have a fever over 102ºF (39ºC)  Call your doctor if:   You have a low fever  Your sore throat gets worse or you see white or yellow spots in your throat  Your symptoms get worse after 3 to 5 days or are not better in 14 days  You have a rash anywhere on your skin  You have large, tender lumps in your neck  You have thick, green, or yellow drainage from your nose  You cough up thick yellow, green, or bloody mucus  You have a bad earache  You have questions or concerns about your condition or care  Medicines: You may need any of the following:  Decongestants  help reduce nasal congestion and help you breathe more easily  If you take decongestant pills, they may make you feel restless or cause problems with your sleep  Do not use decongestant sprays for more than a few days  Cough suppressants  help reduce coughing  Ask your healthcare provider which type of cough medicine is best for you  NSAIDs , such as ibuprofen, help decrease swelling, pain, and fever  NSAIDs can cause stomach bleeding or kidney problems in certain people  If you take blood thinner medicine, always ask your healthcare provider if NSAIDs are safe for you  Always read the medicine label and follow directions  Acetaminophen  decreases pain and fever  It is available without a doctor's order  Ask how much to take and how often to take it  Follow directions   Read the labels of all other medicines you are using to see if they also contain acetaminophen, or ask your doctor or pharmacist  Acetaminophen can cause liver damage if not taken correctly  Do not use more than 4 grams (4,000 milligrams) total of acetaminophen in one day  Take your medicine as directed  Contact your healthcare provider if you think your medicine is not helping or if you have side effects  Tell him or her if you are allergic to any medicine  Keep a list of the medicines, vitamins, and herbs you take  Include the amounts, and when and why you take them  Bring the list or the pill bottles to follow-up visits  Carry your medicine list with you in case of an emergency  Self-care:   Rest as much as possible  Slowly start to do more each day  Drink more liquids as directed  Liquids will help thin and loosen mucus so you can cough it up  Liquids will also help prevent dehydration  Liquids that help prevent dehydration include water, fruit juice, and broth  Do not drink liquids that contain caffeine  Caffeine can increase your risk for dehydration  Ask your healthcare provider how much liquid to drink each day  Soothe a sore throat  Gargle with warm salt water  Make salt water by dissolving ¼ teaspoon salt in 1 cup warm water  You may also suck on hard candy or throat lozenges  You may use a sore throat spray  Use a humidifier or vaporizer  Use a cool mist humidifier or a vaporizer to increase air moisture in your home  This may make it easier for you to breathe and help decrease your cough  Use saline nasal drops as directed  These help relieve congestion  Apply petroleum-based jelly around the outside of your nostrils  This can decrease irritation from blowing your nose  Do not smoke  Nicotine and other chemicals in cigarettes and cigars can make your symptoms worse  They can also cause infections such as bronchitis or pneumonia   Ask your healthcare provider for information if you currently smoke and need help to quit  E-cigarettes or smokeless tobacco still contain nicotine  Talk to your healthcare provider before you use these products  Prevent a cold: Wash your hands often  Use soap and water every time you wash your hands  Rub your soapy hands together, lacing your fingers  Use the fingers of one hand to scrub under the nails of the other hand  Wash for at least 20 seconds  Rinse with warm, running water for several seconds  Then dry your hands  Use germ-killing gel if soap and water are not available  Do not touch your eyes or mouth without washing your hands first          Cover a sneeze or cough  Use a tissue that covers your mouth and nose  Put the used tissue in the trash right away  Use the bend of your arm if a tissue is not available  Wash your hands well with soap and water or use a hand   Do not stand close to anyone who is sneezing or coughing  Try to stay away from others while you are sick  This is especially important during the first 2 to 3 days when the virus is more easily spread  Wait until a fever, cough, or other symptoms are gone before you return to work or other regular activities  Do not share items while you are sick  This includes food, drinks, eating utensils, and dishes  Follow up with your doctor as directed:  Write down your questions so you remember to ask them during your visits  © Copyright [address] 2022 Information is for End User's use only and may not be sold, redistributed or otherwise used for commercial purposes  All illustrations and images included in CareNotes® are the copyrighted property of A D [employer] M [employer]  or [employer]  The above information is an  only  It is not intended as medical advice for individual conditions or treatments  Talk to your doctor, nurse or pharmacist before following any medical regimen to see if it is safe and effective for you

## 2022-08-02 ENCOUNTER — LAB (OUTPATIENT)
Dept: LAB | Age: 67
End: 2022-08-02
Payer: COMMERCIAL

## 2022-08-02 DIAGNOSIS — M54.2 CERVICALGIA: ICD-10-CM

## 2022-08-02 DIAGNOSIS — E78.2 MIXED HYPERLIPIDEMIA: ICD-10-CM

## 2022-08-02 DIAGNOSIS — M80.00XA OSTEOPOROSIS WITH CURRENT PATHOLOGICAL FRACTURE, UNSPECIFIED OSTEOPOROSIS TYPE, INITIAL ENCOUNTER: ICD-10-CM

## 2022-08-02 DIAGNOSIS — E03.8 SUBCLINICAL HYPOTHYROIDISM: ICD-10-CM

## 2022-08-02 DIAGNOSIS — Z11.59 NEED FOR HEPATITIS C SCREENING TEST: ICD-10-CM

## 2022-08-02 DIAGNOSIS — Z11.4 SCREENING FOR HIV (HUMAN IMMUNODEFICIENCY VIRUS): ICD-10-CM

## 2022-08-02 LAB
ALBUMIN SERPL BCP-MCNC: 3.8 G/DL (ref 3.5–5)
ALP SERPL-CCNC: 58 U/L (ref 46–116)
ALT SERPL W P-5'-P-CCNC: 42 U/L (ref 12–78)
ANION GAP SERPL CALCULATED.3IONS-SCNC: 5 MMOL/L (ref 4–13)
AST SERPL W P-5'-P-CCNC: 23 U/L (ref 5–45)
BILIRUB SERPL-MCNC: 0.64 MG/DL (ref 0.2–1)
BUN SERPL-MCNC: 13 MG/DL (ref 5–25)
CALCIUM SERPL-MCNC: 9.2 MG/DL (ref 8.3–10.1)
CHLORIDE SERPL-SCNC: 111 MMOL/L (ref 96–108)
CHOLEST SERPL-MCNC: 241 MG/DL
CO2 SERPL-SCNC: 27 MMOL/L (ref 21–32)
CREAT SERPL-MCNC: 0.84 MG/DL (ref 0.6–1.3)
GFR SERPL CREATININE-BSD FRML MDRD: 73 ML/MIN/1.73SQ M
GLUCOSE P FAST SERPL-MCNC: 94 MG/DL (ref 65–99)
HCV AB SER QL: NORMAL
HDLC SERPL-MCNC: 61 MG/DL
LDLC SERPL CALC-MCNC: 143 MG/DL (ref 0–100)
NONHDLC SERPL-MCNC: 180 MG/DL
POTASSIUM SERPL-SCNC: 4 MMOL/L (ref 3.5–5.3)
PROT SERPL-MCNC: 7.3 G/DL (ref 6.4–8.4)
SODIUM SERPL-SCNC: 143 MMOL/L (ref 135–147)
TRIGL SERPL-MCNC: 187 MG/DL
TSH SERPL DL<=0.05 MIU/L-ACNC: 2.6 UIU/ML (ref 0.45–4.5)

## 2022-08-02 PROCEDURE — 86803 HEPATITIS C AB TEST: CPT

## 2022-08-02 PROCEDURE — 80053 COMPREHEN METABOLIC PANEL: CPT

## 2022-08-02 PROCEDURE — 87389 HIV-1 AG W/HIV-1&-2 AB AG IA: CPT

## 2022-08-02 PROCEDURE — 36415 COLL VENOUS BLD VENIPUNCTURE: CPT

## 2022-08-02 PROCEDURE — 84443 ASSAY THYROID STIM HORMONE: CPT

## 2022-08-02 PROCEDURE — 80061 LIPID PANEL: CPT

## 2022-08-02 NOTE — RESULT ENCOUNTER NOTE
Joon cano  Your labs show the following  1  Negative hep c  2  Lipids remain elevated - would recommend consideration for lipid lowering medication called statin - are you interested in trying? 3  Normal cmp  4  Normal tsh  5    Hiv pending  - dr Pastora Cordova

## 2022-08-03 LAB — HIV 1+2 AB+HIV1 P24 AG SERPL QL IA: NORMAL

## 2022-08-03 NOTE — RESULT ENCOUNTER NOTE
Hiv negative  I have cc and pasted the other message re: rest of labs:    Joon cano  Your labs show the following  1   Negative hep c  2   Lipids remain elevated - would recommend consideration for lipid lowering medication called statin - are you interested in trying? 3   Normal cmp  4   Normal tsh  5   DARIN pending  - dr Glory Garber! THANKS!   DR Carmona Mems

## 2022-08-04 ENCOUNTER — OFFICE VISIT (OUTPATIENT)
Dept: OBGYN CLINIC | Facility: CLINIC | Age: 67
End: 2022-08-04
Payer: COMMERCIAL

## 2022-08-04 VITALS
HEIGHT: 57 IN | SYSTOLIC BLOOD PRESSURE: 160 MMHG | BODY MASS INDEX: 30.63 KG/M2 | DIASTOLIC BLOOD PRESSURE: 83 MMHG | WEIGHT: 142 LBS | HEART RATE: 56 BPM

## 2022-08-04 DIAGNOSIS — M65.341 TRIGGER FINGER, RIGHT RING FINGER: Primary | ICD-10-CM

## 2022-08-04 PROCEDURE — 99214 OFFICE O/P EST MOD 30 MIN: CPT | Performed by: ORTHOPAEDIC SURGERY

## 2022-08-04 RX ORDER — CHLORHEXIDINE GLUCONATE 0.12 MG/ML
15 RINSE ORAL ONCE
OUTPATIENT
Start: 2022-08-04 | End: 2022-08-04

## 2022-08-04 RX ORDER — CHLORHEXIDINE GLUCONATE 4 G/100ML
SOLUTION TOPICAL DAILY PRN
OUTPATIENT
Start: 2022-08-04

## 2022-08-04 NOTE — PROGRESS NOTES
Assessment:  1  Trigger finger, right ring finger  Case request operating room: RELEASE RIGHT RING TRIGGER FINGER    Ambulatory referral to Family Practice    CBC and differential    EKG 12 lead    Case request operating room: RELEASE RIGHT RING TRIGGER FINGER     Patient Active Problem List   Diagnosis    Elevated BP without diagnosis of hypertension    Hypercalcemia    Osteoporosis    Sensorineural hearing loss (SNHL) of both ears    Oral mucosal lesion    Epistaxis    Melanoma in situ of left lower leg (HCC)    Basal cell carcinoma (BCC) of right lower leg    Cochlear implant status    Subclinical hypothyroidism    Mixed hyperlipidemia    Trigger finger, right ring finger       Plan:    61 y o  female  with right ring trigger finger     The patient would like to proceed with right ring trigger finger release  Risks and benefits of the procedure were explained to patient in detail today questions were answered to their satisfaction  Patient gave their informed consent for the procedure   Follow-up 10-14 days from date of surgery      The patient was seen and examined by Dr Gwen Nowak and myself  The assessment and plan were formulated by Dr Gwen Nowak and I assisted in carrying it out  Subjective:   Patient ID: Chandler Marroquin is a 61 y o  female   HPI    Patient presents to the office for follow up of right ring trigger  Since the last visit, the patient reports persistent pain triggering and locking of the right ring finger  She like to discuss surgical release of A1 pulley today       The following portions of the patient's history were reviewed and updated as appropriate: allergies, current medications, past family history, past social history, past surgical history and problem list     Social History     Socioeconomic History    Marital status: Single     Spouse name: Not on file    Number of children: Not on file    Years of education: Not on file    Highest education level: Not on file Occupational History    Not on file   Tobacco Use    Smoking status: Never Smoker    Smokeless tobacco: Never Used   Vaping Use    Vaping Use: Never used   Substance and Sexual Activity    Alcohol use: No    Drug use: No    Sexual activity: Never   Other Topics Concern    Not on file   Social History Narrative    Caffeine use     Social Determinants of Health     Financial Resource Strain: Not on file   Food Insecurity: Not on file   Transportation Needs: Not on file   Physical Activity: Not on file   Stress: Not on file   Social Connections: Not on file   Intimate Partner Violence: Not on file   Housing Stability: Not on file     Past Medical History:   Diagnosis Date    Arthritis     Asthma     Basal cell carcinoma 02/11/2020    Right alar groove    HL (hearing loss)     Osteoporosis     Shingles      Past Surgical History:   Procedure Laterality Date    COCHLEAR IMPLANT      COLONOSCOPY  2017    COLONOSCOPY  2010    fiberoptic; follow up at age; 2017 10 year f/u    HYSTERECTOMY      age 30    MOHS SURGERY  05/11/2020     Right alar groove    OOPHORECTOMY      age 30   Portia Riis SKIN BIOPSY      TONSILLECTOMY       Allergies   Allergen Reactions    Codeine      Reaction Date: [id number];     Darvon [Propoxyphene]     Demerol [Meperidine]     Valium [Diazepam]     Aspirin      Reaction Date: [id number];      Current Outpatient Medications on File Prior to Visit   Medication Sig Dispense Refill    benzonatate (TESSALON PERLES) 100 mg capsule Take 1 capsule (100 mg total) by mouth 3 (three) times a day as needed for cough 20 capsule 0    Calcium Carbonate-Vit D-Min (CALCIUM 600+D PLUS MINERALS PO)  (Patient not taking: Reported on 7/15/2022)      Denosumab (PROLIA SC) Inject under the skin      Multiple Vitamin (MULTIVITAMIN) capsule Take 1 capsule by mouth daily       No current facility-administered medications on file prior to visit         Review of Systems  See HPi    Objective:    Vitals: 08/04/22 0735   BP: 160/83   Pulse: 56       Physical Exam  Constitutional:       Appearance: She is well-developed  HENT:      Head: Normocephalic and atraumatic  Eyes:      General: No scleral icterus  Conjunctiva/sclera: Conjunctivae normal    Cardiovascular:      Comments: No discernible arrhthymias  Pulmonary:      Effort: Pulmonary effort is normal  No respiratory distress  Breath sounds: No stridor  Abdominal:      General: There is no distension  Palpations: Abdomen is soft  Musculoskeletal:      Cervical back: Neck supple  Skin:     General: Skin is warm and dry  Findings: No erythema  Neurological:      Mental Status: She is alert and oriented to person, place, and time  Psychiatric:         Behavior: Behavior normal          Right Hand Exam     Tenderness   Right hand tenderness location: Right ring finger A1 pulley  Range of Motion   The patient has normal right wrist ROM  Muscle Strength   The patient has normal right wrist strength  Other   Erythema: absent  Scars: absent  Sensation: normal  Pulse: present    Comments:  Palpable nodule and locking of the right ring finger at the A1 pulley                Procedures  No Procedures performed today    Scribe Attestation    I,:  Gunnar Bo PA-C am acting as a scribe while in the presence of the attending physician :       I,:  Tristen Summers DO personally performed the services described in this documentation    as scribed in my presence :              Portions of the record may have been created with voice recognition software  Occasional wrong word or "sound a like" substitutions may have occurred due to the inherent limitations of voice recognition software  Read the chart carefully and recognize, using context, where substitutions have occurred

## 2022-08-04 NOTE — LETTER
August 4, 2022     Patient: Tanya Arreola  YOB: 1955  Date of Visit: 8/4/2022      To Whom it May Concern:    Charlette Shipley is under my professional care  Lisandro Walker was seen in my office on 8/4/2022  Lisandro Walker is having surgery on 9/20/22  She may return on 9/24 with restrictions: no lifting greater than 2 lbs with the right hand  Restrictions will be re-assessed at her first post operative visit approximately 10-14 days from date of surgery  If you have any questions or concerns, please don't hesitate to call           Sincerely,          Yasmine Coats DO        CC: No Recipients

## 2022-09-01 ENCOUNTER — OFFICE VISIT (OUTPATIENT)
Dept: LAB | Facility: HOSPITAL | Age: 67
End: 2022-09-01
Payer: COMMERCIAL

## 2022-09-01 ENCOUNTER — APPOINTMENT (OUTPATIENT)
Dept: LAB | Facility: HOSPITAL | Age: 67
End: 2022-09-01
Payer: COMMERCIAL

## 2022-09-01 DIAGNOSIS — M65.341 TRIGGER FINGER, RIGHT RING FINGER: ICD-10-CM

## 2022-09-01 LAB
ATRIAL RATE: 50 BPM
BASOPHILS # BLD AUTO: 0.06 THOUSANDS/ΜL (ref 0–0.1)
BASOPHILS NFR BLD AUTO: 1 % (ref 0–1)
EOSINOPHIL # BLD AUTO: 0.06 THOUSAND/ΜL (ref 0–0.61)
EOSINOPHIL NFR BLD AUTO: 1 % (ref 0–6)
ERYTHROCYTE [DISTWIDTH] IN BLOOD BY AUTOMATED COUNT: 12.3 % (ref 11.6–15.1)
HCT VFR BLD AUTO: 39.3 % (ref 34.8–46.1)
HGB BLD-MCNC: 12.9 G/DL (ref 11.5–15.4)
IMM GRANULOCYTES # BLD AUTO: 0.08 THOUSAND/UL (ref 0–0.2)
IMM GRANULOCYTES NFR BLD AUTO: 1 % (ref 0–2)
LYMPHOCYTES # BLD AUTO: 2.9 THOUSANDS/ΜL (ref 0.6–4.47)
LYMPHOCYTES NFR BLD AUTO: 32 % (ref 14–44)
MCH RBC QN AUTO: 30.6 PG (ref 26.8–34.3)
MCHC RBC AUTO-ENTMCNC: 32.8 G/DL (ref 31.4–37.4)
MCV RBC AUTO: 93 FL (ref 82–98)
MONOCYTES # BLD AUTO: 0.64 THOUSAND/ΜL (ref 0.17–1.22)
MONOCYTES NFR BLD AUTO: 7 % (ref 4–12)
NEUTROPHILS # BLD AUTO: 5.31 THOUSANDS/ΜL (ref 1.85–7.62)
NEUTS SEG NFR BLD AUTO: 58 % (ref 43–75)
NRBC BLD AUTO-RTO: 0 /100 WBCS
P AXIS: 8 DEGREES
PLATELET # BLD AUTO: 307 THOUSANDS/UL (ref 149–390)
PMV BLD AUTO: 10.7 FL (ref 8.9–12.7)
PR INTERVAL: 118 MS
QRS AXIS: 41 DEGREES
QRSD INTERVAL: 86 MS
QT INTERVAL: 460 MS
QTC INTERVAL: 419 MS
RBC # BLD AUTO: 4.21 MILLION/UL (ref 3.81–5.12)
T WAVE AXIS: 45 DEGREES
VENTRICULAR RATE: 50 BPM
WBC # BLD AUTO: 9.05 THOUSAND/UL (ref 4.31–10.16)

## 2022-09-01 PROCEDURE — 93005 ELECTROCARDIOGRAM TRACING: CPT

## 2022-09-01 PROCEDURE — 93010 ELECTROCARDIOGRAM REPORT: CPT | Performed by: INTERNAL MEDICINE

## 2022-09-01 PROCEDURE — 36415 COLL VENOUS BLD VENIPUNCTURE: CPT

## 2022-09-01 PROCEDURE — 85025 COMPLETE CBC W/AUTO DIFF WBC: CPT

## 2022-09-08 ENCOUNTER — APPOINTMENT (OUTPATIENT)
Dept: RADIOLOGY | Facility: HOSPITAL | Age: 67
End: 2022-09-08
Payer: COMMERCIAL

## 2022-09-08 ENCOUNTER — HOSPITAL ENCOUNTER (EMERGENCY)
Facility: HOSPITAL | Age: 67
Discharge: HOME | End: 2022-09-08
Attending: EMERGENCY MEDICINE
Payer: COMMERCIAL

## 2022-09-08 VITALS
HEART RATE: 58 BPM | HEIGHT: 57 IN | DIASTOLIC BLOOD PRESSURE: 85 MMHG | WEIGHT: 136.2 LBS | SYSTOLIC BLOOD PRESSURE: 180 MMHG | RESPIRATION RATE: 18 BRPM | TEMPERATURE: 97.8 F | BODY MASS INDEX: 29.38 KG/M2 | OXYGEN SATURATION: 97 %
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
## 2022-09-08 DIAGNOSIS — S80.00XA KNEE CONTUSION: ICD-10-CM

## 2022-09-08 PROCEDURE — 99283 EMERGENCY DEPT VISIT LOW MDM: CPT

## 2022-09-08 PROCEDURE — 73564 X-RAY EXAM KNEE 4 OR MORE: CPT

## 2022-09-08 PROCEDURE — 73502 X-RAY EXAM HIP UNI 2-3 VIEWS: CPT

## 2022-09-08 PROCEDURE — 99284 EMERGENCY DEPT VISIT MOD MDM: CPT | Performed by: EMERGENCY MEDICINE

## 2022-09-08 NOTE — ED PROVIDER NOTES
History  Chief Complaint   Patient presents with    Fall     Tripped over 2 cords and fell onto right side; c/o right knee and right hip pain; did not hit head but states head is hurting  81-year-old female presenting to the emergency department with right knee and right hip pain after a fall at work  Patient notes that she tripped over 2 cords  This happened about 7 hour before evaluation  Patient notes that she has been having persistent pain throughout the night despite ranging as well as rash  No fevers chills cough congestion rhinorrhea  No nausea vomiting diarrhea  No head injury  No loss consciousness      History provided by:  Patient      Prior to Admission Medications   Prescriptions Last Dose Informant Patient Reported? Taking?    Calcium Carbonate-Vit D-Min (CALCIUM 600+D PLUS MINERALS PO)  Self Yes No   Patient not taking: Reported on 7/15/2022   Denosumab (PROLIA SC)  Self Yes No   Sig: Inject under the skin   Multiple Vitamin (MULTIVITAMIN) capsule  Self Yes No   Sig: Take 1 capsule by mouth daily   benzonatate (TESSALON PERLES) 100 mg capsule   No No   Sig: Take 1 capsule (100 mg total) by mouth 3 (three) times a day as needed for cough      Facility-Administered Medications: None       Past Medical History:   Diagnosis Date    Arthritis     Asthma     Basal cell carcinoma 2020    Right alar groove    HL (hearing loss)     Osteoporosis     Shingles        Past Surgical History:   Procedure Laterality Date    COCHLEAR IMPLANT      COLONOSCOPY      COLONOSCOPY      fiberoptic; follow up at age; 2017 10 year f/u    HYSTERECTOMY      age 30    MOHS SURGERY  2020     Right alar groove    OOPHORECTOMY      age 30   Edilia Dominguez SKIN BIOPSY      TONSILLECTOMY         Family History   Problem Relation Age of Onset    Osteoporosis Mother     Hearing loss Mother         she just found out    Diabetes Father             Heart disease Father         Zaira Holland Hypertension Father             Alcohol abuse Father             Dementia Father     Stroke Father             Lung cancer Maternal Grandmother         76s    Cancer Maternal Grandmother             Heart disease Paternal Grandmother             Diabetes Paternal Grandmother             No Known Problems Maternal Aunt     Lung cancer Maternal Grandfather     Cancer Maternal Grandfather             Hearing loss Maternal Grandfather     Prostate cancer Paternal Grandfather             Cancer Paternal Grandfather             Colon cancer Other     Pancreatic cancer Other     Alcohol abuse Brother     Hypertension Brother     Diabetes Brother      I have reviewed and agree with the history as documented  E-Cigarette/Vaping    E-Cigarette Use Never User      E-Cigarette/Vaping Substances    Nicotine No     THC No     CBD No     Flavoring No     Other No     Unknown No      Social History     Tobacco Use    Smoking status: Never Smoker    Smokeless tobacco: Never Used   Vaping Use    Vaping Use: Never used   Substance Use Topics    Alcohol use: No    Drug use: No       Review of Systems   Constitutional: Negative for chills and fever  HENT: Negative for ear pain and sore throat  Eyes: Negative for pain and visual disturbance  Respiratory: Negative for cough and shortness of breath  Cardiovascular: Negative for chest pain and palpitations  Gastrointestinal: Negative for abdominal pain and vomiting  Genitourinary: Negative for dysuria and hematuria  Musculoskeletal: Positive for arthralgias  Negative for back pain  Skin: Negative for color change and rash  Neurological: Negative for seizures and syncope  All other systems reviewed and are negative  Physical Exam  Physical Exam  Vitals and nursing note reviewed  Constitutional:       General: She is not in acute distress       Appearance: She is well-developed  HENT:      Head: Normocephalic and atraumatic  Nose: Nose normal       Mouth/Throat:      Mouth: Mucous membranes are moist    Eyes:      Conjunctiva/sclera: Conjunctivae normal    Cardiovascular:      Rate and Rhythm: Normal rate and regular rhythm  Heart sounds: No murmur heard  Pulmonary:      Effort: Pulmonary effort is normal  No respiratory distress  Breath sounds: Normal breath sounds  Abdominal:      Palpations: Abdomen is soft  Tenderness: There is no abdominal tenderness  Musculoskeletal:      Cervical back: Neck supple  Comments: Full range of motion of the right hip and right knee but anterior tenderness to palpation at the right knee as well as lateral tenderness to palpation of the right hip  Skin:     General: Skin is warm and dry  Capillary Refill: Capillary refill takes less than 2 seconds  Neurological:      Mental Status: She is alert           Vital Signs  ED Triage Vitals [09/08/22 0656]   Temperature Pulse Respirations Blood Pressure SpO2   97 8 °F (36 6 °C) 58 18 (!) 180/85 97 %      Temp Source Heart Rate Source Patient Position - Orthostatic VS BP Location FiO2 (%)   Oral Monitor Sitting Right arm --      Pain Score       9           Vitals:    09/08/22 0656   BP: (!) 180/85   Pulse: 58   Patient Position - Orthostatic VS: Sitting         Visual Acuity      ED Medications  Medications - No data to display    Diagnostic Studies  Results Reviewed     None                 XR hip/pelv 2-3 vws right if performed   ED Interpretation by Artie Landry MD (09/08 0481)   No fracture/dislocation      XR knee 4+ vw right injury   ED Interpretation by Artie Landry MD (09/08 2095)   No fracture/dislocation                   Procedures  Procedures         ED Course                               SBIRT 20yo+    Flowsheet Row Most Recent Value   SBIRT (25 yo +)    In order to provide better care to our patients, we are screening all of our patients for alcohol and drug use  Would it be okay to ask you these screening questions? Yes Filed at: 09/08/2022 0302   Initial Alcohol Screen: US AUDIT-C     1  How often do you have a drink containing alcohol? 0 Filed at: 09/08/2022 0704   2  How many drinks containing alcohol do you have on a typical day you are drinking? 0 Filed at: 09/08/2022 0704   3a  Male UNDER 65: How often do you have five or more drinks on one occasion? 0 Filed at: 09/08/2022 0704   3b  FEMALE Any Age, or MALE 65+: How often do you have 4 or more drinks on one occassion? 0 Filed at: 09/08/2022 0704   Audit-C Score 0 Filed at: 09/08/2022 3173   FILOMENA: How many times in the past year have you    Used an illegal drug or used a prescription medication for non-medical reasons? Never Filed at: 09/08/2022 0704                    MDM  Number of Diagnoses or Management Options  Fall, initial encounter  Knee contusion  Diagnosis management comments: X-ray of the right hip and right knee without fracture or dislocation  Likely just contusion, PCP follow-up  Patient does have hypertension, asked to follow-up with PCP for further management  Disposition  Final diagnoses:   Fall, initial encounter   Knee contusion     Time reflects when diagnosis was documented in both MDM as applicable and the Disposition within this note     Time User Action Codes Description Comment    9/8/2022  7:27 AM Joaquin Still Add [K96  IKXD] Fall, initial encounter     9/8/2022  7:27 AM Joaquin Still Add [S80 00XA] Knee contusion       ED Disposition     ED Disposition   Discharge    Condition   Stable    Date/Time   u Sep 8, 2022  7:27 AM    Comment   Jyotsna Sandoval discharge to home/self care                 Follow-up Information     Follow up With Specialties Details Why Trever Rasheed [address], DO Family Medicine   [address] [facility]  [address]  [place] [id number] [phone]            Patient's Medications   Discharge Prescriptions No medications on file       No discharge procedures on file      PDMP Review     None          ED Provider  Electronically Signed by           Philip Bentley MD  09/08/22 9587

## 2022-09-09 ENCOUNTER — CONSULT (OUTPATIENT)
Dept: FAMILY MEDICINE CLINIC | Facility: CLINIC | Age: 67
End: 2022-09-09
Payer: COMMERCIAL

## 2022-09-09 VITALS
WEIGHT: 135.4 LBS | BODY MASS INDEX: 29.21 KG/M2 | SYSTOLIC BLOOD PRESSURE: 180 MMHG | RESPIRATION RATE: 12 BRPM | OXYGEN SATURATION: 98 % | HEIGHT: 57 IN | DIASTOLIC BLOOD PRESSURE: 102 MMHG | HEART RATE: 46 BPM

## 2022-09-09 DIAGNOSIS — Z13.220 SCREENING FOR HYPERLIPIDEMIA: ICD-10-CM

## 2022-09-09 DIAGNOSIS — I10 ESSENTIAL HYPERTENSION: Primary | ICD-10-CM

## 2022-09-09 DIAGNOSIS — Z13.1 SCREENING FOR DIABETES MELLITUS: ICD-10-CM

## 2022-09-09 PROCEDURE — 99214 OFFICE O/P EST MOD 30 MIN: CPT | Performed by: INTERNAL MEDICINE

## 2022-09-09 RX ORDER — MELOXICAM 7.5 MG/1
TABLET ORAL
COMMUNITY
End: 2022-09-09 | Stop reason: ALTCHOICE

## 2022-09-09 RX ORDER — LOSARTAN POTASSIUM 50 MG/1
50 TABLET ORAL DAILY
Qty: 30 TABLET | Refills: 0 | Status: SHIPPED | OUTPATIENT
Start: 2022-09-09 | End: 2022-09-16

## 2022-09-09 NOTE — PATIENT INSTRUCTIONS
Go to [employer] buy [employer] blood pressure monitor Check your blood pressure at home twice a day for 10 days being on   Losartan 50 mg  Do not forget to bring your blood pressure cuff with you

## 2022-09-09 NOTE — ASSESSMENT & PLAN NOTE
Blood pressure is highly elevated, she is asymptomatic  Start losartan 50 mg daily  She will check her blood pressures at home twice a day, follow-up in 7 days  Limit salt intake, avoid NSAIDs  If  Any new severe headache,  Vision changes, shortness of breath, chest pain or palpitations go to emergency room immediately  Hold off on clearing for the surgery as for now

## 2022-09-09 NOTE — PROGRESS NOTES
Assessment/Plan:    Uncontrolled hypertension    Blood pressure is highly elevated, she is asymptomatic  Start losartan 50 mg daily  She will check her blood pressures at home twice a day, follow-up in 7 days  Limit salt intake, avoid NSAIDs  If  Any new severe headache,  Vision changes, shortness of breath, chest pain or palpitations go to emergency room immediately  Hold off on clearing for the surgery as for now  Diagnoses and all orders for this visit:    Essential hypertension  -     losartan (COZAAR) 50 mg tablet; Take 1 tablet (50 mg total) by mouth daily    Screening for diabetes mellitus  -     HEMOGLOBIN A1C W/ EAG ESTIMATION; Future    Screening for hyperlipidemia  -     Lipid panel; Future    Other orders  -     Discontinue: meloxicam (MOBIC) 7 5 mg tablet; meloxicam 7 5 mg tablet   take 1 tablet by mouth once daily (Patient not taking: Reported on 9/9/2022)          Subjective:      Patient ID: Montana Talavera is a 61 y o  female  Patient came today to get medical clearance for her upcoming hand surgery  Her blood pressure was uncontrolled today, recheck by me to 200/100  The following portions of the patient's history were reviewed and updated as appropriate: allergies, current medications, past family history, past medical history, past social history, past surgical history, and problem list     Review of Systems   Respiratory: Negative for chest tightness and shortness of breath  Cardiovascular: Negative for palpitations  Neurological: Negative for dizziness, light-headedness and headaches           Objective:      BP (!) 180/102 (BP Location: Left arm, Patient Position: Sitting, Cuff Size: Standard)   Pulse (!) 46   Resp 12   Ht 4' 9" (1 448 m)   Wt 61 4 kg (135 lb 6 4 oz)   LMP 06/01/1989 (Exact Date) Comment: hysterectomy, total  SpO2 98%   BMI 29 30 kg/m²     Allergies   Allergen Reactions    Codeine      Reaction Date: [id number];     Darvon [Propoxyphene]     Demerol [Meperidine]     Valium [Diazepam]     Aspirin      Reaction Date: [id number];           Current Outpatient Medications:     Denosumab (PROLIA SC), Inject under the skin, Disp: , Rfl:     losartan (COZAAR) 50 mg tablet, Take 1 tablet (50 mg total) by mouth daily, Disp: 30 tablet, Rfl: 0    Calcium Carbonate-Vit D-Min (CALCIUM 600+D PLUS MINERALS PO), , Disp: , Rfl:     Multiple Vitamin (MULTIVITAMIN) capsule, Take 1 capsule by mouth daily (Patient not taking: Reported on 9/9/2022), Disp: , Rfl:      Patient Instructions   Go to [employer] blood pressure monitor Check your blood pressure at home twice a day for 10 days being on   Losartan 50 mg  Do not forget to bring your blood pressure cuff with you  Physical Exam  Constitutional:       General: She is not in acute distress  Appearance: She is not ill-appearing or toxic-appearing  Cardiovascular:      Rate and Rhythm: Normal rate  Heart sounds: No murmur heard  No gallop  Pulmonary:      Effort: No respiratory distress  Breath sounds: No wheezing or rales

## 2022-09-13 ENCOUNTER — APPOINTMENT (OUTPATIENT)
Dept: LAB | Age: 67
End: 2022-09-13

## 2022-09-13 DIAGNOSIS — Z00.8 HEALTH EXAMINATION IN POPULATION SURVEYS: ICD-10-CM

## 2022-09-13 LAB
CHOLEST SERPL-MCNC: 220 MG/DL
EST. AVERAGE GLUCOSE BLD GHB EST-MCNC: 108 MG/DL
HBA1C MFR BLD: 5.4 %
HDLC SERPL-MCNC: 62 MG/DL
LDLC SERPL CALC-MCNC: 129 MG/DL (ref 0–100)
NONHDLC SERPL-MCNC: 158 MG/DL
TRIGL SERPL-MCNC: 146 MG/DL

## 2022-09-13 PROCEDURE — 80061 LIPID PANEL: CPT

## 2022-09-13 PROCEDURE — 36415 COLL VENOUS BLD VENIPUNCTURE: CPT

## 2022-09-13 PROCEDURE — 83036 HEMOGLOBIN GLYCOSYLATED A1C: CPT

## 2022-09-16 ENCOUNTER — OFFICE VISIT (OUTPATIENT)
Dept: FAMILY MEDICINE CLINIC | Facility: CLINIC | Age: 67
End: 2022-09-16
Payer: COMMERCIAL

## 2022-09-16 VITALS
HEIGHT: 57 IN | OXYGEN SATURATION: 98 % | HEART RATE: 62 BPM | BODY MASS INDEX: 29.21 KG/M2 | SYSTOLIC BLOOD PRESSURE: 150 MMHG | WEIGHT: 135.4 LBS | DIASTOLIC BLOOD PRESSURE: 82 MMHG | TEMPERATURE: 96.5 F

## 2022-09-16 DIAGNOSIS — I10 ESSENTIAL HYPERTENSION: Primary | ICD-10-CM

## 2022-09-16 DIAGNOSIS — I10 UNCONTROLLED HYPERTENSION: ICD-10-CM

## 2022-09-16 PROCEDURE — 99214 OFFICE O/P EST MOD 30 MIN: CPT | Performed by: INTERNAL MEDICINE

## 2022-09-16 RX ORDER — LOSARTAN POTASSIUM 100 MG/1
100 TABLET ORAL DAILY
Qty: 30 TABLET | Refills: 0 | Status: SHIPPED | OUTPATIENT
Start: 2022-09-16 | End: 2022-10-07 | Stop reason: SDUPTHER

## 2022-09-16 RX ORDER — HYDROCHLOROTHIAZIDE 25 MG/1
25 TABLET ORAL DAILY
Qty: 30 TABLET | Refills: 0 | Status: SHIPPED | OUTPATIENT
Start: 2022-09-16 | End: 2022-09-30

## 2022-09-16 NOTE — PATIENT INSTRUCTIONS
Take losartan 100 mg as you did before you go to bed, hydrochlorothiazide before you go to work  Check your kidney function and electrolytes blood work in 7 days starting from when you will start a full course

## 2022-09-16 NOTE — ASSESSMENT & PLAN NOTE
Blood pressure recheck by me 180/90  Increase losartan up to 100 mg daily, add hydrochlorothiazide 25 mg daily  Recheck BMP in 7 days  Continue to monitor blood pressures at home  Her blood pressure cuff was checked by me and seems to be reliable

## 2022-09-16 NOTE — PROGRESS NOTES
Assessment/Plan:    Uncontrolled hypertension  Blood pressure recheck by me 180/90  Increase losartan up to 100 mg daily, add hydrochlorothiazide 25 mg daily  Recheck BMP in 7 days  Continue to monitor blood pressures at home  Her blood pressure cuff was checked by me and seems to be reliable  Diagnoses and all orders for this visit:    Essential hypertension  -     hydrochlorothiazide (HYDRODIURIL) 25 mg tablet; Take 1 tablet (25 mg total) by mouth daily  -     losartan (COZAAR) 100 MG tablet; Take 1 tablet (100 mg total) by mouth daily  -     Basic metabolic panel; Future    Uncontrolled hypertension          Subjective:      Patient ID: Klarissa Mirza is a 61 y o  female  Patient came today for follow-up on her blood pressures that are still uncontrolled  Hypertension  Pertinent negatives include no headaches, palpitations or shortness of breath  The following portions of the patient's history were reviewed and updated as appropriate: allergies, current medications, past family history, past medical history, past social history, past surgical history, and problem list     Review of Systems   Respiratory: Negative for chest tightness and shortness of breath  Cardiovascular: Negative for palpitations  Neurological: Negative for dizziness, light-headedness and headaches           Objective:      /82 (BP Location: Right arm, Patient Position: Sitting, Cuff Size: Adult)   Pulse 62   Temp (!) 96 5 °F (35 8 °C) (Temporal)   Ht 4' 9" (1 448 m)   Wt 61 4 kg (135 lb 6 4 oz)   LMP 06/01/1989 (Exact Date) Comment: hysterectomy, total  SpO2 98%   BMI 29 30 kg/m²     Allergies   Allergen Reactions    Codeine      Reaction Date: [id number];     Darvon [Propoxyphene]     Demerol [Meperidine]     Valium [Diazepam]     Aspirin      Reaction Date: [id number];           Current Outpatient Medications:     Calcium Carbonate-Vit D-Min (CALCIUM 600+D PLUS MINERALS PO), , Disp: , Rfl:    Denosumab (PROLIA SC), Inject under the skin, Disp: , Rfl:     hydrochlorothiazide (HYDRODIURIL) 25 mg tablet, Take 1 tablet (25 mg total) by mouth daily, Disp: 30 tablet, Rfl: 0    losartan (COZAAR) 100 MG tablet, Take 1 tablet (100 mg total) by mouth daily, Disp: 30 tablet, Rfl: 0    Multiple Vitamin (MULTIVITAMIN) capsule, Take 1 capsule by mouth daily, Disp: , Rfl:      Patient Instructions   Take losartan 100 mg as you did before you go to bed, hydrochlorothiazide before you go to work  Check your kidney function and electrolytes blood work in 7 days starting from when you will start a full course  Physical Exam  Constitutional:       General: She is not in acute distress  Appearance: She is not ill-appearing or toxic-appearing  Cardiovascular:      Rate and Rhythm: Normal rate  Heart sounds: No murmur heard  No gallop  Pulmonary:      Effort: No respiratory distress  Breath sounds: No wheezing or rales

## 2022-09-28 ENCOUNTER — HOSPITAL ENCOUNTER (OUTPATIENT)
Facility: HOSPITAL | Age: 67
Setting detail: OBSERVATION
Discharge: HOME | End: 2022-09-30
Attending: EMERGENCY MEDICINE | Admitting: INTERNAL MEDICINE
Payer: COMMERCIAL

## 2022-09-28 ENCOUNTER — TELEPHONE (OUTPATIENT)
Dept: FAMILY MEDICINE CLINIC | Facility: CLINIC | Age: 67
End: 2022-09-28

## 2022-09-28 ENCOUNTER — LAB (OUTPATIENT)
Dept: LAB | Age: 67
End: 2022-09-28
Payer: COMMERCIAL

## 2022-09-28 DIAGNOSIS — I10 ESSENTIAL HYPERTENSION: ICD-10-CM

## 2022-09-28 DIAGNOSIS — Z13.1 SCREENING FOR DIABETES MELLITUS: ICD-10-CM

## 2022-09-28 DIAGNOSIS — N17.9 ACUTE KIDNEY INJURY (HCC): Primary | ICD-10-CM

## 2022-09-28 DIAGNOSIS — Z13.220 SCREENING FOR HYPERLIPIDEMIA: ICD-10-CM

## 2022-09-28 DIAGNOSIS — E83.52 HYPERCALCEMIA: ICD-10-CM

## 2022-09-28 LAB
ANION GAP SERPL CALCULATED.3IONS-SCNC: 8 MMOL/L (ref 4–13)
BUN SERPL-MCNC: 27 MG/DL (ref 5–25)
CALCIUM SERPL-MCNC: 13.5 MG/DL (ref 8.3–10.1)
CHLORIDE SERPL-SCNC: 100 MMOL/L (ref 96–108)
CHOLEST SERPL-MCNC: 202 MG/DL
CO2 SERPL-SCNC: 32 MMOL/L (ref 21–32)
CREAT SERPL-MCNC: 1.74 MG/DL (ref 0.6–1.3)
EST. AVERAGE GLUCOSE BLD GHB EST-MCNC: 108 MG/DL
GFR SERPL CREATININE-BSD FRML MDRD: 30 ML/MIN/1.73SQ M
GLUCOSE P FAST SERPL-MCNC: 116 MG/DL (ref 65–99)
HBA1C MFR BLD: 5.4 %
HDLC SERPL-MCNC: 68 MG/DL
LDLC SERPL CALC-MCNC: 103 MG/DL (ref 0–100)
NONHDLC SERPL-MCNC: 134 MG/DL
POTASSIUM SERPL-SCNC: 3.6 MMOL/L (ref 3.5–5.3)
SODIUM SERPL-SCNC: 140 MMOL/L (ref 135–147)
TRIGL SERPL-MCNC: 153 MG/DL

## 2022-09-28 PROCEDURE — 83970 ASSAY OF PARATHORMONE: CPT | Performed by: NURSE PRACTITIONER

## 2022-09-28 PROCEDURE — 99284 EMERGENCY DEPT VISIT MOD MDM: CPT | Performed by: PHYSICIAN ASSISTANT

## 2022-09-28 PROCEDURE — 83036 HEMOGLOBIN GLYCOSYLATED A1C: CPT

## 2022-09-28 PROCEDURE — 36415 COLL VENOUS BLD VENIPUNCTURE: CPT

## 2022-09-28 PROCEDURE — 99284 EMERGENCY DEPT VISIT MOD MDM: CPT

## 2022-09-28 PROCEDURE — 80061 LIPID PANEL: CPT

## 2022-09-28 PROCEDURE — 80048 BASIC METABOLIC PNL TOTAL CA: CPT

## 2022-09-28 PROCEDURE — 99220 PR INITIAL OBSERVATION CARE/DAY 70 MINUTES: CPT | Performed by: INTERNAL MEDICINE

## 2022-09-28 RX ORDER — SODIUM CHLORIDE 9 MG/ML
100 INJECTION, SOLUTION INTRAVENOUS CONTINUOUS
Status: DISCONTINUED | OUTPATIENT
Start: 2022-09-28 | End: 2022-09-30

## 2022-09-28 RX ORDER — HEPARIN SODIUM 5000 [USP'U]/ML
5000 INJECTION, SOLUTION INTRAVENOUS; SUBCUTANEOUS EVERY 8 HOURS SCHEDULED
Status: DISCONTINUED | OUTPATIENT
Start: 2022-09-28 | End: 2022-09-30 | Stop reason: HOSPADM

## 2022-09-28 RX ORDER — HYDRALAZINE HYDROCHLORIDE 20 MG/ML
5 INJECTION INTRAMUSCULAR; INTRAVENOUS ONCE
Status: COMPLETED | OUTPATIENT
Start: 2022-09-28 | End: 2022-09-28

## 2022-09-28 RX ORDER — ACETAMINOPHEN 325 MG/1
650 TABLET ORAL EVERY 6 HOURS PRN
Status: DISCONTINUED | OUTPATIENT
Start: 2022-09-28 | End: 2022-09-30 | Stop reason: HOSPADM

## 2022-09-28 RX ADMIN — HYDRALAZINE HYDROCHLORIDE 5 MG: 20 INJECTION, SOLUTION INTRAMUSCULAR; INTRAVENOUS at 23:57

## 2022-09-28 RX ADMIN — SODIUM CHLORIDE 1000 ML: 0.9 INJECTION, SOLUTION INTRAVENOUS at 23:56

## 2022-09-28 RX ADMIN — HEPARIN SODIUM 5000 UNITS: 5000 INJECTION INTRAVENOUS; SUBCUTANEOUS at 23:57

## 2022-09-28 NOTE — TELEPHONE ENCOUNTER
I personally called pt at 1740 to discuss critical labs done earlier today in f/u to increase in losartan dose and also addition of hctz  Pt's calcium is critically high and her creatinine has doubled  I will need pt to go to the ED tonight  I left message that I have an important message to give her (as per pt communication)  And that she needs to call the office as well as that I want her to proceed to the ED tonight  I then called her friend jaime at 200- listed as alternate contact - to see if she knows how I can contact her  I asked jaime to text her and let her know to listen to her VM and call me back at the office tonight      Await call back

## 2022-09-29 LAB
ALBUMIN SERPL BCP-MCNC: 3 G/DL (ref 3.5–5)
ALP SERPL-CCNC: 43 U/L (ref 43–122)
ALT SERPL W P-5'-P-CCNC: 22 U/L
ANION GAP SERPL CALCULATED.3IONS-SCNC: 6 MMOL/L (ref 5–14)
AST SERPL W P-5'-P-CCNC: 25 U/L (ref 14–36)
BACTERIA UR QL AUTO: ABNORMAL /HPF
BILIRUB SERPL-MCNC: 0.24 MG/DL (ref 0.2–1)
BILIRUB UR QL STRIP: NEGATIVE
BUN SERPL-MCNC: 22 MG/DL (ref 5–25)
CA-I BLD-SCNC: 1.24 MMOL/L (ref 1.12–1.32)
CALCIUM ALBUM COR SERPL-MCNC: 10.6 MG/DL (ref 8.3–10.1)
CALCIUM SERPL-MCNC: 9.8 MG/DL (ref 8.4–10.2)
CHLORIDE SERPL-SCNC: 109 MMOL/L (ref 96–108)
CLARITY UR: CLEAR
CO2 SERPL-SCNC: 27 MMOL/L (ref 21–32)
COLOR UR: ABNORMAL
CREAT SERPL-MCNC: 1.2 MG/DL (ref 0.6–1.2)
ERYTHROCYTE [DISTWIDTH] IN BLOOD BY AUTOMATED COUNT: 11.9 % (ref 11.6–15.1)
GFR SERPL CREATININE-BSD FRML MDRD: 47 ML/MIN/1.73SQ M
GLUCOSE P FAST SERPL-MCNC: 86 MG/DL (ref 70–99)
GLUCOSE SERPL-MCNC: 86 MG/DL (ref 70–99)
GLUCOSE UR STRIP-MCNC: NEGATIVE MG/DL
HCT VFR BLD AUTO: 31.6 % (ref 34.8–46.1)
HGB BLD-MCNC: 10.6 G/DL (ref 11.5–15.4)
HGB UR QL STRIP.AUTO: NEGATIVE
KETONES UR STRIP-MCNC: NEGATIVE MG/DL
LEUKOCYTE ESTERASE UR QL STRIP: NEGATIVE
MAGNESIUM SERPL-MCNC: 1.5 MG/DL (ref 1.6–2.3)
MCH RBC QN AUTO: 31.5 PG (ref 26.8–34.3)
MCHC RBC AUTO-ENTMCNC: 33.5 G/DL (ref 31.4–37.4)
MCV RBC AUTO: 94 FL (ref 82–98)
NITRITE UR QL STRIP: NEGATIVE
NON-SQ EPI CELLS URNS QL MICRO: ABNORMAL /HPF
PH UR STRIP.AUTO: 8 [PH]
PLATELET # BLD AUTO: 221 THOUSANDS/UL (ref 149–390)
PMV BLD AUTO: 12.5 FL (ref 8.9–12.7)
POTASSIUM SERPL-SCNC: 2.9 MMOL/L (ref 3.5–5.3)
PROT SERPL-MCNC: 5.5 G/DL (ref 6.4–8.4)
PROT UR STRIP-MCNC: NEGATIVE MG/DL
PTH-INTACT SERPL-MCNC: 16.4 PG/ML (ref 18.4–80.1)
RBC # BLD AUTO: 3.36 MILLION/UL (ref 3.81–5.12)
RBC #/AREA URNS AUTO: ABNORMAL /HPF
SODIUM SERPL-SCNC: 142 MMOL/L (ref 135–147)
SP GR UR STRIP.AUTO: 1.01 (ref 1–1.04)
UROBILINOGEN UA: NEGATIVE MG/DL
WBC # BLD AUTO: 6.29 THOUSAND/UL (ref 4.31–10.16)
WBC #/AREA URNS AUTO: ABNORMAL /HPF

## 2022-09-29 PROCEDURE — 85027 COMPLETE CBC AUTOMATED: CPT | Performed by: INTERNAL MEDICINE

## 2022-09-29 PROCEDURE — 99245 OFF/OP CONSLTJ NEW/EST HI 55: CPT | Performed by: INTERNAL MEDICINE

## 2022-09-29 PROCEDURE — 83735 ASSAY OF MAGNESIUM: CPT | Performed by: NURSE PRACTITIONER

## 2022-09-29 PROCEDURE — 82330 ASSAY OF CALCIUM: CPT | Performed by: INTERNAL MEDICINE

## 2022-09-29 PROCEDURE — 81001 URINALYSIS AUTO W/SCOPE: CPT | Performed by: INTERNAL MEDICINE

## 2022-09-29 PROCEDURE — 80053 COMPREHEN METABOLIC PANEL: CPT | Performed by: INTERNAL MEDICINE

## 2022-09-29 RX ORDER — POTASSIUM CHLORIDE 20 MEQ/1
40 TABLET, EXTENDED RELEASE ORAL ONCE
Status: COMPLETED | OUTPATIENT
Start: 2022-09-29 | End: 2022-09-29

## 2022-09-29 RX ORDER — AMLODIPINE BESYLATE 10 MG/1
10 TABLET ORAL DAILY
Status: DISCONTINUED | OUTPATIENT
Start: 2022-09-29 | End: 2022-09-30 | Stop reason: HOSPADM

## 2022-09-29 RX ORDER — MAGNESIUM SULFATE 1 G/100ML
1 INJECTION INTRAVENOUS ONCE
Status: COMPLETED | OUTPATIENT
Start: 2022-09-29 | End: 2022-09-30

## 2022-09-29 RX ADMIN — MAGNESIUM SULFATE HEPTAHYDRATE 1 G: 1 INJECTION, SOLUTION INTRAVENOUS at 10:51

## 2022-09-29 RX ADMIN — SODIUM CHLORIDE 125 ML/HR: 0.9 INJECTION, SOLUTION INTRAVENOUS at 01:06

## 2022-09-29 RX ADMIN — SODIUM CHLORIDE 100 ML/HR: 0.9 INJECTION, SOLUTION INTRAVENOUS at 18:01

## 2022-09-29 RX ADMIN — AMLODIPINE BESYLATE 10 MG: 10 TABLET ORAL at 10:51

## 2022-09-29 RX ADMIN — SODIUM CHLORIDE 125 ML/HR: 0.9 INJECTION, SOLUTION INTRAVENOUS at 08:45

## 2022-09-29 RX ADMIN — POTASSIUM CHLORIDE 40 MEQ: 1500 TABLET, EXTENDED RELEASE ORAL at 10:51

## 2022-09-29 NOTE — ASSESSMENT & PLAN NOTE
· Creatinine elevated 1 74 on admission  · Holding home losartan  · Monitor I&Os  · Check UA  · Daily BMP

## 2022-09-29 NOTE — ASSESSMENT & PLAN NOTE
· Patient with uncontrolled hypertension on admission, /101  · Holding home losartan and hydrochlorothiazide due to JOAQUIN and hypercalcemia  · Nephrology on board -suggesting amlodipine  · Monitor vitals per routine

## 2022-09-29 NOTE — ED PROVIDER NOTES
History  Chief Complaint   Patient presents with    Abnormal Lab     Patient was told by her doctor that she needed to come to the ER because her kidney levels in her labs were abnormal and they believe she has a kidney injury  No problems with urination, just urinating a lot  71-year-old female with history of hypertension presents at the request of her doctor to be admitted after outpatient blood work  Patient had routine labs today and was called by her doctor controlled to be admitted to the hospital for acute kidney injury  Patient denies any complaints and arrives without acute issue  History provided by:  Patient   used: No        Prior to Admission Medications   Prescriptions Last Dose Informant Patient Reported? Taking?    Calcium Carbonate-Vit D-Min (CALCIUM 600+D PLUS MINERALS PO)  Self Yes No   Denosumab (PROLIA SC)  Self Yes No   Sig: Inject under the skin   Multiple Vitamin (MULTIVITAMIN) capsule  Self Yes No   Sig: Take 1 capsule by mouth daily   hydrochlorothiazide (HYDRODIURIL) 25 mg tablet   No No   Sig: Take 1 tablet (25 mg total) by mouth daily   losartan (COZAAR) 100 MG tablet   No No   Sig: Take 1 tablet (100 mg total) by mouth daily      Facility-Administered Medications: None       Past Medical History:   Diagnosis Date    Arthritis     Asthma     Basal cell carcinoma 2020    Right alar groove    HL (hearing loss)     Osteoporosis     Shingles        Past Surgical History:   Procedure Laterality Date    COCHLEAR IMPLANT      COLONOSCOPY      COLONOSCOPY      fiberoptic; follow up at age; 2017 10 year f/u    HYSTERECTOMY      age 30    MOHS SURGERY  2020     Right alar groove    OOPHORECTOMY      age 30   Jarad Romo SKIN BIOPSY      TONSILLECTOMY         Family History   Problem Relation Age of Onset    Osteoporosis Mother     Hearing loss Mother         she just found out    Diabetes Father             Heart disease Father     Hypertension Father             Alcohol abuse Father             Dementia Father     Stroke Father             Lung cancer Maternal Grandmother         76s    Cancer Maternal Grandmother             Heart disease Paternal Grandmother             Diabetes Paternal Grandmother             No Known Problems Maternal Aunt     Lung cancer Maternal Grandfather     Cancer Maternal Grandfather             Hearing loss Maternal Grandfather     Prostate cancer Paternal Grandfather             Cancer Paternal Grandfather             Colon cancer Other     Pancreatic cancer Other     Alcohol abuse Brother     Hypertension Brother     Diabetes Brother      I have reviewed and agree with the history as documented  E-Cigarette/Vaping    E-Cigarette Use Never User      E-Cigarette/Vaping Substances    Nicotine No     THC No     CBD No     Flavoring No     Other No     Unknown No      Social History     Tobacco Use    Smoking status: Never Smoker    Smokeless tobacco: Never Used   Vaping Use    Vaping Use: Never used   Substance Use Topics    Alcohol use: No    Drug use: No       Review of Systems   Constitutional: Negative  Negative for chills and fatigue  HENT: Negative for ear pain and sore throat  Eyes: Negative for photophobia and redness  Respiratory: Negative for apnea, cough and shortness of breath  Cardiovascular: Negative for chest pain  Gastrointestinal: Negative for abdominal pain, nausea and vomiting  Genitourinary: Negative for dysuria  Musculoskeletal: Negative for arthralgias, neck pain and neck stiffness  Skin: Negative for rash  Neurological: Negative for dizziness, tremors, syncope and weakness  Psychiatric/Behavioral: Negative for suicidal ideas  Physical Exam  Physical Exam  Constitutional:       General: She is not in acute distress  Appearance: She is well-developed  She is not diaphoretic  Eyes:      Pupils: Pupils are equal, round, and reactive to light  Cardiovascular:      Rate and Rhythm: Normal rate and regular rhythm  Pulmonary:      Effort: Pulmonary effort is normal  No respiratory distress  Breath sounds: Normal breath sounds  Abdominal:      General: Bowel sounds are normal  There is no distension  Palpations: Abdomen is soft  Musculoskeletal:         General: Normal range of motion  Cervical back: Normal range of motion and neck supple  Skin:     General: Skin is warm and dry  Neurological:      Mental Status: She is alert and oriented to person, place, and time           Vital Signs  ED Triage Vitals [09/28/22 2241]   Temperature Pulse Respirations Blood Pressure SpO2   97 7 °F (36 5 °C) 86 18 (!) 184/101 99 %      Temp Source Heart Rate Source Patient Position - Orthostatic VS BP Location FiO2 (%)   Oral Monitor Sitting Left arm --      Pain Score       --           Vitals:    09/28/22 2241   BP: (!) 184/101   Pulse: 86   Patient Position - Orthostatic VS: Sitting         Visual Acuity      ED Medications  Medications   sodium chloride 0 9 % bolus 1,000 mL (1,000 mL Intravenous New Bag 9/28/22 2356)   sodium chloride 0 9 % infusion (has no administration in time range)   acetaminophen (TYLENOL) tablet 650 mg (has no administration in time range)   heparin (porcine) subcutaneous injection 5,000 Units (5,000 Units Subcutaneous Given 9/28/22 2357)   hydrALAZINE (APRESOLINE) injection 5 mg (5 mg Intravenous Given 9/28/22 2357)       Diagnostic Studies  Results Reviewed     Procedure Component Value Units Date/Time    Urinalysis with microscopic [[id number]]     Lab Status: No result Specimen: Urine, Clean Catch                  No orders to display              Procedures  Procedures         ED Course                                             MDM  Number of Diagnoses or Management Options  Acute kidney injury [facility]): new and does not require workup  Hypercalcemia: new and does not require workup  Diagnosis management comments: Outpatient labs were reviewed by me and patient and have any acute kidney injury with evidenc of hypercalcemia  This was discussed with inpatient team who agrees to admit the patient for IV hydration  Patient stable for admission to the floor  Continues to deny complaints starting the stay  Amount and/or Complexity of Data Reviewed  Clinical lab tests: reviewed    Risk of Complications, Morbidity, and/or Mortality  Presenting problems: moderate  Diagnostic procedures: moderate  Management options: moderate    Patient Progress  Patient progress: stable      Disposition  Final diagnoses:   Acute kidney injury ([facility] [address] )   Hypercalcemia     Time reflects when diagnosis was documented in both MDM as applicable and the Disposition within this note     Time User Action Codes Description Comment    9/29/2022 12:06 AM Rudy Casanova Add [N17 9] Acute kidney injury ([facility] [address] )     9/29/2022 12:07 AM Rudy Casanova Add [E83 52] Hypercalcemia       ED Disposition     ED Disposition   Admit    Condition   Stable    Date/Time   Wed Sep 28, 2022 11:16 PM    Comment   Case was discussed with SLIM PA and the patient's admission status was agreed to be Admission Status: observation status to the service of Dr Thor Dakin   Follow-up Information    None         Patient's Medications   Discharge Prescriptions    No medications on file       No discharge procedures on file      PDMP Review     None          ED Provider  Electronically Signed by           Anthony Kirkland PA-C  09/29/22 0007

## 2022-09-29 NOTE — H&P
[address]ian [address] 1955, 61 y o  female MRN: [id number]  Unit/Bed#: 7T [facility] 706-01 Encounter: [id number]  Primary Care Provider: Jas Lewis DO   Date and time admitted to hospital: 9/28/2022 10:26 PM    * Hypercalcemia  Assessment & Plan  Patient presenting to the ED for abnormal lab work done as an outpatient  Patient was found to have hypercalcemia and was recommended for admission by her PCP  · Calcium 13 5 on admission  · Holding home hydrochlorothiazide and oral calcium supplementation  · Start on continuous IVF  · Check ionized calcium  · Nephrology consult input appreciated  Hypertension  Assessment & Plan  · Patient with uncontrolled hypertension on admission, /101  · Holding home losartan and hydrochlorothiazide due to JOAQUIN and hypercalcemia  · Nephrology on board -suggesting amlodipine  · Monitor vitals per routine    JOAQUIN (acute kidney injury) ([facility] [address] )  Assessment & Plan  · Creatinine elevated 1 74 on admission  · Holding home losartan  · Monitor I&Os  · Check UA  · Daily BMP    VTE Prophylaxis: Heparin  / sequential compression device   Code Status: Level 1 code  Discussion with family: All questions answered to the best of my ability    Anticipated Length of Stay:  Patient will be admitted on an Observation basis with an anticipated length of stay of  Less than 2 midnights  Justification for Hospital Stay: Hypercalcemia, JOAQUIN    Total Time for Visit, including Counseling / Coordination of Care: 60 minutes  Greater than 50% of this total time spent on direct patient counseling and coordination of care  Chief Complaint:   Abnormal lab work    History of Present Illness:    Salomón Villar is a 61 y o  female who has a past medical history significant for hypertension  Patient presenting to the ED for abnormal lab work done as an outpatient  Patient was found to have hypercalcemia and was recommended for admission by her PCP   Patient denies any medical complaints at this time  All patient questions answered to the best of my ability  Patient regularly take calcium and vitamin supplement  There was also recent changes in her blood pressure medications dose  Review of Systems:    Review of Systems   Constitutional: Negative for chills and fever  HENT: Negative for ear pain and sore throat  Eyes: Negative for pain and visual disturbance  Respiratory: Negative for cough and shortness of breath  Cardiovascular: Negative for chest pain and palpitations  Gastrointestinal: Negative for abdominal pain and vomiting  Genitourinary: Negative for dysuria and hematuria  Musculoskeletal: Negative for arthralgias and back pain  Skin: Negative for color change and rash  Neurological: Negative for seizures and syncope  All other systems reviewed and are negative  Past Medical and Surgical History:     Past Medical History:   Diagnosis Date    Arthritis     Asthma     Basal cell carcinoma 02/11/2020    Right alar groove    HL (hearing loss)     Osteoporosis     Shingles        Past Surgical History:   Procedure Laterality Date    COCHLEAR IMPLANT      COLONOSCOPY  2017    COLONOSCOPY  2010    fiberoptic; follow up at age; 2017 10 year f/u    HYSTERECTOMY      age 30    MOHS SURGERY  05/11/2020     Right alar groove    OOPHORECTOMY      age 30   Earna Hui SKIN BIOPSY      TONSILLECTOMY         Meds/Allergies:    Prior to Admission medications    Medication Sig Start Date End Date Taking?  Authorizing Provider   Calcium Carbonate-Vit D-Min (CALCIUM 600+D PLUS MINERALS PO)     Historical Provider, MD   Denosumab (PROLIA SC) Inject under the skin    Historical Provider, MD   hydrochlorothiazide (HYDRODIURIL) 25 mg tablet Take 1 tablet (25 mg total) by mouth daily 9/16/22   Ben Burgess MD   losartan (COZAAR) 100 MG tablet Take 1 tablet (100 mg total) by mouth daily 9/16/22   Ben Burgess MD   Multiple Vitamin (MULTIVITAMIN) capsule Take 1 capsule by mouth daily    Historical Provider, MD     I have reviewed home medications using allscripts  Allergies:    Allergies   Allergen Reactions    Codeine      Reaction Date: ;     Darvon [Propoxyphene]     Demerol [Meperidine]     Valium [Diazepam]     Aspirin      Reaction Date: ;        Social History:     Marital Status: Single   Occupation: NA  Patient Pre-hospital Living Situation: Home  Patient Pre-hospital Level of Mobility: Walks  Patient Pre-hospital Diet Restrictions: None  Substance Use History:   Social History     Substance and Sexual Activity   Alcohol Use Never     Social History     Tobacco Use   Smoking Status Never Smoker   Smokeless Tobacco Never Used     Social History     Substance and Sexual Activity   Drug Use No       Family History:    Family History   Problem Relation Age of Onset    Osteoporosis Mother     Hearing loss Mother         she just found out    Diabetes Father             Heart disease Father             Hypertension Father             Alcohol abuse Father             Dementia Father     Stroke Father             Lung cancer Maternal Grandmother         76s    Cancer Maternal Grandmother             Heart disease Paternal Grandmother             Diabetes Paternal Grandmother             No Known Problems Maternal Aunt     Lung cancer Maternal Grandfather     Cancer Maternal Grandfather             Hearing loss Maternal Grandfather     Prostate cancer Paternal Grandfather             Cancer Paternal Grandfather             Colon cancer Other     Pancreatic cancer Other     Alcohol abuse Brother     Hypertension Brother     Diabetes Brother        Physical Exam:     Vitals:   Blood Pressure: 146/67 (22 1050)  Pulse: 63 (22 1050)  Temperature: 98 3 °F (36 8 °C) (22 0725)  Temp Source: Temporal (09/29/22 0725)  Respirations: 18 (09/29/22 0725)  Height: 4' 9" (144 8 cm) (09/29/22 0031)  Weight - Scale: 59 1 kg (130 lb 4 7 oz) (09/29/22 0557)  SpO2: 98 % (09/29/22 0725)    Physical Exam  Vitals and nursing note reviewed  Constitutional:       General: She is not in acute distress  Appearance: She is well-developed  HENT:      Head: Normocephalic and atraumatic  Mouth/Throat:      Pharynx: Oropharynx is clear  Eyes:      Conjunctiva/sclera: Conjunctivae normal    Cardiovascular:      Rate and Rhythm: Normal rate and regular rhythm  Heart sounds: No murmur heard  Pulmonary:      Effort: Pulmonary effort is normal  No respiratory distress  Breath sounds: Normal breath sounds  Abdominal:      General: Bowel sounds are normal       Palpations: Abdomen is soft  Tenderness: There is no abdominal tenderness  Musculoskeletal:         General: Normal range of motion  Cervical back: Neck supple  Skin:     General: Skin is warm and dry  Neurological:      Mental Status: She is alert and oriented to person, place, and time  Additional Data:     Lab Results: I have personally reviewed pertinent reports  Results from last 7 days   Lab Units 09/29/22  0504   WBC Thousand/uL 6 29   HEMOGLOBIN g/dL 10 6*   HEMATOCRIT % 31 6*   PLATELETS Thousands/uL 221     Results from last 7 days   Lab Units 09/29/22  0504   SODIUM mmol/L 142   POTASSIUM mmol/L 2 9*   CHLORIDE mmol/L 109*   CO2 mmol/L 27   BUN mg/dL 22   CREATININE mg/dL 1 20   ANION GAP mmol/L 6   CALCIUM mg/dL 9 8   ALBUMIN g/dL 3 0*   TOTAL BILIRUBIN mg/dL 0 24   ALK PHOS U/L 43   ALT U/L 22   AST U/L 25   GLUCOSE RANDOM mg/dL 86             Results from last 7 days   Lab Units 09/28/22  0748   HEMOGLOBIN A1C % 5 4           Imaging: I have personally reviewed pertinent reports        No orders to display       EKG, Pathology, and Other Studies Reviewed on Admission:   · EKG: None obtained    [facility] Records Reviewed: Yes     ** Please Note: This note has been constructed using a voice recognition system   **

## 2022-09-29 NOTE — ASSESSMENT & PLAN NOTE
Patient presenting to the ED for abnormal lab work done as an outpatient  Patient was found to have hypercalcemia and was recommended for admission by her PCP  · Calcium 13 5 on admission  · Holding home hydrochlorothiazide and oral calcium supplementation  · Start on continuous IVF  · Check ionized calcium  · Nephrology consult input appreciated

## 2022-09-29 NOTE — CONSULTS
Consultation - Nephrology   Chandler Marroquin 61 y o  female MRN: [id number]  Unit/Bed#: 7T [facility] 706-01 Encounter: [id number]    ASSESSMENT/PLAN:  JOAQUIN (POA):  Suspect combination of thiazide and hypercalcemia   -baseline creatinine 0 7-0 8   -presented with creatinine 1 74   -creatinine has improved to 1 20 with IV fluid administration and holding ARB and thiazide   -will continue normal saline at 100 cc/hour   -currently does not follow with Nephrology  -will check urinalysis  -will check bladder scan to rule out retention  -recommend avoiding nephrotoxins, hypotension, IV contrast   -strict I/O  Hypercalcemia:  Presented with calcium of 13 5  Patient was taking calcium carbonate plus vitamin-D at home   -repeat calcium 9 8 with corrected calcium of 10 6   -ionized calcium level 1 24, normal   -recommend avoiding calcium supplementation   -hydrochlorothiazide currently on hold   -checking PTH and vitamin-D level  Added to a m  Labs  -receives prolia as an OP every 6 months, next dose in January  Hypokalemia:  -Mag level low, will provide replacement today   -will continue to monitor and replace as needed  Hypertension:  Blood pressure remains above goal but is improved   -losartan and hydrochlorothiazide currently on hold  Likely will continue to hold hydrochlorothiazide at discharge   -home medications:  Hydrochlorothiazide 25 mg daily, losartan 100 mg daily  -recommend avoiding hypotension or high fluctuations in blood pressure   -recommend holding parameters on antihypertensive for systolic blood pressure less than 130       Anemia:  -consider checking SPEP/UPEP if has proteinuria on UA   -continue to monitor and transfuse as needed for hemoglobin less than 7 0   -baseline hemoglobin level normal       HISTORY OF PRESENT ILLNESS:  Requesting Physician: Sheldon Lau MD  Reason for Consult:  Acute kidney injury and hypercalcemia    Chandler Marroquin is a 61y o  year old female with past medical history of uncontrolled hypertension, who was admitted to [facility] after presenting with hypercalcemia on outpatient lab work  A renal consultation is requested today for assistance in the management of acute kidney injury and hypercalcemia  The patient denies any chest discomfort or shortness of breath  She denies nausea, vomiting, diarrhea, or issues with urination  She reports taking 2 vitamin-D plus calcium pills in the morning and 2 in the evening  She also is drinking orange juice with calcium and vitamin-D  She reports having history of hypercalcemia 1 time in the past   She denies any recent NSAID use      PAST MEDICAL HISTORY:  Past Medical History:   Diagnosis Date    Arthritis     Asthma     Basal cell carcinoma 2020    Right alar groove    HL (hearing loss)     Osteoporosis     Shingles        PAST SURGICAL HISTORY:  Past Surgical History:   Procedure Laterality Date    COCHLEAR IMPLANT      COLONOSCOPY      COLONOSCOPY      fiberoptic; follow up at age; 2017 10 year f/u    HYSTERECTOMY      age 30    MOHS SURGERY  2020     Right alar groove    OOPHORECTOMY      age 30   Janie Huerta SKIN BIOPSY      TONSILLECTOMY         ALLERGIES:  Allergies   Allergen Reactions    Codeine      Reaction Date: ;     Darvon [Propoxyphene]     Demerol [Meperidine]     Valium [Diazepam]     Aspirin      Reaction Date: ;        SOCIAL HISTORY:  Social History     Substance and Sexual Activity   Alcohol Use No     Social History     Substance and Sexual Activity   Drug Use No     Social History     Tobacco Use   Smoking Status Never Smoker   Smokeless Tobacco Never Used       FAMILY HISTORY:  Family History   Problem Relation Age of Onset    Osteoporosis Mother     Hearing loss Mother         she just found out    Diabetes Father             Heart disease Father             Hypertension Father             Alcohol abuse Father             Dementia Father     Stroke Father             Lung cancer Maternal Grandmother         76s    Cancer Maternal Grandmother             Heart disease Paternal Grandmother             Diabetes Paternal Grandmother             No Known Problems Maternal Aunt     Lung cancer Maternal Grandfather     Cancer Maternal Grandfather             Hearing loss Maternal Grandfather     Prostate cancer Paternal Grandfather             Cancer Paternal Grandfather             Colon cancer Other     Pancreatic cancer Other     Alcohol abuse Brother     Hypertension Brother     Diabetes Brother        MEDICATIONS:    Current Facility-Administered Medications:     acetaminophen (TYLENOL) tablet 650 mg, 650 mg, Oral, Q6H PRN, Genna Casey PA-C    heparin (porcine) subcutaneous injection 5,000 Units, 5,000 Units, Subcutaneous, Q8H [address], Mayra Rothman PA-C, 5,000 Units at 22 2357    sodium chloride 0 9 % infusion, 125 mL/hr, Intravenous, Continuous, Mayra Rothman PA-C, Last Rate: 125 mL/hr at 22 0106, 125 mL/hr at 22 0106    REVIEW OF SYSTEMS:  A complete review of systems was performed and found to be negative unless otherwise noted in the history of present illness  General: No fevers, chills  Cardiovascular:  - chest pain, - leg edema  Respiratory: No cough, sputum production,  - shortness of breath  Gastrointestinal:  - nausea/vomiting,  - diarrhea,  - abdominal pain  Genitourinary: No hematuria  No foamy urine    No dysuria    PHYSICAL EXAM:  Current Weight: Weight - Scale: 59 1 kg (130 lb 4 7 oz)  First Weight: Weight - Scale: 59 6 kg (131 lb 8 oz)  Vitals:    22 2241 22 0031 22 0557 22 0725   BP: (!) 184/101 (!) 172/78  154/80   BP Location: Left arm Left arm  Left arm   Pulse: 86 79  65   Resp: 18 18  18   Temp: 97 7 °F (36 5 °C) 97 5 °F (36 4 °C)  98 3 °F (36 8 °C)   TempSrc: Oral Temporal  Temporal   SpO2: 99% 97%  98% Weight: 59 6 kg (131 lb 8 oz) 59 1 kg (130 lb 4 7 oz) 59 1 kg (130 lb 4 7 oz)    Height:  4' 9" (1 448 m)         Intake/Output Summary (Last 24 hours) at 9/29/2022 0820  Last data filed at 9/29/2022 0501  Gross per 24 hour   Intake 240 ml   Output 400 ml   Net -160 ml     General: NAD  Skin: warm, dry, intact, no rash  HEENT: Moist mucous membranes, sclera anicteric, normocephalic, atraumatic  Neck: No apparent JVD appreciated  Chest:lung sounds clear B/L, on RA   CVS:Regular rate and rhythm, no murmer   Abdomen: Soft, round, non-tender, +BS  Extremities: No B/L LE edema present  Neuro: alert and oriented  Psych: appropriate mood and affect     Invasive Devices:      Lab Results:   Results from last 7 days   Lab Units 09/29/22  0504 09/28/22  0748   WBC Thousand/uL 6 29  --    HEMOGLOBIN g/dL 10 6*  --    HEMATOCRIT % 31 6*  --    PLATELETS Thousands/uL 221  --    SODIUM mmol/L 142 140   POTASSIUM mmol/L 2 9* 3 6   CHLORIDE mmol/L 109* 100   CO2 mmol/L 27 32   BUN mg/dL 22 27*   CREATININE mg/dL 1 20 1 74*   CALCIUM mg/dL 9 8 13 5*   ALK PHOS U/L 43  --    ALT U/L 22  --    AST U/L 25  --

## 2022-09-29 NOTE — PLAN OF CARE
Problem: Potential for Falls  Goal: Patient will remain free of falls  Description: INTERVENTIONS:  - Educate patient/family on patient safety including physical limitations  - Instruct patient to call for assistance with activity   - Consult OT/PT to assist with strengthening/mobility   - Keep Call bell within reach  - Keep bed low and locked with side rails adjusted as appropriate  - Keep care items and personal belongings within reach  - Initiate and maintain comfort rounds  - Make Fall Risk Sign visible to staff  - Apply yellow socks and bracelet for high fall risk patients  - Consider moving patient to room near nurses station  Outcome: Progressing     Problem: PAIN - ADULT  Goal: Verbalizes/displays adequate comfort level or baseline comfort level  Description: Interventions:  - Encourage patient to monitor pain and request assistance  - Assess pain using appropriate pain scale  - Administer analgesics based on type and severity of pain and evaluate response  - Implement non-pharmacological measures as appropriate and evaluate response  - Consider cultural and social influences on pain and pain management  - Notify physician/advanced practitioner if interventions unsuccessful or patient reports new pain  Outcome: Progressing     Problem: INFECTION - ADULT  Goal: Absence or prevention of progression during hospitalization  Description: INTERVENTIONS:  - Assess and monitor for signs and symptoms of infection  - Monitor lab/diagnostic results  - Monitor all insertion sites, i e  indwelling lines, tubes, and drains  - Monitor endotracheal if appropriate and nasal secretions for changes in amount and color  - Douglas appropriate cooling/warming therapies per order  - Administer medications as ordered  - Instruct and encourage patient and family to use good hand hygiene technique  - Identify and instruct in appropriate isolation precautions for identified infection/condition  Outcome: Progressing     Problem: SAFETY ADULT  Goal: Patient will remain free of falls  Description: INTERVENTIONS:  - Educate patient/family on patient safety including physical limitations  - Instruct patient to call for assistance with activity   - Consult OT/PT to assist with strengthening/mobility   - Keep Call bell within reach  - Keep bed low and locked with side rails adjusted as appropriate  - Keep care items and personal belongings within reach  - Initiate and maintain comfort rounds  - Make Fall Risk Sign visible to staff  - Apply yellow socks and bracelet for high fall risk patients  - Consider moving patient to room near nurses station  Outcome: Progressing  Goal: Maintain or return to baseline ADL function  Description: INTERVENTIONS:  -  Assess patient's ability to carry out ADLs; assess patient's baseline for ADL function and identify physical deficits which impact ability to perform ADLs (bathing, care of mouth/teeth, toileting, grooming, dressing, etc )  - Assess/evaluate cause of self-care deficits   - Assess range of motion  - Assess patient's mobility; develop plan if impaired  - Assess patient's need for assistive devices and provide as appropriate  - Encourage maximum independence but intervene and supervise when necessary  - Involve family in performance of ADLs  - Assess for home care needs following discharge   - Consider OT consult to assist with ADL evaluation and planning for discharge  - Provide patient education as appropriate  Outcome: Progressing  Goal: Maintains/Returns to pre admission functional level  Description: INTERVENTIONS:  - Perform BMAT or MOVE assessment daily    - Set and communicate daily mobility goal to care team and patient/family/caregiver     - Collaborate with rehabilitation services on mobility goals if consulted  - Out of bed for toileting  - Record patient progress and toleration of activity level   Outcome: Progressing     Problem: DISCHARGE PLANNING  Goal: Discharge to home or other facility with appropriate resources  Description: INTERVENTIONS:  - Identify barriers to discharge w/patient and caregiver  - Arrange for needed discharge resources and transportation as appropriate  - Identify discharge learning needs (meds, wound care, etc )  - Arrange for interpretive services to assist at discharge as needed  - Refer to Case Management Department for coordinating discharge planning if the patient needs post-hospital services based on physician/advanced practitioner order or complex needs related to functional status, cognitive ability, or social support system  Outcome: Progressing     Problem: Knowledge Deficit  Goal: Patient/family/caregiver demonstrates understanding of disease process, treatment plan, medications, and discharge instructions  Description: Complete learning assessment and assess knowledge base    Interventions:  - Provide teaching at level of understanding  - Provide teaching via preferred learning methods  Outcome: Progressing     Problem: METABOLIC, FLUID AND ELECTROLYTES - ADULT  Goal: Electrolytes maintained within normal limits  Description: INTERVENTIONS:  - Monitor labs and assess patient for signs and symptoms of electrolyte imbalances  - Administer electrolyte replacement as ordered  - Monitor response to electrolyte replacements, including repeat lab results as appropriate  - Instruct patient on fluid and nutrition as appropriate  Outcome: Progressing  Goal: Fluid balance maintained  Description: INTERVENTIONS:  - Monitor labs   - Monitor I/O and WT  - Instruct patient on fluid and nutrition as appropriate  - Assess for signs & symptoms of volume excess or deficit  Outcome: Progressing

## 2022-09-29 NOTE — TELEPHONE ENCOUNTER
Late note -   9/28/2022 - I attempted to call pt at 2045 - to advise her to go to the hospital   No answer  I then tried again this am (9/29/2022) at [phone] - and realized as I was leaving a message - pt is admitted

## 2022-09-29 NOTE — UTILIZATION REVIEW
Initial Clinical Review    Admission: Date/Time/Statement:   Admission Orders (From admission, onward)     Ordered        09/28/22 2317  Place in Observation  Once            09/28/22 2317  Place in Observation  Once                      Orders Placed This Encounter   Procedures    Place in Observation     Standing Status:   Standing     Number of Occurrences:   1     Order Specific Question:   Level of Care     Answer:   Med Surg [16]    Place in Observation     Standing Status:   Standing     Number of Occurrences:   1     Order Specific Question:   Level of Care     Answer:   Med Surg [16]     ED Arrival Information     Expected   -    Arrival   9/28/2022 22:25    Acuity   Urgent            Means of arrival   Walk-In    Escorted by   Self    Service   Hospitalist    Admission type   Emergency            Arrival complaint   abnormal blood           Chief Complaint   Patient presents with    Abnormal Lab     Patient was told by her doctor that she needed to come to the ER because her kidney levels in her labs were abnormal and they believe she has a kidney injury  No problems with urination, just urinating a lot  Initial Presentation: 61 y o  female who presented self from PCP to [address] [facility] [facility] ED  Admitted in observation status for evaluation and treatment of JOAQUIN and electrolyte imbalances  PMHx: Asthma, Osteoporosis, HTN  Presented w/ abnormal outpatient labs  On exam, unremarkable  Crt 1 74 (Crt 0 7)  Calcium 13 5  Plan: IVF, hold HCTZ, losartan, and PO calcium supplement, check ionized calcium, Trend labs, replete electrolytes as needed; I&O, prn hydralazine  Nephrology consulted  09/29/22 Nephrology Consult: Pt w/ JOAQUIN, hypercalcemia, hypokalemia  Plan: IVF, check UA, avoid nephrotoxins/hypotension, I&O, Trend labs, replete electrolytes as needed; hold home HCTZ and losartan      ED Triage Vitals   Temperature Pulse Respirations Blood Pressure SpO2   09/28/22 2241 09/28/22 2241 09/28/22 2241 09/28/22 2241 09/28/22 2241   97 7 °F (36 5 °C) 86 18 (!) 184/101 99 %      Temp Source Heart Rate Source Patient Position - Orthostatic VS BP Location FiO2 (%)   09/28/22 2241 09/28/22 2241 09/28/22 2241 09/28/22 2241 --   Oral Monitor Sitting Left arm       Pain Score       09/29/22 0031       No Pain          Wt Readings from Last 1 Encounters:   09/30/22 59 6 kg (131 lb 6 3 oz)     Additional Vital Signs:   Date/Time Temp Pulse Resp BP SpO2 O2 Device   09/29/22 0725 98 3 °F (36 8 °C) 65 18 154/80 98 % None (Room air)   09/29/22 0031 97 5 °F (36 4 °C) 79 18 172/78 Abnormal  97 % None (Room air)       Pertinent Labs/Diagnostic Test Results:   Results from last 7 days   Lab Units 09/29/22  0504   WBC Thousand/uL 6 29   HEMOGLOBIN g/dL 10 6*   HEMATOCRIT % 31 6*   PLATELETS Thousands/uL 221         Results from last 7 days   Lab Units 09/29/22  0504 09/28/22  0748   SODIUM mmol/L 142 140   POTASSIUM mmol/L 2 9* 3 6   CHLORIDE mmol/L 109* 100   CO2 mmol/L 27 32   ANION GAP mmol/L 6 8   BUN mg/dL 22 27*   CREATININE mg/dL 1 20 1 74*   EGFR ml/min/1 73sq m 47 30   CALCIUM mg/dL 9 8 13 5*   CALCIUM, IONIZED mmol/L 1 24  --    MAGNESIUM mg/dL 1 5*  --      Results from last 7 days   Lab Units 09/29/22  0504   AST U/L 25   ALT U/L 22   ALK PHOS U/L 43   TOTAL PROTEIN g/dL 5 5*   ALBUMIN g/dL 3 0*   TOTAL BILIRUBIN mg/dL 0 24         Results from last 7 days   Lab Units 09/29/22  0504   GLUCOSE RANDOM mg/dL 86         Results from last 7 days   Lab Units 09/28/22  0748   HEMOGLOBIN A1C % 5 4   EAG mg/dl 108       Results from last 7 days   Lab Units 09/29/22  0554   CLARITY UA  Clear   COLOR UA  Straw   SPEC GRAV UA  1 015   PH UA  8 0   GLUCOSE UA mg/dl Negative   KETONES UA mg/dl Negative   BLOOD UA  Negative   PROTEIN UA mg/dl Negative   NITRITE UA  Negative   BILIRUBIN UA  Negative   UROBILINOGEN UA mg/dL Negative   LEUKOCYTES UA  Negative   WBC UA /hpf 0-1*   RBC UA /hpf None Seen   BACTERIA UA /hpf None Seen EPITHELIAL CELLS WET PREP /hpf Occasional       ED Treatment:   Medication Administration from 09/28/2022 2225 to 09/29/2022 0028       Date/Time Order Dose Route Action     09/28/2022 2356 sodium chloride 0 9 % bolus 1,000 mL 1,000 mL Intravenous New Bag     09/28/2022 2357 heparin (porcine) subcutaneous injection 5,000 Units 5,000 Units Subcutaneous Given     09/28/2022 2357 hydrALAZINE (APRESOLINE) injection 5 mg 5 mg Intravenous Given        Past Medical History:   Diagnosis Date    Arthritis     Asthma     Basal cell carcinoma 02/11/2020    Right alar groove    HL (hearing loss)     Osteoporosis     Shingles      Present on Admission:   Hypercalcemia      Admitting Diagnosis: Hypercalcemia [E83 52]  Abnormal laboratory test [R89 9]  Acute kidney injury ([facility] [address] ) [N17 9]  Age/Sex: 61 y o  female  Admission Orders:  Regular Diet  DW  I&O  SCDs  Scheduled Medications:  amLODIPine, 10 mg, Oral, Daily  heparin (porcine), 5,000 Units, Subcutaneous, Q8H [address]    Continuous IV Infusions:  sodium chloride, 100 mL/hr, Intravenous, Continuous    PRN Meds:  acetaminophen, 650 mg, Oral, Q6H PRN        IP CONSULT TO NEPHROLOGY    Network Utilization Review Department  ATTENTION: Please call with any questions or concerns to [phone] and carefully listen to the prompts so that you are directed to the right person  All voicemails are confidential   Julieta Navarro all requests for admission clinical reviews, approved or denied determinations and any other requests to dedicated fax number below belonging to the campus where the patient is receiving treatment   List of dedicated fax numbers for the Facilities:  [address] DENIALS (Administrative/Medical Necessity) [phone]   [address] (Maternity/NICU/Pediatrics) 261 [facility],[address] [place] [address] [phone] [address] [facility] [phone] [address] [address] [place] [address] Trever Reeves [address] [address] [place] [address] [phone]

## 2022-09-30 ENCOUNTER — TELEPHONE (OUTPATIENT)
Dept: NEPHROLOGY | Facility: CLINIC | Age: 67
End: 2022-09-30

## 2022-09-30 VITALS
WEIGHT: 131.39 LBS | BODY MASS INDEX: 28.35 KG/M2 | HEART RATE: 70 BPM | DIASTOLIC BLOOD PRESSURE: 74 MMHG | TEMPERATURE: 97.1 F | RESPIRATION RATE: 20 BRPM | SYSTOLIC BLOOD PRESSURE: 140 MMHG | HEIGHT: 57 IN | OXYGEN SATURATION: 97 %

## 2022-09-30 DIAGNOSIS — N17.9 AKI (ACUTE KIDNEY INJURY) (HCC): Primary | ICD-10-CM

## 2022-09-30 LAB
25(OH)D3 SERPL-MCNC: 49.1 NG/ML (ref 30–100)
ANION GAP SERPL CALCULATED.3IONS-SCNC: 5 MMOL/L (ref 5–14)
BUN SERPL-MCNC: 14 MG/DL (ref 5–25)
CALCIUM SERPL-MCNC: 8 MG/DL (ref 8.4–10.2)
CHLORIDE SERPL-SCNC: 116 MMOL/L (ref 96–108)
CO2 SERPL-SCNC: 20 MMOL/L (ref 21–32)
CREAT SERPL-MCNC: 0.95 MG/DL (ref 0.6–1.2)
GFR SERPL CREATININE-BSD FRML MDRD: 63 ML/MIN/1.73SQ M
GLUCOSE SERPL-MCNC: 82 MG/DL (ref 70–99)
POTASSIUM SERPL-SCNC: 3.2 MMOL/L (ref 3.5–5.3)
SODIUM SERPL-SCNC: 141 MMOL/L (ref 135–147)

## 2022-09-30 PROCEDURE — 82306 VITAMIN D 25 HYDROXY: CPT | Performed by: INTERNAL MEDICINE

## 2022-09-30 PROCEDURE — 99217 PR OBSERVATION CARE DISCHARGE MANAGEMENT: CPT | Performed by: INTERNAL MEDICINE

## 2022-09-30 PROCEDURE — 80048 BASIC METABOLIC PNL TOTAL CA: CPT | Performed by: INTERNAL MEDICINE

## 2022-09-30 PROCEDURE — 99214 OFFICE O/P EST MOD 30 MIN: CPT | Performed by: NURSE PRACTITIONER

## 2022-09-30 RX ORDER — POTASSIUM CHLORIDE 20 MEQ/1
40 TABLET, EXTENDED RELEASE ORAL ONCE
Status: COMPLETED | OUTPATIENT
Start: 2022-09-30 | End: 2022-09-30

## 2022-09-30 RX ADMIN — POTASSIUM CHLORIDE 40 MEQ: 1500 TABLET, EXTENDED RELEASE ORAL at 10:45

## 2022-09-30 RX ADMIN — AMLODIPINE BESYLATE 10 MG: 10 TABLET ORAL at 08:19

## 2022-09-30 NOTE — NURSING NOTE
Patient discharged to home, IV removed  Discharge instructions given to patient with stated understanding  Patient left unit with belongings in stable condition

## 2022-09-30 NOTE — ASSESSMENT & PLAN NOTE
Patient presenting to the ED for abnormal lab work done as an outpatient  Patient was found to have hypercalcemia and was recommended for admission by her PCP  · Calcium 13 5 on admission  · Nephrology on board input appreciated  · Stop taking hydrochlorothiazide indefinitely  · Continue to hold off calcium supplementation for now    · Get Prolia every 6 months as an outpatient

## 2022-09-30 NOTE — ASSESSMENT & PLAN NOTE
· Patient with uncontrolled hypertension on admission, /101  · BP better controlled  · During hospitalization, Holding home losartan and hydrochlorothiazide due to JOAQUIN and hypercalcemia  · Can resume losartan starting from tomorrow  · Nephrology on board -input appreciated

## 2022-09-30 NOTE — PROGRESS NOTES
NEPHROLOGY PROGRESS NOTE   Marychuy Moore 61 y o  female MRN: [id number]  Unit/Bed#: 7T [facility] 706-01 Encounter: [id number]  Reason for Consult:  JOAQUIN, hypercalcemia    82-year-old female with history of hypertension, osteoporosis, who presents with elevated creatinine and calcium on outpatient lab work  Nephrology is consulted for acute kidney injury and hypercalcemia management  ASSESSMENT/PLAN:  JOAQUIN (POA):  Suspect combination of thiazide and hypercalcemia   -baseline creatinine 0 7-0 8   -presented with creatinine 1 74   -creatinine has improved with IV fluid administration and holding ARB and thiazide   -may discontinue IV fluids  At baseline    -currently does not follow with Nephrology  -UA bland with 0-1 WBCs  -bladder scan insignificant   -recommend avoiding nephrotoxins, hypotension, IV contrast   -strict I/O      Hypercalcemia:  Presented with calcium of 13 5  Patient was taking calcium carbonate plus vitamin-D at home + thiazide  -repeat calcium 8 0  Corrects to normal to albumin   -ionized calcium level 1 24, normal   -recommend avoiding calcium supplementation, continue to hold at discharge   -hydrochlorothiazide currently on hold, would continue to hold indefinitely  -PTH suppressed 16 4, ionized calcium level 1 24   -receives prolia as an OP every 6 months, next dose in January       Hypokalemia/hypomagnesemia: In the setting of hypo magnesemia  -Mag level low, status post replacement   -will continue to monitor and replace as needed      Hypertension:  Blood pressure is overall improved  -would hold thiazide indefinitely   -may resume losartan tomorrow    -home medications:  Hydrochlorothiazide 25 mg daily, losartan 100 mg daily    -recommend avoiding hypotension or high fluctuations in blood pressure   -recommend holding parameters on antihypertensive for systolic blood pressure less than 130       Anemia:  -continue to monitor and transfuse as needed for hemoglobin less than 7 0   -baseline hemoglobin level normal     Disposition:  Okay to discharge from Renal   Would continue to avoid calcium and vitamin-D supplements  Would continue to avoid hydrochlorothiazide indefinitely  Will send message office to arrange follow-up with repeat BMP prior to the appointment  SUBJECTIVE:  The patient reports feeling well  She denies chest discomfort or shortness of breath  He is eating and drinking well  We discussed that she will need to continue to hold her vitamin-D and calcium supplements as well as her hydrochlorothiazide  She may resume her losartan tomorrow      OBJECTIVE:  Current Weight: Weight - Scale: 59 6 kg (131 lb 6 3 oz)  Vitals:    09/29/22 1543 09/29/22 2301 09/30/22 0600 09/30/22 0806   BP: 129/86 133/76  140/74   BP Location: Left arm Left arm  Left arm   Pulse: 74 67  70   Resp: 18 18  20   Temp: 98 5 °F (36 9 °C) 98 °F (36 7 °C)  (!) 97 1 °F (36 2 °C)   TempSrc: Temporal Temporal  Temporal   SpO2: 95% 95%  97%   Weight:   59 6 kg (131 lb 6 3 oz)    Height:           Intake/Output Summary (Last 24 hours) at 9/30/2022 8582  Last data filed at 9/30/2022 0601  Gross per 24 hour   Intake 1700 ml   Output --   Net 1700 ml     General: NAD  Skin: warm, dry, intact, no rash  HEENT: Moist mucous membranes, sclera anicteric, normocephalic, atraumatic  Neck: No apparent JVD appreciated  Chest: lung sounds clear B/L, on RA   CVS:Regular rate and rhythm, no murmer   Abdomen: Soft, round, non-tender, +BS  Extremities: No B/L LE edema present  Neuro: alert and oriented  Psych: appropriate mood and affect     Medications:    Current Facility-Administered Medications:     acetaminophen (TYLENOL) tablet 650 mg, 650 mg, Oral, Q6H PRN, Mayra Rothman PA-C    amLODIPine (NORVASC) tablet 10 mg, 10 mg, Oral, Daily, CHET Talbot, 10 mg at 09/30/22 0819    heparin (porcine) subcutaneous injection 5,000 Units, 5,000 Units, Subcutaneous, Q8H [address], Mayra Rothman PA-C, 5,000 Units at 09/28/22 2357    sodium chloride 0 9 % infusion, 100 mL/hr, Intravenous, Continuous, CHET Walker, Last Rate: 100 mL/hr at 09/29/22 1801, 100 mL/hr at 09/29/22 1801    Laboratory Results:  Results from last 7 days   Lab Units 09/29/22  0504 09/28/22  0748   WBC Thousand/uL 6 29  --    HEMOGLOBIN g/dL 10 6*  --    HEMATOCRIT % 31 6*  --    PLATELETS Thousands/uL 221  --    SODIUM mmol/L 142 140   POTASSIUM mmol/L 2 9* 3 6   CHLORIDE mmol/L 109* 100   CO2 mmol/L 27 32   BUN mg/dL 22 27*   CREATININE mg/dL 1 20 1 74*   CALCIUM mg/dL 9 8 13 5*   MAGNESIUM mg/dL 1 5*  --    ALK PHOS U/L 43  --    ALT U/L 22  --    AST U/L 25  --

## 2022-09-30 NOTE — DISCHARGE INSTRUCTIONS
Discharge instructions from hospitalist  1  Follow-up with your primary care physician in 1 week in regards to recent hospitalization  Get BMP in 1 week and follow-up with PCP  2  Take medications regularly    Changes in medications      Can start taking losartan 100 mg daily from tomorrow  Stop taking hydrochlorothiazide indefinitely  Continue to hold off calcium supplementation for now  Received Prolia every 6 months as an outpatient  3  Come back to the ER if symptoms recur or worsen  4  Activity as tolerated  5   Diet :  Heart healthy diet; information packet has more detailed information

## 2022-09-30 NOTE — ASSESSMENT & PLAN NOTE
· Creatinine normalized  · Nephrology on board input appreciated  · Can start taking losartan 100 mg daily from tomorrow    · Get BMP next week and follow-up with PCP

## 2022-09-30 NOTE — TELEPHONE ENCOUNTER
----- Message from Puja Torre sent at 9/30/2022 10:03 AM EDT -----  Please arrange JOAQUIN and hypercalcemia follow-up  Okay to be seen within 2-4 weeks  Will check BMP prior to appointment  Thank you

## 2022-09-30 NOTE — DISCHARGE SUMMARY
[facility]  Discharge- Tillman Paci 1955, 61 y o  female MRN: [id number]  Unit/Bed#: 7T [facility] 706-01 Encounter: [id number]  Primary Care Provider: Ernestina Clinton DO   Date and time admitted to hospital: 9/28/2022 10:26 PM    * Hypercalcemia  Assessment & Plan  Patient presenting to the ED for abnormal lab work done as an outpatient  Patient was found to have hypercalcemia and was recommended for admission by her PCP  · Calcium 13 5 on admission  · Nephrology on board input appreciated  · Stop taking hydrochlorothiazide indefinitely  · Continue to hold off calcium supplementation for now  · Get Prolia every 6 months as an outpatient    Hypertension  Assessment & Plan  · Patient with uncontrolled hypertension on admission, /101  · BP better controlled  · During hospitalization, Holding home losartan and hydrochlorothiazide due to JOAQUIN and hypercalcemia  · Can resume losartan starting from tomorrow  · Nephrology on board -input appreciated      JOAQUIN (acute kidney injury) ([facility] [address] )  Assessment & Plan  · Creatinine normalized  · Nephrology on board input appreciated  · Can start taking losartan 100 mg daily from tomorrow  · Get BMP next week and follow-up with PCP      Discharging Physician / Practitioner: Laurel Lewis  PCP: Ernestina Clinton DO  Admission Date:   Admission Orders (From admission, onward)     Ordered        09/28/22 2317  Place in Observation  Once            09/28/22 2317  Place in Observation  Once                      Discharge Date: 09/30/22    Medical Problems             Resolved Problems  Date Reviewed: 9/30/2022   None                 Consultations During Hospital Stay:  Nephrologist    Procedures Performed:   · None    Significant Findings / Test Results:   · No results found  Incidental Findings:   · None    Test Results Pending at Discharge (will require follow up):    · None     Outpatient Tests Requested:  · None    Complications: None    Reason for Admission:  Hypercalcemia, JOAQUIN    Hospital Course:     Kenia Wheeler is a 61 y o  female patient who originally presented to the hospital on 9/28/2022 due to hypercalcemia JOAQUIN  Please refer to H&P on 9/28/22 for details  During hospitalization, patient hypercalcemia JOAQUIN improved with IV fluid  Calcium supplementation was on hold  Patient is recommended to stop hydrochlorothiazide indefinitely  Patient get Prolia every 6 minutes as an outpatient  Patient can go back to losartan starting tomorrow  Patient is recommended to follow up with PCP and get BMP as an outpatient  Patient is clinically and hemodynamically stable to be discharged today  Please see above list of diagnoses and related plan for additional information  Condition at Discharge: stable     Discharge Day Visit / Exam:     Subjective:  Patient was seen and examined at bedside  The patient denies any pain, headache, blurry vision, chest pain, palpitation, shortness of breath, N/V, abd pain      Vitals: Blood Pressure: 140/74 (09/30/22 0806)  Pulse: 70 (09/30/22 0806)  Temperature: (!) 97 1 °F (36 2 °C) (09/30/22 0806)  Temp Source: Temporal (09/30/22 0806)  Respirations: 20 (09/30/22 0806)  Height: 4' 9" (144 8 cm) (09/29/22 0031)  Weight - Scale: 59 6 kg (131 lb 6 3 oz) (09/30/22 0600)  SpO2: 97 % (09/30/22 0806)  Exam:   Physical Exam  General: breathing well on room air, no acute distress  HEENT: NC/AT, PERRL, EOM - normal  Neck: Supple  Pulm/Chest: Normal chest wall expansion, clear breath sounds on both side, no wheezing/rhonchi or crackles appreciated  CVS: RRR, normal S1&S2, no murmur appreciated, capillary refill <2s  Abd: soft, non tender, non distended, bowel sounds +  MSK: move all 4 extremities spontaneously  Skin: warm  CNS: no acute focal neuro deficit    Discussion with Family:  Patient said she has no family member    Discharge instructions/Information to patient and family:   See after visit summary for information provided to patient and family  Provisions for Follow-Up Care:  See after visit summary for information related to follow-up care and any pertinent home health orders  Disposition:     Home    For Discharges to [facility] SNF:   · Not Applicable to this Patient - Not Applicable to this Patient    Planned Readmission:  No     Discharge Statement:  I spent 45 minutes discharging the patient  This time was spent on the day of discharge  I had direct contact with the patient on the day of discharge  Greater than 50% of the total time was spent examining patient, answering all patient questions, arranging and discussing plan of care with patient as well as directly providing post-discharge instructions  Additional time then spent on discharge activities  Discharge Medications:  See after visit summary for reconciled discharge medications provided to patient and family        ** Please Note: This note has been constructed using a voice recognition system **

## 2022-09-30 NOTE — CASE MANAGEMENT
Case Management Discharge Planning Note    Patient name Gigi Esteves  Location 7T [facility] 706/7T [facility] 706-01 MRN [id number]  : 1955 Date 2022       Current Admission Date: 2022  Current Admission Diagnosis:Hypercalcemia   Patient Active Problem List    Diagnosis Date Noted    JOAQUIN (acute kidney injury) ([facility] [address] ) 2022    Hypertension 2022    Uncontrolled hypertension 2022    Trigger finger, right ring finger 2022    Subclinical hypothyroidism 2022    Mixed hyperlipidemia 2022    Cochlear implant status 2022    Melanoma in situ of left lower leg ([facility] [address] ) 2019    Basal cell carcinoma (BCC) of right lower leg 2019    Sensorineural hearing loss (SNHL) of both ears 2019    Oral mucosal lesion 2019    Epistaxis 2019    Elevated BP without diagnosis of hypertension 10/04/2017    Hypercalcemia 2017    Osteoporosis 10/09/2012      LOS (days): 0  Geometric Mean LOS (GMLOS) (days):   Days to GMLOS:     OBJECTIVE:      Current admission status: Observation   Preferred Pharmacy:   [address] [place] [address] [id number] [place] [facility], [address]  [address] [place] [address] Drive [address]  Phone: [phone] Fax: [phone]    [facility], [address]  [place] [address]  Phone: [phone] Fax: [phone]    [address] [place] [address] - [facility] [address] ,  [facility] [address] [place] [address]  Phone: [phone] Fax: [phone]    Primary Care Provider: Elma Bradley DO    Primary Insurance: CAPITAL  Secondary Insurance:     DISCHARGE DETAILS:  CM was notified at morning rounds that pt is medical cleared to be discharged today  CM met with pt at bedside  CM Discussed Freedom of choice  Pt will return back to her previous environment    Transportation: Drive home    CM reviewed Discharge planning process including the following: identifying help at home, patient preference for discharge planning needs, Discharge lounge, [facility] to bed program, availability of treatment team to discuss questions or concerns patient and family may have regarding understanding medications and recognizing signs and symptoms once discharged  CM also encouraged pt to follow up with all recommended appointments after discharge     CM department will continue to follow through pts D/C

## 2022-10-03 ENCOUNTER — TRANSITIONAL CARE MANAGEMENT (OUTPATIENT)
Dept: FAMILY MEDICINE CLINIC | Facility: CLINIC | Age: 67
End: 2022-10-03

## 2022-10-05 ENCOUNTER — APPOINTMENT (OUTPATIENT)
Dept: LAB | Facility: HOSPITAL | Age: 67
End: 2022-10-05
Payer: COMMERCIAL

## 2022-10-05 ENCOUNTER — HOSPITAL ENCOUNTER (OUTPATIENT)
Dept: MAMMOGRAPHY | Facility: CLINIC | Age: 67
Discharge: HOME | End: 2022-10-05
Payer: COMMERCIAL

## 2022-10-05 ENCOUNTER — HOSPITAL ENCOUNTER (OUTPATIENT)
Dept: BONE DENSITY | Facility: CLINIC | Age: 67
Discharge: HOME | End: 2022-10-05
Payer: COMMERCIAL

## 2022-10-05 VITALS — HEIGHT: 57 IN | WEIGHT: 131 LBS | BODY MASS INDEX: 28.26 KG/M2

## 2022-10-05 DIAGNOSIS — N17.9 ACUTE KIDNEY INJURY (HCC): ICD-10-CM

## 2022-10-05 DIAGNOSIS — Z12.31 SCREENING MAMMOGRAM, ENCOUNTER FOR: ICD-10-CM

## 2022-10-05 DIAGNOSIS — N17.9 AKI (ACUTE KIDNEY INJURY) (HCC): ICD-10-CM

## 2022-10-05 DIAGNOSIS — M81.0 OSTEOPOROSIS, UNSPECIFIED OSTEOPOROSIS TYPE, UNSPECIFIED PATHOLOGICAL FRACTURE PRESENCE: ICD-10-CM

## 2022-10-05 DIAGNOSIS — E83.52 HYPERCALCEMIA: ICD-10-CM

## 2022-10-05 DIAGNOSIS — Z13.820 ENCOUNTER FOR SCREENING FOR OSTEOPOROSIS: ICD-10-CM

## 2022-10-05 LAB
ANION GAP SERPL CALCULATED.3IONS-SCNC: 8 MMOL/L (ref 5–14)
BUN SERPL-MCNC: 16 MG/DL (ref 5–25)
CALCIUM SERPL-MCNC: 9.7 MG/DL (ref 8.4–10.2)
CHLORIDE SERPL-SCNC: 106 MMOL/L (ref 96–108)
CO2 SERPL-SCNC: 27 MMOL/L (ref 21–32)
CREAT SERPL-MCNC: 0.9 MG/DL (ref 0.6–1.2)
GFR SERPL CREATININE-BSD FRML MDRD: 67 ML/MIN/1.73SQ M
GLUCOSE P FAST SERPL-MCNC: 94 MG/DL (ref 70–99)
POTASSIUM SERPL-SCNC: 4.4 MMOL/L (ref 3.5–5.3)
SODIUM SERPL-SCNC: 141 MMOL/L (ref 135–147)

## 2022-10-05 PROCEDURE — 77067 SCR MAMMO BI INCL CAD: CPT

## 2022-10-05 PROCEDURE — 36415 COLL VENOUS BLD VENIPUNCTURE: CPT

## 2022-10-05 PROCEDURE — 77080 DXA BONE DENSITY AXIAL: CPT

## 2022-10-05 PROCEDURE — 80048 BASIC METABOLIC PNL TOTAL CA: CPT

## 2022-10-05 PROCEDURE — 77063 BREAST TOMOSYNTHESIS BI: CPT

## 2022-10-05 NOTE — TELEPHONE ENCOUNTER
Tried to call pt to schedule hospital follow up  However phone number is restricted   Sent message via [employer]

## 2022-10-06 ENCOUNTER — TELEPHONE (OUTPATIENT)
Dept: NEPHROLOGY | Facility: CLINIC | Age: 67
End: 2022-10-06

## 2022-10-06 NOTE — TELEPHONE ENCOUNTER
Message sent through [employer], I scheduled your appointment for 11/3/22 at 8:20 am in our [facility] [place] [place] office with Dr Maria Elena Omer  I do not currently have anything available in our [facility] office  The address is [address] [place] [address]  If you have any questions, please call the office   Thank you

## 2022-10-06 NOTE — TELEPHONE ENCOUNTER
----- Message from Sean Morillo sent at 10/6/2022  7:41 AM EDT -----  Regarding: FW: hospital follow up appointment    ----- Message -----  From: Calvin Hurd  Sent: 10/5/2022   7:57 PM EDT  To: Nephrology [facility]  Subject: hospital follow up appointment                   Just gotten your message  I sleep during the day due I work nights til 06:30a  Any followup visits will have to be done in early mornings  I see Dr Juan Carlos Blood on Friday  Go ahead schedule my appointment and send me address of location    I am going out of town next week   thank you

## 2022-10-07 ENCOUNTER — OFFICE VISIT (OUTPATIENT)
Dept: FAMILY MEDICINE CLINIC | Facility: CLINIC | Age: 67
End: 2022-10-07
Payer: COMMERCIAL

## 2022-10-07 VITALS
SYSTOLIC BLOOD PRESSURE: 160 MMHG | WEIGHT: 135.2 LBS | DIASTOLIC BLOOD PRESSURE: 90 MMHG | BODY MASS INDEX: 29.17 KG/M2 | HEART RATE: 76 BPM | OXYGEN SATURATION: 100 % | HEIGHT: 57 IN | RESPIRATION RATE: 12 BRPM

## 2022-10-07 DIAGNOSIS — I10 ESSENTIAL HYPERTENSION: Primary | ICD-10-CM

## 2022-10-07 PROCEDURE — 99495 TRANSJ CARE MGMT MOD F2F 14D: CPT | Performed by: INTERNAL MEDICINE

## 2022-10-07 RX ORDER — LOSARTAN POTASSIUM 100 MG/1
100 TABLET ORAL DAILY
Qty: 90 TABLET | Refills: 1 | Status: SHIPPED | OUTPATIENT
Start: 2022-10-07

## 2022-10-07 NOTE — ASSESSMENT & PLAN NOTE
Blood pressure is slightly better controlled right now being on losartan 100 mg daily  Will continue the same  Recheck BMP on Monday next week  Her JOAQUIN seems to be resolved, calcium was within normal limits on last BMP on 10/05/2022  She will follow-up with nephrology in November, she will follow-up with our office in December  She was instructed to stay hydrated and avoid NSAIDs for pain  We discussed with her today probably she will benefit from 2nd medication to better control her blood pressure, we discussed possible use of carvedilol but she would like to stay on 1 medication as for now

## 2022-10-07 NOTE — PROGRESS NOTES
Assessment/Plan:    Essential hypertension  Blood pressure is slightly better controlled right now being on losartan 100 mg daily  Will continue the same  Recheck BMP on Monday next week  Her PAUL seems to be resolved, calcium was within normal limits on last BMP on 10/05/2022  She will follow-up with nephrology in November, she will follow-up with our office in December  She was instructed to stay hydrated and avoid NSAIDs for pain  We discussed with her today probably she will benefit from 2nd medication to better control her blood pressure, we discussed possible use of carvedilol but she would like to stay on 1 medication as for now  Diagnoses and all orders for this visit:    Essential hypertension  -     Basic metabolic panel; Future  -     losartan (COZAAR) 100 MG tablet; Take 1 tablet (100 mg total) by mouth daily          Subjective:      Patient ID: Miguel Browne is a 61 y o  female  Patient came today for TCM visit after recent admission to the hospital due to PAUL and hyperkalemia likely due to concurrent use of calcium supplements and recent add on of hydrochlorothiazide to her current losartan for blood pressure  She was treated with IV fluids, her Paul I resolved, calcium level currently within normal limits  She denies any active complaints  Blood pressure recheck by me 155/90  She said that she is checking it at home it runs around 405-129 systolic  The following portions of the patient's history were reviewed and updated as appropriate: allergies, current medications, past family history, past medical history, past social history, past surgical history, and problem list     Review of Systems   Respiratory: Negative for chest tightness and shortness of breath  Cardiovascular: Negative for palpitations  Neurological: Negative for dizziness, light-headedness and headaches           Objective:      /90 (BP Location: Left arm, Patient Position: Sitting, Cuff Size: Standard)   Pulse 76   Resp 12   Ht 4' 9" (1 448 m)   Wt 61 3 kg (135 lb 3 2 oz)   LMP 06/01/1989 (Exact Date) Comment: hysterectomy, total  SpO2 100%   BMI 29 26 kg/m²     Allergies   Allergen Reactions    Codeine      Reaction Date: [id number];     Darvon [Propoxyphene]     Demerol [Meperidine]     Valium [Diazepam]     Aspirin      Reaction Date: [id number];           Current Outpatient Medications:     losartan (COZAAR) 100 MG tablet, Take 1 tablet (100 mg total) by mouth daily, Disp: 90 tablet, Rfl: 1    Multiple Vitamin (MULTIVITAMIN) capsule, Take 1 capsule by mouth daily, Disp: , Rfl:     Denosumab (PROLIA SC), Inject under the skin, Disp: , Rfl:      There are no Patient Instructions on file for this visit  Physical Exam  Constitutional:       General: She is not in acute distress  Appearance: She is not ill-appearing or toxic-appearing  Cardiovascular:      Rate and Rhythm: Normal rate  Heart sounds: No murmur heard  No gallop  Pulmonary:      Effort: No respiratory distress  Breath sounds: No wheezing or rales

## 2022-10-07 NOTE — TELEPHONE ENCOUNTER
Responded to patient's in basket message via [employer]- scheduled for 11/3 @ 8:20 in [facility] office

## 2022-10-10 NOTE — RESULT ENCOUNTER NOTE
Joon cano  Your dexa is consistent with known osteoporosis  But - Since a DXA study from 8/17/2020, there has been:  A  STATISTICALLY SIGNIFICANT INCREASE in bone mineral density of  0 053 g/cm2 (5 5)% in the lumbar spine    will see you in December  Dr Edie Crawford

## 2022-10-15 ENCOUNTER — APPOINTMENT (OUTPATIENT)
Dept: LAB | Age: 67
End: 2022-10-15
Payer: COMMERCIAL

## 2022-10-15 DIAGNOSIS — I10 ESSENTIAL HYPERTENSION: ICD-10-CM

## 2022-10-15 LAB
ANION GAP SERPL CALCULATED.3IONS-SCNC: 5 MMOL/L (ref 4–13)
BUN SERPL-MCNC: 17 MG/DL (ref 5–25)
CALCIUM SERPL-MCNC: 9.5 MG/DL (ref 8.3–10.1)
CHLORIDE SERPL-SCNC: 108 MMOL/L (ref 96–108)
CO2 SERPL-SCNC: 26 MMOL/L (ref 21–32)
CREAT SERPL-MCNC: 1.15 MG/DL (ref 0.6–1.3)
GFR SERPL CREATININE-BSD FRML MDRD: 50 ML/MIN/1.73SQ M
GLUCOSE SERPL-MCNC: 110 MG/DL (ref 65–140)
POTASSIUM SERPL-SCNC: 4.3 MMOL/L (ref 3.5–5.3)
SODIUM SERPL-SCNC: 139 MMOL/L (ref 135–147)

## 2022-10-15 PROCEDURE — 36415 COLL VENOUS BLD VENIPUNCTURE: CPT

## 2022-10-15 PROCEDURE — 80048 BASIC METABOLIC PNL TOTAL CA: CPT

## 2022-11-02 ENCOUNTER — TELEPHONE (OUTPATIENT)
Dept: NEPHROLOGY | Facility: CLINIC | Age: 67
End: 2022-11-02

## 2022-11-02 NOTE — TELEPHONE ENCOUNTER
Appointment Confirmation   Person confirmed appointment with  If not patient, name of the person Left message     Date and time of appointment 11/3   Patient acknowledged and will be at appointment? no   Did you advise the patient that they will need a urine sample if they are a new patient? no   Did you advise the patient to bring their current medications for verification? (including any OTC) yes   Additional Information

## 2022-11-03 ENCOUNTER — OFFICE VISIT (OUTPATIENT)
Dept: NEPHROLOGY | Facility: CLINIC | Age: 67
End: 2022-11-03

## 2022-11-03 VITALS
WEIGHT: 137 LBS | DIASTOLIC BLOOD PRESSURE: 80 MMHG | BODY MASS INDEX: 29.56 KG/M2 | HEIGHT: 57 IN | SYSTOLIC BLOOD PRESSURE: 122 MMHG

## 2022-11-03 DIAGNOSIS — N17.9 AKI (ACUTE KIDNEY INJURY) (HCC): Primary | ICD-10-CM

## 2022-11-03 NOTE — PATIENT INSTRUCTIONS
Drink at least 60 oz of water a day  Repeat your lab work and urine work in next 2 weeks  Check ultrasound of her kidneys  We will see you back in 3 months

## 2022-11-03 NOTE — PROGRESS NOTES
NEPHROLOGY OFFICE VISIT   Bautista Reis 74 y o  female MRN: [id number]  11/3/2022    Reason for Visit:  Follow-up JOAQUIN/hypercalcemia    ASSESSMENT and PLAN:  It was a pleasure evaluating your patient in the office today  Thank you for allowing our team to participate in the care of Ms Danielle Sandoval  Please do not hesitate to contact our team if further issues/questions shall arise in the interim       # Acute kidney injury 09/2022  - Etiology was thought to be due to prerenal JOAQUIN, hypercalcemia causing vasoconstriction, hemodynamic changes in setting of thiazide and ARB use   - Admission creatinine 1 74 09/28/2022   - Discharge creatinine 0 95 09/30/2022   - Follow-up creatinine 1 15 10/15/2022  Lab Results   Component Value Date    CREATININE 1 15 10/15/2022    BUN 17 10/15/2022     06/20/2018    K 4 3 10/15/2022     10/15/2022    CO2 26 10/15/2022     # Evaluation of renal function  - Etiology/risk factors for CKD: Hypertension, previous episode of JOAQUIN  - Baseline Cr: Around 0 9, most recently 1 15  - Urinalysis: Imelda Morning 09/2022, check urinalysis with microscopy  - Proteinuria: Negative by dipstick analysis 09/2022, check UACR/UPCR   - Imaging: Check renal ultrasound to look at renal parenchyma   - Repeat BMP in 2 weeks    # Hypercalcemia   - Etiology was thought to be in setting of taking calcium supplements + use of thiazide  - Admission calcium 13 5 09/28/2022   - Discharge calcium 8 0 09/30/2022   - Follow-up calcium 9 5 10/15/2022  - PTH suppressed at 16 4 in setting of hypercalcemia   - 25 hydroxyvitamin D level 49  - Patient wants to go back on her calcium supplements due to history of osteoporosis   - Currently serum calcium levels are within normal limits  - Advised to recheck serum calcium level with PTH before restarting calcium supplements  Lab Results   Component Value Date    PTH 16 4 (L) 09/28/2022    CALCIUM 9 5 10/15/2022     # Hypertension  - Goal blood pressure less than 120/80 per KDIGO guidelines 2021   - Blood pressure currently at goal   - Current medication: Losartan 100 mg daily   - Continue losartan 100 mg daily    # Osteoporosis   - On Denosumab every 6 months, peviously on Fosamax  - Denosumab can lead to hypocalcemia  - Continue to monitor serum calcium levels every 3 months     HPI:  13year-old female with history of hypertension and osteoporosis was admitted to the hospital with JOAQUIN and hypercalcemia  Etiology was thought to be a combination of calcium supplements and hydrochlorothiazide  She received IV fluids and her renal function and calcium levels improved  She was discharged with no calcium supplements and no hydrochlorothiazide  Since discharge from the hospital she has been feeling well  She has mild dyspnea on exertion  She has no leg swelling  She has no urinary symptoms  >> Major risk factors for CKD  - Diabetes: No  - Hypertension: Yes    - Age ? 54 years: Yes   - Family history of kidney disease: No  - Obesity or metabolic syndrome: Yes     >> Medical history evaluation   - Prior kidney disease or dialysis: JOAQUIN in 10/2022  - Incidental hematuria in the past: No  - Urinary symptoms: No  - History of foamy or frothy urine: No  - History of nephrolithiasis: No  - Diseases that share risk factors with CKD: HTN  - Systemic diseases that might affect kidney: No  - History of use of medications that might affect renal function: No    PATIENT INSTRUCTIONS:    Patient Instructions   Drink at least 60 oz of water a day  Repeat your lab work and urine work in next 2 weeks  Check ultrasound of her kidneys  We will see you back in 3 months  OBJECTIVE:  Current Weight: Weight - Scale: 62 1 kg (137 lb)  Vitals:    11/03/22 0800   BP: 122/80   BP Location: Left arm   Patient Position: Sitting   Cuff Size: Standard   Weight: 62 1 kg (137 lb)   Height: 4' 9" (1 448 m)    Body mass index is 29 65 kg/m²        REVIEW OF SYSTEMS:    Review of Systems   Constitutional: Negative for chills and fever  HENT: Negative for ear pain and sore throat  Eyes: Negative for pain and visual disturbance  Respiratory: Negative for cough and shortness of breath  Cardiovascular: Negative for chest pain and palpitations  Gastrointestinal: Negative for abdominal pain and vomiting  Genitourinary: Negative for dysuria and hematuria  Musculoskeletal: Negative for arthralgias and back pain  Skin: Negative for color change and rash  Neurological: Negative for seizures and syncope  All other systems reviewed and are negative  PHYSICAL EXAM:      Physical Exam  Constitutional:       Appearance: Normal appearance  HENT:      Head: Normocephalic and atraumatic  Mouth/Throat:      Mouth: Mucous membranes are moist       Pharynx: Oropharynx is clear  Cardiovascular:      Rate and Rhythm: Normal rate and regular rhythm  Pulmonary:      Effort: Pulmonary effort is normal       Breath sounds: Normal breath sounds  Abdominal:      General: Abdomen is flat  Bowel sounds are normal       Palpations: Abdomen is soft  Musculoskeletal:         General: Normal range of motion  Right lower leg: No edema  Left lower leg: No edema  Skin:     General: Skin is warm and dry  Neurological:      General: No focal deficit present  Mental Status: She is alert and oriented to person, place, and time  Mental status is at baseline  Psychiatric:         Mood and Affect: Mood normal          Behavior: Behavior normal          Thought Content:  Thought content normal          Judgment: Judgment normal      Medications:    Current Outpatient Medications:   •  Denosumab (PROLIA SC), Inject under the skin, Disp: , Rfl:   •  losartan (COZAAR) 100 MG tablet, Take 1 tablet (100 mg total) by mouth daily, Disp: 90 tablet, Rfl: 1  •  Multiple Vitamin (MULTIVITAMIN) capsule, Take 1 capsule by mouth daily, Disp: , Rfl:     Laboratory Results:        Invalid input(s): ALBUMIN    Results for orders placed or performed in visit on 91/57/22   Basic metabolic panel   Result Value Ref Range    Sodium 139 135 - 147 mmol/L    Potassium 4 3 3 5 - 5 3 mmol/L    Chloride 108 96 - 108 mmol/L    CO2 26 21 - 32 mmol/L    ANION GAP 5 4 - 13 mmol/L    BUN 17 5 - 25 mg/dL    Creatinine 1 15 0 60 - 1 30 mg/dL    Glucose 110 65 - 140 mg/dL    Calcium 9 5 8 3 - 10 1 mg/dL    eGFR 50 ml/min/1 73sq m

## 2022-11-07 ENCOUNTER — APPOINTMENT (OUTPATIENT)
Dept: LAB | Facility: HOSPITAL | Age: 67
End: 2022-11-07
Attending: INTERNAL MEDICINE

## 2022-11-07 ENCOUNTER — HOSPITAL ENCOUNTER (OUTPATIENT)
Dept: ULTRASOUND IMAGING | Facility: HOSPITAL | Age: 67
Discharge: HOME | End: 2022-11-07
Attending: INTERNAL MEDICINE

## 2022-11-07 DIAGNOSIS — N17.9 AKI (ACUTE KIDNEY INJURY) (HCC): ICD-10-CM

## 2022-11-07 LAB
BACTERIA UR QL AUTO: NORMAL /HPF
BILIRUB UR QL STRIP: NEGATIVE
CLARITY UR: CLEAR
COLOR UR: NORMAL
CREAT UR-MCNC: <13 MG/DL
CREAT UR-MCNC: <13 MG/DL
GLUCOSE UR STRIP-MCNC: NEGATIVE MG/DL
HGB UR QL STRIP.AUTO: NEGATIVE
KETONES UR STRIP-MCNC: NEGATIVE MG/DL
LEUKOCYTE ESTERASE UR QL STRIP: NEGATIVE
MICROALBUMIN UR-MCNC: <5 MG/L (ref 0–20)
NITRITE UR QL STRIP: NEGATIVE
NON-SQ EPI CELLS URNS QL MICRO: NORMAL /HPF
PH UR STRIP.AUTO: 6 [PH]
PROT UR STRIP-MCNC: NEGATIVE MG/DL
PROT UR-MCNC: <6 MG/DL
RBC #/AREA URNS AUTO: NORMAL /HPF
SP GR UR STRIP.AUTO: 1.01 (ref 1–1.04)
UROBILINOGEN UA: NEGATIVE MG/DL
WBC #/AREA URNS AUTO: NORMAL /HPF

## 2022-11-09 ENCOUNTER — TELEPHONE (OUTPATIENT)
Dept: NEPHROLOGY | Facility: CLINIC | Age: 67
End: 2022-11-09

## 2022-11-09 NOTE — TELEPHONE ENCOUNTER
Message left on patient's VM that renal US is normal per [facility]       ----- Message from [place], [place] sent at 11/8/2022  4:40 PM EST -----  Please let patient know her renal ultrasound is normal

## 2022-11-15 ENCOUNTER — APPOINTMENT (OUTPATIENT)
Dept: LAB | Facility: HOSPITAL | Age: 67
End: 2022-11-15
Attending: INTERNAL MEDICINE

## 2022-11-15 DIAGNOSIS — E83.52 HYPERCALCEMIA: Primary | ICD-10-CM

## 2022-11-15 LAB
25(OH)D3 SERPL-MCNC: 44.9 NG/ML (ref 30–100)
ALBUMIN SERPL BCP-MCNC: 4.5 G/DL (ref 3.5–5)
ANION GAP SERPL CALCULATED.3IONS-SCNC: 9 MMOL/L (ref 5–14)
BUN SERPL-MCNC: 16 MG/DL (ref 5–25)
CALCIUM SERPL-MCNC: 9.5 MG/DL (ref 8.4–10.2)
CHLORIDE SERPL-SCNC: 103 MMOL/L (ref 96–108)
CO2 SERPL-SCNC: 29 MMOL/L (ref 21–32)
CREAT SERPL-MCNC: 0.9 MG/DL (ref 0.6–1.2)
GFR SERPL CREATININE-BSD FRML MDRD: 67 ML/MIN/1.73SQ M
GLUCOSE P FAST SERPL-MCNC: 139 MG/DL (ref 70–99)
PHOSPHATE SERPL-MCNC: 4.6 MG/DL (ref 2.5–4.8)
POTASSIUM SERPL-SCNC: 4.1 MMOL/L (ref 3.5–5.3)
PTH-INTACT SERPL-MCNC: 102.3 PG/ML (ref 18.4–80.1)
SODIUM SERPL-SCNC: 141 MMOL/L (ref 135–147)

## 2022-12-04 ENCOUNTER — HOSPITAL ENCOUNTER (EMERGENCY)
Facility: HOSPITAL | Age: 67
Discharge: HOME | End: 2022-12-04
Attending: EMERGENCY MEDICINE

## 2022-12-04 VITALS
RESPIRATION RATE: 12 BRPM | DIASTOLIC BLOOD PRESSURE: 92 MMHG | HEART RATE: 64 BPM | WEIGHT: 114.2 LBS | SYSTOLIC BLOOD PRESSURE: 190 MMHG | OXYGEN SATURATION: 99 % | BODY MASS INDEX: 24.71 KG/M2 | TEMPERATURE: 98.2 F

## 2022-12-04 DIAGNOSIS — G89.29 ACUTE EXACERBATION OF CHRONIC LOW BACK PAIN: Primary | ICD-10-CM

## 2022-12-04 DIAGNOSIS — M54.50 ACUTE EXACERBATION OF CHRONIC LOW BACK PAIN: Primary | ICD-10-CM

## 2022-12-04 RX ORDER — LIDOCAINE 50 MG/G
1 PATCH TOPICAL ONCE
Status: DISCONTINUED | OUTPATIENT
Start: 2022-12-04 | End: 2022-12-04 | Stop reason: HOSPADM

## 2022-12-04 RX ORDER — CYCLOBENZAPRINE HCL 10 MG
10 TABLET ORAL 2 TIMES DAILY PRN
Qty: 20 TABLET | Refills: 0 | Status: SHIPPED | OUTPATIENT
Start: 2022-12-04

## 2022-12-04 RX ORDER — LIDOCAINE 50 MG/G
1 PATCH TOPICAL EVERY 24 HOURS
Qty: 15 PATCH | Refills: 0 | Status: SHIPPED | OUTPATIENT
Start: 2022-12-04 | End: 2022-12-19

## 2022-12-04 RX ORDER — ACETAMINOPHEN 500 MG
500 TABLET ORAL EVERY 6 HOURS PRN
Qty: 30 TABLET | Refills: 0 | Status: SHIPPED | OUTPATIENT
Start: 2022-12-04

## 2022-12-04 RX ADMIN — LIDOCAINE 1 PATCH: 50 PATCH CUTANEOUS at 11:39

## 2022-12-04 NOTE — Clinical Note
Rena Najera was seen and treated in our emergency department on 12/4/2022  Diagnosis:     Lexis Patel  may return to work on return date  She may return on this date: 12/06/2022         If you have any questions or concerns, please don't hesitate to call        Yasir Marquez PA-C    ______________________________           _______________          _______________  Hospital Representative                              Date                                Time

## 2022-12-04 NOTE — ED PROVIDER NOTES
History  Chief Complaint   Patient presents with   • Back Pain     Low back pain "spasm" since yesterday; took Aleve last night with no relief     82-year-old female presenting for evaluation of low back pain  Patient reports he was walking around [place] yesterday with friends and reports she had cut her walk short due to pain with bending twisting and moving  Patient reports he has had low back pain in the past reports this feels similar  Patient denies any numbness or tingling, weakness or paresthesias or paralysis  Patient denies any bowel or bladder incontinence, saddle anesthesia  Patient reports no previous injuries spine, injections or surgery to spine  Patient reports no fevers or chills  Patient denies any directed medication use  Patient reports no rashes or lesions to the area pain  Patient reports he has been taking Tylenol home with no relief reports she was told not to use Aleve due to kidney problems in the past and reports she, despite this, used Aleve and reports no relief with that medication either  Patient reports he has been using Biofreeze with no relief the pain  Patient reports pain is worse with bending twisting and moving  Patient denies any dysuria, hematuria, abdominal pain  History provided by:  Patient   used: No    Back Pain  Location:  Lumbar spine  Pain severity:  Moderate  Pain is:  Same all the time  Onset quality:  Gradual  Timing:  Constant  Progression:  Unchanged  Chronicity:  New  Relieved by:  Nothing  Worsened by:  Bending and twisting  Ineffective treatments:  None tried  Associated symptoms: no abdominal pain, no bladder incontinence, no bowel incontinence, no chest pain, no dysuria, no fever, no numbness, no perianal numbness, no tingling and no weakness        Prior to Admission Medications   Prescriptions Last Dose Informant Patient Reported? Taking?    Denosumab (PROLIA SC)   Yes No   Sig: Inject under the skin   Multiple Vitamin (MULTIVITAMIN) capsule   Yes No   Sig: Take 1 capsule by mouth daily   losartan (COZAAR) 100 MG tablet   No No   Sig: Take 1 tablet (100 mg total) by mouth daily      Facility-Administered Medications: None       Past Medical History:   Diagnosis Date   • Arthritis    • Asthma    • Basal cell carcinoma 2020    Right alar groove   • HL (hearing loss)    • Osteoporosis    • Shingles        Past Surgical History:   Procedure Laterality Date   • COCHLEAR IMPLANT     • COLONOSCOPY     • COLONOSCOPY      fiberoptic; follow up at age; 2017 12 year f/u   • HYSTERECTOMY      age 30   • MOHS SURGERY  2020     Right alar groove   • OOPHORECTOMY      age 30   • SKIN BIOPSY     • TONSILLECTOMY         Family History   Problem Relation Age of Onset   • Osteoporosis Mother    • Hearing loss Mother         she just found out   • Diabetes Father            • Heart disease Father            • Hypertension Father            • Alcohol abuse Father            • Dementia Father    • Stroke Father            • Lung cancer Maternal Grandmother         76s   • Cancer Maternal Grandmother            • Heart disease Paternal Grandmother            • Diabetes Paternal Grandmother            • No Known Problems Maternal Aunt    • Lung cancer Maternal Grandfather    • Cancer Maternal Grandfather            • Hearing loss Maternal Grandfather    • Prostate cancer Paternal Grandfather            • Cancer Paternal Grandfather            • Colon cancer Other    • Pancreatic cancer Other    • Alcohol abuse Brother    • Hypertension Brother    • Diabetes Brother      I have reviewed and agree with the history as documented      E-Cigarette/Vaping   • E-Cigarette Use Never User      E-Cigarette/Vaping Substances   • Nicotine No    • THC No    • CBD No    • Flavoring No    • Other No    • Unknown No      Social History     Tobacco Use   • Smoking status: Never   • Smokeless tobacco: Never   Vaping Use   • Vaping Use: Never used   Substance Use Topics   • Alcohol use: Never   • Drug use: No       Review of Systems   Constitutional: Negative for chills, fatigue and fever  HENT: Negative for congestion, ear pain, rhinorrhea and sore throat  Eyes: Negative for redness  Respiratory: Negative for chest tightness and shortness of breath  Cardiovascular: Negative for chest pain and palpitations  Gastrointestinal: Negative for abdominal pain, bowel incontinence, nausea and vomiting  Genitourinary: Negative for bladder incontinence, dysuria and hematuria  Musculoskeletal: Positive for back pain  Skin: Negative for rash  Neurological: Negative for dizziness, tingling, syncope, weakness, light-headedness and numbness  Physical Exam  Physical Exam  Vitals and nursing note reviewed  Constitutional:       Appearance: She is well-developed and well-nourished  HENT:      Head: Normocephalic  Eyes:      General: No scleral icterus  Cardiovascular:      Rate and Rhythm: Normal rate and regular rhythm  Pulmonary:      Effort: Pulmonary effort is normal       Breath sounds: Normal breath sounds  No stridor  Abdominal:      General: There is no distension  Palpations: Abdomen is soft  Tenderness: There is no abdominal tenderness  Musculoskeletal:         General: Normal range of motion  Back:       Comments: No midline spinal tenderness, deformities, crepitus, step-off, skin changes noted to the area of pain  Skin:     General: Skin is warm and dry  Capillary Refill: Capillary refill takes less than 2 seconds  Neurological:      Mental Status: She is alert and oriented to person, place, and time     Psychiatric:         Mood and Affect: Mood and affect normal          Vital Signs  ED Triage Vitals [12/04/22 1105]   Temperature Pulse Respirations Blood Pressure SpO2   98 2 °F (36 8 °C) 64 12 (!) 190/92 99 % Temp Source Heart Rate Source Patient Position - Orthostatic VS BP Location FiO2 (%)   Oral Monitor Sitting Left arm --      Pain Score       --           Vitals:    12/04/22 1105   BP: (!) 190/92   Pulse: 64   Patient Position - Orthostatic VS: Sitting         Visual Acuity      ED Medications  Medications   lidocaine (LIDODERM) 5 % patch 1 patch (has no administration in time range)       Diagnostic Studies  Results Reviewed     None                 No orders to display              Procedures  Procedures         ED Course                                             MDM  Number of Diagnoses or Management Options  Diagnosis management comments: Denies history of severe or worsening lower extremity weakness/numbness  Denies history of saddle anesthesia/perineal anesthesia  Denies bowel or bladder incontinence/retention   History does not suggest diagnosis of cauda equina syndrome   Patient denies history of IVDA, denies history of fevers, no recent surgeries or any procedures to suggest a transient bacteremia leading to a diagnosis of subdural abscess  Denies history of blood thinner use with recent history of lumbar puncture or any violation of the epidural space to suggest history of epidural hematoma  Therefore these above diagnoses (cauda equina syndrome, epidural abscess, epidural hematoma) were not pursued with diagnostic imaging  All imaging and/or lab testing discussed with patient, strict return to ED precautions discussed  Patient recommended to follow up promptly with appropriate outpatient provider  Patient and/or family members verbalizes understanding and agrees with plan  Patient is stable for discharge      Portions of the record may have been created with voice recognition software  Occasional wrong word or "sound a like" substitutions may have occurred due to the inherent limitations of voice recognition software   Read the chart carefully and recognize, using context, where substitutions have occurred  Disposition  Final diagnoses:   Acute exacerbation of chronic low back pain     Time reflects when diagnosis was documented in both MDM as applicable and the Disposition within this note     Time User Action Codes Description Comment    12/4/2022 11:26 AM Clemencia Fregoso Add [M54 50,  G89 29] Acute exacerbation of chronic low back pain       ED Disposition     ED Disposition   Discharge    Condition   Good    Date/Time   Sun Dec 4, 2022 11:26 AM    Comment   Josue Kussmaul Stowers discharge to home/self care  Follow-up Information     Follow up With Specialties Details Why Contact Info Additional Information    [facility] Schedule an appointment as soon as possible for a visit in 1 day  [phone]          Patient's Medications   Discharge Prescriptions    ACETAMINOPHEN (TYLENOL) 500 MG TABLET    Take 1 tablet (500 mg total) by mouth every 6 (six) hours as needed for mild pain       Start Date: 12/4/2022 End Date: --       Order Dose: 500 mg       Quantity: 30 tablet    Refills: 0    CYCLOBENZAPRINE (FLEXERIL) 10 MG TABLET    Take 1 tablet (10 mg total) by mouth 2 (two) times a day as needed for muscle spasms       Start Date: 12/4/2022 End Date: --       Order Dose: 10 mg       Quantity: 20 tablet    Refills: 0    LIDOCAINE (LIDODERM) 5 %    Apply 1 patch topically every 24 hours for 15 days Remove & Discard patch within 12 hours or as directed by MD       Start Date: 12/4/2022 End Date: 12/19/2022       Order Dose: 1 patch       Quantity: 15 patch    Refills: 0       No discharge procedures on file      PDMP Review     None          ED Provider  Electronically Signed by           Tal Ponce PA-C  12/04/22 9611

## 2022-12-29 ENCOUNTER — OFFICE VISIT (OUTPATIENT)
Dept: FAMILY MEDICINE CLINIC | Facility: CLINIC | Age: 67
End: 2022-12-29

## 2022-12-29 VITALS
HEART RATE: 67 BPM | BODY MASS INDEX: 29.47 KG/M2 | DIASTOLIC BLOOD PRESSURE: 86 MMHG | SYSTOLIC BLOOD PRESSURE: 140 MMHG | RESPIRATION RATE: 18 BRPM | TEMPERATURE: 97.6 F | WEIGHT: 136.6 LBS | OXYGEN SATURATION: 99 % | HEIGHT: 57 IN

## 2022-12-29 DIAGNOSIS — E78.2 MIXED HYPERLIPIDEMIA: ICD-10-CM

## 2022-12-29 DIAGNOSIS — C44.712 BASAL CELL CARCINOMA (BCC) OF RIGHT LOWER LEG: ICD-10-CM

## 2022-12-29 DIAGNOSIS — H90.3 SENSORINEURAL HEARING LOSS (SNHL) OF BOTH EARS: ICD-10-CM

## 2022-12-29 DIAGNOSIS — M54.50 CHRONIC BILATERAL LOW BACK PAIN WITHOUT SCIATICA: ICD-10-CM

## 2022-12-29 DIAGNOSIS — M81.0 OSTEOPOROSIS, UNSPECIFIED OSTEOPOROSIS TYPE, UNSPECIFIED PATHOLOGICAL FRACTURE PRESENCE: ICD-10-CM

## 2022-12-29 DIAGNOSIS — Z00.00 ANNUAL PHYSICAL EXAM: ICD-10-CM

## 2022-12-29 DIAGNOSIS — D03.72 MELANOMA IN SITU OF LEFT LOWER LEG (HCC): ICD-10-CM

## 2022-12-29 DIAGNOSIS — G89.29 CHRONIC BILATERAL LOW BACK PAIN WITHOUT SCIATICA: ICD-10-CM

## 2022-12-29 DIAGNOSIS — E03.8 SUBCLINICAL HYPOTHYROIDISM: ICD-10-CM

## 2022-12-29 DIAGNOSIS — Z23 NEED FOR PNEUMOCOCCAL VACCINATION: ICD-10-CM

## 2022-12-29 DIAGNOSIS — Z00.00 PHYSICAL EXAM: Primary | ICD-10-CM

## 2022-12-29 DIAGNOSIS — I10 ESSENTIAL HYPERTENSION: ICD-10-CM

## 2022-12-29 RX ORDER — AMLODIPINE BESYLATE 2.5 MG/1
2.5 TABLET ORAL DAILY
Qty: 90 TABLET | Refills: 1 | Status: SHIPPED | OUTPATIENT
Start: 2022-12-29

## 2022-12-29 NOTE — PROGRESS NOTES
FAMILY PRACTICE OFFICE VISIT    NAME: Danielle Sandoval    AGE: 74 y o  SEX: female  : 1955   MRN: [id number]    DATE: 2022  TIME: 8:11 AM    Assessment and Plan   1  Need for pneumococcal vaccination      2  Physical exam  Anticipatory guidance and preventative medicine discussed  prevnar 20 today  Other vaccines up to date  Repeat labs in 6 mos prior to next visit  With renal labs prior     Patient instructiosn:  Reach out to dr Gennaro Wilcox thru [employer] in feb - after nephrology appt - to discuss whether or not to restart calcium    Avoid using any nsaids (ie: motrin like products)  Tylenol only    Prevnar 20 given today    Fasting labs for dr Gennaro Wilcox in 6 mos and then appointment  So labs for neprhology prior to feb appt  Goes to ent yearly along with audiolgist       3  Subclinical hypothyroidism  Normal TSH in 2022    4  Essential hypertension  Slightly elevated; did come down a little by end of visit  But still above normal  Will add norvasc 2 5 mg daily in the am  And return in 3 mos to f/u on BP  Home monitoring if able in the interim    Pt did not tolerate hctz due to JOAQUIN  She does report to adding back some soda and had some weight gain      5  Osteoporosis, unspecified osteoporosis type, unspecified pathological fracture presence  dexa done 10/2022 - showed increase in BMP from prior  Remains on prolia  CALICUM AND PTH THRU RHEUM  I WILL CHECK A VIT D LEVEL  6  Melanoma in situ of left lower leg ([address] )  Following with derm      7  Basal cell carcinoma (BCC) of right lower leg      8  Mixed hyperlipidemia  Stable in 2022     9  Low back pain  Refer to PT:  Pain primarily in low back - currently right more than left  But please concentrate on working on entire back - as pt is very active with job - and would benefit from strengthening of posterior thorax/lumbar regions and core as well      10  H/o cochlear implant  prevnar 20  REQUIRES ELI-OP PROPHYLAXIS      There are no Patient Instructions on file for this visit  Chief Complaint     Chief Complaint   Patient presents with   • Physical Exam       History of Present Illness   Danielle Sandoval is a 74y o -year-old female who presents today for annual PE  Since last visit with me - pt was in the hospital for JOAQUIN with hypercalcemia , elevated creatinine of 1 74 - felt to be secondary to hctz (which had been recently started) and calcium supplement  Both were stopped upon discharge  Pt responded to IV fluids   F/u with nephro - and will be returning to their office with labs prior in 2/2023      Pt then went to ED on 12/4/2022 for acute back pain  H/o back pain in the past  Continues - but is a little improved  Review of Systems   Review of Systems   Musculoskeletal: Positive for back pain  Low back pain - right more than left     Using lidoderm patch with some relief  Rarely taking muscle relaxant     Neurological:        Multiple falls - stephanie on ice  Then slipped over cord at work  No fractures         Active Problem List     Patient Active Problem List   Diagnosis   • Elevated BP without diagnosis of hypertension   • Hypercalcemia   • Osteoporosis   • Sensorineural hearing loss (SNHL) of both ears   • Oral mucosal lesion   • Epistaxis   • Melanoma in situ of left lower leg (HCC)   • Basal cell carcinoma (BCC) of right lower leg   • Cochlear implant status   • Subclinical hypothyroidism   • Mixed hyperlipidemia   • Trigger finger, right ring finger   • Uncontrolled hypertension   • JOAQUIN (acute kidney injury) ([address] )   • Essential hypertension         Past Medical History:  Past Medical History:   Diagnosis Date   • Arthritis    • Asthma    • Basal cell carcinoma 02/11/2020    Right alar groove   • HL (hearing loss)    • Osteoporosis    • Shingles        Past Surgical History:  Past Surgical History:   Procedure Laterality Date   • COCHLEAR IMPLANT     • COLONOSCOPY  2017   • COLONOSCOPY  2010    fiberoptic; follow up at age; 2017 10 year f/u   • HYSTERECTOMY      age 30   • MOHS SURGERY  2020     Right alar groove   • OOPHORECTOMY      age 30   • SKIN BIOPSY     • TONSILLECTOMY         Family History:  Family History   Problem Relation Age of Onset   • Osteoporosis Mother    • Hearing loss Mother         she just found out   • Diabetes Father            • Heart disease Father            • Hypertension Father            • Alcohol abuse Father            • Dementia Father    • Stroke Father            • Lung cancer Maternal Grandmother         76s   • Cancer Maternal Grandmother            • Heart disease Paternal Grandmother            • Diabetes Paternal Grandmother            • No Known Problems Maternal Aunt    • Lung cancer Maternal Grandfather    • Cancer Maternal Grandfather            • Hearing loss Maternal Grandfather    • Prostate cancer Paternal Grandfather            • Cancer Paternal Grandfather            • Colon cancer Other    • Pancreatic cancer Other    • Alcohol abuse Brother    • Hypertension Brother    • Diabetes Brother        Social History:  Social History     Socioeconomic History   • Marital status: Single     Spouse name: Not on file   • Number of children: Not on file   • Years of education: Not on file   • Highest education level: Not on file   Occupational History   • Not on file   Tobacco Use   • Smoking status: Never   • Smokeless tobacco: Never   Vaping Use   • Vaping Use: Never used   Substance and Sexual Activity   • Alcohol use: Never   • Drug use: No   • Sexual activity: Never   Other Topics Concern   • Not on file   Social History Narrative    Caffeine use     Social Determinants of Health     Financial Resource Strain: Not on file   Food Insecurity: Not on file   Transportation Needs: Not on file   Physical Activity: Not on file   Stress: Not on file   Social Connections: Not on file   Intimate Partner Violence: Not on file   Housing Stability: Not on file       Objective     Vitals:    12/29/22 0801   BP: 140/86   Pulse: 67   Resp: 18   Temp: 97 6 °F (36 4 °C)   SpO2: 99%     Wt Readings from Last 3 Encounters:   12/29/22 62 kg (136 lb 9 6 oz)   12/04/22 51 8 kg (114 lb 3 2 oz)   11/03/22 62 1 kg (137 lb)       Physical Exam  Vitals and nursing note reviewed  Constitutional:       General: She is not in acute distress  Appearance: Normal appearance  She is not ill-appearing or toxic-appearing  Comments: APPEARS YOUNGER THAN STATED AGE   HENT:      Head: Normocephalic  Right Ear: Tympanic membrane normal       Left Ear: Tympanic membrane normal       Ears:      Comments: HEARING AIDS REMOVED B/L  NORMAL TMS     Nose: No congestion or rhinorrhea  Mouth/Throat:      Mouth: Mucous membranes are moist       Pharynx: Oropharynx is clear  No oropharyngeal exudate or posterior oropharyngeal erythema  Comments: Mucous membranes moist and pink  No tonsillar exudates    Eyes:      General: No scleral icterus  Conjunctiva/sclera: Conjunctivae normal       Pupils: Pupils are equal, round, and reactive to light  Neck:      Vascular: No carotid bruit  Cardiovascular:      Rate and Rhythm: Normal rate and regular rhythm  Pulses: Normal pulses  Heart sounds: Normal heart sounds  No murmur heard  Pulmonary:      Effort: Pulmonary effort is normal  No respiratory distress  Breath sounds: Normal breath sounds  No stridor  No wheezing, rhonchi or rales  Abdominal:      General: Abdomen is flat  There is no distension  Palpations: There is no mass  Tenderness: There is no abdominal tenderness  There is no guarding or rebound  Hernia: No hernia is present  Musculoskeletal:         General: No swelling, tenderness or deformity  Cervical back: Normal range of motion and neck supple  No rigidity  No muscular tenderness  Right lower leg: No edema        Left lower leg: No edema  Comments: No calf tenderness   Lymphadenopathy:      Cervical: No cervical adenopathy  Skin:     General: Skin is warm  Capillary Refill: Capillary refill takes less than 2 seconds  Coloration: Skin is not jaundiced or pale  Findings: No lesion or rash  Comments: SKIN TAGS  AND CYSTS x 2 ON FACE (LEFT CHEEK AND ABOVE RIGHT EYE MEDIALLY)     Neurological:      General: No focal deficit present  Mental Status: She is alert and oriented to person, place, and time  Cranial Nerves: No cranial nerve deficit  Sensory: No sensory deficit  Motor: No weakness  Gait: Gait normal       Deep Tendon Reflexes: Reflexes normal    Psychiatric:         Mood and Affect: Mood normal          Behavior: Behavior normal          Thought Content:  Thought content normal          Judgment: Judgment normal          Pertinent Laboratory/Diagnostic Studies:  Lab Results   Component Value Date    BUN 16 11/15/2022    CREATININE 0 90 11/15/2022    CALCIUM 9 5 11/15/2022     06/20/2018    K 4 1 11/15/2022    CO2 29 11/15/2022     11/15/2022     Lab Results   Component Value Date    ALT 22 09/29/2022    AST 25 09/29/2022    ALKPHOS 43 09/29/2022    BILITOT 0 7 06/20/2018       Lab Results   Component Value Date    WBC 6 29 09/29/2022    HGB 10 6 (L) 09/29/2022    HCT 31 6 (L) 09/29/2022    MCV 94 09/29/2022     09/29/2022       No results found for: TSH    Lab Results   Component Value Date    CHOL 211 (H) 06/20/2018     Lab Results   Component Value Date    TRIG 153 (H) 09/28/2022     Lab Results   Component Value Date    HDL 68 09/28/2022     Lab Results   Component Value Date    LDLCALC 103 (H) 09/28/2022     Lab Results   Component Value Date    HGBA1C 5 4 09/28/2022       Results for orders placed or performed in visit on 11/07/22   PTH, intact   Result Value Ref Range     3 (H) 18 4 - 80 1 pg/mL   Vitamin D 25 hydroxy   Result Value Ref Range    Vit D, 25-Hydroxy 44 9 30 0 - 100 0 ng/mL   Renal function panel   Result Value Ref Range    Albumin 4 5 3 5 - 5 0 g/dL    Calcium 9 5 8 4 - 10 2 mg/dL    Phosphorus 4 6 2 5 - 4 8 mg/dL    BUN 16 5 - 25 mg/dL    Creatinine 0 90 0 60 - 1 20 mg/dL    Sodium 141 135 - 147 mmol/L    Potassium 4 1 3 5 - 5 3 mmol/L    Chloride 103 96 - 108 mmol/L    CO2 29 21 - 32 mmol/L    ANION GAP 9 5 - 14 mmol/L    eGFR 67 ml/min/1 73sq m    Glucose, Fasting 139 (H) 70 - 99 mg/dL       No orders of the defined types were placed in this encounter  ALLERGIES:  Allergies   Allergen Reactions   • Codeine      Reaction Date: [id number];    • Darvon [Propoxyphene]    • Demerol [Meperidine]    • Valium [Diazepam]    • Aspirin      Reaction Date: [id number];        Current Medications     Current Outpatient Medications   Medication Sig Dispense Refill   • acetaminophen (TYLENOL) 500 mg tablet Take 1 tablet (500 mg total) by mouth every 6 (six) hours as needed for mild pain 30 tablet 0   • cyclobenzaprine (FLEXERIL) 10 mg tablet Take 1 tablet (10 mg total) by mouth 2 (two) times a day as needed for muscle spasms 20 tablet 0   • Denosumab (PROLIA SC) Inject under the skin     • losartan (COZAAR) 100 MG tablet Take 1 tablet (100 mg total) by mouth daily 90 tablet 1   • Multiple Vitamin (MULTIVITAMIN) capsule Take 1 capsule by mouth daily     • lidocaine (LIDODERM) 5 % Apply 1 patch topically every 24 hours for 15 days Remove & Discard patch within 12 hours or as directed by MD 15 patch 0     No current facility-administered medications for this visit           Health Maintenance     Health Maintenance   Topic Date Due   • Hepatitis B Vaccine (1 of 3 - 3-dose series) Never done   • BMI: Followup Plan  06/08/2021   • PT PLAN OF CARE  10/17/2021   • COVID-19 Vaccine (4 - Booster for Pfizer series) 02/12/2022   • Influenza Vaccine (1) 09/01/2022   • Fall Risk  10/11/2022   • Urinary Incontinence Screening  Never done   • Pneumococcal Vaccine: 65+ Years (3 - PPSV23 if available, else PCV20) 10/11/2022   • Depression Screening  06/28/2023   • Annual Physical  12/19/2023 (Originally 6/30/2022)   • BMI: Adult  12/29/2023   • Breast Cancer Screening: Mammogram  10/05/2024   • Colorectal Cancer Screening  04/12/2027   • DTaP,Tdap,and Td Vaccines (3 - Td or Tdap) 06/11/2029   • HIV Screening  Completed   • Hepatitis C Screening  Completed   • Osteoporosis Screening  Completed   • HIB Vaccine  Aged Out   • IPV Vaccine  Aged Out   • Hepatitis A Vaccine  Aged Out   • Meningococcal ACWY Vaccine  Aged Out   • HPV Vaccine  Aged Out     Immunization History   Administered Date(s) Administered   • COVID-19 PFIZER VACCINE 0 3 ML IM 12/21/2020, 01/11/2021, 12/18/2021   • H1N1, All Formulations 11/10/2009   • INFLUENZA 10/24/2018, 10/20/2020, 10/03/2021   • Influenza, seasonal, injectable 10/13/2017   • Pneumococcal Conjugate 13-Valent 04/17/2014   • Pneumococcal Polysaccharide PPV23 05/29/2014   • Tdap 06/03/2017, 06/11/2019   • Zoster 10/13/2017   • Zoster Vaccine Recombinant 10/27/2020, 02/11/2021          Elsy Raymundo DO

## 2022-12-29 NOTE — PATIENT INSTRUCTIONS
Reach out to dr Tashi Ruiz [facility] in feb - after nephrology appt - to discuss whether or not to restart calcium    Avoid using any nsaids (ie: motrin like products)  Tylenol only    Prevnar 20 given today    Fasting labs for dr Tashi Ruiz in 6 mos and then appointment  So labs for neprhology prior to feb appt  Wellness Visit for Adults   AMBULATORY CARE:   A wellness visit  is when you see your healthcare provider to get screened for health problems  Your healthcare provider will also give you advice on how to stay healthy  Write down your questions so you remember to ask them  Ask your healthcare provider how often you should have a wellness visit  What happens at a wellness visit:  Your healthcare provider will ask about your health, and your family history of health problems  This includes high blood pressure, heart disease, and cancer  He or she will ask if you have symptoms that concern you, if you smoke, and about your mood  You may also be asked about your intake of medicines, supplements, food, and alcohol  Any of the following may be done: Your weight  will be checked  Your height may also be checked so your body mass index (BMI) can be calculated  Your BMI shows if you are at a healthy weight  Your blood pressure  and heart rate will be checked  Your temperature may also be checked  Blood and urine tests  may be done  Blood tests may be done to check your cholesterol levels  Abnormal cholesterol levels increase your risk for heart disease and stroke  You may also need a blood or urine test to check for diabetes if you are at increased risk  Urine tests may be done to look for signs of an infection or kidney disease  A physical exam  includes checking your heartbeat and lungs with a stethoscope  Your healthcare provider may also check your skin to look for sun damage  Screening tests  may be recommended  A screening test is done to check for diseases that may not cause symptoms   The screening tests you may need depend on your age, gender, family history, and lifestyle habits  For example, colorectal screening may be recommended if you are 48years old or older  Screening tests you need if you are a woman:   A Pap smear  is used to screen for cervical cancer  Pap smears are usually done every 3 to 5 years depending on your age  You may need them more often if you have had abnormal Pap smear test results in the past  Ask your healthcare provider how often you should have a Pap smear  A mammogram  is an x-ray of your breasts to screen for breast cancer  Experts recommend mammograms every 2 years starting at age 48 years  You may need a mammogram at age 52 years or younger if you have an increased risk for breast cancer  Talk to your healthcare provider about when you should start having mammograms and how often you need them  Vaccines you may need:   Get an influenza vaccine  every year  The influenza vaccine protects you from the flu  Several types of viruses cause the flu  The viruses change over time, so new vaccines are made each year  Get a tetanus-diphtheria (Td) booster vaccine  every 10 years  This vaccine protects you against tetanus and diphtheria  Tetanus is a severe infection that may cause painful muscle spasms and lockjaw  Diphtheria is a severe bacterial infection that causes a thick covering in the back of your mouth and throat  Get a human papillomavirus (HPV) vaccine  if you are female and aged 23 to 32 or male 23 to 24 and never received it  This vaccine protects you from HPV infection  HPV is the most common infection spread by sexual contact  HPV may also cause vaginal, penile, and anal cancers  Get a pneumococcal vaccine  if you are aged 72 years or older  The pneumococcal vaccine is an injection given to protect you from pneumococcal disease  Pneumococcal disease is an infection caused by pneumococcal bacteria   The infection may cause pneumonia, meningitis, or an ear infection  Get a shingles vaccine  if you are 60 or older, even if you have had shingles before  The shingles vaccine is an injection to protect you from the varicella-zoster virus  This is the same virus that causes chickenpox  Shingles is a painful rash that develops in people who had chickenpox or have been exposed to the virus  How to eat healthy:  My Plate is a model for planning healthy meals  It shows the types and amounts of foods that should go on your plate  Fruits and vegetables make up about half of your plate, and grains and protein make up the other half  A serving of dairy is included on the side of your plate  The amount of calories and serving sizes you need depends on your age, gender, weight, and height  Examples of healthy foods are listed below:  Eat a variety of vegetables  such as dark green, red, and orange vegetables  You can also include canned vegetables low in sodium (salt) and frozen vegetables without added butter or sauces  Eat a variety of fresh fruits , canned fruit in 100% juice, frozen fruit, and dried fruit  Include whole grains  At least half of the grains you eat should be whole grains  Examples include whole-wheat bread, wheat pasta, brown rice, and whole-grain cereals such as oatmeal     Eat a variety of protein foods such as seafood (fish and shellfish), lean meat, and poultry without skin (turkey and chicken)  Examples of lean meats include pork leg, shoulder, or tenderloin, and beef round, sirloin, tenderloin, and extra lean ground beef  Other protein foods include eggs and egg substitutes, beans, peas, soy products, nuts, and seeds  Choose low-fat dairy products such as skim or 1% milk or low-fat yogurt, cheese, and cottage cheese  Limit unhealthy fats  such as butter, hard margarine, and shortening  Exercise:  Exercise at least 30 minutes per day on most days of the week  Some examples of exercise include walking, biking, dancing, and swimming  You can also fit in more physical activity by taking the stairs instead of the elevator or parking farther away from stores  Include muscle strengthening activities 2 days each week  Regular exercise provides many health benefits  It helps you manage your weight, and decreases your risk for type 2 diabetes, heart disease, stroke, and high blood pressure  Exercise can also help improve your mood  Ask your healthcare provider about the best exercise plan for you  General health and safety guidelines:   Do not smoke  Nicotine and other chemicals in cigarettes and cigars can cause lung damage  Ask your healthcare provider for information if you currently smoke and need help to quit  E-cigarettes or smokeless tobacco still contain nicotine  Talk to your healthcare provider before you use these products  Limit alcohol  A drink of alcohol is 12 ounces of beer, 5 ounces of wine, or 1½ ounces of liquor  Lose weight, if needed  Being overweight increases your risk of certain health conditions  These include heart disease, high blood pressure, type 2 diabetes, and certain types of cancer  Protect your skin  Do not sunbathe or use tanning beds  Use sunscreen with a SPF 15 or higher  Apply sunscreen at least 15 minutes before you go outside  Reapply sunscreen every 2 hours  Wear protective clothing, hats, and sunglasses when you are outside  Drive safely  Always wear your seatbelt  Make sure everyone in your car wears a seatbelt  A seatbelt can save your life if you are in an accident  Do not use your cell phone when you are driving  This could distract you and cause an accident  Pull over if you need to make a call or send a text message  Practice safe sex  Use latex condoms if are sexually active and have more than one partner  Your healthcare provider may recommend screening tests for sexually transmitted infections (STIs)  Wear helmets, lifejackets, and protective gear    Always wear a helmet when you ride a bike or motorcycle, go skiing, or play sports that could cause a head injury  Wear protective equipment when you play sports  Wear a lifejacket when you are on a boat or doing water sports  © Copyright [employer] 2022 Information is for End User's use only and may not be sold, redistributed or otherwise used for commercial purposes  All illustrations and images included in CareNotes® are the copyrighted property of A [employer]  or Alphonso Polo   The above information is an  only  It is not intended as medical advice for individual conditions or treatments  Talk to your doctor, nurse or pharmacist before following any medical regimen to see if it is safe and effective for you

## 2023-01-10 ENCOUNTER — HOSPITAL ENCOUNTER (OUTPATIENT)
Dept: CT IMAGING | Facility: HOSPITAL | Age: 68
Discharge: HOME | End: 2023-01-10

## 2023-01-10 DIAGNOSIS — M54.50 LOW BACK PAIN, UNSPECIFIED BACK PAIN LATERALITY, UNSPECIFIED CHRONICITY, UNSPECIFIED WHETHER SCIATICA PRESENT: ICD-10-CM

## 2023-01-11 ENCOUNTER — EVALUATION (OUTPATIENT)
Dept: PHYSICAL THERAPY | Facility: MEDICAL CENTER | Age: 68
End: 2023-01-11

## 2023-01-11 DIAGNOSIS — M54.50 CHRONIC BILATERAL LOW BACK PAIN WITHOUT SCIATICA: Primary | ICD-10-CM

## 2023-01-11 DIAGNOSIS — G89.29 CHRONIC BILATERAL LOW BACK PAIN WITHOUT SCIATICA: Primary | ICD-10-CM

## 2023-01-11 NOTE — PROGRESS NOTES
PT Evaluation     Today's date: 2023  Patient name: Rodrigo Wilson  : 1955  MRN: [id number]  Referring provider: Lili Garg DO  Dx:   Encounter Diagnosis     ICD-10-CM    1  Chronic bilateral low back pain without sciatica  M54 50 Ambulatory Referral to Physical Therapy    G89 29                      Assessment  Assessment details: Rodrigo Wilson is a 74 y o  female was evaluated on 2023  for Chronic bilateral low back pain without sciatica  (primary encounter diagnosis)  Rodrigo Wilson has the above listed impairments resulting in functional deficits and negative impact to quality of life  Patient is appropriate for skilled PT intervention to promote maximal return to function and patient specific goals  Patient agrees with outlined treatment plan and all questions were answered to their satisfaction  Impairments: abnormal muscle firing, abnormal muscle tone, abnormal or restricted ROM, impaired physical strength, lacks appropriate home exercise program and pain with function  Understanding of Dx/Px/POC: good   Prognosis: good    Goals  Patient will successfully transition to home exercise program   Patient will be able to manage symptoms independently      Imelda Jimenez will report 50% reduction in pain   Danielle will report no pain bending to floor     Plan  Patient would benefit from: skilled PT  Referral necessary: No  Planned modality interventions: thermotherapy: hydrocollator packs  Planned therapy interventions: home exercise program, manual therapy, neuromuscular re-education, patient education, functional ROM exercises, strengthening, stretching, joint mobilization, graded activity, graded exercise, therapeutic exercise, body mechanics training, motor coordination training and activity modification  Frequency: 1x week  Duration in weeks: 12  Treatment plan discussed with: patient        Subjective Evaluation    History of Present Illness  Mechanism of injury: Marquita Jaffe is a 74 y o  female presenting to therapy with complaints of low back pain since early December  She reports no injury, just notes that her back began to spasm and she feels a lot of pressure  Denies numbness or tingling into LE, only notes central low back pain and radiation to buttock area  She is taking Tylenol as needed  Awaiting CT results for low back   Pain  Current pain ratin  At best pain rating: 3  At worst pain ratin  Quality: dull ache, cramping and squeezing    Patient Goals  Patient goals for therapy: decreased pain, increased motion, independence with ADLs/IADLs, increased strength and return to sport/leisure activities          Objective   Red Flag Screening:  History Cancer (-)  Bowl/Bladder dysfunction (-)  Night pain (-)  Unexplained weight loss (-)     Dermatomes: WNL   Myotomes:   WNL    Reflexes:   Patellar R 2+, L 2+  Achilles R 2+, L 2+      Lumbar:  AROM:   Flexion Moderate restriction   Extension Moderate restriction  Side bending Moderate restriction  Rotation Moderate restriction     Repeated Motion Testing: Not assessed    Active Motion Observations: Very guarded due to high pain irritability       PAIVM: Not assessed due to pain irritability     Special Tests:   Crossed SLR (-), SLR (-) Prone instability (-)   Neural Dynamic Testing: Tibial Bias (-), Peroneal Bias (-)   Slump Testing (-) with and without axial compression       Hip   Deferred   Special Tests: JOAQUIN (-), FADIR (-)                           Precautions: None       Manuals             Theragun to lumbar paraspinals                                                     Neuro Re-Ed                                                                                                        Ther Ex             LTR 30                                                                                                       Ther Activity                                       Gait Training Modalities

## 2023-01-17 ENCOUNTER — TELEPHONE (OUTPATIENT)
Dept: OBGYN CLINIC | Facility: MEDICAL CENTER | Age: 68
End: 2023-01-17

## 2023-01-17 ENCOUNTER — OFFICE VISIT (OUTPATIENT)
Dept: PHYSICAL THERAPY | Facility: MEDICAL CENTER | Age: 68
End: 2023-01-17

## 2023-01-17 DIAGNOSIS — G89.29 CHRONIC BILATERAL LOW BACK PAIN WITHOUT SCIATICA: Primary | ICD-10-CM

## 2023-01-17 DIAGNOSIS — M54.50 CHRONIC BILATERAL LOW BACK PAIN WITHOUT SCIATICA: Primary | ICD-10-CM

## 2023-01-17 NOTE — TELEPHONE ENCOUNTER
Good Morning Dr Molly Constantino and Dr Gavin Horton,    Patient stopped in [facility] office requesting transfer of care  Patient last saw Dr Molly Constantino 3/23/22  Patient just had CT of lumbar spine performed 1/10/23 and was advised by physical therapy to follow up with pain management  Reason for ANA LAURA: [facility] and [place] [facility] [address] so she would prefer to keep her care all in one location  She does live in [place] and knows there is a location there but she would like to come to [facility]  Please advise if ok to transfer care  Thank you

## 2023-01-17 NOTE — PROGRESS NOTES
Daily Note     Today's date: 2023  Patient name: Melanie Ruiz  : 1955  MRN: [id number]  Referring provider: Dorothy Nunez DO  Dx:   Encounter Diagnosis     ICD-10-CM    1  Chronic bilateral low back pain without sciatica  M54 50     G89 29                      Subjective: Danielle reports doing very well after evaluation and use of theragun to her lumbar paraspinals  Objective: See treatment diary below      Assessment: Tolerated treatment well  Patient again responded well to [facility] with use of theragun to lumbar paraspinals  Able to perform more mobility exercises today without as much discomfort  Progress as able      Plan: Continue per plan of care        Precautions: None       Manuals             Theragun to lumbar paraspinals  AF                                                   Neuro Re-Ed                                                                                                        Ther Ex             LTR 30            PB roll out 20            PB rolls up wall 30            Scapular retractions 30 yellow                                                                  Ther Activity                                       Gait Training                                       Modalities

## 2023-01-18 ENCOUNTER — APPOINTMENT (OUTPATIENT)
Dept: LAB | Facility: HOSPITAL | Age: 68
End: 2023-01-18

## 2023-01-18 DIAGNOSIS — M81.8 OTHER OSTEOPOROSIS WITHOUT CURRENT PATHOLOGICAL FRACTURE: ICD-10-CM

## 2023-01-21 ENCOUNTER — APPOINTMENT (OUTPATIENT)
Dept: LAB | Facility: HOSPITAL | Age: 68
End: 2023-01-21

## 2023-01-21 LAB
25(OH)D3 SERPL-MCNC: 39.8 NG/ML (ref 30–100)
CALCIUM SERPL-MCNC: 9.8 MG/DL (ref 8.4–10.2)
PTH-INTACT SERPL-MCNC: 74.5 PG/ML (ref 18.4–80.1)

## 2023-01-24 ENCOUNTER — OFFICE VISIT (OUTPATIENT)
Dept: PHYSICAL THERAPY | Facility: MEDICAL CENTER | Age: 68
End: 2023-01-24

## 2023-01-24 DIAGNOSIS — G89.29 CHRONIC BILATERAL LOW BACK PAIN WITHOUT SCIATICA: Primary | ICD-10-CM

## 2023-01-24 DIAGNOSIS — M54.50 CHRONIC BILATERAL LOW BACK PAIN WITHOUT SCIATICA: Primary | ICD-10-CM

## 2023-01-24 NOTE — PROGRESS NOTES
Daily Note     Today's date: 2023  Patient name: Tri Lux  : 1955  MRN: [id number]  Referring provider: Jacy Benoit DO  Dx:   Encounter Diagnosis     ICD-10-CM    1  Chronic bilateral low back pain without sciatica  M54 50     G89 29                      Subjective: Danielle reports doing fairly well, still with right sided low back pain and right hip pain  Responds to theragun well  Objective: See treatment diary below      Assessment: Tolerated treatment well  Patient able to complete lumbar mobility exercises, more right sided hip soreness than L  No increase in symptoms at conclusion of session        Plan: Continue per plan of care        Precautions: None       Manuals             Theragun to lumbar paraspinals  AF                                                   Neuro Re-Ed                                                                                                        Ther Ex             LTR 30            PB roll out 20            PB rolls up wall 30            Scapular retractions 30 yellow              Seated hip ER stretch on stool 10 sec  X 5                                                   Ther Activity                                       Gait Training                                       Modalities

## 2023-01-31 ENCOUNTER — APPOINTMENT (OUTPATIENT)
Dept: PHYSICAL THERAPY | Facility: MEDICAL CENTER | Age: 68
End: 2023-01-31

## 2023-02-14 ENCOUNTER — APPOINTMENT (OUTPATIENT)
Dept: LAB | Age: 68
End: 2023-02-14

## 2023-02-14 DIAGNOSIS — E83.52 HYPERCALCEMIA: ICD-10-CM

## 2023-02-14 LAB
25(OH)D3 SERPL-MCNC: 43.7 NG/ML (ref 30–100)
ALBUMIN SERPL BCP-MCNC: 3.9 G/DL (ref 3.5–5)
ANION GAP SERPL CALCULATED.3IONS-SCNC: 5 MMOL/L (ref 4–13)
BUN SERPL-MCNC: 17 MG/DL (ref 5–25)
CALCIUM SERPL-MCNC: 9.3 MG/DL (ref 8.3–10.1)
CHLORIDE SERPL-SCNC: 110 MMOL/L (ref 96–108)
CO2 SERPL-SCNC: 23 MMOL/L (ref 21–32)
CREAT SERPL-MCNC: 0.8 MG/DL (ref 0.6–1.3)
GFR SERPL CREATININE-BSD FRML MDRD: 77 ML/MIN/1.73SQ M
GLUCOSE P FAST SERPL-MCNC: 95 MG/DL (ref 65–99)
PHOSPHATE SERPL-MCNC: 2.8 MG/DL (ref 2.3–4.1)
POTASSIUM SERPL-SCNC: 3.8 MMOL/L (ref 3.5–5.3)
SODIUM SERPL-SCNC: 138 MMOL/L (ref 135–147)

## 2023-02-16 ENCOUNTER — OFFICE VISIT (OUTPATIENT)
Dept: NEPHROLOGY | Facility: CLINIC | Age: 68
End: 2023-02-16

## 2023-02-16 VITALS
HEIGHT: 57 IN | WEIGHT: 139 LBS | HEART RATE: 51 BPM | BODY MASS INDEX: 29.99 KG/M2 | DIASTOLIC BLOOD PRESSURE: 68 MMHG | OXYGEN SATURATION: 99 % | SYSTOLIC BLOOD PRESSURE: 130 MMHG

## 2023-02-16 DIAGNOSIS — M81.0 AGE-RELATED OSTEOPOROSIS WITHOUT CURRENT PATHOLOGICAL FRACTURE: ICD-10-CM

## 2023-02-16 DIAGNOSIS — I10 PRIMARY HYPERTENSION: ICD-10-CM

## 2023-02-16 DIAGNOSIS — Z86.39 HISTORY OF HYPERCALCEMIA: Primary | ICD-10-CM

## 2023-02-16 NOTE — PROGRESS NOTES
NEPHROLOGY OFFICE VISIT   Adrien Paula 74 y o  female MRN: [id number]  2/16/2023    Reason for Visit: Follow-up hypercalcemia and acute kidney injury    ASSESSMENT and PLAN:  It was a pleasure evaluating your patient in the office today  Thank you for allowing our team to participate in the care of Ms Danielle Sandoval  Please do not hesitate to contact our team if further issues/questions shall arise in the interim       # Acute kidney injury 09/2022 (currently resolved)  - Etiology was thought to be due to prerenal JOAQUIN, hypercalcemia causing vasoconstriction, hemodynamic changes in setting of thiazide and ARB use   - Admission creatinine 1 74 09/28/2022   - Discharge creatinine 0 95 09/30/2022   - Follow-up creatinine 0 80 02/2023    # Evaluation of renal function  - Risk factors for CKD: Hypertension, previous episode of JOAQUIN  - Baseline Cr: Around 0 9, most recently 0 80 02/2023  - Urinalysis: Proteinuria, no hematuria  - Proteinuria: No  - Imaging: Right kidney 10 2 cm, left kidney 10 cm, normal echogenicity and contour 11/2022  - Discussed avoidance of NSAIDs due to their short-term and long-term effects on kidney function     # Hypercalcemia   - Etiology was thought to be in setting of taking calcium supplements + use of thiazide  - Admission calcium 13 5 09/28/2022   - Discharge calcium 8 0 09/30/2022   - Follow-up calcium 9 3 02/2023  - PTH was suppressed at 16 4 in setting of hypercalcemia in 09/2022  - PTH went up to 102 in 11/2022 after improvement in calcium  - PTH now stabilized at 74 5 01/2023  - 25 hydroxyvitamin D level nikkie within normal limits at 43 7 in 02/2023  - Patient wants to go back on her calcium supplements due to history of osteoporosis   - Okay to resume low-dose calcium and vitamin D, start Os-Kehinde D 250 mg - 3 125 mcg daily  - Continue to follow lab work including calcium and PTH every 3 months    # Hypertension  - Goal blood pressure less than 130/80  - Current medication: Losartan 100 mg daily  and amlodipine 2 5 mg daily  - Blood pressure is well controlled and therefore would not make any changes to antihypertensive regimen today    # Osteoporosis   - Most recent DEXA scan with increase in bone mineral density 10/2022  - On Denosumab every 6 months, peviously on Fosamax  - Denosumab can lead to hypocalcemia, continue to monitor serum calcium levels every 3 months  - Resumed low-dose calcium and vitamin D as above    # Follow-up  - Repeat labs in 3 months  - Office visit in 6 months    HPI:  Since last visit: Patient continues to have issues with low back pain  She is seeing orthopedics and underwent CT of her spine that showed degenerative spondylosis  Otherwise she is feeling okay  She has frequency of urination and she is drinking a lot of fluids  At times he would also have a sense of incontinence  She denies dyspnea  She denies leg swelling  71-year-old female with history of hypertension and osteoporosis was admitted to the hospital with JOAQUIN and hypercalcemia in 09/2022  Etiology was thought to be a combination of calcium supplements and hydrochlorothiazide  She received IV fluids and her renal function and calcium levels improved        >> Medical history evaluation   - Diabetes: No  - Hypertension: Yes    - Age ? 56 years: Yes   - Family history of kidney disease: No  - Obesity or metabolic syndrome: Yes   - Prior kidney disease or dialysis: JOAQUIN in 10/2022  - Incidental hematuria in the past: No  - Urinary symptoms: No  - History of foamy or frothy urine: No  - History of nephrolithiasis: No  - Diseases that share risk factors with CKD: HTN  - Systemic diseases that might affect kidney: No  - History of use of medications that might affect renal function: No    PATIENT INSTRUCTIONS:    Patient Instructions   Your kidney numbers are looking good  Your calcium is looking good  You can start low-dose calcium and vitamin D    Continue to check your lab work every 3 months  We will bring you back to office in 6 months  OBJECTIVE:  Current Weight: Weight - Scale: 63 kg (139 lb)  Vitals:    23 0752   BP: 130/68   BP Location: Left arm   Patient Position: Sitting   Cuff Size: Adult   Pulse: (!) 51   SpO2: 99%   Weight: 63 kg (139 lb)   Height: 4' 9" (1 448 m)    Body mass index is 30 08 kg/m²  REVIEW OF SYSTEMS:    Review of Systems   Constitutional: Negative for chills and fever  HENT: Negative for ear pain and sore throat  Eyes: Negative for pain and visual disturbance  Respiratory: Negative for cough and shortness of breath  Cardiovascular: Negative for chest pain and palpitations  Gastrointestinal: Negative for abdominal pain and vomiting  Genitourinary: Positive for frequency  Negative for dysuria and hematuria  Musculoskeletal: Positive for back pain  Negative for arthralgias  Skin: Negative for color change and rash  Neurological: Negative for seizures and syncope  All other systems reviewed and are negative        Past Medical History:   Diagnosis Date   • Arthritis    • Asthma    • Basal cell carcinoma 2020    Right alar groove   • HL (hearing loss)    • Osteoporosis    • Shingles        Past Surgical History:   Procedure Laterality Date   • COCHLEAR IMPLANT     • COLONOSCOPY     • COLONOSCOPY      fiberoptic; follow up at age; 2017 12 year f/u   • HYSTERECTOMY      age 30   • MOHS SURGERY  2020     Right alar groove   • OOPHORECTOMY      age 30   • SKIN BIOPSY     • TONSILLECTOMY         Family History   Problem Relation Age of Onset   • Osteoporosis Mother    • Hearing loss Mother         she just found out   • Diabetes Father            • Heart disease Father            • Hypertension Father            • Alcohol abuse Father            • Dementia Father    • Stroke Father            • Lung cancer Maternal Grandmother         76s   • Cancer Maternal Grandmother            • Heart disease Paternal Grandmother            • Diabetes Paternal Grandmother            • No Known Problems Maternal Aunt    • Lung cancer Maternal Grandfather    • Cancer Maternal Grandfather            • Hearing loss Maternal Grandfather    • Prostate cancer Paternal Grandfather            • Cancer Paternal Grandfather            • Colon cancer Other    • Pancreatic cancer Other    • Alcohol abuse Brother    • Hypertension Brother    • Diabetes Brother         Social History     Substance and Sexual Activity   Alcohol Use Never     Social History     Substance and Sexual Activity   Drug Use No     Social History     Tobacco Use   Smoking Status Never   Smokeless Tobacco Never       PHYSICAL EXAM:      Physical Exam  Constitutional:       Appearance: Normal appearance  HENT:      Head: Normocephalic and atraumatic  Mouth/Throat:      Mouth: Mucous membranes are moist       Pharynx: Oropharynx is clear  Cardiovascular:      Rate and Rhythm: Normal rate and regular rhythm  Pulses: Normal pulses  Heart sounds: Normal heart sounds  Pulmonary:      Effort: Pulmonary effort is normal       Breath sounds: Normal breath sounds  Abdominal:      General: Bowel sounds are normal       Palpations: Abdomen is soft  Musculoskeletal:         General: Normal range of motion  Right lower leg: No edema  Left lower leg: No edema  Skin:     General: Skin is warm and dry  Neurological:      General: No focal deficit present  Mental Status: She is alert and oriented to person, place, and time  Mental status is at baseline  Psychiatric:         Mood and Affect: Mood normal          Behavior: Behavior normal          Thought Content:  Thought content normal          Judgment: Judgment normal        Medications:    Current Outpatient Medications:   •  acetaminophen (TYLENOL) 500 mg tablet, Take 2 tabs po up to every 8 hours prn pain, Disp: 60 tablet, Rfl: 0  • amLODIPine (NORVASC) 2 5 mg tablet, Take 1 tablet (2 5 mg total) by mouth daily, Disp: 90 tablet, Rfl: 1  •  calcium carbonate-vitamin D 250 mg-3 125 mcg per tablet, Take 1 tablet by mouth daily, Disp: 90 tablet, Rfl: 3  •  cyclobenzaprine (FLEXERIL) 10 mg tablet, Take 1 tablet (10 mg total) by mouth 2 (two) times a day as needed for muscle spasms, Disp: 20 tablet, Rfl: 0  •  Denosumab (PROLIA SC), Inject under the skin, Disp: , Rfl:   •  losartan (COZAAR) 100 MG tablet, Take 1 tablet (100 mg total) by mouth daily, Disp: 90 tablet, Rfl: 1  •  Multiple Vitamin (MULTIVITAMIN) capsule, Take 1 capsule by mouth daily, Disp: , Rfl:   •  lidocaine (LIDODERM) 5 %, Apply 1 patch topically every 24 hours for 15 days Remove & Discard patch within 12 hours or as directed by MD, Disp: 15 patch, Rfl: 0    Laboratory Results:  Results from last 7 days   Lab Units 02/14/23  0744   POTASSIUM mmol/L 3 8   CHLORIDE mmol/L 110*   CO2 mmol/L 23   BUN mg/dL 17   CREATININE mg/dL 0 80   CALCIUM mg/dL 9 3   PHOSPHORUS mg/dL 2 8       Results for orders placed or performed in visit on 02/14/23   Renal function panel   Result Value Ref Range    Albumin 3 9 3 5 - 5 0 g/dL    Calcium 9 3 8 3 - 10 1 mg/dL    Phosphorus 2 8 2 3 - 4 1 mg/dL    BUN 17 5 - 25 mg/dL    Creatinine 0 80 0 60 - 1 30 mg/dL    Sodium 138 135 - 147 mmol/L    Potassium 3 8 3 5 - 5 3 mmol/L    Chloride 110 (H) 96 - 108 mmol/L    CO2 23 21 - 32 mmol/L    ANION GAP 5 4 - 13 mmol/L    eGFR 77 ml/min/1 73sq m    Glucose, Fasting 95 65 - 99 mg/dL   Vitamin D 25 hydroxy   Result Value Ref Range    Vit D, 25-Hydroxy 43 7 30 0 - 100 0 ng/mL

## 2023-02-16 NOTE — PATIENT INSTRUCTIONS
Your kidney numbers are looking good  Your calcium is looking good  You can start low-dose calcium and vitamin D  Continue to check your lab work every 3 months  We will bring you back to office in 6 months

## 2023-03-01 DIAGNOSIS — G89.29 ACUTE EXACERBATION OF CHRONIC LOW BACK PAIN: ICD-10-CM

## 2023-03-01 DIAGNOSIS — M54.50 ACUTE EXACERBATION OF CHRONIC LOW BACK PAIN: ICD-10-CM

## 2023-03-01 RX ORDER — ACETAMINOPHEN 500 MG
TABLET ORAL
Qty: 60 TABLET | Refills: 0 | Status: SHIPPED | OUTPATIENT
Start: 2023-03-01

## 2023-03-03 ENCOUNTER — OFFICE VISIT (OUTPATIENT)
Dept: PHYSICAL THERAPY | Facility: MEDICAL CENTER | Age: 68
End: 2023-03-03

## 2023-03-03 DIAGNOSIS — M54.50 CHRONIC BILATERAL LOW BACK PAIN WITHOUT SCIATICA: Primary | ICD-10-CM

## 2023-03-03 DIAGNOSIS — G89.29 CHRONIC BILATERAL LOW BACK PAIN WITHOUT SCIATICA: Primary | ICD-10-CM

## 2023-03-03 NOTE — PROGRESS NOTES
Daily Note     Today's date: 3/3/2023  Patient name: Melanie Ruiz  : 1955  MRN: [id number]  Referring provider: Dorothy Nunez DO  Dx:   Encounter Diagnosis     ICD-10-CM    1  Chronic bilateral low back pain without sciatica  M54 50     G89 29                      Subjective: Danielle reports she continues to have on and off back pain  Has spine consult this coming week       Objective: See treatment diary below      Assessment: Tolerated treatment well  Patient able to complete lumbar mobility exercises, more right sided hip soreness than L  No increase in symptoms at conclusion of session    Hold until after MD consult     Plan: Continue per plan of care        Precautions: None       Manuals 3/3            Theragun to lumbar paraspinals  AF                                                   Neuro Re-Ed                                                                                                        Ther Ex             LTR 30            PB roll out 20            PB rolls up wall 30            Scapular retractions 30 yellow              Seated hip ER stretch on stool 10 sec  X 5                                                   Ther Activity                                       Gait Training                                       Modalities

## 2023-03-09 ENCOUNTER — OFFICE VISIT (OUTPATIENT)
Dept: PAIN MEDICINE | Facility: MEDICAL CENTER | Age: 68
End: 2023-03-09

## 2023-03-09 VITALS
HEIGHT: 57 IN | HEART RATE: 62 BPM | OXYGEN SATURATION: 98 % | WEIGHT: 139 LBS | SYSTOLIC BLOOD PRESSURE: 148 MMHG | DIASTOLIC BLOOD PRESSURE: 85 MMHG | BODY MASS INDEX: 29.99 KG/M2

## 2023-03-09 DIAGNOSIS — M46.1 SACROILIITIS (HCC): Primary | ICD-10-CM

## 2023-03-09 DIAGNOSIS — M47.816 LUMBAR SPONDYLOSIS: ICD-10-CM

## 2023-03-09 NOTE — PATIENT INSTRUCTIONS
Lower Back Exercises   AMBULATORY CARE:   Lower back exercises  help heal and strengthen your back muscles to prevent another injury  Ask your healthcare provider if you need to see a physical therapist for more advanced exercises  Seek care immediately if:   You have severe pain that prevents you from moving  Call your doctor if:   Your pain becomes worse  You have new pain  You have questions or concerns about your condition or care  Do lower back exercises safely:   Do the exercises on a mat or firm surface (not on a bed)  A firm surface will support your spine and prevent low back pain  Move slowly and smoothly  Avoid fast or jerky motions  Breathe normally  Do not hold your breath  Stop if you feel pain  It is normal to feel some discomfort at first, but you should not feel pain  Regular exercise will help decrease your discomfort over time  Lower back exercises: Your healthcare provider may recommend that you do back exercises 10 to 30 minutes each day  He or she may also recommend that you do exercises 1 to 3 times each day  Ask your provider which exercises are best for you and how often to do them  Ankle pumps:  Lie on your back  Move your foot up (with your toes pointing toward your head)  Then, move your foot down (with your toes pointing away from you)  Repeat this exercise 10 times on each side  Heel slides:  Lie on your back  Slowly bend one leg and then straighten it  Next, bend the other leg and then straighten it  Repeat 10 times on each side  Pelvic tilt:  Lie on your back with your knees bent and feet flat on the floor  Place your arms in a relaxed position beside your body  Tighten the muscles of your abdomen and flatten your back against the floor  Hold for 5 seconds  Repeat 5 times  Back stretch:  Lie on your back with your hands behind your head  Bend your knees and turn the lower half of your body to one side   Hold this position for 10 seconds  Repeat 3 times on each side  Straight leg raises:  Lie on your back with one leg straight  Bend the other knee  Tighten your abdomen and then slowly lift the straight leg up about 6 to 12 inches off the floor  Hold for 1 to 5 seconds  Lower your leg slowly  Repeat 10 times on each leg  Knee-to-chest:  Lie on your back with your knees bent and feet flat on the floor  Pull one of your knees toward your chest and hold it there for 5 seconds  Return your leg to the starting position  Lift the other knee toward your chest and hold for 5 seconds  Do this 5 times on each side  Cat and camel:  Place your hands and knees on the floor  Arch your back upward toward the ceiling and lower your head  Round out your spine as much as you can  Hold for 5 seconds  Lift your head upward and push your chest downward toward the floor  Hold for 5 seconds  Do 3 sets or as directed  Wall squats:  Stand with your back against a wall  Tighten the muscles of your abdomen  Slowly lower your body until your knees are bent at a 45 degree angle  Hold this position for 5 seconds  Slowly move back up to a standing position  Repeat 10 times  Curl up:  Lie on your back with your knees bent and feet flat on the floor  Place your hands, palms down, underneath the curve in your lower back  Next, with your elbows on the floor, lift your shoulders and chest 2 to 3 inches  Keep your head in line with your shoulders  Hold this position for 5 seconds  When you can do this exercise without pain for 10 to 15 seconds, you may add a rotation  While your shoulders and chest are lifted off the ground, turn slightly to the left and hold  Repeat on the other side  Bird dog:  Place your hands and knees on the floor  Keep your wrists directly below your shoulders and your knees directly below your hips  Pull your belly button in toward your spine  Do not flatten or arch your back  Tighten your abdominal muscles  Raise one arm straight out so that it is aligned with your head  Next, raise the leg opposite your arm  Hold this position for 15 seconds  Lower your arm and leg slowly and change sides  Do 5 sets  Follow up with your doctor as directed:  Write down your questions so you remember to ask them during your visits  © Copyright [employer] 2022 Information is for End User's use only and may not be sold, redistributed or otherwise used for commercial purposes  The above information is an  only  It is not intended as medical advice for individual conditions or treatments  Talk to your doctor, nurse or pharmacist before following any medical regimen to see if it is safe and effective for you

## 2023-03-09 NOTE — PROGRESS NOTES
Assessment:  1  Sacroiliitis ([address] )    2  Lumbar spondylosis        Plan:  While the patient was in the office today, I did have a thorough conversation regarding their chronic pain syndrome and treatment plan options  Based on patient report and physical exam, the patient's symptomatology does seem to be consistent with sacroiliac mediated pain from sacroiliitis  I have recommended a right SIJ injection to decrease any inflammatory component of the patient's pain symptoms  The patient wishes to stay conservative and attempt physical therapy prior to scheduling any interventional treatment  I told the patient to call us if she decides to proceed with the injection  Otherwise continue Tylenol as needed for pain and proceed with physical therapy  My impressions and treatment recommendations were discussed in detail with the patient who verbalized understanding and had no further questions  Discharge instructions were provided  I personally saw and examined the patient and I agree with the above discussed plan of care  No orders of the defined types were placed in this encounter  No orders of the defined types were placed in this encounter  History of Present Illness:  Loreto Bush is a 74 y o  female who presents for a follow up office visit in regards to low back pain  The patient’s current symptoms include chronic low back pain that she presently rates a 9 out of 10 on the pain scale describes it as an intermittent sharp and pressure-like pain  The pain is localized to the right low back  She occasionally has referred pain to the right hip  She has difficulty with prolonged sitting, bending and lifting her right leg up to put her shoes on in the morning  The patient did have a CT scan done of the lumbar spine which revealed some degenerative changes and she previously had a hip x-ray which was within normal limits      I have personally reviewed and/or updated the patient's past medical history, past surgical history, family history, social history, current medications, allergies, and vital signs today  Review of Systems   Respiratory: Negative for shortness of breath  Cardiovascular: Negative for chest pain  Gastrointestinal: Negative for constipation, diarrhea, nausea and vomiting  Musculoskeletal: Positive for back pain, gait problem and myalgias  Negative for arthralgias and joint swelling  Skin: Negative for rash  Neurological: Negative for dizziness, seizures and weakness  All other systems reviewed and are negative        Patient Active Problem List   Diagnosis   • Elevated BP without diagnosis of hypertension   • Hypercalcemia   • Osteoporosis   • Sensorineural hearing loss (SNHL) of both ears   • Oral mucosal lesion   • Epistaxis   • Melanoma in situ of left lower leg (HCC)   • Basal cell carcinoma (BCC) of right lower leg   • Cochlear implant status   • Subclinical hypothyroidism   • Mixed hyperlipidemia   • Trigger finger, right ring finger   • Uncontrolled hypertension   • JOAQUIN (acute kidney injury) ([facility] [address] )   • Essential hypertension       Past Medical History:   Diagnosis Date   • Arthritis    • Asthma    • Basal cell carcinoma 2020    Right alar groove   • HL (hearing loss)    • Osteoarthritis    • Osteoporosis    • Shingles        Past Surgical History:   Procedure Laterality Date   • COCHLEAR IMPLANT     • COLONOSCOPY     • COLONOSCOPY      fiberoptic; follow up at age; 2017 10 year f/u   • HYSTERECTOMY      age 30   • MOHS SURGERY  2020     Right alar groove   • OOPHORECTOMY      age 30   • SKIN BIOPSY     • TONSILLECTOMY         Family History   Problem Relation Age of Onset   • Osteoporosis Mother    • Hearing loss Mother         she just found out   • Diabetes Father            • Heart disease Father            • Hypertension Father            • Alcohol abuse Father            • Dementia Father    • Stroke Father            • Lung cancer Maternal Grandmother         76s   • Cancer Maternal Grandmother            • Osteoporosis Maternal Grandmother            • Heart disease Paternal Grandmother            • Diabetes Paternal Grandmother            • No Known Problems Maternal Aunt    • Lung cancer Maternal Grandfather    • Cancer Maternal Grandfather            • Hearing loss Maternal Grandfather    • Prostate cancer Paternal Grandfather            • Cancer Paternal Grandfather            • Colon cancer Other    • Pancreatic cancer Other    • Alcohol abuse Brother    • Hypertension Brother    • Diabetes Brother        Social History     Occupational History   • Not on file   Tobacco Use   • Smoking status: Never   • Smokeless tobacco: Never   Vaping Use   • Vaping Use: Never used   Substance and Sexual Activity   • Alcohol use: No   • Drug use: No   • Sexual activity: Never       Current Outpatient Medications on File Prior to Visit   Medication Sig   • acetaminophen (TYLENOL) 500 mg tablet Take 2 tabs po up to every 8 hours prn pain   • amLODIPine (NORVASC) 2 5 mg tablet Take 1 tablet (2 5 mg total) by mouth daily   • calcium carbonate-vitamin D 250 mg-3 125 mcg per tablet Take 1 tablet by mouth daily   • cyclobenzaprine (FLEXERIL) 10 mg tablet Take 1 tablet (10 mg total) by mouth 2 (two) times a day as needed for muscle spasms   • Denosumab (PROLIA SC) Inject under the skin   • losartan (COZAAR) 100 MG tablet Take 1 tablet (100 mg total) by mouth daily   • Multiple Vitamin (MULTIVITAMIN) capsule Take 1 capsule by mouth daily   • lidocaine (LIDODERM) 5 % Apply 1 patch topically every 24 hours for 15 days Remove & Discard patch within 12 hours or as directed by MD     No current facility-administered medications on file prior to visit         Allergies   Allergen Reactions   • Codeine      Reaction Date: ;    • Darvon [Propoxyphene]    • Demerol [Meperidine]    • Valium [Diazepam]    • Other Other (See Comments)     HCTZ - JOAQUIN with elevated calcium and creatinine; hospitalization 11/2022   • Aspirin      Reaction Date: [id number];        Physical Exam:    /85   Pulse 62   Ht 4' 9" (1 448 m)   Wt 63 kg (139 lb)   LMP 06/01/1989 (Exact Date) Comment: hysterectomy, total  SpO2 98%   BMI 30 08 kg/m²     Constitutional:normal, well developed, well nourished, alert, in no distress and non-toxic and no overt pain behavior  Eyes:anicteric  HEENT:grossly intact  Neck:supple, symmetric, trachea midline and no masses   Pulmonary:even and unlabored  Cardiovascular:No edema or pitting edema present  Skin:Normal without rashes or lesions and well hydrated  Psychiatric:Mood and affect appropriate  Neurologic:Cranial Nerves II-XII grossly intact  Musculoskeletal: Right side + Sara finger sign + Patricks test +Gaenslen's test+ Posterior pelvic pain provocation    Imaging  Study Result    Narrative & Impression   CT LUMBAR SPINE WITHOUT CONTRAST     INDICATION:   M54 50: Low back pain, unspecified      COMPARISON: X-ray dated 7/13/2022      TECHNIQUE:  Contiguous axial images through the lumbar spine were obtained  Sagittal and coronal reconstructions were performed        IV Contrast: No contrast     Radiation dose length product (DLP) for this visit:  798 mGy-cm   This examination, like all CT scans performed in the [facility], was performed utilizing techniques to minimize radiation dose exposure, including the use of iterative   reconstruction and automated exposure control        IMAGE QUALITY:  Diagnostic      FINDINGS:     ALIGNMENT:  There are 5 lumbar type vertebral bodies  Alignment unchanged  Minimal degenerative anterolisthesis at L2-3  Grade 1 retrolisthesis at L4-5  No spondylolisthesis  No scoliosis        VERTEBRAL BODIES:  No fracture    No lytic or blastic lesion      DEGENERATIVE CHANGES:     Lower Thoracic spine:  Mild lower thoracic degenerative disc disease with mild annular bulging      L1-2:  Disc space narrowing  Small disc bulge  No significant canal or foraminal stenosis      L2-3:  Disc space narrowing  Small disc bulge and marginal osteophyte  Mild facet arthropathy  No significant canal or foraminal stenosis      L3-4:  Mild disc space narrowing  Disc bulge with small superimposed left central protrusion  Mild canal stenosis  No significant foraminal stenosis      L4-5:  Disc space narrowing  Grade 1 retrolisthesis  No significant canal stenosis  Mild foraminal stenosis      L5-S1:  Small disc bulge  No significant canal stenosis    Mild right foraminal stenosis      PARASPINAL SOFT TISSUES:   Normal      IMPRESSION:  Degenerative spondylosis as described      Workstation performed: [id number]

## 2023-03-10 ENCOUNTER — OFFICE VISIT (OUTPATIENT)
Dept: PHYSICAL THERAPY | Facility: MEDICAL CENTER | Age: 68
End: 2023-03-10

## 2023-03-10 DIAGNOSIS — M54.50 CHRONIC BILATERAL LOW BACK PAIN WITHOUT SCIATICA: Primary | ICD-10-CM

## 2023-03-10 DIAGNOSIS — G89.29 CHRONIC BILATERAL LOW BACK PAIN WITHOUT SCIATICA: Primary | ICD-10-CM

## 2023-03-10 NOTE — PROGRESS NOTES
Daily Note     Today's date: 3/10/2023  Patient name: Melanie Ruiz  : 1955  MRN: [id number]  Referring provider: Dorothy Nunez DO  Dx:   Encounter Diagnosis     ICD-10-CM    1  Chronic bilateral low back pain without sciatica  M54 50     G89 29                      Subjective: Danielle reports she continues to have on and off back pain  Saw spine and pain, offered SIJ injection however she declined and will continue PT for now       Objective: See treatment diary below      Assessment: Tolerated treatment well  Patient able to complete lumbar mobility exercises, more right sided hip soreness than L  No increase in symptoms at conclusion of session  Progressed lumbopelvic strengthening with good tolerance  Plan: Continue per plan of care        Precautions: None       Manuals 3/3            Theragun to lumbar paraspinals  AF                                                   Neuro Re-Ed                                                                                                        Ther Ex             LTR 30            PB roll out 20            PB rolls up wall 30            Scapular retractions 30 yellow              Seated hip ER stretch on stool 10 sec  X 5            PB depression 3 sec  X 20            Standing hip abduction Yellow  3x10            Standing hip extension 3X10  yellow            Theraband single arm pulls Green  3X10             Ther Activity                                       Gait Training                                       Modalities

## 2023-03-17 ENCOUNTER — OFFICE VISIT (OUTPATIENT)
Dept: PHYSICAL THERAPY | Facility: MEDICAL CENTER | Age: 68
End: 2023-03-17

## 2023-03-17 DIAGNOSIS — G89.29 CHRONIC BILATERAL LOW BACK PAIN WITHOUT SCIATICA: Primary | ICD-10-CM

## 2023-03-17 DIAGNOSIS — M54.50 CHRONIC BILATERAL LOW BACK PAIN WITHOUT SCIATICA: Primary | ICD-10-CM

## 2023-03-17 NOTE — PROGRESS NOTES
Daily Note     Today's date: 3/17/2023  Patient name: Tri Lux  : 1955  MRN: [id number]  Referring provider: Jacy Beniot DO  Dx:   Encounter Diagnosis     ICD-10-CM    1  Chronic bilateral low back pain without sciatica  M54 50     G89 29                      Subjective: Patient reports that her back is sore today after twisting the wrong way at work and being on her feet a lot yesterday  Objective: See treatment diary below      Assessment: Held Theragun today due to patient report of sensitivity and discomfort during this intervention  Also decreased reps of LTR due to report of discomfort with prolonged supine lying  Cueing provided to prevent compensation from lumbar spine during standing hip EXT  Held seated ER stretching due to report of bilateral hip pain when attempting this intervention  Patient was educated to perform all exercises in a pain-free range and tolerated her modified program well  Patient would benefit from continued PT to address to maximize function  Plan: Continue per plan of care        Precautions: None       Manuals 3/3 3/17           Theragun to lumbar paraspinals  AF held                                                  Neuro Re-Ed                                                                                                        Ther Ex             LTR 30 25           PB roll out 20 20           PB rolls up wall 30 30           Scapular retractions 30 yellow   30 yellow           Seated hip ER stretch on stool 10 sec  X 5 held           PB depression 3 sec  X 20 3 sec X 20           Standing hip abduction Yellow  3x10 Yellow 3x10           Standing hip extension 3X10  yellow 3X10 yellow           Theraband single arm pulls Green  3X10  Green 3X10           Ther Activity                                       Gait Training                                       Modalities             MHP lumbar   x10' pre seated

## 2023-03-24 ENCOUNTER — OFFICE VISIT (OUTPATIENT)
Dept: PHYSICAL THERAPY | Facility: MEDICAL CENTER | Age: 68
End: 2023-03-24

## 2023-03-24 DIAGNOSIS — G89.29 CHRONIC BILATERAL LOW BACK PAIN WITHOUT SCIATICA: Primary | ICD-10-CM

## 2023-03-24 DIAGNOSIS — M54.50 CHRONIC BILATERAL LOW BACK PAIN WITHOUT SCIATICA: Primary | ICD-10-CM

## 2023-03-24 NOTE — PROGRESS NOTES
Daily Note     Today's date: 3/24/2023  Patient name: Montana Talavera  : 1955  MRN: [id number]  Referring provider: Brenton Reece DO  Dx:   Encounter Diagnosis     ICD-10-CM    1  Chronic bilateral low back pain without sciatica  M54 50     G89 29                      Subjective: Patient reports that her back is sore today after twisting the wrong way at work and being on her feet a lot yesterday  Objective: See treatment diary below      Assessment: Held Theragun today due to patient report of sensitivity and discomfort during this intervention  Also decreased reps of LTR due to report of discomfort with prolonged supine lying  Cueing provided to prevent compensation from lumbar spine during standing hip EXT  Held seated ER stretching due to report of bilateral hip pain when attempting this intervention  Patient was educated to perform all exercises in a pain-free range and tolerated her modified program well  Patient would benefit from continued PT to address to maximize function  Plan: Continue per plan of care        Precautions: None       Manuals 3/24            Theragun to lumbar paraspinals  AF                                                   Neuro Re-Ed                                                                                                        Ther Ex             LTR 30            PB roll out 20            PB rolls up wall 30            Scapular retractions 30 yellow              Seated hip ER stretch on stool 10 sec  X 5            PB depression 3 sec  X 20            Standing hip abduction Yellow  3x10            Standing hip extension 3X10  yellow            Theraband single arm pulls Green  3X10             Ther Activity                                       Gait Training                                       Modalities             MHP lumbar

## 2023-03-28 ENCOUNTER — OFFICE VISIT (OUTPATIENT)
Dept: PHYSICAL THERAPY | Facility: MEDICAL CENTER | Age: 68
End: 2023-03-28

## 2023-03-28 DIAGNOSIS — M54.50 CHRONIC BILATERAL LOW BACK PAIN WITHOUT SCIATICA: Primary | ICD-10-CM

## 2023-03-28 DIAGNOSIS — G89.29 CHRONIC BILATERAL LOW BACK PAIN WITHOUT SCIATICA: Primary | ICD-10-CM

## 2023-03-28 NOTE — PROGRESS NOTES
Daily Note     Today's date: 3/28/2023  Patient name: Carolin Copeland  : 1955  MRN: [id number]  Referring provider: Dilip Narayan DO  Dx:   Encounter Diagnosis     ICD-10-CM    1  Chronic bilateral low back pain without sciatica  M54 50     G89 29                      Subjective: Patient reports that her back is sore today after moving things around her house yesterday       Objective: See treatment diary below      Assessment: Patient tolerated treatment well, good response to theragun today  Continues to tolerate lumbopelvic strengthening well without issues  Progress as able         Plan: Continue per plan of care        Precautions: None       Manuals 3/28            Theragun to lumbar paraspinals  AF                                                   Neuro Re-Ed                                                                                                        Ther Ex             LTR 30            PB roll out 20            PB rolls up wall 30            Scapular retractions 30 yellow              Seated hip ER stretch on stool 10 sec  X 5            PB depression 3 sec  X 20            Standing hip abduction Yellow  3x10            Standing hip extension 3X10  yellow            Theraband single arm pulls Green  3X10             Ther Activity                                       Gait Training                                       Modalities             MHP lumbar

## 2023-04-03 ENCOUNTER — LAB (OUTPATIENT)
Dept: LAB | Age: 68
End: 2023-04-03

## 2023-04-03 ENCOUNTER — APPOINTMENT (OUTPATIENT)
Dept: LAB | Age: 68
End: 2023-04-03

## 2023-04-03 ENCOUNTER — TELEPHONE (OUTPATIENT)
Dept: NEPHROLOGY | Facility: CLINIC | Age: 68
End: 2023-04-03

## 2023-04-03 DIAGNOSIS — E78.2 MIXED HYPERLIPIDEMIA: ICD-10-CM

## 2023-04-03 DIAGNOSIS — Z00.8 HEALTH EXAMINATION IN POPULATION SURVEY: ICD-10-CM

## 2023-04-03 DIAGNOSIS — E21.3 HYPERPARATHYROIDISM (HCC): ICD-10-CM

## 2023-04-03 DIAGNOSIS — Z86.39 HISTORY OF HYPERCALCEMIA: ICD-10-CM

## 2023-04-03 DIAGNOSIS — I10 PRIMARY HYPERTENSION: ICD-10-CM

## 2023-04-03 DIAGNOSIS — E03.8 SUBCLINICAL HYPOTHYROIDISM: ICD-10-CM

## 2023-04-03 DIAGNOSIS — M81.0 OSTEOPOROSIS, UNSPECIFIED OSTEOPOROSIS TYPE, UNSPECIFIED PATHOLOGICAL FRACTURE PRESENCE: ICD-10-CM

## 2023-04-03 DIAGNOSIS — Z86.39 HISTORY OF HYPERCALCEMIA: Primary | ICD-10-CM

## 2023-04-03 LAB
25(OH)D3 SERPL-MCNC: 38.9 NG/ML (ref 30–100)
ALBUMIN SERPL BCP-MCNC: 4.3 G/DL (ref 3.5–5)
ALP SERPL-CCNC: 58 U/L (ref 46–116)
ALT SERPL W P-5'-P-CCNC: 57 U/L (ref 12–78)
ANION GAP SERPL CALCULATED.3IONS-SCNC: 4 MMOL/L (ref 4–13)
AST SERPL W P-5'-P-CCNC: 31 U/L (ref 5–45)
BASOPHILS # BLD AUTO: 0.03 THOUSANDS/ÂΜL (ref 0–0.1)
BASOPHILS NFR BLD AUTO: 0 % (ref 0–1)
BILIRUB SERPL-MCNC: 0.74 MG/DL (ref 0.2–1)
BUN SERPL-MCNC: 14 MG/DL (ref 5–25)
CALCIUM SERPL-MCNC: 9.7 MG/DL (ref 8.3–10.1)
CHLORIDE SERPL-SCNC: 106 MMOL/L (ref 96–108)
CHOLEST SERPL-MCNC: 298 MG/DL
CO2 SERPL-SCNC: 27 MMOL/L (ref 21–32)
CREAT SERPL-MCNC: 0.86 MG/DL (ref 0.6–1.3)
EOSINOPHIL # BLD AUTO: 0.04 THOUSAND/ÂΜL (ref 0–0.61)
EOSINOPHIL NFR BLD AUTO: 1 % (ref 0–6)
ERYTHROCYTE [DISTWIDTH] IN BLOOD BY AUTOMATED COUNT: 12.7 % (ref 11.6–15.1)
EST. AVERAGE GLUCOSE BLD GHB EST-MCNC: 111 MG/DL
GFR SERPL CREATININE-BSD FRML MDRD: 71 ML/MIN/1.73SQ M
GLUCOSE P FAST SERPL-MCNC: 96 MG/DL (ref 65–99)
HBA1C MFR BLD: 5.5 %
HCT VFR BLD AUTO: 40.8 % (ref 34.8–46.1)
HDLC SERPL-MCNC: 81 MG/DL
HGB BLD-MCNC: 12.9 G/DL (ref 11.5–15.4)
IMM GRANULOCYTES # BLD AUTO: 0.02 THOUSAND/UL (ref 0–0.2)
IMM GRANULOCYTES NFR BLD AUTO: 0 % (ref 0–2)
LDLC SERPL CALC-MCNC: 188 MG/DL (ref 0–100)
LYMPHOCYTES # BLD AUTO: 2.3 THOUSANDS/ÂΜL (ref 0.6–4.47)
LYMPHOCYTES NFR BLD AUTO: 33 % (ref 14–44)
MCH RBC QN AUTO: 30.2 PG (ref 26.8–34.3)
MCHC RBC AUTO-ENTMCNC: 31.6 G/DL (ref 31.4–37.4)
MCV RBC AUTO: 96 FL (ref 82–98)
MONOCYTES # BLD AUTO: 0.42 THOUSAND/ÂΜL (ref 0.17–1.22)
MONOCYTES NFR BLD AUTO: 6 % (ref 4–12)
NEUTROPHILS # BLD AUTO: 4.07 THOUSANDS/ÂΜL (ref 1.85–7.62)
NEUTS SEG NFR BLD AUTO: 60 % (ref 43–75)
NONHDLC SERPL-MCNC: 217 MG/DL
NRBC BLD AUTO-RTO: 0 /100 WBCS
PHOSPHATE SERPL-MCNC: 3 MG/DL (ref 2.3–4.1)
PLATELET # BLD AUTO: 304 THOUSANDS/UL (ref 149–390)
PMV BLD AUTO: 10.8 FL (ref 8.9–12.7)
POTASSIUM SERPL-SCNC: 3.8 MMOL/L (ref 3.5–5.3)
PROT SERPL-MCNC: 7.7 G/DL (ref 6.4–8.4)
PTH-INTACT SERPL-MCNC: 146.8 PG/ML (ref 18.4–80.1)
RBC # BLD AUTO: 4.27 MILLION/UL (ref 3.81–5.12)
SODIUM SERPL-SCNC: 137 MMOL/L (ref 135–147)
TRIGL SERPL-MCNC: 144 MG/DL
TSH SERPL DL<=0.05 MIU/L-ACNC: 3.12 UIU/ML (ref 0.45–4.5)
WBC # BLD AUTO: 6.88 THOUSAND/UL (ref 4.31–10.16)

## 2023-04-03 NOTE — TELEPHONE ENCOUNTER
Left message for patient informing her Dr Radha Mcintosh is referring her to endocrinology and provided phone number  Advised patient to call back with any questions

## 2023-04-03 NOTE — TELEPHONE ENCOUNTER
----- Message from Bruno Lucas MD sent at 4/3/2023  4:09 PM EDT -----  Regarding: Endocrinology consultation  Please help this patient with phone number for endocrinology office to schedule consultation  I have placed a referral   Thank you

## 2023-04-04 ENCOUNTER — TELEPHONE (OUTPATIENT)
Dept: OTHER | Facility: OTHER | Age: 68
End: 2023-04-04

## 2023-04-04 ENCOUNTER — TELEPHONE (OUTPATIENT)
Dept: ENDOCRINOLOGY | Facility: CLINIC | Age: 68
End: 2023-04-04

## 2023-04-04 ENCOUNTER — OFFICE VISIT (OUTPATIENT)
Dept: FAMILY MEDICINE CLINIC | Facility: CLINIC | Age: 68
End: 2023-04-04

## 2023-04-04 VITALS
SYSTOLIC BLOOD PRESSURE: 128 MMHG | BODY MASS INDEX: 30.3 KG/M2 | HEART RATE: 65 BPM | DIASTOLIC BLOOD PRESSURE: 64 MMHG | TEMPERATURE: 98.7 F | OXYGEN SATURATION: 100 % | WEIGHT: 140 LBS

## 2023-04-04 DIAGNOSIS — I10 ESSENTIAL HYPERTENSION: Primary | ICD-10-CM

## 2023-04-04 DIAGNOSIS — R79.89 ELEVATED PTHRP LEVEL: ICD-10-CM

## 2023-04-04 DIAGNOSIS — E78.2 MIXED HYPERLIPIDEMIA: ICD-10-CM

## 2023-04-04 DIAGNOSIS — E03.8 SUBCLINICAL HYPOTHYROIDISM: ICD-10-CM

## 2023-04-04 PROBLEM — R03.0 ELEVATED BP WITHOUT DIAGNOSIS OF HYPERTENSION: Status: RESOLVED | Noted: 2017-10-04 | Resolved: 2023-04-04

## 2023-04-04 RX ORDER — ROSUVASTATIN CALCIUM 10 MG/1
10 TABLET, COATED ORAL DAILY
Qty: 90 TABLET | Refills: 1 | Status: SHIPPED | OUTPATIENT
Start: 2023-04-04

## 2023-04-04 NOTE — PATIENT INSTRUCTIONS
Start taking crestor 10 mg nightly for cholesterol  Get nonfasting lab done in 4-6 weeks  Patient to call for results if he/she does not hear from us    Followup with endocrine re: elevated PTH level      Return in 4 m os    Try to begin more regular exercise  And drink more water

## 2023-04-04 NOTE — PROGRESS NOTES
FAMILY PRACTICE OFFICE VISIT    NAME: Danielle Sandoval    AGE: 74 y o  SEX: female  : 1955   MRN: [id number]    DATE: 2023  TIME: 8:40 AM    Assessment and Plan   1  Essential hypertension  Responding well to addition of norvasc      2  Mixed hyperlipidemia  Reviewed dietary items  Pt has had overall worsening of lipids  Suspect possibly some family history contributing  Advise more regular exercise  Nonsmoker        3  Subclinical hypothyroidism  tsh stable  4  Elevated PTHrP level  Will be seeing endo    Patient Instructions   Start taking crestor 10 mg nightly for cholesterol  Get nonfasting lab done in 4-6 weeks  Patient to call for results if he/she does not hear from us    Followup with endocrine re: elevated PTH level      Return in 4 m os    Try to begin more regular exercise  And drink more water      Had labs done yesterday  PTH is elevated  And will be f/u with endo      Chief Complaint     Chief Complaint   Patient presents with   • Follow-up       History of Present Illness   Danielle Sandoval is a 74y o -year-old female who presents today for routine f/u visit  Has been going to PT for low back pain  Dx with SI joint dysfunction   Has not had any injections      Review of Systems   Review of Systems   Constitutional: Negative for unexpected weight change  No formal exercise outside of work     HENT: Positive for hearing loss  Respiratory: Negative for cough, shortness of breath and wheezing  Cardiovascular: Negative for chest pain and palpitations  Gastrointestinal:        Breakfast - eggs or fruit; usually eats something light  Lunch - skips as pt sleeps  Due to work schedule  Supper - salad or soup    Snacks - cant taste salt so usually skips   Drinking more soda - 1 bottle /night  Not drinking as much water  No dairy - is lactose intolerant   Musculoskeletal:        Going to PT once weekly  Is very active walking at work for the most part  Just got off work this am from night shift         Active Problem List     Patient Active Problem List   Diagnosis   • Elevated BP without diagnosis of hypertension   • Hypercalcemia   • Osteoporosis   • Sensorineural hearing loss (SNHL) of both ears   • Oral mucosal lesion   • Epistaxis   • Melanoma in situ of left lower leg (HCC)   • Basal cell carcinoma (BCC) of right lower leg   • Cochlear implant status   • Subclinical hypothyroidism   • Mixed hyperlipidemia   • Trigger finger, right ring finger   • Uncontrolled hypertension   • JOAQUIN (acute kidney injury) ([facility] [address] )   • Essential hypertension         Past Medical History:  Past Medical History:   Diagnosis Date   • Arthritis    • Asthma    • Basal cell carcinoma 2020    Right alar groove   • HL (hearing loss)    • Osteoarthritis    • Osteoporosis    • Shingles        Past Surgical History:  Past Surgical History:   Procedure Laterality Date   • COCHLEAR IMPLANT     • COLONOSCOPY     • COLONOSCOPY      fiberoptic; follow up at age; 2017 10 year f/u   • HYSTERECTOMY      age 30   • MOHS SURGERY  2020     Right alar groove   • OOPHORECTOMY      age 30   • SKIN BIOPSY     • TONSILLECTOMY         Family History:  Family History   Problem Relation Age of Onset   • Osteoporosis Mother    • Hearing loss Mother         she just found out   • Diabetes Father            • Heart disease Father            • Hypertension Father            • Alcohol abuse Father            • Dementia Father    • Stroke Father            • Lung cancer Maternal Grandmother         76s   • Cancer Maternal Grandmother            • Osteoporosis Maternal Grandmother            • Heart disease Paternal Grandmother            • Diabetes Paternal Grandmother            • No Known Problems Maternal Aunt    • Lung cancer Maternal Grandfather    • Cancer Maternal Grandfather            • Hearing loss Maternal Grandfather    • Prostate cancer Paternal Grandfather            • Cancer Paternal Grandfather            • Colon cancer Other    • Pancreatic cancer Other    • Alcohol abuse Brother    • Hypertension Brother    • Diabetes Brother        Social History:  Social History     Socioeconomic History   • Marital status: Single     Spouse name: Not on file   • Number of children: Not on file   • Years of education: Not on file   • Highest education level: Not on file   Occupational History   • Not on file   Tobacco Use   • Smoking status: Never   • Smokeless tobacco: Never   Vaping Use   • Vaping Use: Never used   Substance and Sexual Activity   • Alcohol use: No   • Drug use: No   • Sexual activity: Never   Other Topics Concern   • Not on file   Social History Narrative    Caffeine use     Social Determinants of Health     Financial Resource Strain: Not on file   Food Insecurity: Not on file   Transportation Needs: Not on file   Physical Activity: Not on file   Stress: Not on file   Social Connections: Not on file   Intimate Partner Violence: Not on file   Housing Stability: Not on file       Objective     Vitals:    23 0828   BP: 128/64   Pulse: 65   Temp: 98 7 °F (37 1 °C)   SpO2: 100%     Wt Readings from Last 3 Encounters:   23 63 5 kg (140 lb)   23 63 kg (139 lb)   23 63 kg (139 lb)       Physical Exam  Vitals and nursing note reviewed  Constitutional:       General: She is not in acute distress  Appearance: Normal appearance  She is not ill-appearing or toxic-appearing  Comments: Appears younger than stated age     Neck:      Vascular: No carotid bruit  Comments: No thyromegaly  Cardiovascular:      Rate and Rhythm: Normal rate and regular rhythm  Pulses: Normal pulses  Heart sounds: Normal heart sounds  No murmur heard  Pulmonary:      Effort: Pulmonary effort is normal  No respiratory distress  Breath sounds: Normal breath sounds  No wheezing, rhonchi or rales  Musculoskeletal:      Right lower leg: No edema  Left lower leg: No edema  Lymphadenopathy:      Cervical: No cervical adenopathy  Skin:     General: Skin is warm  Coloration: Skin is not jaundiced  Neurological:      General: No focal deficit present  Mental Status: She is alert and oriented to person, place, and time  Psychiatric:         Mood and Affect: Mood normal          Behavior: Behavior normal          Thought Content: Thought content normal          Judgment: Judgment normal          Pertinent Laboratory/Diagnostic Studies:  Lab Results   Component Value Date    BUN 14 04/03/2023    CREATININE 0 86 04/03/2023    CALCIUM 9 7 04/03/2023     06/20/2018    K 3 8 04/03/2023    CO2 27 04/03/2023     04/03/2023     Lab Results   Component Value Date    ALT 57 04/03/2023    AST 31 04/03/2023    ALKPHOS 58 04/03/2023    BILITOT 0 7 06/20/2018       Lab Results   Component Value Date    WBC 6 88 04/03/2023    HGB 12 9 04/03/2023    HCT 40 8 04/03/2023    MCV 96 04/03/2023     04/03/2023       No results found for: TSH    Lab Results   Component Value Date    CHOL 211 (H) 06/20/2018     Lab Results   Component Value Date    TRIG 144 04/03/2023     Lab Results   Component Value Date    HDL 81 04/03/2023     Lab Results   Component Value Date    LDLCALC 188 (H) 04/03/2023     Lab Results   Component Value Date    HGBA1C 5 5 04/03/2023       Results for orders placed or performed in visit on 04/03/23   Hemoglobin A1C   Result Value Ref Range    Hemoglobin A1C 5 5 Normal 3 8-5 6%; PreDiabetic 5 7-6 4%;  Diabetic >=6 5%; Glycemic control for adults with diabetes <7 0% %     mg/dl   Lipid panel   Result Value Ref Range    Cholesterol 298 (H) See Comment mg/dL    Triglycerides 144 See Comment mg/dL    HDL, Direct 81 >=50 mg/dL    LDL Calculated 188 (H) 0 - 100 mg/dL    Non-HDL-Chol (CHOL-HDL) 217 mg/dl       No orders of the defined types were placed in this encounter  ALLERGIES:  Allergies   Allergen Reactions   • Codeine      Reaction Date: [id number];    • Darvon [Propoxyphene]    • Demerol [Meperidine]    • Valium [Diazepam]    • Other Other (See Comments)     HCTZ - JOAQUIN with elevated calcium and creatinine; hospitalization 11/2022   • Aspirin      Reaction Date: [id number];        Current Medications     Current Outpatient Medications   Medication Sig Dispense Refill   • acetaminophen (TYLENOL) 500 mg tablet Take 2 tabs po up to every 8 hours prn pain 60 tablet 0   • amLODIPine (NORVASC) 2 5 mg tablet Take 1 tablet (2 5 mg total) by mouth daily 90 tablet 1   • calcium carbonate-vitamin D 250 mg-3 125 mcg per tablet Take 1 tablet by mouth daily 90 tablet 3   • cyclobenzaprine (FLEXERIL) 10 mg tablet Take 1 tablet (10 mg total) by mouth 2 (two) times a day as needed for muscle spasms 20 tablet 0   • Denosumab (PROLIA SC) Inject under the skin     • losartan (COZAAR) 100 MG tablet Take 1 tablet (100 mg total) by mouth daily 90 tablet 1   • Multiple Vitamin (MULTIVITAMIN) capsule Take 1 capsule by mouth daily     • lidocaine (LIDODERM) 5 % Apply 1 patch topically every 24 hours for 15 days Remove & Discard patch within 12 hours or as directed by MD 15 patch 0     No current facility-administered medications for this visit           Health Maintenance     Health Maintenance   Topic Date Due   • BMI: Followup Plan  06/08/2021   • COVID-19 Vaccine (4 - Booster for Pfizer series) 02/12/2022   • Influenza Vaccine (1) 09/01/2022   • Urinary Incontinence Screening  Never done   • PT PLAN OF CARE  02/10/2023   • Depression Screening  06/28/2023   • Annual Physical  12/29/2023   • Fall Risk  01/11/2024   • BMI: Adult  03/09/2024   • Breast Cancer Screening: Mammogram  10/05/2024   • Colorectal Cancer Screening  04/12/2027   • DTaP,Tdap,and Td Vaccines (3 - Td or Tdap) 06/11/2029   • HIV Screening  Completed   • Hepatitis C Screening  Completed   • Osteoporosis Screening Completed   • Pneumococcal Vaccine: 65+ Years  Completed   • HIB Vaccine  Aged Out   • IPV Vaccine  Aged Out   • Hepatitis A Vaccine  Aged Out   • Meningococcal ACWY Vaccine  Aged Out   • HPV Vaccine  Aged Out     Immunization History   Administered Date(s) Administered   • COVID-19 PFIZER VACCINE 0 3 ML IM 12/21/2020, 01/11/2021, 12/18/2021   • H1N1, All Formulations 11/10/2009   • INFLUENZA 10/24/2018, 10/20/2020, 10/03/2021   • Influenza, seasonal, injectable 10/13/2017   • Pneumococcal Conjugate 13-Valent 04/17/2014   • Pneumococcal Conjugate Vaccine 20-valent (Pcv20), Polysace 12/29/2022   • Pneumococcal Polysaccharide PPV23 05/29/2014   • Tdap 06/03/2017, 06/11/2019   • Zoster 10/13/2017   • Zoster Vaccine Recombinant 10/27/2020, 02/11/2021     BMI Counseling: Body mass index is 30 3 kg/m²  The BMI is above normal  Nutrition recommendations include decreasing portion sizes, encouraging healthy choices of fruits and vegetables, decreasing fast food intake, consuming healthier snacks, limiting drinks that contain sugar, moderation in carbohydrate intake, increasing intake of lean protein, reducing intake of saturated and trans fat and reducing intake of cholesterol  Exercise recommendations include exercising 3-5 times per week  No pharmacotherapy was ordered  Rationale for BMI follow-up plan is due to patient being overweight or obese           Kasey Cruz DO

## 2023-04-04 NOTE — TELEPHONE ENCOUNTER
Pt  Was referred to an endocrinologist by her doctor and she would like to know if she could have an early appt  Because she works nights and can come right after work  Also, If she does not answer when you call back she maybe sleeping and she asks if you could put a note in her MyChart and and she will return your call   Ty

## 2023-04-06 NOTE — TELEPHONE ENCOUNTER
Patient is calling back to schedule NP appt  She works night shift and sleeps all day  She is asking that she be given an appt, preferrably one scheduled for 8am due to work schedule  She will be able to see appt in her MyChart and will call back if that appt is not convenient  She also will be gone 4/19 through 4/30

## 2023-04-07 ENCOUNTER — OFFICE VISIT (OUTPATIENT)
Dept: PHYSICAL THERAPY | Facility: MEDICAL CENTER | Age: 68
End: 2023-04-07

## 2023-04-07 DIAGNOSIS — M54.50 CHRONIC BILATERAL LOW BACK PAIN WITHOUT SCIATICA: Primary | ICD-10-CM

## 2023-04-07 DIAGNOSIS — G89.29 CHRONIC BILATERAL LOW BACK PAIN WITHOUT SCIATICA: Primary | ICD-10-CM

## 2023-04-07 NOTE — PROGRESS NOTES
Daily Note     Today's date: 2023  Patient name: Tera Scott  : 1955  MRN: [id number]  Referring provider: Robert Cabrera DO  Dx:   Encounter Diagnosis     ICD-10-CM    1  Chronic bilateral low back pain without sciatica  M54 50     G89 29                      Subjective: Patient reports that her back is sore overall, mostly on right side of low back       Objective: See treatment diary below      Assessment: Patient tolerated treatment well, good response to theragun today  Continues to tolerate lumbopelvic strengthening well without issues  Progress as able         Plan: Continue per plan of care        Precautions: None       Manuals             Theragun to lumbar paraspinals  AF                                                   Neuro Re-Ed                                                                                                        Ther Ex             LTR 30            PB roll out 20            PB rolls up wall 30            Scapular retractions 30 yellow              Seated hip ER stretch on stool 10 sec  X 5            PB depression 3 sec  X 20            Standing hip abduction Yellow  3x10            Standing hip extension 3X10  yellow            Theraband single arm pulls Green  3X10             Ther Activity                                       Gait Training                                       Modalities             MHP lumbar

## 2023-05-16 DIAGNOSIS — I10 ESSENTIAL HYPERTENSION: ICD-10-CM

## 2023-05-17 RX ORDER — LOSARTAN POTASSIUM 100 MG/1
100 TABLET ORAL DAILY
Qty: 90 TABLET | Refills: 0 | Status: SHIPPED | OUTPATIENT
Start: 2023-05-17

## 2023-05-22 ENCOUNTER — CONSULT (OUTPATIENT)
Dept: ENDOCRINOLOGY | Facility: CLINIC | Age: 68
End: 2023-05-22

## 2023-05-22 VITALS
HEIGHT: 57 IN | HEART RATE: 65 BPM | SYSTOLIC BLOOD PRESSURE: 140 MMHG | DIASTOLIC BLOOD PRESSURE: 80 MMHG | WEIGHT: 135.2 LBS | BODY MASS INDEX: 29.17 KG/M2

## 2023-05-22 DIAGNOSIS — E21.3 HYPERPARATHYROIDISM (HCC): Primary | ICD-10-CM

## 2023-05-22 DIAGNOSIS — E03.8 SUBCLINICAL HYPOTHYROIDISM: ICD-10-CM

## 2023-05-22 DIAGNOSIS — Z86.39 HISTORY OF HYPERCALCEMIA: ICD-10-CM

## 2023-05-22 DIAGNOSIS — M81.0 OSTEOPOROSIS, UNSPECIFIED OSTEOPOROSIS TYPE, UNSPECIFIED PATHOLOGICAL FRACTURE PRESENCE: ICD-10-CM

## 2023-05-22 NOTE — ASSESSMENT & PLAN NOTE
PTH was low during hospitalization in September for severe hypercalcemia and acute kidney injury-she has been off calcium and vitamin D supplementations after the hospitalization and recently started 1 tablet daily-she does take a multivitamin-no significant dairy in her diet  Hypercalcemia does not appear to be PTH mediated however she has had mild intermittent hypercalcemia in the past as well  For now advised to make sure she is getting calcium 1000 mg daily and a combination of calcium supplementations as well as her multivitamin-repeat labs including checking a 24-hour urine calcium and creatinine in the next month    Further management will depend on the results

## 2023-05-22 NOTE — PATIENT INSTRUCTIONS
Take calcium 1000 mg daily in divided doses  ( combination of MVI as well as calcium supplements )  In 4 weeks - collect urine for 24 hours and when you drop it off in morning have the  labs done

## 2023-05-22 NOTE — PROGRESS NOTES
Rachael Rebollar 74 y o  female MRN: [id number]    Encounter: [id number]      Assessment/Plan     Problem List Items Addressed This Visit        Endocrine    Hyperparathyroidism ([address] ) - Primary     PTH was low during hospitalization in September for severe hypercalcemia and acute kidney injury-she has been off calcium and vitamin D supplementations after the hospitalization and recently started 1 tablet daily-she does take a multivitamin-no significant dairy in her diet  Hypercalcemia does not appear to be PTH mediated however she has had mild intermittent hypercalcemia in the past as well  For now advised to make sure she is getting calcium 1000 mg daily and a combination of calcium supplementations as well as her multivitamin-repeat labs including checking a 24-hour urine calcium and creatinine in the next month  Further management will depend on the results         Relevant Orders    Comprehensive metabolic panel Lab Collect    Vitamin D 25 hydroxy Lab Collect    PTH, intact Lab Collect Lab Collect    Phosphorus Lab Collect    Calcium, ionized    Calcium, urine, 24 hour Lab Collect    Creatinine, urine, 24 hour Lab Collect    Subclinical hypothyroidism    Relevant Orders    TSH, 3rd generation Lab Collect    T4, free Lab Collect    Thyroid Antibodies Panel Lab Collect       Musculoskeletal and Integument    Osteoporosis     Surgical menopause at age 28-was on HRT up till May be in her mid 45s and has been on antiresorptive therapies for it appears more than 10 years  Currently on Prolia-being managed by rheumatologist        Other Visit Diagnoses     History of hypercalcemia              CC:   Hyperparathyroidism    History of Present Illness     HPI:  79-year-old female referred here for evaluation of hyperparathyroidism    She was  admitted to the hospital with JOAQUIN and hypercalcemia in 09/2022   Etiology was thought to be a combination of calcium supplements and hydrochlorothiazide   She received IV fluids and her renal function and calcium levels improved   PTH at that time was low so hypercalcemia did not appear to be PTH mediated      Was taking calcium supplementations for years - was taking 4 tabs and  Restarted 1 tab daily in feb   Dairy intolerance     Sees rheumatologist for osteoporosis and has been on prolia  6-10  years -  Prior to that was on fosamax >5 years or so   She does state that she has been told that calcium has been slightly high  in the past for many years and she sometimes has to cut back on her calcium supplementations    Prior labs show calcium ranging between 9 4-10 3 for the past few years   Last injection in stephanie   No history of low trauma fractures   No history of kidney stones     No polyuria , polydipsia , has IBS and has either diarrhea /constipation    lost 1 1/2 inches in height     Surgical menopause at age 30 - was on HRT off and on till mid 45s     FH of osteoporosis - mother and GM   No parental hip fracture     Review of Systems    Historical Information   Past Medical History:   Diagnosis Date   • Arthritis    • Asthma    • Basal cell carcinoma 2020    Right alar groove   • HL (hearing loss)    • Osteoarthritis    • Osteoporosis    • Shingles      Past Surgical History:   Procedure Laterality Date   • COCHLEAR IMPLANT     • COLONOSCOPY     • COLONOSCOPY      fiberoptic; follow up at age; 2017 10 year f/u   • HYSTERECTOMY      age 30   • MOHS SURGERY  2020     Right alar groove   • OOPHORECTOMY      age 30   • SKIN BIOPSY     • TONSILLECTOMY       Social History   Social History     Substance and Sexual Activity   Alcohol Use No     Social History     Substance and Sexual Activity   Drug Use No     Social History     Tobacco Use   Smoking Status Never   Smokeless Tobacco Never     Family History:   Family History   Problem Relation Age of Onset   • Osteoporosis Mother    • Hearing loss Mother         she just found out   • Diabetes Father            • Heart disease Father            • Hypertension Father            • Alcohol abuse Father            • Dementia Father    • Stroke Father            • Lung cancer Maternal Grandmother         76s   • Cancer Maternal Grandmother            • Osteoporosis Maternal Grandmother            • Heart disease Paternal Grandmother            • Diabetes Paternal Grandmother            • No Known Problems Maternal Aunt    • Lung cancer Maternal Grandfather    • Cancer Maternal Grandfather            • Hearing loss Maternal Grandfather    • Prostate cancer Paternal Grandfather            • Cancer Paternal Grandfather            • Colon cancer Other    • Pancreatic cancer Other    • Alcohol abuse Brother    • Hypertension Brother    • Diabetes Brother        Meds/Allergies   Current Outpatient Medications   Medication Sig Dispense Refill   • acetaminophen (TYLENOL) 500 mg tablet Take 2 tabs po up to every 8 hours prn pain 60 tablet 0   • amLODIPine (NORVASC) 2 5 mg tablet Take 1 tablet (2 5 mg total) by mouth daily 90 tablet 1   • calcium carbonate-vitamin D 250 mg-3 125 mcg per tablet Take 1 tablet by mouth daily 90 tablet 3   • cyclobenzaprine (FLEXERIL) 10 mg tablet Take 1 tablet (10 mg total) by mouth 2 (two) times a day as needed for muscle spasms 20 tablet 0   • Denosumab (PROLIA SC) Inject under the skin     • losartan (COZAAR) 100 MG tablet Take 1 tablet (100 mg total) by mouth daily 90 tablet 0   • Multiple Vitamin (MULTIVITAMIN) capsule Take 1 capsule by mouth daily     • rosuvastatin (CRESTOR) 10 MG tablet Take 1 tablet (10 mg total) by mouth daily In the pm 90 tablet 1   • lidocaine (LIDODERM) 5 % Apply 1 patch topically every 24 hours for 15 days Remove & Discard patch within 12 hours or as directed by MD (Patient not taking: Reported on 2023) 15 patch 0     No current facility-administered medications for this visit       Allergies "  Allergen Reactions   • Codeine      Reaction Date: [id number];    • Darvon [Propoxyphene]    • Demerol [Meperidine]    • Valium [Diazepam]    • Other Other (See Comments)     HCTZ - JOAQUIN with elevated calcium and creatinine; hospitalization 11/2022   • Aspirin      Reaction Date: [id number];        Objective   Vitals: Blood pressure 140/80, pulse 65, height 4' 9\" (1 448 m), weight 61 3 kg (135 lb 3 2 oz), last menstrual period 06/01/1989, not currently breastfeeding  Physical Exam  Vitals reviewed  Constitutional:       General: She is not in acute distress  Appearance: Normal appearance  She is not ill-appearing, toxic-appearing or diaphoretic  HENT:      Head: Normocephalic and atraumatic  Eyes:      General: No scleral icterus  Extraocular Movements: Extraocular movements intact  Cardiovascular:      Rate and Rhythm: Normal rate and regular rhythm  Heart sounds: Normal heart sounds  No murmur heard  Pulmonary:      Effort: Pulmonary effort is normal  No respiratory distress  Breath sounds: Normal breath sounds  No wheezing or rales  Abdominal:      General: There is no distension  Palpations: Abdomen is soft  Tenderness: There is no abdominal tenderness  There is no guarding  Musculoskeletal:      Cervical back: Neck supple  Right lower leg: No edema  Left lower leg: No edema  Lymphadenopathy:      Cervical: No cervical adenopathy  Skin:     General: Skin is warm and dry  Neurological:      General: No focal deficit present  Mental Status: She is alert and oriented to person, place, and time  Psychiatric:         Mood and Affect: Mood normal          Behavior: Behavior normal          Thought Content: Thought content normal          The history was obtained from the review of the chart, patient      Lab Results:   Lab Results   Component Value Date/Time    Potassium 3 8 04/03/2023 07:36 AM    Potassium 3 8 02/14/2023 07:44 AM    Potassium 4 1 " 11/15/2022 06:42 AM    Chloride 106 04/03/2023 07:36 AM    Chloride 110 (H) 02/14/2023 07:44 AM    Chloride 103 11/15/2022 06:42 AM    CO2 27 04/03/2023 07:36 AM    CO2 23 02/14/2023 07:44 AM    CO2 29 11/15/2022 06:42 AM    BUN 14 04/03/2023 07:36 AM    BUN 17 02/14/2023 07:44 AM    BUN 16 11/15/2022 06:42 AM    Creatinine 0 86 04/03/2023 07:36 AM    Creatinine 0 80 02/14/2023 07:44 AM    Creatinine 0 90 11/15/2022 06:42 AM    Glucose, Fasting 96 04/03/2023 07:36 AM    Glucose, Fasting 95 02/14/2023 07:44 AM    Glucose, Fasting 139 (H) 11/15/2022 06:42 AM    Calcium 9 7 04/03/2023 07:36 AM    Calcium 9 3 02/14/2023 07:44 AM    Calcium 9 8 01/21/2023 07:03 AM    eGFR 71 04/03/2023 07:36 AM    eGFR 77 02/14/2023 07:44 AM    eGFR 67 11/15/2022 06:42 AM    TSH 3RD GENERATON 3 120 04/03/2023 07:36 AM    TSH 3RD GENERATON 2 600 08/02/2022 07:19 AM     8 (H) 04/03/2023 07:36 AM    PTH 74 5 01/21/2023 07:03 AM     3 (H) 11/15/2022 06:42 AM    Vit D, 25-Hydroxy 38 9 04/03/2023 07:36 AM    Vit D, 25-Hydroxy 43 7 02/14/2023 07:44 AM    Vit D, 25-Hydroxy 39 8 01/21/2023 07:03 AM     PTH, intact  Order: [id number]   s     1 Patient Communication  Component Ref Range & Units 4/3/23  7:36 AM 1/21/23  7:03 AM 11/15/22  6:42 AM 9/28/22  7:48 AM   PTH 18 4 - 80 1 pg/mL 146 8 High   74 5  102 3 High   16 4 Low            Phosphorus  Order: [id number] - Reflex for Order [id number]     Component Ref Range & Units 4/3/23  7:36 AM 2/14/23  7:44 AM 11/15/22  6:42 AM   Phosphorus 2 3 - 4 1 mg/dL 3 0  2 8  4 6 R           Imaging Studies:         Results for orders placed during the hospital encounter of 10/05/22    DXA bone density spine hip and pelvis    Impression  1  Osteoporosis  2   Since a DXA study from 8/17/2020, there has been:  A  STATISTICALLY SIGNIFICANT INCREASE in bone mineral density of  0 053 g/cm2 (5 5)% in the lumbar spine          3   The 10 year risk of hip fracture is 3 1% with the 10 year risk of major osteoporotic fracture being 15% as calculated by the [employer]/WHO fracture risk assessment tool (FRAX)  4   The current NOF guidelines recommend treating patients with a T-score of -2 5 or less in the lumbar spine or hips, or in post-menopausal women and men over the age of 48 with low bone mass (osteopenia) and a FRAX 10 year risk score of >3% for hip  fracture and/or >20% for major osteoporotic fracture  5   The NOF recommends follow-up DXA in 1-2 years after initiating therapy for osteoporosis and every 2 years thereafter  More frequent evaluation is appropriate for patients with conditions associated with rapid bone loss, such as glucocorticoid  therapy  The interval between DXA screenings may be longer for individuals without major risk factors and initial T-score in the normal or upper low bone mass range  The FRAX algorithm has certain limitations:  -FRAX has not been validated in patients currently or previously treated with pharmacotherapy for osteoporosis  In such patients, clinical judgment must be exercised in interpreting FRAX scores  -Prior hip, vertebral and humeral fragility fractures appear to confer greater risk of subsequent fracture than fractures at other sites (this is especially true for individuals with severe vertebral fractures), but quantification of this incremental  risk is not possible with FRAX  -FRAX underestimates fracture risk in patients with history of multiple fragility fractures  -FRAX may underestimate fracture risk in patients with history of frequent falls   -It is not appropriate to use FRAX to monitor treatment response  WHO CLASSIFICATION:  Normal (a T-score of -1 0 or higher)  Low bone mineral density (a T-score of less than -1 0 but higher than -2 5)  Osteoporosis (a T-score of -2 5 or less)  Severe osteoporosis (a T-score of -2 5 or less with a fragility fracture)    LEAST SIGNIFICANT CHANGE (AT 95% C  I):  Lumbar spine: 0 020 g/cm2 "(2 2%)  Total hip: 0 033 g/cm2 (3 8%)  Forearm: 0 021 g/cm2 (3 3%)          Workstation performed: [id number]    Results for orders placed in visit on 11/08/18    DXA body comp analysis          I have personally reviewed pertinent reports  Portions of the record may have been created with voice recognition software  Occasional wrong word or \"sound a like\" substitutions may have occurred due to the inherent limitations of voice recognition software  Read the chart carefully and recognize, using context, where substitutions have occurred    "

## 2023-05-22 NOTE — ASSESSMENT & PLAN NOTE
Surgical menopause at age 28-was on HRT up till May be in her mid 45s and has been on antiresorptive therapies for it appears more than 10 years  Currently on Prolia-being managed by rheumatologist

## 2023-05-23 ENCOUNTER — PROCEDURE VISIT (OUTPATIENT)
Dept: DERMATOLOGY | Facility: CLINIC | Age: 68
End: 2023-05-23

## 2023-05-23 VITALS — BODY MASS INDEX: 28.74 KG/M2 | TEMPERATURE: 97.3 F | HEIGHT: 57 IN | WEIGHT: 133.2 LBS

## 2023-05-23 DIAGNOSIS — L72.9 CYST OF SKIN: Primary | ICD-10-CM

## 2023-05-23 NOTE — PATIENT INSTRUCTIONS
POSTOP DISCUSSION DISCUSSION AND INSTRUCTIONS FOR PATIENT      Rationale for Procedure  A skin excision allows the dermatologist to remove a lesion  The procedure involves a local numbing medication and removing the entire lesion  Typically, the lesion is being removed because it is atypical, traumatized, or for significant appearance reasons  The area will be open like a brush burn and allowed to heal    There will be no sutures  Tissue is sent to pathologist who will reconfirm diagnosis and verify completeness of lesion removal     Description of Procedure  We would like to perform a skin excision today  A local anesthetic, similar to the kind that a dentist uses when filling a cavity, will be injected with a very small needle into the skin area to be sampled  The injected skin and tissue underneath should “go to sleep” and become numbed so that no further pain should be felt  A scalpel will be used to cut around the lesion and tissue will be submitted to pathology for examination  Depending on the diagnosis the lesion will be excised with a certain amount of normal skin to help assure completeness of lesion removal   The physician will discuss in advance the anticipated size and extent of removal    Bleeding will occur, but it will stopped with direct pressure, sutures, and electrocautery  Surgical “Vaseline-type” ointment will also applied after the procedure to help create a barrier between the wound and the outside world  Risks and Potential Complications  The advantage of a skin excision is that it allows us to remove a problem lesion quickly  Although this usually permits the lesion to heal as soon as possible with the least scarring, there are some risks and potential complications that include but are not limited to the following:  Some bleeding is normal at time of procedure and some bleeding on gauze is normal  the first few days after surgery    Profuse bleeding and bleeding with swelling and pain should be reported as detailed  below  Infection is uncommon in skin surgery  Infection should be reported and is indicated by pain, redness, and discharge of purulent material   Some dull to at time sharp pain could occur initially the day after surgery  Persistent pain or increasing pain days after surgery is not expected and should be reported  Every effort is made to minimize scar, but location, size, and genetics do play a role in scar appearance  A surgical wound does not achieve its optimal appearance until 6 months  There are several treatments available if scarring would be problematic including scar creams, silicone pad, laser and scar revision  Skin discoloration can occur especially in people of color  Its important to avoid sun on wound in first 6 months after surgery  Treatment is available if pigment is problematic  Incomplete removal of the lesion or recurrence of lesion can occur and this would then require further treatment and more surgery  Nerve Damage/Numbness/Loss of Function is very rare, but is most likely to occur if lesion is large or if it is in a high risk location  Allergic Reaction to lidocaine is rare  More commonly,  epinephrine is used  with the lidocaine  Occasionally, epinephrine (aka adrenalin) may cause a brief  feeling of anxiety or jitteriness  The person at the microscope  (pathologist) may provide additional information that was unexpected  This unexpected finding could provoke the need for additional treatment or evaluation  What You Will Need to Do After the Procedure  Keep the area clean and dry the first day  Try NOT to remove the bandage for the first day  Gently clean the area with soap and water and apply Vaseline ointment (this is over the counter and not a prescription) to the excision  site for up to 2 weeks  Apply a clean appropriately sized bandage to area  Gauze and paper tape are recommended for sensitive skin    Return for suture removal as instructed (generally 1 week for the face, 2 weeks for the body)  Take Acetaminophen (Tylenol) for discomfort, if no contraindications  Do NOT take Ibuprofen or aspirin unless specifically told to do so by your Dermatologist because these medications can make bleeding worse  Call our office immediately for signs of infection: fever, chills, increased redness, warmth, tenderness, discomfort/pain, or pus or foul smell coming from the wound  If bleeding is noticed, place a clean cloth over the area and apply firm pressure for thirty minutes  Check the wound ONLY after 30 minutes of direct pressure; do not cheat and sneak a peak, as that does not count  If bleeding persists after 30 minutes of legitimate direct pressure, then try one more round of direct pressure for an additional 10 minutes to the area  Should the bleeding become heavier or not stop after the second attempt, call [facility] directly at [phone] (SKIN) or, if after hours, go to your nearest Emergency Room or Urgent Care        REMOVE SUTURES IN 5-7 DAYS (5/28/23-5/30/23)

## 2023-05-23 NOTE — PROGRESS NOTES
"[facility] Note     Patient Name: Ronda Bae  Encounter Date: 05/23/2023     Have you been cared for by a [facility] in the last 3 years and, if so, which description applies to you? Yes  I have been here within the last 3 years, and my medical history has NOT changed since that time  I am FEMALE/of child-bearing potential     REVIEW OF SYSTEMS:  Have you recently had or currently have any of the following? · No changes in my recent health  PAST MEDICAL HISTORY:  Have you personally ever had or currently have any of the following? If \"YES,\" then please provide more detail  · No changes in my medical history  FAMILY HISTORY:  Any \"first degree relatives\" (parent, brother, sister, or child) with the following? • No changes in my family's known health  PATIENT EXPERIENCE:    • Do you want the Dermatologist to perform a COMPLETE skin exam today including a clinical examination under the \"bra and underwear\" areas? NO  • If necessary, do we have your permission to call and leave a detailed message on your Preferred Phone number that includes your specific medical information?   Yes      Allergies   Allergen Reactions   • Codeine      Reaction Date: [id number];    • Darvon [Propoxyphene]    • Demerol [Meperidine]    • Valium [Diazepam]    • Other Other (See Comments)     HCTZ - JOAQUIN with elevated calcium and creatinine; hospitalization 11/2022   • Aspirin      Reaction Date: [id number];       Current Outpatient Medications:   •  acetaminophen (TYLENOL) 500 mg tablet, Take 2 tabs po up to every 8 hours prn pain, Disp: 60 tablet, Rfl: 0  •  amLODIPine (NORVASC) 2 5 mg tablet, Take 1 tablet (2 5 mg total) by mouth daily, Disp: 90 tablet, Rfl: 1  •  calcium carbonate-vitamin D 250 mg-3 125 mcg per tablet, Take 1 tablet by mouth daily, Disp: 90 tablet, Rfl: 3  •  cyclobenzaprine (FLEXERIL) 10 mg tablet, Take 1 tablet (10 mg total) by mouth 2 (two) times a day as needed for " muscle spasms, Disp: 20 tablet, Rfl: 0  •  Denosumab (PROLIA SC), Inject under the skin, Disp: , Rfl:   •  lidocaine (LIDODERM) 5 %, Apply 1 patch topically every 24 hours for 15 days Remove & Discard patch within 12 hours or as directed by MD (Patient not taking: Reported on 5/22/2023), Disp: 15 patch, Rfl: 0  •  losartan (COZAAR) 100 MG tablet, Take 1 tablet (100 mg total) by mouth daily, Disp: 90 tablet, Rfl: 0  •  Multiple Vitamin (MULTIVITAMIN) capsule, Take 1 capsule by mouth daily, Disp: , Rfl:   •  rosuvastatin (CRESTOR) 10 MG tablet, Take 1 tablet (10 mg total) by mouth daily In the pm, Disp: 90 tablet, Rfl: 1          • Whom besides the patient is providing clinical information about today's encounter?   o NO ADDITIONAL HISTORIAN (patient alone provided history)    Physical Exam and Assessment/Plan by Diagnosis:    PROCEDURE:  [address] [place]    Attending: Dr Rock  Assistant: Katarzyna Navas    Pre-Op Diagnosis: epidermal cyst  Post-Op Diagnosis: Same   Benign versus malignant benign    Lesion Anatomic Location: Left cheek   Pre-op size: 0 5 cm  Size of defect:  1 0 (with 0 centimeter margins)  Final repaired wound length:  1 0 cm    Written and verbal, witnessed informed consent was obtained  Risks (bleeding, pain, infection, scarring, recurrence) and benefits discussed  It was specifically discussed that excision involves local anesthesia ,  cutting around and under  the area,  widely undermining the defect, and suture closing area both with sutures inside and outside skin  A return appointment will be necessary usually in 7 to 14 days  It was discussed that every effort is made to minimize scar but some time of scar always results with excisional surgery  The degree and character of scar is often dependent on lesion size, location, and patient healing characteristics  Time Out: performed:  YES  Correct patient: YES  Correct site per Clinic Report: YES     Correct site per Patient Report: YES    LOCAL ANESTHESIA: 3 ml of 1% xylocaine with epi     DESCRIPTION OF PROCEDURE: The patient was brought back into the procedure room, anesthetized locally, prepped and draped in the usual fashion  Using a #15 blade with a scalpel, the lesion was excised in elliptical fashion  The wound was extensively undermined in the deep fascial plane  Hemostasis was achieved with light electrocoagulation  Epidermal edge closure was accomplished with superficial sutures as follows:    Superficial suture:6-0 Ethilon  Superficial suture type: Interrupted    Estimated blood loss less than 3ml  The patient tolerated the procedure well without any complications  The wound was cleaned with sterile saline, dried off, surgical vaseline ointment was applied, and the wound was covered  A pressure dressing was applied for stabilization and light pressure  The patient was given detailed oral and written instructions on postoperative care as detailed in consent  The patient left in good medical condition  POSTOP DISCUSSION DISCUSSION AND INSTRUCTIONS FOR PATIENT      Rationale for Procedure  A skin excision allows the dermatologist to remove a lesion  The procedure involves a local numbing medication and removing the entire lesion  Typically, the lesion is being removed because it is atypical, traumatized, or for significant appearance reasons  The area will be open like a brush burn and allowed to heal    There will be no sutures  Tissue is sent to pathologist who will reconfirm diagnosis and verify completeness of lesion removal     Description of Procedure  We would like to perform a skin excision today  A local anesthetic, similar to the kind that a dentist uses when filling a cavity, will be injected with a very small needle into the skin area to be sampled  The injected skin and tissue underneath should “go to sleep” and become numbed so that no further pain should be felt    A scalpel will be used to cut around the lesion and tissue will be submitted to pathology for examination  Depending on the diagnosis the lesion will be excised with a certain amount of normal skin to help assure completeness of lesion removal   The physician will discuss in advance the anticipated size and extent of removal    Bleeding will occur, but it will stopped with direct pressure, sutures, and electrocautery  Surgical “Vaseline-type” ointment will also applied after the procedure to help create a barrier between the wound and the outside world  Risks and Potential Complications  The advantage of a skin excision is that it allows us to remove a problem lesion quickly  Although this usually permits the lesion to heal as soon as possible with the least scarring, there are some risks and potential complications that include but are not limited to the following:  - Some bleeding is normal at time of procedure and some bleeding on gauze is normal  the first few days after surgery  Profuse bleeding and bleeding with swelling and pain should be reported as detailed  below  - Infection is uncommon in skin surgery  Infection should be reported and is indicated by pain, redness, and discharge of purulent material   - Some dull to at time sharp pain could occur initially the day after surgery  Persistent pain or increasing pain days after surgery is not expected and should be reported  - Every effort is made to minimize scar, but location, size, and genetics do play a role in scar appearance  A surgical wound does not achieve its optimal appearance until 6 months  There are several treatments available if scarring would be problematic including scar creams, silicone pad, laser and scar revision   - Skin discoloration can occur especially in people of color  Its important to avoid sun on wound in first 6 months after surgery    Treatment is available if pigment is problematic   - Incomplete removal of the lesion or recurrence of lesion can occur and this would then require further treatment and more surgery   - Nerve Damage/Numbness/Loss of Function is very rare, but is most likely to occur if lesion is large or if it is in a high risk location  - Allergic Reaction to lidocaine is rare  More commonly,  epinephrine is used  with the lidocaine  Occasionally, epinephrine (aka adrenalin) may cause a brief  feeling of anxiety or jitteriness  - The person at the microscope  (pathologist) may provide additional information that was unexpected  This unexpected finding could provoke the need for additional treatment or evaluation  What You Will Need to Do After the Procedure  1  Keep the area clean and dry the first day  Try NOT to remove the bandage for the first day  2  Gently clean the area with soap and water and apply Vaseline ointment (this is over the counter and not a prescription) to the excision  site for up to 2 weeks  3  Apply a clean appropriately sized bandage to area  Gauze and paper tape are recommended for sensitive skin  4  Return for suture removal as instructed (generally 1 week for the face, 2 weeks for the body)  5  Take Acetaminophen (Tylenol) for discomfort, if no contraindications  Do NOT take Ibuprofen or aspirin unless specifically told to do so by your Dermatologist because these medications can make bleeding worse  6  Call our office immediately for signs of infection: fever, chills, increased redness, warmth, tenderness, discomfort/pain, or pus or foul smell coming from the wound  If bleeding is noticed, place a clean cloth over the area and apply firm pressure for thirty minutes  Check the wound ONLY after 30 minutes of direct pressure; do not cheat and sneak a peak, as that does not count  If bleeding persists after 30 minutes of legitimate direct pressure, then try one more round of direct pressure for an additional 10 minutes to the area    Should the bleeding become heavier or not stop after the second attempt, call [facility] directly at [phone] (SKIN) or, if after hours, go to your nearest Emergency Room or Urgent Care        Scribe Attestation    I,:  Wang Junior am acting as a scribe while in the presence of the attending physician :       I,:  Bogdan Thompson MD personally performed the services described in this documentation    as scribed in my presence :

## 2023-05-28 ENCOUNTER — OFFICE VISIT (OUTPATIENT)
Dept: URGENT CARE | Age: 68
End: 2023-05-28

## 2023-05-28 VITALS — RESPIRATION RATE: 18 BRPM | HEART RATE: 64 BPM | OXYGEN SATURATION: 99 % | TEMPERATURE: 97.2 F

## 2023-05-28 DIAGNOSIS — Z48.02 ENCOUNTER FOR REMOVAL OF SUTURES: Primary | ICD-10-CM

## 2023-05-28 NOTE — PROGRESS NOTES
[facility]        NAME: Julienne Hammonds is a 74 y o  female  : 1955    MRN: [id number]  DATE: May 28, 2023  TIME: 11:27 AM    Assessment and Plan   Encounter for removal of sutures [Z48 02]  1  Encounter for removal of sutures  Suture removal            Patient Instructions     2 suture removed without complication  Discussed local wound care and f/u with Dermatology  Follow up with PCP in 2-3 days  Proceed to ER if symptoms worsen  Chief Complaint     Chief Complaint   Patient presents with   • Suture / Staple Removal     Two sutures on left cheek         History of Present Illness     74 y o  F  Presents for suture removal  DOS 23 - cyst of skin removed by [facility]  2 sutures to L cheek  No evidence of infection  Pt reports no complaints  States she was told to go to ER or Urgent care for suture removal in 5 days    Review of Systems   Review of Systems   Constitutional: Negative for chills, fatigue and fever  HENT: Negative for congestion, ear pain, facial swelling, hearing loss, rhinorrhea, sinus pressure, sneezing, sore throat and trouble swallowing  Eyes: Negative for pain, redness and visual disturbance  Respiratory: Negative for cough, chest tightness, shortness of breath and wheezing  Cardiovascular: Negative for chest pain and palpitations  Gastrointestinal: Negative for abdominal pain, diarrhea, nausea and vomiting  Genitourinary: Negative for dysuria, flank pain, hematuria and pelvic pain  Musculoskeletal: Negative for arthralgias, back pain and myalgias  Skin: Positive for wound  Negative for color change and rash  Neurological: Negative for dizziness, seizures, syncope, weakness, light-headedness and headaches  Psychiatric/Behavioral: Negative for confusion, hallucinations and sleep disturbance  The patient is not nervous/anxious  All other systems reviewed and are negative          Current Medications       Current Outpatient Medications:   • amLODIPine (NORVASC) 2 5 mg tablet, Take 1 tablet (2 5 mg total) by mouth daily, Disp: 90 tablet, Rfl: 1  •  cyclobenzaprine (FLEXERIL) 10 mg tablet, Take 1 tablet (10 mg total) by mouth 2 (two) times a day as needed for muscle spasms, Disp: 20 tablet, Rfl: 0  •  Denosumab (PROLIA SC), Inject under the skin, Disp: , Rfl:   •  losartan (COZAAR) 100 MG tablet, Take 1 tablet (100 mg total) by mouth daily, Disp: 90 tablet, Rfl: 0  •  Multiple Vitamin (MULTIVITAMIN) capsule, Take 1 capsule by mouth daily, Disp: , Rfl:   •  rosuvastatin (CRESTOR) 10 MG tablet, Take 1 tablet (10 mg total) by mouth daily In the pm, Disp: 90 tablet, Rfl: 1  •  acetaminophen (TYLENOL) 500 mg tablet, Take 2 tabs po up to every 8 hours prn pain, Disp: 60 tablet, Rfl: 0  •  calcium carbonate-vitamin D 250 mg-3 125 mcg per tablet, Take 1 tablet by mouth daily, Disp: 90 tablet, Rfl: 3  •  lidocaine (LIDODERM) 5 %, Apply 1 patch topically every 24 hours for 15 days Remove & Discard patch within 12 hours or as directed by MD (Patient not taking: Reported on 5/22/2023), Disp: 15 patch, Rfl: 0    Current Allergies     Allergies as of 05/28/2023 - Reviewed 05/28/2023   Allergen Reaction Noted   • Codeine  04/21/2012   • Darvon [propoxyphene]  07/11/2019   • Demerol [meperidine]  07/11/2019   • Valium [diazepam]  07/11/2019   • Other Other (See Comments) 12/29/2022   • Aspirin  04/21/2012            The following portions of the patient's history were reviewed and updated as appropriate: allergies, current medications, past family history, past medical history, past social history, past surgical history and problem list      Past Medical History:   Diagnosis Date   • Arthritis    • Asthma    • Basal cell carcinoma 02/11/2020    Right alar groove   • HL (hearing loss)    • Osteoarthritis    • Osteoporosis    • Shingles        Past Surgical History:   Procedure Laterality Date   • COCHLEAR IMPLANT     • COLONOSCOPY  2017   • COLONOSCOPY  2010 fiberoptic; follow up at age; 2017 10 year f/u   • HYSTERECTOMY      age 30   • MOHS SURGERY  2020     Right alar groove   • OOPHORECTOMY      age 30   • SKIN BIOPSY     • TONSILLECTOMY         Family History   Problem Relation Age of Onset   • Osteoporosis Mother    • Hearing loss Mother         she just found out   • Diabetes Father            • Heart disease Father            • Hypertension Father            • Alcohol abuse Father            • Dementia Father    • Stroke Father            • Lung cancer Maternal Grandmother         76s   • Cancer Maternal Grandmother            • Osteoporosis Maternal Grandmother            • Heart disease Paternal Grandmother            • Diabetes Paternal Grandmother            • No Known Problems Maternal Aunt    • Lung cancer Maternal Grandfather    • Cancer Maternal Grandfather            • Hearing loss Maternal Grandfather    • Prostate cancer Paternal Grandfather            • Cancer Paternal Grandfather            • Colon cancer Other    • Pancreatic cancer Other    • Alcohol abuse Brother    • Hypertension Brother    • Diabetes Brother          Medications have been verified  Objective   Pulse 64   Temp (!) 97 2 °F (36 2 °C)   Resp 18   LMP 1989 (Exact Date) Comment: hysterectomy, total  SpO2 99%   Patient's last menstrual period was 1989 (exact date)  Physical Exam     Physical Exam  Vitals reviewed  Constitutional:       General: She is not in acute distress  Appearance: Normal appearance  She is not toxic-appearing  HENT:      Head: Normocephalic  Comments:   2 sutures in place  CDI  No swelling, erythema or drainage  Wound healed     Right Ear: Tympanic membrane normal  Tympanic membrane is not erythematous or bulging  Left Ear: Tympanic membrane normal  Tympanic membrane is not erythematous or bulging        Nose: No congestion or "rhinorrhea  Right Sinus: No maxillary sinus tenderness or frontal sinus tenderness  Left Sinus: No maxillary sinus tenderness or frontal sinus tenderness  Mouth/Throat:      Pharynx: Uvula midline  No oropharyngeal exudate, posterior oropharyngeal erythema or uvula swelling  Tonsils: No tonsillar exudate or tonsillar abscesses  Eyes:      Extraocular Movements: Extraocular movements intact  Conjunctiva/sclera: Conjunctivae normal       Pupils: Pupils are equal, round, and reactive to light  Cardiovascular:      Rate and Rhythm: Normal rate and regular rhythm  Pulses: Normal pulses  Heart sounds: Normal heart sounds  Pulmonary:      Effort: Pulmonary effort is normal  No tachypnea or respiratory distress  Breath sounds: Normal breath sounds and air entry  No decreased breath sounds, wheezing, rhonchi or rales  Abdominal:      General: Bowel sounds are normal       Palpations: Abdomen is soft  Tenderness: There is no abdominal tenderness  There is no right CVA tenderness or guarding  Musculoskeletal:         General: Normal range of motion  Cervical back: Normal range of motion  Lymphadenopathy:      Cervical: No cervical adenopathy  Skin:     General: Skin is warm and dry  Neurological:      General: No focal deficit present  Mental Status: She is alert  Sensory: Sensation is intact  Motor: Motor function is intact  Coordination: Coordination is intact  Gait: Gait is intact  Deep Tendon Reflexes: Reflexes are normal and symmetric  Suture removal    Date/Time: 5/28/2023 11:30 AM    Performed by: CHET Israel  Authorized by: CHET Israel  Universal Protocol:  Procedure performed by:  Consent: Verbal consent obtained    Risks and benefits: risks, benefits and alternatives were discussed  Consent given by: patient  Time out: Immediately prior to procedure a \"time out\" was called to verify the correct " patient, procedure, equipment, support staff and site/side marked as required  Timeout called at: 5/28/2023 11:26 AM   Patient understanding: patient states understanding of the procedure being performed        Patient location:  Bedside  Location:     Laterality:  Left    [facility]  [address] [facility] location:  [address] [facility] location:  L cheek  Procedure details: Tools used:  Suture removal kit    Wound appearance:  No sign(s) of infection and good wound healing    Number of sutures removed:  2  Post-procedure details:     Post-removal:  No dressing applied    Patient tolerance of procedure:   Tolerated well, no immediate complications

## 2023-05-30 PROCEDURE — 88304 TISSUE EXAM BY PATHOLOGIST: CPT | Performed by: STUDENT IN AN ORGANIZED HEALTH CARE EDUCATION/TRAINING PROGRAM

## 2023-05-30 NOTE — RESULT ENCOUNTER NOTE
DERMATOPATHOLOGY RESULT NOTE    Results reviewed by ordering physician  Called patient to personally discuss results  mychart      Instructions for Clinical Derm Team:   (remember to route Result Note to appropriate staff):    None    Result & Plan by Specimen:    Specimen A: benign  Plan: reassured, benign    Final Diagnosis  A   Skin, left cheek, excision:     EPIDERMAL INCLUSION CYST

## 2023-06-16 DIAGNOSIS — E78.2 MIXED HYPERLIPIDEMIA: ICD-10-CM

## 2023-06-16 RX ORDER — ROSUVASTATIN CALCIUM 10 MG/1
10 TABLET, COATED ORAL DAILY
Qty: 90 TABLET | Refills: 0 | Status: SHIPPED | OUTPATIENT
Start: 2023-06-16

## 2023-06-20 ENCOUNTER — LAB (OUTPATIENT)
Dept: LAB | Age: 68
End: 2023-06-20
Payer: COMMERCIAL

## 2023-06-20 ENCOUNTER — APPOINTMENT (OUTPATIENT)
Dept: LAB | Age: 68
End: 2023-06-20
Payer: COMMERCIAL

## 2023-06-20 ENCOUNTER — TELEPHONE (OUTPATIENT)
Dept: FAMILY MEDICINE CLINIC | Facility: MEDICAL CENTER | Age: 68
End: 2023-06-20

## 2023-06-20 DIAGNOSIS — E78.2 MIXED HYPERLIPIDEMIA: ICD-10-CM

## 2023-06-20 DIAGNOSIS — E03.8 SUBCLINICAL HYPOTHYROIDISM: ICD-10-CM

## 2023-06-20 DIAGNOSIS — E21.3 HYPERPARATHYROIDISM (HCC): ICD-10-CM

## 2023-06-20 LAB
25(OH)D3 SERPL-MCNC: 73.8 NG/ML (ref 30–100)
ALBUMIN SERPL BCP-MCNC: 4.1 G/DL (ref 3.5–5)
ALP SERPL-CCNC: 73 U/L (ref 46–116)
ALT SERPL W P-5'-P-CCNC: 52 U/L (ref 12–78)
ANION GAP SERPL CALCULATED.3IONS-SCNC: 4 MMOL/L
AST SERPL W P-5'-P-CCNC: 26 U/L (ref 5–45)
BILIRUB SERPL-MCNC: 0.67 MG/DL (ref 0.2–1)
BUN SERPL-MCNC: 13 MG/DL (ref 5–25)
CA-I BLD-SCNC: 1.2 MMOL/L (ref 1.12–1.32)
CALCIUM 24H UR-MCNC: 204.4 MG/24 HRS (ref 42–353)
CALCIUM SERPL-MCNC: 9.8 MG/DL (ref 8.3–10.1)
CHLORIDE SERPL-SCNC: 108 MMOL/L (ref 96–108)
CHOLEST SERPL-MCNC: 225 MG/DL
CK SERPL-CCNC: 63 U/L (ref 26–192)
CO2 SERPL-SCNC: 28 MMOL/L (ref 21–32)
CREAT 24H UR-MRATE: 0.8 G/24HR (ref 0.6–1.8)
CREAT SERPL-MCNC: 0.88 MG/DL (ref 0.6–1.3)
GFR SERPL CREATININE-BSD FRML MDRD: 69 ML/MIN/1.73SQ M
GLUCOSE P FAST SERPL-MCNC: 103 MG/DL (ref 65–99)
HDLC SERPL-MCNC: 76 MG/DL
LDLC SERPL CALC-MCNC: 125 MG/DL (ref 0–100)
NONHDLC SERPL-MCNC: 149 MG/DL
PHOSPHATE SERPL-MCNC: 3.9 MG/DL (ref 2.3–4.1)
POTASSIUM SERPL-SCNC: 4 MMOL/L (ref 3.5–5.3)
PROT SERPL-MCNC: 7.8 G/DL (ref 6.4–8.4)
PTH-INTACT SERPL-MCNC: 72.6 PG/ML (ref 12–88)
SODIUM SERPL-SCNC: 140 MMOL/L (ref 135–147)
SPECIMEN VOL UR: 1400 ML
SPECIMEN VOL UR: 1400 ML
T4 FREE SERPL-MCNC: 0.69 NG/DL (ref 0.61–1.12)
TRIGL SERPL-MCNC: 118 MG/DL
TSH SERPL DL<=0.05 MIU/L-ACNC: 4.3 UIU/ML (ref 0.45–4.5)

## 2023-06-20 PROCEDURE — 80061 LIPID PANEL: CPT

## 2023-06-20 PROCEDURE — 82306 VITAMIN D 25 HYDROXY: CPT

## 2023-06-20 PROCEDURE — 82330 ASSAY OF CALCIUM: CPT

## 2023-06-20 PROCEDURE — 80053 COMPREHEN METABOLIC PANEL: CPT

## 2023-06-20 PROCEDURE — 84100 ASSAY OF PHOSPHORUS: CPT

## 2023-06-20 PROCEDURE — 82340 ASSAY OF CALCIUM IN URINE: CPT

## 2023-06-20 PROCEDURE — 84439 ASSAY OF FREE THYROXINE: CPT

## 2023-06-20 PROCEDURE — 86376 MICROSOMAL ANTIBODY EACH: CPT

## 2023-06-20 PROCEDURE — 36415 COLL VENOUS BLD VENIPUNCTURE: CPT

## 2023-06-20 PROCEDURE — 82550 ASSAY OF CK (CPK): CPT

## 2023-06-20 PROCEDURE — 84443 ASSAY THYROID STIM HORMONE: CPT

## 2023-06-20 PROCEDURE — 86800 THYROGLOBULIN ANTIBODY: CPT

## 2023-06-20 PROCEDURE — 82570 ASSAY OF URINE CREATININE: CPT

## 2023-06-20 PROCEDURE — 83970 ASSAY OF PARATHORMONE: CPT

## 2023-06-20 NOTE — TELEPHONE ENCOUNTER
----- Message from [employer] sent at 6/20/2023  9:45 AM EDT -----  Regarding: Previous meds  Contact: [phone]  Po Hoang,  My endocrinologist cannot access any records prior to 2010  I need an listing of the estrogen that I was taking   I know I provided copies of my medical records when I started seeing Dr Princess Pablo

## 2023-06-20 NOTE — TELEPHONE ENCOUNTER
Hi, can you reach out to patient and let her know the process for getting these records? I looked in [employer] and there is about 100 pages of older records scanned in  I am not sure what she is looking for precisely

## 2023-06-20 NOTE — RESULT ENCOUNTER NOTE
Your labs are overall good  Improvement is noted with lipids  Cmp is normal  Please be sure to followup with endo for their ordered lab results  Thanks  Dr Duc Aguirre

## 2023-06-21 LAB
THYROGLOB AB SERPL-ACNC: <1 IU/ML (ref 0–0.9)
THYROPEROXIDASE AB SERPL-ACNC: 12 IU/ML (ref 0–34)

## 2023-06-28 DIAGNOSIS — I10 ESSENTIAL HYPERTENSION: ICD-10-CM

## 2023-06-28 RX ORDER — AMLODIPINE BESYLATE 2.5 MG/1
TABLET ORAL
Qty: 90 TABLET | Refills: 1 | Status: SHIPPED | OUTPATIENT
Start: 2023-06-28

## 2023-07-19 ENCOUNTER — APPOINTMENT (OUTPATIENT)
Dept: LAB | Age: 68
End: 2023-07-19
Payer: COMMERCIAL

## 2023-07-19 DIAGNOSIS — M81.8 IDIOPATHIC OSTEOPOROSIS: ICD-10-CM

## 2023-07-19 LAB — CALCIUM SERPL-MCNC: 9.6 MG/DL (ref 8.3–10.1)

## 2023-07-19 PROCEDURE — 36415 COLL VENOUS BLD VENIPUNCTURE: CPT

## 2023-07-19 PROCEDURE — 82310 ASSAY OF CALCIUM: CPT

## 2023-08-15 ENCOUNTER — OFFICE VISIT (OUTPATIENT)
Dept: FAMILY MEDICINE CLINIC | Facility: CLINIC | Age: 68
End: 2023-08-15
Payer: COMMERCIAL

## 2023-08-15 VITALS
TEMPERATURE: 97 F | HEIGHT: 57 IN | RESPIRATION RATE: 18 BRPM | SYSTOLIC BLOOD PRESSURE: 124 MMHG | OXYGEN SATURATION: 100 % | DIASTOLIC BLOOD PRESSURE: 82 MMHG | WEIGHT: 134 LBS | HEART RATE: 58 BPM | BODY MASS INDEX: 28.91 KG/M2

## 2023-08-15 DIAGNOSIS — G89.29 CHRONIC RIGHT SHOULDER PAIN: ICD-10-CM

## 2023-08-15 DIAGNOSIS — Z12.31 ENCOUNTER FOR SCREENING MAMMOGRAM FOR BREAST CANCER: Primary | ICD-10-CM

## 2023-08-15 DIAGNOSIS — E78.2 MIXED HYPERLIPIDEMIA: ICD-10-CM

## 2023-08-15 DIAGNOSIS — E83.52 HYPERCALCEMIA: ICD-10-CM

## 2023-08-15 DIAGNOSIS — E03.8 SUBCLINICAL HYPOTHYROIDISM: ICD-10-CM

## 2023-08-15 DIAGNOSIS — R07.89 CHEST WALL PAIN: ICD-10-CM

## 2023-08-15 DIAGNOSIS — D03.72 MELANOMA IN SITU OF LEFT LOWER LEG (HCC): ICD-10-CM

## 2023-08-15 DIAGNOSIS — R79.89 ELEVATED PTHRP LEVEL: ICD-10-CM

## 2023-08-15 DIAGNOSIS — M25.511 CHRONIC RIGHT SHOULDER PAIN: ICD-10-CM

## 2023-08-15 DIAGNOSIS — H90.3 SENSORINEURAL HEARING LOSS (SNHL) OF BOTH EARS: ICD-10-CM

## 2023-08-15 DIAGNOSIS — I10 ESSENTIAL HYPERTENSION: ICD-10-CM

## 2023-08-15 PROCEDURE — 99214 OFFICE O/P EST MOD 30 MIN: CPT | Performed by: FAMILY MEDICINE

## 2023-08-15 RX ORDER — LOSARTAN POTASSIUM 100 MG/1
100 TABLET ORAL DAILY
Qty: 90 TABLET | Refills: 1 | Status: SHIPPED | OUTPATIENT
Start: 2023-08-15

## 2023-08-15 RX ORDER — ROSUVASTATIN CALCIUM 10 MG/1
10 TABLET, COATED ORAL DAILY
Qty: 90 TABLET | Refills: 1 | Status: SHIPPED | OUTPATIENT
Start: 2023-08-15

## 2023-08-15 NOTE — PATIENT INSTRUCTIONS
Schedule mammogram after 10/5/2023    Return dec/stephanie for PE - have fasting labs done prior    Trial of some home exercises for shoulder  Do 1-2x/day - and then ice shoulder and chest wall after exercising for about 30 min at a time.     If not improving - call dr Elpidio Duong - could consider further workup or physical therapy

## 2023-08-31 NOTE — PROGRESS NOTES
Established Patient Progress Note    CC: Follow up for hypercalcemia    Impression & Plan:    Problem List Items Addressed This Visit        Musculoskeletal and Integument    Osteoporosis     Managed by rheumatology            Other    Hypercalcemia - Primary     This has improved on most recent lab work. Laboratory testing following her last visit in May were all normal including 24 hour urine calcium, PTH and calcium levels as well as most a calcium level drawn most recently in July. Discussed patient should take no more than 1200 mg of calcium per day through diet and/or supplementation. She is now off the HCTZ. Patient should continue following with her PCP. A CMP has been ordered for January. She may return as needed. No orders of the defined types were placed in this encounter. History of Present Illness:   Rachael Nicole is a 74 y.o. female with a history of hypercalcemia found on lab work which prompted hospitalization on 9/28/22 for JOAQUIN. Calcium on admission was 13.5. She reports taking too many calcium supplements at this time in combination with being on HCTZ. Since hospitalization, she is down to 1 tab daily of her calcium/vitamin D supplement and is off HCTZ. She was asymptomatic at this time. Osteoporosis: she is being treated with prolia by rheumatology. Her lab work is monitored prior and 2 week post-injection. She has been on prolia since 2016/2018. Previously treated with Fosamax (2001 to 2016/2018) and HRT (1989 to 2001). She denies any symptoms today of hypercalcemia. She has no complaints. Her lab work following her visit in 05/2023 was normal including PTH, 24 hour urine calcium, vitamin D and serum calcium levels.     Patient Active Problem List   Diagnosis   • Hypercalcemia   • Osteoporosis   • Sensorineural hearing loss (SNHL) of both ears   • Oral mucosal lesion   • Epistaxis   • Melanoma in situ of left lower leg (HCC)   • Basal cell carcinoma (BCC) of right lower leg   • Cochlear implant status   • Subclinical hypothyroidism   • Mixed hyperlipidemia   • Trigger finger, right ring finger   • JOAQUIN (acute kidney injury) ([address])   • Essential hypertension   • Elevated PTHrP level   • Hyperparathyroidism ([address])      Past Medical History:   Diagnosis Date   • Arthritis    • Asthma    • Basal cell carcinoma 2020    Right alar groove   • HL (hearing loss)    • Osteoarthritis    • Osteoporosis    • Shingles       Past Surgical History:   Procedure Laterality Date   • COCHLEAR IMPLANT     • COLONOSCOPY     • COLONOSCOPY      fiberoptic; follow up at age; 2017 10 year f/u   • HYSTERECTOMY      age 30   • MOHS SURGERY  2020     Right alar groove   • OOPHORECTOMY      age 30   • SKIN BIOPSY     • TONSILLECTOMY        Family History   Problem Relation Age of Onset   • Osteoporosis Mother    • Hearing loss Mother         she just found out   • Diabetes Father            • Heart disease Father            • Hypertension Father            • Alcohol abuse Father            • Dementia Father    • Stroke Father            • Lung cancer Maternal Grandmother         76s   • Cancer Maternal Grandmother            • Osteoporosis Maternal Grandmother            • Heart disease Paternal Grandmother            • Diabetes Paternal Grandmother            • No Known Problems Maternal Aunt    • Lung cancer Maternal Grandfather    • Cancer Maternal Grandfather            • Hearing loss Maternal Grandfather    • Prostate cancer Paternal Grandfather            • Cancer Paternal Grandfather            • Colon cancer Other    • Pancreatic cancer Other    • Alcohol abuse Brother    • Hypertension Brother    • Diabetes Brother      Social History     Tobacco Use   • Smoking status: Never   • Smokeless tobacco: Never   Substance Use Topics   • Alcohol use: No     Allergies   Allergen Reactions   • Codeine      Reaction Date: [id number];    • Darvon [Propoxyphene]    • Demerol [Meperidine]    • Valium [Diazepam]    • Other Other (See Comments)     HCTZ - JOAQUIN with elevated calcium and creatinine; hospitalization 11/2022   • Aspirin      Reaction Date: [id number];          Current Outpatient Medications:   •  acetaminophen (TYLENOL) 500 mg tablet, Take 2 tabs po up to every 8 hours prn pain, Disp: 60 tablet, Rfl: 0  •  amLODIPine (NORVASC) 2.5 mg tablet, take 1 tablet by mouth once daily, Disp: 90 tablet, Rfl: 1  •  calcium carbonate-vitamin D 250 mg-3.125 mcg per tablet, Take 1 tablet by mouth daily, Disp: 90 tablet, Rfl: 3  •  losartan (COZAAR) 100 MG tablet, Take 1 tablet (100 mg total) by mouth daily, Disp: 90 tablet, Rfl: 1  •  Multiple Vitamin (MULTIVITAMIN) capsule, Take 1 capsule by mouth daily, Disp: , Rfl:   •  rosuvastatin (CRESTOR) 10 MG tablet, Take 1 tablet (10 mg total) by mouth daily In the pm, Disp: 90 tablet, Rfl: 1  •  Denosumab (PROLIA SC), Inject under the skin, Disp: , Rfl:   •  lidocaine (LIDODERM) 5 %, Apply 1 patch topically every 24 hours for 15 days Remove & Discard patch within 12 hours or as directed by MD (Patient not taking: Reported on 5/22/2023), Disp: 15 patch, Rfl: 0    Review of Systems   Constitutional: Negative for chills and fever. HENT: Negative for ear pain and sore throat. Eyes: Negative for pain and visual disturbance. Respiratory: Negative for cough and shortness of breath. Cardiovascular: Negative for chest pain and palpitations. Gastrointestinal: Negative for abdominal pain and vomiting. Genitourinary: Negative for dysuria and hematuria. Musculoskeletal: Negative for arthralgias and back pain. Skin: Negative for color change and rash. Neurological: Negative for seizures and syncope. All other systems reviewed and are negative. Physical Exam:  Body mass index is 29.82 kg/m².   /84 (BP Location: Left arm, Patient Position: Sitting, Cuff Size: Adult)   Ht 4' 9" (1.448 m)   Wt 62.5 kg (137 lb 12.8 oz)   LMP 06/01/1989 (Exact Date) Comment: hysterectomy, total  BMI 29.82 kg/m²    Wt Readings from Last 3 Encounters:   09/01/23 62.5 kg (137 lb 12.8 oz)   08/15/23 60.8 kg (134 lb)   05/23/23 60.4 kg (133 lb 3.2 oz)       Physical Exam  Vitals reviewed. Constitutional:       Appearance: Normal appearance. HENT:      Head: Normocephalic and atraumatic. Cardiovascular:      Rate and Rhythm: Normal rate. Heart sounds: Normal heart sounds. Pulmonary:      Effort: Pulmonary effort is normal.      Breath sounds: Normal breath sounds. Neurological:      Mental Status: She is alert and oriented to person, place, and time. Psychiatric:         Mood and Affect: Mood normal.         Behavior: Behavior normal.         Labs:   Lab Results   Component Value Date    HGBA1C 5.5 04/03/2023    HGBA1C 5.4 09/28/2022    HGBA1C 5.4 09/13/2022     Lab Results   Component Value Date    CREATININE 0.88 06/20/2023    CREATININE 0.86 04/03/2023    CREATININE 0.80 02/14/2023    BUN 13 06/20/2023     06/20/2018    K 4.0 06/20/2023     06/20/2023    CO2 28 06/20/2023     eGFR   Date Value Ref Range Status   06/20/2023 69 ml/min/1.73sq m Final     Lab Results   Component Value Date    CHOL 211 (H) 06/20/2018    HDL 76 06/20/2023    TRIG 118 06/20/2023     Lab Results   Component Value Date    ALT 52 06/20/2023    AST 26 06/20/2023    ALKPHOS 73 06/20/2023    BILITOT 0.7 06/20/2018     Lab Results   Component Value Date    GIH2KSUHEPHU 4.302 06/20/2023    SOR4MNSMCJRD 3.120 04/03/2023    JCI3KDMHQEYB 2.600 08/02/2022     Lab Results   Component Value Date    FREET4 0.69 06/20/2023         There are no Patient Instructions on file for this visit. Discussed with the patient and all questioned fully answered. She will call me if any problems arise.

## 2023-09-01 ENCOUNTER — OFFICE VISIT (OUTPATIENT)
Dept: ENDOCRINOLOGY | Facility: CLINIC | Age: 68
End: 2023-09-01
Payer: COMMERCIAL

## 2023-09-01 ENCOUNTER — PATIENT MESSAGE (OUTPATIENT)
Dept: NEPHROLOGY | Facility: CLINIC | Age: 68
End: 2023-09-01

## 2023-09-01 VITALS
SYSTOLIC BLOOD PRESSURE: 122 MMHG | HEIGHT: 57 IN | WEIGHT: 137.8 LBS | BODY MASS INDEX: 29.73 KG/M2 | DIASTOLIC BLOOD PRESSURE: 84 MMHG

## 2023-09-01 DIAGNOSIS — N17.9 AKI (ACUTE KIDNEY INJURY) (HCC): Primary | ICD-10-CM

## 2023-09-01 DIAGNOSIS — M81.0 OSTEOPOROSIS, UNSPECIFIED OSTEOPOROSIS TYPE, UNSPECIFIED PATHOLOGICAL FRACTURE PRESENCE: ICD-10-CM

## 2023-09-01 DIAGNOSIS — E83.52 HYPERCALCEMIA: Primary | ICD-10-CM

## 2023-09-01 DIAGNOSIS — E83.52 HYPERCALCEMIA: ICD-10-CM

## 2023-09-01 PROCEDURE — 99214 OFFICE O/P EST MOD 30 MIN: CPT

## 2023-09-01 NOTE — ASSESSMENT & PLAN NOTE
This has improved on most recent lab work. Laboratory testing following her last visit in May were all normal including 24 hour urine calcium, PTH and calcium levels as well as most a calcium level drawn most recently in July. Discussed patient should take no more than 1200 mg of calcium per day through diet and/or supplementation. She is now off the HCTZ. Patient should continue following with her PCP. A CMP has been ordered for January. She may return as needed.

## 2023-10-16 ENCOUNTER — HOSPITAL ENCOUNTER (OUTPATIENT)
Dept: MAMMOGRAPHY | Facility: MEDICAL CENTER | Age: 68
Discharge: HOME | End: 2023-10-16
Payer: COMMERCIAL

## 2023-10-16 VITALS — BODY MASS INDEX: 29.73 KG/M2 | HEIGHT: 57 IN | WEIGHT: 137.79 LBS

## 2023-10-16 DIAGNOSIS — Z12.31 ENCOUNTER FOR SCREENING MAMMOGRAM FOR BREAST CANCER: ICD-10-CM

## 2023-10-16 PROCEDURE — 77063 BREAST TOMOSYNTHESIS BI: CPT

## 2023-10-16 PROCEDURE — 77067 SCR MAMMO BI INCL CAD: CPT

## 2023-12-13 ENCOUNTER — HOSPITAL ENCOUNTER (EMERGENCY)
Facility: HOSPITAL | Age: 68
Discharge: HOME | End: 2023-12-14
Attending: EMERGENCY MEDICINE
Payer: COMMERCIAL

## 2023-12-13 ENCOUNTER — APPOINTMENT (EMERGENCY)
Dept: RADIOLOGY | Facility: HOSPITAL | Age: 68
End: 2023-12-13
Payer: COMMERCIAL

## 2023-12-13 DIAGNOSIS — R07.9 CHEST PAIN: Primary | ICD-10-CM

## 2023-12-13 DIAGNOSIS — Z00.6 ENCOUNTER FOR EXAMINATION FOR NORMAL COMPARISON OR CONTROL IN CLINICAL RESEARCH PROGRAM: ICD-10-CM

## 2023-12-13 LAB
ALBUMIN SERPL BCP-MCNC: 4.4 G/DL (ref 3.5–5)
ALP SERPL-CCNC: 55 U/L (ref 34–104)
ALT SERPL W P-5'-P-CCNC: 38 U/L (ref 7–52)
ANION GAP SERPL CALCULATED.3IONS-SCNC: 8 MMOL/L
AST SERPL W P-5'-P-CCNC: 30 U/L (ref 13–39)
BASOPHILS # BLD AUTO: 0.05 THOUSANDS/ÂΜL (ref 0–0.1)
BASOPHILS NFR BLD AUTO: 1 % (ref 0–1)
BILIRUB SERPL-MCNC: 0.66 MG/DL (ref 0.2–1)
BUN SERPL-MCNC: 11 MG/DL (ref 5–25)
CALCIUM SERPL-MCNC: 10.2 MG/DL (ref 8.4–10.2)
CARDIAC TROPONIN I PNL SERPL HS: 5 NG/L
CHLORIDE SERPL-SCNC: 105 MMOL/L (ref 96–108)
CO2 SERPL-SCNC: 29 MMOL/L (ref 21–32)
CREAT SERPL-MCNC: 0.91 MG/DL (ref 0.6–1.3)
EOSINOPHIL # BLD AUTO: 0.09 THOUSAND/ÂΜL (ref 0–0.61)
EOSINOPHIL NFR BLD AUTO: 1 % (ref 0–6)
ERYTHROCYTE [DISTWIDTH] IN BLOOD BY AUTOMATED COUNT: 11.9 % (ref 11.6–15.1)
GFR SERPL CREATININE-BSD FRML MDRD: 65 ML/MIN/1.73SQ M
GLUCOSE SERPL-MCNC: 112 MG/DL (ref 65–140)
HCT VFR BLD AUTO: 38.4 % (ref 34.8–46.1)
HGB BLD-MCNC: 12.8 G/DL (ref 11.5–15.4)
IMM GRANULOCYTES # BLD AUTO: 0.03 THOUSAND/UL (ref 0–0.2)
IMM GRANULOCYTES NFR BLD AUTO: 0 % (ref 0–2)
LIPASE SERPL-CCNC: 46 U/L (ref 11–82)
LYMPHOCYTES # BLD AUTO: 2.99 THOUSANDS/ÂΜL (ref 0.6–4.47)
LYMPHOCYTES NFR BLD AUTO: 35 % (ref 14–44)
MCH RBC QN AUTO: 30.3 PG (ref 26.8–34.3)
MCHC RBC AUTO-ENTMCNC: 33.3 G/DL (ref 31.4–37.4)
MCV RBC AUTO: 91 FL (ref 82–98)
MONOCYTES # BLD AUTO: 0.58 THOUSAND/ÂΜL (ref 0.17–1.22)
MONOCYTES NFR BLD AUTO: 7 % (ref 4–12)
NEUTROPHILS # BLD AUTO: 4.7 THOUSANDS/ÂΜL (ref 1.85–7.62)
NEUTS SEG NFR BLD AUTO: 56 % (ref 43–75)
NRBC BLD AUTO-RTO: 0 /100 WBCS
PLATELET # BLD AUTO: 269 THOUSANDS/UL (ref 149–390)
PMV BLD AUTO: 9.9 FL (ref 8.9–12.7)
POTASSIUM SERPL-SCNC: 4.2 MMOL/L (ref 3.5–5.3)
PROT SERPL-MCNC: 6.8 G/DL (ref 6.4–8.4)
RBC # BLD AUTO: 4.22 MILLION/UL (ref 3.81–5.12)
SODIUM SERPL-SCNC: 142 MMOL/L (ref 135–147)
WBC # BLD AUTO: 8.44 THOUSAND/UL (ref 4.31–10.16)

## 2023-12-13 PROCEDURE — 85025 COMPLETE CBC W/AUTO DIFF WBC: CPT

## 2023-12-13 PROCEDURE — 93005 ELECTROCARDIOGRAM TRACING: CPT

## 2023-12-13 PROCEDURE — 83690 ASSAY OF LIPASE: CPT

## 2023-12-13 PROCEDURE — 71046 X-RAY EXAM CHEST 2 VIEWS: CPT

## 2023-12-13 PROCEDURE — 99285 EMERGENCY DEPT VISIT HI MDM: CPT

## 2023-12-13 PROCEDURE — 84484 ASSAY OF TROPONIN QUANT: CPT

## 2023-12-13 PROCEDURE — 36415 COLL VENOUS BLD VENIPUNCTURE: CPT

## 2023-12-13 PROCEDURE — 80053 COMPREHEN METABOLIC PANEL: CPT

## 2023-12-13 NOTE — Clinical Note
Monica King was seen and treated in our emergency department on 12/13/2023. Diagnosis:     Shaheed Sierra  may return to work on return date. She may return on this date: 12/14/2023         If you have any questions or concerns, please don't hesitate to call.       Julio C Padilla PA-C    ______________________________           _______________          _______________  Hospital Representative                              Date                                Time

## 2023-12-14 VITALS
SYSTOLIC BLOOD PRESSURE: 133 MMHG | WEIGHT: 138.7 LBS | RESPIRATION RATE: 18 BRPM | HEART RATE: 60 BPM | BODY MASS INDEX: 30.01 KG/M2 | DIASTOLIC BLOOD PRESSURE: 64 MMHG | OXYGEN SATURATION: 98 % | TEMPERATURE: 98.6 F

## 2023-12-14 LAB
2HR DELTA HS TROPONIN: 2 NG/L
CARDIAC TROPONIN I PNL SERPL HS: 7 NG/L

## 2023-12-14 PROCEDURE — 84484 ASSAY OF TROPONIN QUANT: CPT

## 2023-12-14 PROCEDURE — 36415 COLL VENOUS BLD VENIPUNCTURE: CPT

## 2023-12-14 NOTE — ED PROVIDER NOTES
History  Chief Complaint   Patient presents with    Chest Pain     Reports chest pain which started 2 hours ago, off and on. Started under left breast and up to left chest, left back and left arm. No shortness of breath. The patient is a 32-year-old female with a past medical history of asthma, BCC of the nose, osteoporosis, hyperlipidemia, and hypertension, who presents for evaluation of chest pain. She reports 2 hours of chest pain that begins under the left breast and radiates up toward the neck. The pain is exacerbated with overhead movement and deep inspiration. No associated palpitations, shortness of breath, lightheadedness, nausea, vomiting, abdominal pain, recent illness, or F/C. Patient has no personal cardiac history, but does report heart disease in her immediate family. She denies any recent travel, surgeries, or prolonged periods of immobilization. Prior to Admission Medications   Prescriptions Last Dose Informant Patient Reported? Taking?    Denosumab (PROLIA SC)  Self Yes No   Sig: Inject under the skin   Multiple Vitamin (MULTIVITAMIN) capsule  Self Yes No   Sig: Take 1 capsule by mouth daily   acetaminophen (TYLENOL) 500 mg tablet  Self No No   Sig: Take 2 tabs po up to every 8 hours prn pain   amLODIPine (NORVASC) 2.5 mg tablet  Self No No   Sig: take 1 tablet by mouth once daily   calcium carbonate-vitamin D 250 mg-3.125 mcg per tablet  Self No No   Sig: Take 1 tablet by mouth daily   lidocaine (LIDODERM) 5 %   No No   Sig: Apply 1 patch topically every 24 hours for 15 days Remove & Discard patch within 12 hours or as directed by MD   Patient not taking: Reported on 5/22/2023   losartan (COZAAR) 100 MG tablet  Self No No   Sig: Take 1 tablet (100 mg total) by mouth daily   rosuvastatin (CRESTOR) 10 MG tablet  Self No No   Sig: Take 1 tablet (10 mg total) by mouth daily In the pm      Facility-Administered Medications: None       Past Medical History:   Diagnosis Date    Arthritis Asthma     Basal cell carcinoma 2020    Right alar groove    HL (hearing loss)     Osteoarthritis     Osteoporosis     Shingles        Past Surgical History:   Procedure Laterality Date    COCHLEAR IMPLANT      COLONOSCOPY      COLONOSCOPY      fiberoptic; follow up at age; 2017 10 year f/u    HYSTERECTOMY      age 30    MOHS SURGERY  2020     Right alar groove    OOPHORECTOMY      age 30    SKIN BIOPSY      TONSILLECTOMY         Family History   Problem Relation Age of Onset    Osteoporosis Mother     Hearing loss Mother         she just found out    Diabetes Father             Heart disease Father             Hypertension Father             Alcohol abuse Father             Dementia Father     Stroke Father             Lung cancer Maternal Grandmother         76s    Osteoporosis Maternal Grandmother             Lung cancer Maternal Grandfather 80    Hearing loss Maternal Grandfather     Heart disease Paternal Grandmother             Diabetes Paternal Grandmother             Prostate cancer Paternal Grandfather 67            Alcohol abuse Brother     Hypertension Brother     Diabetes Brother     No Known Problems Maternal Aunt     Colon cancer Other         age unknown    Pancreatic cancer Other         age unknown     I have reviewed and agree with the history as documented. E-Cigarette/Vaping    E-Cigarette Use Never User      E-Cigarette/Vaping Substances    Nicotine No     THC No     CBD No     Flavoring No     Other No     Unknown No      Social History     Tobacco Use    Smoking status: Never    Smokeless tobacco: Never   Vaping Use    Vaping status: Never Used   Substance Use Topics    Alcohol use: No    Drug use: No       Review of Systems   Constitutional:  Negative for chills, diaphoresis and fever. HENT:  Negative for congestion, ear pain, rhinorrhea and sore throat.     Eyes:  Negative for pain and visual disturbance. Respiratory:  Negative for cough, chest tightness and shortness of breath. Cardiovascular:  Positive for chest pain. Negative for palpitations. Gastrointestinal:  Negative for abdominal pain, nausea and vomiting. Genitourinary:  Negative for dysuria and hematuria. Musculoskeletal:  Negative for arthralgias, back pain and myalgias. Skin:  Negative for color change and rash. Neurological:  Negative for dizziness, seizures, syncope, light-headedness and headaches. All other systems reviewed and are negative. Physical Exam  Physical Exam  Vitals and nursing note reviewed. Constitutional:       General: She is awake. Appearance: Normal appearance. She is well-developed and overweight. She is not toxic-appearing or diaphoretic. HENT:      Head: Normocephalic and atraumatic. Right Ear: External ear normal.      Left Ear: External ear normal.      Nose: Nose normal.      Mouth/Throat:      Lips: Pink. Mouth: Mucous membranes are moist.   Eyes:      General: Lids are normal. Gaze aligned appropriately. Conjunctiva/sclera: Conjunctivae normal.      Pupils: Pupils are equal, round, and reactive to light. Cardiovascular:      Rate and Rhythm: Normal rate and regular rhythm. Heart sounds: S1 normal and S2 normal. No murmur heard. No friction rub. No gallop. Pulmonary:      Effort: Pulmonary effort is normal. No respiratory distress. Breath sounds: Normal breath sounds and air entry. No wheezing, rhonchi or rales. Chest:      Chest wall: Tenderness present. No deformity, swelling, crepitus or edema. Abdominal:      General: Abdomen is flat. Palpations: Abdomen is soft. Tenderness: There is no abdominal tenderness. There is no guarding or rebound. Musculoskeletal:      Cervical back: Normal, full passive range of motion without pain and neck supple. No spinous process tenderness or muscular tenderness.       Thoracic back: Normal. Lumbar back: Normal.   Skin:     General: Skin is warm and dry. Capillary Refill: Capillary refill takes less than 2 seconds. Coloration: Skin is not cyanotic or pale. Findings: No rash. Neurological:      Mental Status: She is alert and oriented to person, place, and time. Psychiatric:         Mood and Affect: Mood is anxious. Behavior: Behavior is cooperative.          Vital Signs  ED Triage Vitals [12/13/23 2247]   Temperature Pulse Respirations Blood Pressure SpO2   98.6 °F (37 °C) 67 18 (!) 185/84 98 %      Temp Source Heart Rate Source Patient Position - Orthostatic VS BP Location FiO2 (%)   Oral Monitor Lying Left arm --      Pain Score       --           Vitals:    12/13/23 2247 12/14/23 0030 12/14/23 0142   BP: (!) 185/84 116/61 133/64   Pulse: 67 (!) 51 60   Patient Position - Orthostatic VS: Lying Lying Lying       ED Medications  Medications - No data to display    Diagnostic Studies  Results Reviewed       Procedure Component Value Units Date/Time    HS Troponin I 2hr [[id number]]  (Normal) Collected: 12/14/23 0054    Lab Status: Final result Specimen: Blood from Arm, Right Updated: 12/14/23 0128     hs TnI 2hr 7 ng/L      Delta 2hr hsTnI 2 ng/L     HS Troponin 0hr (reflex protocol) [[id number]]  (Normal) Collected: 12/13/23 2258    Lab Status: Final result Specimen: Blood from Arm, Right Updated: 12/13/23 2332     hs TnI 0hr 5 ng/L     Comprehensive metabolic panel [[id number]] Collected: 12/13/23 2258    Lab Status: Final result Specimen: Blood from Arm, Right Updated: 12/13/23 2327     Sodium 142 mmol/L      Potassium 4.2 mmol/L      Chloride 105 mmol/L      CO2 29 mmol/L      ANION GAP 8 mmol/L      BUN 11 mg/dL      Creatinine 0.91 mg/dL      Glucose 112 mg/dL      Calcium 10.2 mg/dL      AST 30 U/L      ALT 38 U/L      Alkaline Phosphatase 55 U/L      Total Protein 6.8 g/dL      Albumin 4.4 g/dL      Total Bilirubin 0.66 mg/dL      eGFR 65 ml/min/1.73sq m     Narrative: National Kidney Disease Foundation guidelines for Chronic Kidney Disease (CKD):     Stage 1 with normal or high GFR (GFR > 90 mL/min/1.73 square meters)    Stage 2 Mild CKD (GFR = 60-89 mL/min/1.73 square meters)    Stage 3A Moderate CKD (GFR = 45-59 mL/min/1.73 square meters)    Stage 3B Moderate CKD (GFR = 30-44 mL/min/1.73 square meters)    Stage 4 Severe CKD (GFR = 15-29 mL/min/1.73 square meters)    Stage 5 End Stage CKD (GFR <15 mL/min/1.73 square meters)  Note: GFR calculation is accurate only with a steady state creatinine    Lipase [[id number]]  (Normal) Collected: 12/13/23 2258    Lab Status: Final result Specimen: Blood from Arm, Right Updated: 12/13/23 2327     Lipase 46 u/L     CBC and differential [[id number]] Collected: 12/13/23 2258    Lab Status: Final result Specimen: Blood from Arm, Right Updated: 12/13/23 2308     WBC 8.44 Thousand/uL      RBC 4.22 Million/uL      Hemoglobin 12.8 g/dL      Hematocrit 38.4 %      MCV 91 fL      MCH 30.3 pg      MCHC 33.3 g/dL      RDW 11.9 %      MPV 9.9 fL      Platelets 771 Thousands/uL      nRBC 0 /100 WBCs      Neutrophils Relative 56 %      Immat GRANS % 0 %      Lymphocytes Relative 35 %      Monocytes Relative 7 %      Eosinophils Relative 1 %      Basophils Relative 1 %      Neutrophils Absolute 4.70 Thousands/µL      Immature Grans Absolute 0.03 Thousand/uL      Lymphocytes Absolute 2.99 Thousands/µL      Monocytes Absolute 0.58 Thousand/µL      Eosinophils Absolute 0.09 Thousand/µL      Basophils Absolute 0.05 Thousands/µL                    XR chest 2 views   ED Interpretation by [facility] ISABEL Amin PA-C (12/14 0005)   No acute cardiopulmonary disease.                  Procedures  ECG 12 Lead Documentation Only    Date/Time: 12/13/2023 10:48 PM    Performed by: Marielena Fong PA-C  Authorized by: Marielena Fogn PA-C    ECG reviewed by me, the ED Provider: yes    Patient location:  ED  Previous ECG:     Comparison to cardiac monitor: Yes    Interpretation: Interpretation: normal    Quality:     Tracing quality:  Limited by artifact  Rate:     ECG rate:  71    ECG rate assessment: normal    Rhythm:     Rhythm: sinus rhythm      Rhythm comment:  NSR with sinus arrhythmia  Ectopy:     Ectopy: none    QRS:     QRS axis:  Normal    QRS intervals:  Normal  Conduction:     Conduction: normal    ST segments:     ST segments:  Normal  T waves:     T waves: normal    Comments:      IA interval: 122ms  QRS duration: 82ms  QT/QTc: 392/425ms          ED Course  ED Course as of 12/14/23 0212   u Dec 14, 2023   0013 hs TnI 0hr: 5         HEART Risk Score      Flowsheet Row Most Recent Value   Heart Score Risk Calculator    History 0 Filed at: 12/14/2023 0029   ECG 0 Filed at: 12/14/2023 0029   Age 2 Filed at: 12/14/2023 0029   Risk Factors 2 Filed at: 12/14/2023 0029   Troponin 0 Filed at: 12/14/2023 0029   HEART Score 4 Filed at: 12/14/2023 0029            SBIRT 20yo+      Flowsheet Row Most Recent Value   Initial Alcohol Screen: US AUDIT-C     1. How often do you have a drink containing alcohol? 0 Filed at: 12/13/2023 2249   2. How many drinks containing alcohol do you have on a typical day you are drinking? 0 Filed at: 12/13/2023 2249   3a. Male UNDER 65: How often do you have five or more drinks on one occasion? 0 Filed at: 12/13/2023 2249   3b. FEMALE Any Age, or MALE 65+: How often do you have 4 or more drinks on one occassion? 0 Filed at: 12/13/2023 2249   Audit-C Score 0 Filed at: 12/13/2023 2249   FILOMENA: How many times in the past year have you. .. Used an illegal drug or used a prescription medication for non-medical reasons?  Never Filed at: 12/13/2023 2249            Barak' Criteria for PE      Flowsheet Row Most Recent Value   Wells' Criteria for PE    Clinical signs and symptoms of DVT 0 Filed at: 12/14/2023 4607   PE is primary diagnosis or equally likely 0 Filed at: 12/14/2023 0029   HR >100 0 Filed at: 12/14/2023 0029   Immobilization at least 3 days or Surgery in the previous 4 weeks 0 Filed at: 12/14/2023 0029   Previous, objectively diagnosed PE or DVT 0 Filed at: 12/14/2023 0029   Hemoptysis 0 Filed at: 12/14/2023 1081   Malignancy with treatment within 6 months or palliative 0 Filed at: 12/14/2023 7792   Wells' Criteria Total 0 Filed at: 12/14/2023 8297                Medical Decision Making  Patient presents for evaluation of chest pain. She is hemodynamically stable and in no acute distress. Auscultation of the heart revealed a regular rate and rhythm with no adventitious sounds. There is mild tenderness to palpation of the left chest wall. Differential diagnosis includes but is not limited to chest wall pain, costochondritis, pleurisy, pericarditis, myocarditis, MI, ACS, PE, anxiety, or GI etiology. On my personal interpretation of the EKG, the patient is in a sinus rhythm with sinus arrhythmia without ischemic changes. Initial troponin was 5 and the two hour delta was +2, giving patient a heart score of 4. Doubt cardiac etiology of her symptoms. Wells Score for PE was zero, therefore further evaluation for a pulmonary embolism was not pursued. Labs and chest x-ray were unremarkable. Reviewed results with the patient, and all questions were answered to the best of my ability. Strict return precautions discussed and she verbalized understanding. Follow-up with PCP, and return to the ED in the interim with new or worsening symptoms. Problems Addressed:  Chest pain: acute illness or injury    Amount and/or Complexity of Data Reviewed  External Data Reviewed: labs and notes. Labs: ordered. Decision-making details documented in ED Course. Radiology: ordered and independent interpretation performed.              Disposition  Final diagnoses:   Chest pain     Time reflects when diagnosis was documented in both MDM as applicable and the Disposition within this note       Time User Action Codes Description Comment    12/14/2023  1:34 AM Eloisa [facility] Add [R07.9] Chest pain           ED Disposition       ED Disposition   Discharge    Condition   Stable    Date/Time   Thu Dec 14, 2023 18[address] [facility] ELIOT Sandoval discharge to home/self care.                    Follow-up Information       Follow up With Specialties Details Why [address], DO Family Medicine   [address]  [address]  [facility] [id number] [phone]              Discharge Medication List as of 12/14/2023  1:35 AM        CONTINUE these medications which have NOT CHANGED    Details   acetaminophen (TYLENOL) 500 mg tablet Take 2 tabs po up to every 8 hours prn pain, Normal      amLODIPine (NORVASC) 2.5 mg tablet take 1 tablet by mouth once daily, Normal      calcium carbonate-vitamin D 250 mg-3.125 mcg per tablet Take 1 tablet by mouth daily, Starting Thu 2/16/2023, Until Fri 9/1/2023, Normal      Denosumab (PROLIA SC) Inject under the skin, Historical Med      lidocaine (LIDODERM) 5 % Apply 1 patch topically every 24 hours for 15 days Remove & Discard patch within 12 hours or as directed by MD, Starting Sun 12/4/2022, Until Mon 12/19/2022, Normal      losartan (COZAAR) 100 MG tablet Take 1 tablet (100 mg total) by mouth daily, Starting Tue 8/15/2023, Normal      Multiple Vitamin (MULTIVITAMIN) capsule Take 1 capsule by mouth daily, Historical Med      rosuvastatin (CRESTOR) 10 MG tablet Take 1 tablet (10 mg total) by mouth daily In the pm, Starting Tue 8/15/2023, Normal                 PDMP Review       None            ED Provider  Electronically Signed by             Rene Marte PA-C  12/14/23 3439

## 2023-12-15 LAB
ATRIAL RATE: 71 BPM
P AXIS: 21 DEGREES
PR INTERVAL: 122 MS
QRS AXIS: 38 DEGREES
QRSD INTERVAL: 82 MS
QT INTERVAL: 392 MS
QTC INTERVAL: 425 MS
T WAVE AXIS: 41 DEGREES
VENTRICULAR RATE: 71 BPM

## 2024-01-02 ENCOUNTER — TELEPHONE (OUTPATIENT)
Dept: NEPHROLOGY | Facility: CLINIC | Age: 69
End: 2024-01-02

## 2024-01-02 ENCOUNTER — LAB (OUTPATIENT)
Dept: LAB | Age: 69
End: 2024-01-02
Payer: COMMERCIAL

## 2024-01-02 DIAGNOSIS — E83.52 HYPERCALCEMIA: ICD-10-CM

## 2024-01-02 DIAGNOSIS — Z00.6 ENCOUNTER FOR EXAMINATION FOR NORMAL COMPARISON OR CONTROL IN CLINICAL RESEARCH PROGRAM: ICD-10-CM

## 2024-01-02 DIAGNOSIS — E78.2 MIXED HYPERLIPIDEMIA: ICD-10-CM

## 2024-01-02 LAB
ALBUMIN SERPL BCP-MCNC: 4.5 G/DL (ref 3.5–5)
ALP SERPL-CCNC: 53 U/L (ref 34–104)
ALT SERPL W P-5'-P-CCNC: 36 U/L (ref 7–52)
ANION GAP SERPL CALCULATED.3IONS-SCNC: 10 MMOL/L
AST SERPL W P-5'-P-CCNC: 35 U/L (ref 13–39)
BILIRUB SERPL-MCNC: 0.43 MG/DL (ref 0.2–1)
BUN SERPL-MCNC: 13 MG/DL (ref 5–25)
CALCIUM SERPL-MCNC: 9.8 MG/DL (ref 8.4–10.2)
CHLORIDE SERPL-SCNC: 104 MMOL/L (ref 96–108)
CHOLEST SERPL-MCNC: 178 MG/DL
CO2 SERPL-SCNC: 27 MMOL/L (ref 21–32)
CREAT SERPL-MCNC: 0.91 MG/DL (ref 0.6–1.3)
GFR SERPL CREATININE-BSD FRML MDRD: 65 ML/MIN/1.73SQ M
GLUCOSE P FAST SERPL-MCNC: 90 MG/DL (ref 65–99)
HDLC SERPL-MCNC: 81 MG/DL
LDLC SERPL CALC-MCNC: 64 MG/DL (ref 0–100)
NONHDLC SERPL-MCNC: 97 MG/DL
PHOSPHATE SERPL-MCNC: 4.2 MG/DL (ref 2.3–4.1)
POTASSIUM SERPL-SCNC: 4.1 MMOL/L (ref 3.5–5.3)
PROT SERPL-MCNC: 7.2 G/DL (ref 6.4–8.4)
SODIUM SERPL-SCNC: 141 MMOL/L (ref 135–147)
TRIGL SERPL-MCNC: 165 MG/DL

## 2024-01-02 PROCEDURE — 36415 COLL VENOUS BLD VENIPUNCTURE: CPT

## 2024-01-02 PROCEDURE — 84100 ASSAY OF PHOSPHORUS: CPT

## 2024-01-02 PROCEDURE — 80061 LIPID PANEL: CPT

## 2024-01-02 PROCEDURE — 80053 COMPREHEN METABOLIC PANEL: CPT

## 2024-01-02 NOTE — TELEPHONE ENCOUNTER
Message left on patient's VM that kidney function and calcium numbers are stable.  Phos mildly elevated.  Per Dr. Adam, focus on low phos diet.      ----- Message from Chao Adam MD sent at 1/2/2024  4:52 PM EST -----  Kidney function and calcium numbers remain stable.  Phosphorus level is mildly elevated, focus on a low phosphate diet.  Thank you.

## 2024-01-04 ENCOUNTER — OFFICE VISIT (OUTPATIENT)
Dept: FAMILY MEDICINE CLINIC | Facility: CLINIC | Age: 69
End: 2024-01-04
Payer: COMMERCIAL

## 2024-01-04 VITALS
HEART RATE: 64 BPM | OXYGEN SATURATION: 99 % | DIASTOLIC BLOOD PRESSURE: 72 MMHG | SYSTOLIC BLOOD PRESSURE: 130 MMHG | BODY MASS INDEX: 30.38 KG/M2 | TEMPERATURE: 97.8 F | WEIGHT: 140.4 LBS

## 2024-01-04 DIAGNOSIS — E78.2 MIXED HYPERLIPIDEMIA: ICD-10-CM

## 2024-01-04 DIAGNOSIS — M81.0 OSTEOPOROSIS, UNSPECIFIED OSTEOPOROSIS TYPE, UNSPECIFIED PATHOLOGICAL FRACTURE PRESENCE: ICD-10-CM

## 2024-01-04 DIAGNOSIS — I10 ESSENTIAL HYPERTENSION: ICD-10-CM

## 2024-01-04 DIAGNOSIS — E03.8 SUBCLINICAL HYPOTHYROIDISM: ICD-10-CM

## 2024-01-04 DIAGNOSIS — E21.3 HYPERPARATHYROIDISM (HCC): ICD-10-CM

## 2024-01-04 DIAGNOSIS — C44.712 BASAL CELL CARCINOMA (BCC) OF RIGHT LOWER LEG: ICD-10-CM

## 2024-01-04 DIAGNOSIS — D03.72 MELANOMA IN SITU OF LEFT LOWER LEG (HCC): ICD-10-CM

## 2024-01-04 DIAGNOSIS — Z00.00 PHYSICAL EXAM: Primary | ICD-10-CM

## 2024-01-04 DIAGNOSIS — H90.3 SENSORINEURAL HEARING LOSS (SNHL) OF BOTH EARS: ICD-10-CM

## 2024-01-04 DIAGNOSIS — R07.9 CHEST PAIN, UNSPECIFIED TYPE: ICD-10-CM

## 2024-01-04 DIAGNOSIS — R15.9 INCONTINENCE OF FECES, UNSPECIFIED FECAL INCONTINENCE TYPE: ICD-10-CM

## 2024-01-04 PROCEDURE — 99214 OFFICE O/P EST MOD 30 MIN: CPT | Performed by: FAMILY MEDICINE

## 2024-01-04 PROCEDURE — 99397 PER PM REEVAL EST PAT 65+ YR: CPT | Performed by: FAMILY MEDICINE

## 2024-01-04 NOTE — PROGRESS NOTES
FAMILY PRACTICE OFFICE VISIT    NAME: Danielle Sandoval    AGE: 68 y.o.   SEX: female  : 1955   MRN: [id number]    DATE: 2024  TIME: 8:38 AM    Assessment and Plan   1. Chest pain, unspecified type  No symptoms since ED visit  Will check stress test  ED if any return of symptoms.    - Echo stress test, exercise; Future    2. Physical exam  Anticipatory guidance and preventative medicine discussed  Vaccines up to date      3. Subclinical hypothyroidism  Stable      4. Hyperparathyroidism (HCC)  Labs normalized upon recheck.  Limit calcium to no more than 1200 mg /day  Remain off hctz.      5. Essential hypertension  Controlled.      6. Sensorineural hearing loss (SNHL) of both ears  Post- cochlear implant  Vaccines up to date - flu and prevnar 20  Going in may for eval.      7. Osteoporosis, unspecified osteoporosis type, unspecified pathological fracture presence  On prolia  Had dexa 10/2022 - which showed increase in bone density.      8. Melanoma in situ of left lower leg (HCC)  Urge routine derm visits.      9. Basal cell carcinoma (BCC) of right lower leg      10. Mixed hyperlipidemia  Stable with slightly elevated trig level.      11.  Rectal incontinence  Send to GI.      To consider pelvic exam at followup.      Chief Complaint     Chief Complaint   Patient presents with    Physical Exam       History of Present Illness   Danielle Sandoval is a 68 y.o.-year-old female who presents today for routine PE  Of note - pt was in the ED a couple of weeks ago - for chest pain  She went from work   Workup (-) for cardiac  Denies GERD.        Review of Systems   Review of Systems   Constitutional:  Negative for chills, diaphoresis, fatigue, fever and unexpected weight change.   Respiratory:  Negative for cough, shortness of breath and wheezing.    Cardiovascular:  Negative for chest pain, palpitations and leg swelling.   Gastrointestinal:  Negative for abdominal pain, blood in stool, constipation, nausea  and vomiting.        At times gets rectal incontinence    No blood in stool.  No GERD.       Genitourinary:  Negative for dysuria, vaginal bleeding and vaginal discharge.        Urinary incontinence  Not sexually active.     Skin:         Pt has lesion on scalp and some on back and left side of face that pt would like derm to remove.  She has a h/o skin cancer.     Neurological:  Negative for dizziness and headaches.   Psychiatric/Behavioral:  Negative for dysphoric mood, self-injury and suicidal ideas. The patient is not nervous/anxious.         Will be visiting her mother in 2 weeks in [place].         Active Problem List     Patient Active Problem List   Diagnosis    Hypercalcemia    Osteoporosis    Sensorineural hearing loss (SNHL) of both ears    Oral mucosal lesion    Epistaxis    Melanoma in situ of left lower leg (HCC)    Basal cell carcinoma (BCC) of right lower leg    Cochlear implant status    Subclinical hypothyroidism    Mixed hyperlipidemia    Trigger finger, right ring finger    JOAQUIN (acute kidney injury) (HCC)    Essential hypertension    Elevated PTHrP level    Hyperparathyroidism (HCC)         Past Medical History:  Past Medical History:   Diagnosis Date    Arthritis     Asthma     Basal cell carcinoma 2020    Right alar groove    HL (hearing loss)     Osteoarthritis     Osteoporosis     Shingles        Past Surgical History:  Past Surgical History:   Procedure Laterality Date    COCHLEAR IMPLANT      COLONOSCOPY      COLONOSCOPY      fiberoptic; follow up at age; 2017 10 year f/u    HYSTERECTOMY      age 34    MOHS SURGERY  2020     Right alar groove    OOPHORECTOMY      age 34    SKIN BIOPSY      TONSILLECTOMY         Family History:  Family History   Problem Relation Age of Onset    Osteoporosis Mother     Hearing loss Mother         she just found out    Diabetes Father             Heart disease Father             Hypertension Father              Alcohol abuse Father             Dementia Father     Stroke Father             Lung cancer Maternal Grandmother         70s    Osteoporosis Maternal Grandmother             Lung cancer Maternal Grandfather 88    Hearing loss Maternal Grandfather     Heart disease Paternal Grandmother             Diabetes Paternal Grandmother             Prostate cancer Paternal Grandfather 72            Alcohol abuse Brother     Hypertension Brother     Diabetes Brother     No Known Problems Maternal Aunt     Colon cancer Other         age unknown    Pancreatic cancer Other         age unknown       Social History:  Social History     Socioeconomic History    Marital status: Single     Spouse name: Not on file    Number of children: Not on file    Years of education: Not on file    Highest education level: Not on file   Occupational History    Not on file   Tobacco Use    Smoking status: Never    Smokeless tobacco: Never   Vaping Use    Vaping status: Never Used   Substance and Sexual Activity    Alcohol use: No    Drug use: No    Sexual activity: Never   Other Topics Concern    Not on file   Social History Narrative    Caffeine use     Social Determinants of Health     Financial Resource Strain: Not on file   Food Insecurity: Not on file   Transportation Needs: Not on file   Physical Activity: Not on file   Stress: Not on file   Social Connections: Not on file   Intimate Partner Violence: Not on file   Housing Stability: Not on file       Objective     Vitals:    24 0814   BP: 130/72   Pulse: 64   Temp: 97.8 °F (36.6 °C)   SpO2: 99%     Wt Readings from Last 3 Encounters:   24 63.7 kg (140 lb 6.4 oz)   23 62.9 kg (138 lb 11.2 oz)   10/16/23 62.5 kg (137 lb 12.6 oz)       Physical Exam  Vitals and nursing note reviewed.   Constitutional:       General: She is not in acute distress.     Appearance: Normal appearance. She is not ill-appearing or toxic-appearing.   HENT:       Right Ear: Tympanic membrane normal.      Left Ear: Tympanic membrane normal.      Ears:      Comments: Hearing aids removed - normal tm's b/l     Nose: Nose normal. No congestion.      Mouth/Throat:      Mouth: Mucous membranes are moist.      Pharynx: No oropharyngeal exudate or posterior oropharyngeal erythema.   Eyes:      General: No scleral icterus.     Extraocular Movements: Extraocular movements intact.      Pupils: Pupils are equal, round, and reactive to light.   Neck:      Vascular: No carotid bruit.   Cardiovascular:      Rate and Rhythm: Normal rate and regular rhythm.      Pulses: Normal pulses.      Heart sounds: Normal heart sounds. No murmur heard.  Pulmonary:      Effort: Pulmonary effort is normal. No respiratory distress.      Breath sounds: Normal breath sounds. No wheezing, rhonchi or rales.   Abdominal:      General: There is no distension.      Palpations: Abdomen is soft. There is no mass.      Tenderness: There is no abdominal tenderness. There is no guarding or rebound.   Musculoskeletal:      Cervical back: Neck supple.      Right lower leg: No edema.      Left lower leg: No edema.   Lymphadenopathy:      Cervical: No cervical adenopathy.   Skin:     General: Skin is warm.      Coloration: Skin is not jaundiced.      Comments: Scattered seborrheic keratoses.     Neurological:      General: No focal deficit present.      Mental Status: She is alert and oriented to person, place, and time.   Psychiatric:         Mood and Affect: Mood normal.         Behavior: Behavior normal.         Thought Content: Thought content normal.         Judgment: Judgment normal.         Pertinent Laboratory/Diagnostic Studies:  Lab Results   Component Value Date    BUN 13 01/02/2024    CREATININE 0.91 01/02/2024    CALCIUM 9.8 01/02/2024     06/20/2018    K 4.1 01/02/2024    CO2 27 01/02/2024     01/02/2024     Lab Results   Component Value Date    ALT 36 01/02/2024    AST 35 01/02/2024    ALKPHOS  "53 01/02/2024    BILITOT 0.7 06/20/2018       Lab Results   Component Value Date    WBC 8.44 12/13/2023    HGB 12.8 12/13/2023    HCT 38.4 12/13/2023    MCV 91 12/13/2023     12/13/2023       No results found for: \"TSH\"    Lab Results   Component Value Date    CHOL 211 (H) 06/20/2018     Lab Results   Component Value Date    TRIG 165 (H) 01/02/2024     Lab Results   Component Value Date    HDL 81 01/02/2024     Lab Results   Component Value Date    LDLCALC 64 01/02/2024     Lab Results   Component Value Date    HGBA1C 5.5 04/03/2023       Results for orders placed or performed in visit on 01/02/24   Lipid panel   Result Value Ref Range    Cholesterol 178 See Comment mg/dL    Triglycerides 165 (H) See Comment mg/dL    HDL, Direct 81 >=50 mg/dL    LDL Calculated 64 0 - 100 mg/dL    Non-HDL-Chol (CHOL-HDL) 97 mg/dl   Comprehensive metabolic panel   Result Value Ref Range    Sodium 141 135 - 147 mmol/L    Potassium 4.1 3.5 - 5.3 mmol/L    Chloride 104 96 - 108 mmol/L    CO2 27 21 - 32 mmol/L    ANION GAP 10 mmol/L    BUN 13 5 - 25 mg/dL    Creatinine 0.91 0.60 - 1.30 mg/dL    Glucose, Fasting 90 65 - 99 mg/dL    Calcium 9.8 8.4 - 10.2 mg/dL    AST 35 13 - 39 U/L    ALT 36 7 - 52 U/L    Alkaline Phosphatase 53 34 - 104 U/L    Total Protein 7.2 6.4 - 8.4 g/dL    Albumin 4.5 3.5 - 5.0 g/dL    Total Bilirubin 0.43 0.20 - 1.00 mg/dL    eGFR 65 ml/min/1.73sq m   Phosphorus   Result Value Ref Range    Phosphorus 4.2 (H) 2.3 - 4.1 mg/dL       Orders Placed This Encounter   Procedures    Ambulatory Referral to Gastroenterology       ALLERGIES:  Allergies   Allergen Reactions    Codeine      Reaction Date: 26May2011;     Darvon [Propoxyphene]     Demerol [Meperidine]     Valium [Diazepam]     Other Other (See Comments)     HCTZ - JOAQUIN with elevated calcium and creatinine; hospitalization 11/2022    Aspirin      Reaction Date: 26May2011;        Current Medications     Current Outpatient Medications   Medication Sig Dispense " Refill    acetaminophen (TYLENOL) 500 mg tablet Take 2 tabs po up to every 8 hours prn pain 60 tablet 0    amLODIPine (NORVASC) 2.5 mg tablet take 1 tablet by mouth once daily 90 tablet 1    Denosumab (PROLIA SC) Inject under the skin      losartan (COZAAR) 100 MG tablet Take 1 tablet (100 mg total) by mouth daily 90 tablet 1    Multiple Vitamin (MULTIVITAMIN) capsule Take 1 capsule by mouth daily      rosuvastatin (CRESTOR) 10 MG tablet Take 1 tablet (10 mg total) by mouth daily In the pm 90 tablet 1    calcium carbonate-vitamin D 250 mg-3.125 mcg per tablet Take 1 tablet by mouth daily 90 tablet 3    lidocaine (LIDODERM) 5 % Apply 1 patch topically every 24 hours for 15 days Remove & Discard patch within 12 hours or as directed by MD (Patient not taking: Reported on 5/22/2023) 15 patch 0     No current facility-administered medications for this visit.         Health Maintenance     Health Maintenance   Topic Date Due    PT PLAN OF CARE  02/10/2023    COVID-19 Vaccine (4 - 2023-24 season) 09/01/2023    Annual Physical  12/29/2023    Fall Risk  01/11/2024    Urinary Incontinence Screening  08/15/2024    Depression Screening  01/04/2025    Breast Cancer Screening: Mammogram  10/16/2025    Colorectal Cancer Screening  04/12/2027    DTaP,Tdap,and Td Vaccines (3 - Td or Tdap) 06/11/2029    Hepatitis C Screening  Completed    Osteoporosis Screening  Completed    Pneumococcal Vaccine: 65+ Years  Completed    Influenza Vaccine  Completed    HIB Vaccine  Aged Out    IPV Vaccine  Aged Out    Hepatitis A Vaccine  Aged Out    Meningococcal ACWY Vaccine  Aged Out    HPV Vaccine  Aged Out     Immunization History   Administered Date(s) Administered    COVID-19 PFIZER VACCINE 0.3 ML IM 12/21/2020, 01/11/2021, 12/18/2021    H1N1, All Formulations 11/10/2009    INFLUENZA 10/24/2018, 10/20/2020, 10/03/2021, 11/20/2023    Influenza, seasonal, injectable 10/13/2017    Pneumococcal Conjugate 13-Valent 04/17/2014    Pneumococcal  Conjugate Vaccine 20-valent (Pcv20), Polysace 12/29/2022    Pneumococcal Polysaccharide PPV23 05/29/2014    Tdap 06/03/2017, 06/11/2019    Zoster 10/13/2017    Zoster Vaccine Recombinant 10/27/2020, 02/11/2021       Depression Screening and Follow-up Plan: Patient was screened for depression during today's encounter. They screened negative with a PHQ-2 score of 0.      Feli Barrios DO

## 2024-01-04 NOTE — PATIENT INSTRUCTIONS
Return to GI to discuss rectal incontinence  New referral generated    Followup with dermatology    Repeat fasting labs in 6/2024  And then return in 9 mos    Schedule stress test

## 2024-01-08 ENCOUNTER — OFFICE VISIT (OUTPATIENT)
Dept: URGENT CARE | Age: 69
End: 2024-01-08
Payer: COMMERCIAL

## 2024-01-08 VITALS
BODY MASS INDEX: 30.3 KG/M2 | WEIGHT: 140 LBS | RESPIRATION RATE: 20 BRPM | TEMPERATURE: 99.9 F | OXYGEN SATURATION: 97 % | HEART RATE: 86 BPM | SYSTOLIC BLOOD PRESSURE: 150 MMHG | DIASTOLIC BLOOD PRESSURE: 80 MMHG

## 2024-01-08 DIAGNOSIS — J06.9 UPPER RESPIRATORY TRACT INFECTION, UNSPECIFIED TYPE: Primary | ICD-10-CM

## 2024-01-08 LAB — S PYO AG THROAT QL: NEGATIVE

## 2024-01-08 PROCEDURE — 99213 OFFICE O/P EST LOW 20 MIN: CPT | Performed by: PHYSICIAN ASSISTANT

## 2024-01-08 PROCEDURE — 87880 STREP A ASSAY W/OPTIC: CPT | Performed by: PHYSICIAN ASSISTANT

## 2024-01-08 RX ORDER — BENZONATATE 200 MG/1
200 CAPSULE ORAL 3 TIMES DAILY PRN
Qty: 20 CAPSULE | Refills: 0 | Status: SHIPPED | OUTPATIENT
Start: 2024-01-08

## 2024-01-08 RX ORDER — AZELASTINE 1 MG/ML
1 SPRAY, METERED NASAL 2 TIMES DAILY
Qty: 1 ML | Refills: 0 | Status: SHIPPED | OUTPATIENT
Start: 2024-01-08

## 2024-01-08 RX ORDER — DENOSUMAB 60 MG/ML
INJECTION SUBCUTANEOUS
COMMUNITY
Start: 2023-12-21

## 2024-01-08 NOTE — PROGRESS NOTES
[facility]        NAME: Danielle Sandoval is a 68 y.o. female  : 1955    MRN: [id number]  DATE: 2024  TIME: 12:13 PM    Assessment and Plan   Upper respiratory tract infection, unspecified type [J06.9]  1. Upper respiratory tract infection, unspecified type  POCT rapid ANTIGEN strepA    Throat culture    azelastine (ASTELIN) 0.1 % nasal spray    benzonatate (TESSALON) 200 MG capsule            Patient Instructions     Use medications as directed for symptomatic relief as needed  Motrin and or Tylenol as needed for any fever pain  Follow up with PCP in 3-5 days.  Proceed to  ER if symptoms worsen.    Chief Complaint     Chief Complaint   Patient presents with    Cold Like Symptoms     Pt started last magali with bilateral ear itching, mild pain, sore throat, nasal drainge. Took Tylenol during the night.           History of Present Illness       68-year-old female presents with bilateral ear itching with discomfort sore throat runny nose congestion.  Symptoms started yesterday.  Continues to have symptoms.  No fevers reported.  No chest pain shortness of breath.    Sore Throat   This is a new problem. The current episode started yesterday. The problem has been waxing and waning. There has been no fever. The pain is mild. Associated symptoms include congestion and a plugged ear sensation. Pertinent negatives include no coughing, drooling, headaches, shortness of breath or trouble swallowing. She has tried nothing for the symptoms. The treatment provided no relief.       Review of Systems   Review of Systems   Constitutional: Negative.  Negative for fatigue and fever.   HENT:  Positive for congestion, rhinorrhea and sore throat. Negative for drooling and trouble swallowing.    Eyes: Negative.    Respiratory: Negative.  Negative for cough and shortness of breath.    Cardiovascular: Negative.    Gastrointestinal: Negative.    Musculoskeletal: Negative.    Skin: Negative.    Neurological:  Negative.  Negative for headaches.         Current Medications       Current Outpatient Medications:     acetaminophen (TYLENOL) 500 mg tablet, Take 2 tabs po up to every 8 hours prn pain, Disp: 60 tablet, Rfl: 0    amLODIPine (NORVASC) 2.5 mg tablet, take 1 tablet by mouth once daily, Disp: 90 tablet, Rfl: 1    azelastine (ASTELIN) 0.1 % nasal spray, 1 spray into each nostril 2 (two) times a day Use in each nostril as directed, Disp: 1 mL, Rfl: 0    benzonatate (TESSALON) 200 MG capsule, Take 1 capsule (200 mg total) by mouth 3 (three) times a day as needed for cough, Disp: 20 capsule, Rfl: 0    Denosumab (PROLIA SC), Inject under the skin, Disp: , Rfl:     losartan (COZAAR) 100 MG tablet, Take 1 tablet (100 mg total) by mouth daily, Disp: 90 tablet, Rfl: 1    Multiple Vitamin (MULTIVITAMIN) capsule, Take 1 capsule by mouth daily, Disp: , Rfl:     Prolia 60 MG/ML, , Disp: , Rfl:     rosuvastatin (CRESTOR) 10 MG tablet, Take 1 tablet (10 mg total) by mouth daily In the pm, Disp: 90 tablet, Rfl: 1    calcium carbonate-vitamin D 250 mg-3.125 mcg per tablet, Take 1 tablet by mouth daily, Disp: 90 tablet, Rfl: 3    lidocaine (LIDODERM) 5 %, Apply 1 patch topically every 24 hours for 15 days Remove & Discard patch within 12 hours or as directed by MD (Patient not taking: Reported on 5/22/2023), Disp: 15 patch, Rfl: 0    Current Allergies     Allergies as of 01/08/2024 - Reviewed 01/08/2024   Allergen Reaction Noted    Codeine  04/21/2012    Darvon [propoxyphene]  07/11/2019    Demerol [meperidine]  07/11/2019    Valium [diazepam]  07/11/2019    Other Other (See Comments) 12/29/2022    Aspirin  04/21/2012            The following portions of the patient's history were reviewed and updated as appropriate: allergies, current medications, past family history, past medical history, past social history, past surgical history and problem list.     Past Medical History:   Diagnosis Date    Arthritis     Asthma     Basal cell  carcinoma 2020    Right alar groove    HL (hearing loss)     Osteoarthritis     Osteoporosis     Shingles        Past Surgical History:   Procedure Laterality Date    COCHLEAR IMPLANT      COLONOSCOPY      COLONOSCOPY      fiberoptic; follow up at age; 2017 12 year f/u    HYSTERECTOMY      age 34    MOHS SURGERY  2020     Right alar groove    OOPHORECTOMY      age 34    SKIN BIOPSY      TONSILLECTOMY         Family History   Problem Relation Age of Onset    Osteoporosis Mother     Hearing loss Mother         she just found out    Diabetes Father             Heart disease Father             Hypertension Father             Alcohol abuse Father             Dementia Father     Stroke Father             Lung cancer Maternal Grandmother         70s    Osteoporosis Maternal Grandmother             Lung cancer Maternal Grandfather 88    Hearing loss Maternal Grandfather     Heart disease Paternal Grandmother             Diabetes Paternal Grandmother             Prostate cancer Paternal Grandfather 72            Alcohol abuse Brother     Hypertension Brother     Diabetes Brother     No Known Problems Maternal Aunt     Colon cancer Other         age unknown    Pancreatic cancer Other         age unknown         Medications have been verified.        Objective   /80   Pulse 86   Temp 99.9 °F (37.7 °C) (Tympanic)   Resp 20   Wt 63.5 kg (140 lb)   LMP 1989 (Exact Date) Comment: hysterectomy, total  SpO2 97%   BMI 30.30 kg/m²   Patient's last menstrual period was 1989 (exact date).       Physical Exam     Physical Exam  Vitals and nursing note reviewed.   Constitutional:       General: She is not in acute distress.     Appearance: Normal appearance. She is well-developed.   HENT:      Head: Normocephalic and atraumatic.      Right Ear: Hearing, tympanic membrane, ear canal and external ear normal. There is no impacted  cerumen.      Left Ear: Hearing, tympanic membrane, ear canal and external ear normal. There is no impacted cerumen.      Nose: Congestion and rhinorrhea present.      Mouth/Throat:      Pharynx: Uvula midline. Posterior oropharyngeal erythema (Mild posterior) present. No oropharyngeal exudate.   Eyes:      General:         Right eye: No discharge.         Left eye: No discharge.      Conjunctiva/sclera: Conjunctivae normal.   Cardiovascular:      Rate and Rhythm: Normal rate and regular rhythm.      Heart sounds: Normal heart sounds. No murmur heard.  Pulmonary:      Effort: Pulmonary effort is normal. No respiratory distress.      Breath sounds: Normal breath sounds. No wheezing or rales.   Abdominal:      General: Bowel sounds are normal.      Palpations: Abdomen is soft.      Tenderness: There is no abdominal tenderness.   Musculoskeletal:         General: Normal range of motion.      Cervical back: Normal range of motion and neck supple.   Lymphadenopathy:      Cervical: No cervical adenopathy.   Skin:     General: Skin is warm and dry.   Neurological:      Mental Status: She is alert and oriented to person, place, and time.   Psychiatric:         Mood and Affect: Mood normal.

## 2024-01-08 NOTE — PATIENT INSTRUCTIONS
Use medications as directed for symptomatic relief as needed  Motrin and or Tylenol as needed for any fever pain  Follow up with PCP in 3-5 days.  Proceed to  ER if symptoms worsen.    Upper Respiratory Infection   AMBULATORY CARE:   An upper respiratory infection  is also called a cold. Your nose, throat, ears, and sinuses may be affected. You are more likely to get a cold in the winter. Your risk of getting a cold may be increased if you smoke cigarettes or have allergies, such as hay fever.  What causes a cold?  A cold is caused by a virus. Many viruses can cause a cold, and each is contagious. This means the virus can be easily spread to another person when the sick person coughs or sneezes. The virus can also be spread if you touch an object the virus is on and then touch your eyes, mouth, or nose.  Cold symptoms  are usually worst for the first 3 to 5 days. You may have any of the following:  Runny or stuffy nose    Sneezing and coughing    Sore throat or hoarseness    Red, watery, and sore eyes    Fatigue (you feel more tired than usual)    Chills and fever    Headache, body aches, or sore muscles    Call your local emergency number (911 in the [place]) if:   You have chest pain or trouble breathing.      Seek care immediately if:   You have a fever over 102ºF (39ºC).      Call your doctor if:   You have a low fever.    Your sore throat gets worse or you see white or yellow spots in your throat.    Your symptoms get worse after 3 to 5 days or are not better in 14 days.    You have a rash anywhere on your skin.    You have large, tender lumps in your neck.    You have thick, green, or yellow drainage from your nose.    You cough up thick yellow, green, or bloody mucus.    You have a bad earache.    You have questions or concerns about your condition or care.    Treatment:  Colds are caused by viruses and do not get better with antibiotics. Most people get better in 7 to 14 days. You may continue to cough for 2 to 3  weeks. The following may help decrease your symptoms:  Decongestants  help reduce nasal congestion and help you breathe more easily. If you take decongestant pills, they may make you feel restless or not able to sleep. Do not use decongestant sprays for more than a few days.    Cough suppressants  help reduce coughing. Ask your healthcare provider which type of cough medicine is best for you.     NSAIDs , such as ibuprofen, help decrease swelling, pain, and fever. NSAIDs can cause stomach bleeding or kidney problems in certain people. If you take blood thinner medicine, always ask your healthcare provider if NSAIDs are safe for you. Always read the medicine label and follow directions.    Acetaminophen  decreases pain and fever. It is available without a doctor's order. Ask how much to take and how often to take it. Follow directions. Read the labels of all other medicines you are using to see if they also contain acetaminophen, or ask your doctor or pharmacist. Acetaminophen can cause liver damage if not taken correctly.    Manage a cold:   Rest as much as possible.  Slowly start to do more each day.    Drink more liquids as directed.  Liquids will help thin and loosen mucus so you can cough it up. Liquids will also help prevent dehydration. Liquids that help prevent dehydration include water, fruit juice, and broth. Do not drink liquids that contain caffeine. Caffeine can increase your risk for dehydration. Ask your healthcare provider how much liquid to drink each day.    Soothe a sore throat.  Gargle with warm salt water. Make salt water by dissolving ¼ teaspoon salt in 1 cup warm water. You may also suck on hard candy or throat lozenges. You may use a sore throat spray.    Use a humidifier or vaporizer.  Use a cool mist humidifier or a vaporizer to increase air moisture in your home. This may make it easier for you to breathe and help decrease your cough.    Use saline nasal drops as directed.  These help  relieve congestion.    Apply petroleum-based jelly around the outside of your nostrils.  This can decrease irritation from blowing your nose.    Do not smoke.  Nicotine and other chemicals in cigarettes and cigars can make your symptoms worse. They can also cause infections such as bronchitis or pneumonia. Ask your healthcare provider for information if you currently smoke and need help to quit. E-cigarettes or smokeless tobacco still contain nicotine. Talk to your healthcare provider before you use these products.    Prevent a cold:   Wash your hands often.  Use soap and water every time you wash your hands. Rub your soapy hands together, lacing your fingers. Use the fingers of one hand to scrub under the nails of the other hand. Wash for at least 20 seconds. Rinse with warm, running water for several seconds. Then dry your hands. Use hand  gel if soap and water are not available. Do not touch your eyes or mouth without washing your hands first.         Cover a sneeze or cough.  Use a tissue that covers your mouth and nose. Put the used tissue in the trash right away. Use the bend of your arm if a tissue is not available. Wash your hands well with soap and water or use a hand . Do not stand close to anyone who is sneezing or coughing.    Try to stay away from others while you are sick.  This is especially important during the first 2 to 3 days when the virus is more easily spread. Wait until a fever, cough, or other symptoms are gone before you return to work or other regular activities.    Do not share items while you are sick.  This includes food, drinks, eating utensils, and dishes.    Follow up with your doctor as directed:  Write down your questions so you remember to ask them during your visits.  © Copyright [employer] 2023 Information is for End User's use only and may not be sold, redistributed or otherwise used for commercial purposes.  The above information is an  only. It  is not intended as medical advice for individual conditions or treatments. Talk to your doctor, nurse or pharmacist before following any medical regimen to see if it is safe and effective for you.

## 2024-01-23 DIAGNOSIS — I10 ESSENTIAL HYPERTENSION: ICD-10-CM

## 2024-01-23 RX ORDER — AMLODIPINE BESYLATE 2.5 MG/1
TABLET ORAL
Qty: 90 TABLET | Refills: 1 | Status: SHIPPED | OUTPATIENT
Start: 2024-01-23

## 2024-02-05 ENCOUNTER — APPOINTMENT (OUTPATIENT)
Dept: LAB | Age: 69
End: 2024-02-05
Payer: COMMERCIAL

## 2024-02-05 DIAGNOSIS — M81.8 IDIOPATHIC OSTEOPOROSIS: ICD-10-CM

## 2024-02-05 LAB — CALCIUM SERPL-MCNC: 9.8 MG/DL (ref 8.4–10.2)

## 2024-02-05 PROCEDURE — 36415 COLL VENOUS BLD VENIPUNCTURE: CPT

## 2024-02-07 ENCOUNTER — OFFICE VISIT (OUTPATIENT)
Dept: GASTROENTEROLOGY | Facility: MEDICAL CENTER | Age: 69
End: 2024-02-07
Payer: COMMERCIAL

## 2024-02-07 VITALS
BODY MASS INDEX: 30.34 KG/M2 | OXYGEN SATURATION: 98 % | WEIGHT: 140.6 LBS | TEMPERATURE: 98.6 F | DIASTOLIC BLOOD PRESSURE: 70 MMHG | HEART RATE: 69 BPM | HEIGHT: 57 IN | SYSTOLIC BLOOD PRESSURE: 128 MMHG

## 2024-02-07 DIAGNOSIS — R15.9 INCONTINENCE OF FECES, UNSPECIFIED FECAL INCONTINENCE TYPE: ICD-10-CM

## 2024-02-07 PROCEDURE — 99244 OFF/OP CNSLTJ NEW/EST MOD 40: CPT | Performed by: PHYSICIAN ASSISTANT

## 2024-02-07 NOTE — PATIENT INSTRUCTIONS
High Fiber Diet   AMBULATORY CARE:   A high-fiber diet  includes foods that have a high amount of fiber. Fiber is the part of fruits, vegetables, and grains that is not broken down by your body. Fiber keeps your bowel movements regular. Fiber can also help lower your cholesterol level, control blood sugar in people with diabetes, and relieve constipation. Fiber can also help you control your weight because it helps you feel full faster. Most adults should eat 20 to 25 grams of fiber each day. Talk to your dietitian or healthcare provider about the amount of fiber you need.  Good sources of fiber:       Foods with at least 4 grams of fiber per serving:      ? to ½ cup of high-fiber cereal (check the nutrition label on the box)    ½ cup of blackberries or raspberries    4 dried prunes    1 cooked artichoke    ½ cup of cooked legumes, such as lentils, or red, kidney, and nassar beans    Foods with 1 to 3 grams of fiber per servin slice of whole-wheat, pumpernickel, or rye bread    ½ cup of cooked brown rice    4 whole-wheat crackers    1 cup of oatmeal    ½ cup of cereal with 1 to 3 grams of fiber per serving (check the nutrition label on the box)    1 small piece of fruit, such as an apple, banana, pear, kiwi, or orange    3 dates    ½ cup of canned apricots, fruit cocktail, peaches, or pears    ½ cup of raw or cooked vegetables, such as carrots, cauliflower, cabbage, spinach, squash, or corn  Ways that you can increase fiber in your diet:   Choose brown or wild rice instead of white rice.     Use whole wheat flour in recipes instead of white or all-purpose flour.     Add beans and peas to casseroles or soups.     Choose fresh fruit and vegetables with peels or skins on instead of juices.    Other diet guidelines to follow:   Add fiber to your diet slowly.  You may have abdominal discomfort, bloating, and gas if you add fiber to your diet too quickly.     Drink plenty of liquids as you add fiber to your diet.   You may have nausea or develop constipation if you do not drink enough water. Ask how much liquid to drink each day and which liquids are best for you.    © Copyright [employer] 2023 Information is for End User's use only and may not be sold, redistributed or otherwise used for commercial purposes.  The above information is an  only. It is not intended as medical advice for individual conditions or treatments. Talk to your doctor, nurse or pharmacist before following any medical regimen to see if it is safe and effective for you.

## 2024-02-07 NOTE — PROGRESS NOTES
[facility] - Outpatient Consultation  Danielle Sandoval 68 y.o. female MRN: [id number]  Encounter: [id number]      Assessment and Plan    1.  Fecal incontinence  The patient was having intermittent fecal incontinence now resolved after she stopped eating rehan. Denies any associated symptoms including constipation, diarrhea, pain, or unintentional weight loss.  She does have known hemorrhoids but denies any significant symptoms from these.  Her last colonoscopy was in 2017 normal aside for hemorrhoids.  Recall was recommended 10 years later.  -Defers rectal examination and colonoscopy  -Increase fiber to 20-25g daily  -Call with worsening symptoms/alarm symptoms    Follow up 3 months     ______________________________________________________________________    History of Present Illness  Danielle Sandoval is a 68 y.o. female here for consultation of fecal incontinence.  The patient states that this has been intermittent.  Has been improved since she stopped eating rehan.  Denies any associated symptoms including constipation, diarrhea, pain, or unintentional weight loss.  She does have known hemorrhoids but denies any significant symptoms from these.  Her last colonoscopy was in 2017 normal aside for hemorrhoids.  Recall was recommended 10 years later.      Review of Systems   Constitutional:  Negative for activity change, appetite change, chills, fatigue, fever and unexpected weight change.   Gastrointestinal:  Negative for abdominal distention, abdominal pain, anal bleeding, blood in stool, constipation, diarrhea, nausea, rectal pain and vomiting.       Past Medical History  Past Medical History:   Diagnosis Date    Arthritis     Asthma     Basal cell carcinoma 02/11/2020    Right alar groove    HL (hearing loss)     Osteoarthritis     Osteoporosis     Shingles        Past Social history  Past Surgical History:   Procedure Laterality Date    COCHLEAR IMPLANT      COLONOSCOPY  2017     COLONOSCOPY      fiberoptic; follow up at age; 2017 10 year f/u    HYSTERECTOMY      age 34    MOHS SURGERY  2020     Right alar groove    OOPHORECTOMY      age 34    SKIN BIOPSY      TONSILLECTOMY       Social History     Socioeconomic History    Marital status: Single     Spouse name: Not on file    Number of children: Not on file    Years of education: Not on file    Highest education level: Not on file   Occupational History    Not on file   Tobacco Use    Smoking status: Never    Smokeless tobacco: Never   Vaping Use    Vaping status: Never Used   Substance and Sexual Activity    Alcohol use: No    Drug use: No    Sexual activity: Never   Other Topics Concern    Not on file   Social History Narrative    Caffeine use     Social Determinants of Health     Financial Resource Strain: Not on file   Food Insecurity: Not on file   Transportation Needs: Not on file   Physical Activity: Not on file   Stress: Not on file   Social Connections: Not on file   Intimate Partner Violence: Not on file   Housing Stability: Not on file     Social History     Substance and Sexual Activity   Alcohol Use No     Social History     Substance and Sexual Activity   Drug Use No     Social History     Tobacco Use   Smoking Status Never   Smokeless Tobacco Never       Past Family History  Family History   Problem Relation Age of Onset    Osteoporosis Mother     Hearing loss Mother         she just found out    Diabetes Father             Heart disease Father             Hypertension Father             Alcohol abuse Father             Dementia Father     Stroke Father             Lung cancer Maternal Grandmother         70s    Osteoporosis Maternal Grandmother             Lung cancer Maternal Grandfather 88    Hearing loss Maternal Grandfather     Heart disease Paternal Grandmother             Diabetes Paternal Grandmother             Prostate cancer Paternal Grandfather 72             Alcohol abuse Brother     Hypertension Brother     Diabetes Brother     No Known Problems Maternal Aunt     Colon cancer Other         age unknown    Pancreatic cancer Other         age unknown       Current Medications  Current Outpatient Medications   Medication Sig Dispense Refill    acetaminophen (TYLENOL) 500 mg tablet Take 2 tabs po up to every 8 hours prn pain 60 tablet 0    amLODIPine (NORVASC) 2.5 mg tablet take 1 tablet by mouth once daily 90 tablet 1    azelastine (ASTELIN) 0.1 % nasal spray 1 spray into each nostril 2 (two) times a day Use in each nostril as directed 1 mL 0    benzonatate (TESSALON) 200 MG capsule Take 1 capsule (200 mg total) by mouth 3 (three) times a day as needed for cough 20 capsule 0    Denosumab (PROLIA SC) Inject under the skin      losartan (COZAAR) 100 MG tablet Take 1 tablet (100 mg total) by mouth daily 90 tablet 1    Multiple Vitamin (MULTIVITAMIN) capsule Take 1 capsule by mouth daily      Prolia 60 MG/ML       rosuvastatin (CRESTOR) 10 MG tablet Take 1 tablet (10 mg total) by mouth daily In the pm 90 tablet 1    calcium carbonate-vitamin D 250 mg-3.125 mcg per tablet Take 1 tablet by mouth daily 90 tablet 3    lidocaine (LIDODERM) 5 % Apply 1 patch topically every 24 hours for 15 days Remove & Discard patch within 12 hours or as directed by MD (Patient not taking: Reported on 2023) 15 patch 0     No current facility-administered medications for this visit.       Allergies  Allergies   Allergen Reactions    Codeine      Reaction Date: 2011;     Darvon [Propoxyphene]     Demerol [Meperidine]     Valium [Diazepam]     Other Other (See Comments)     HCTZ - JOAQUIN with elevated calcium and creatinine; hospitalization 2022    Aspirin      Reaction Date: 2011;          The following portions of the patient's history were reviewed and updated as appropriate: allergies, current medications, past medical history, past social history, past surgical  "history and problem list.      Vitals  Vitals:    02/07/24 0805   BP: 128/70   BP Location: Right arm   Pulse: 69   Temp: 98.6 °F (37 °C)   SpO2: 98%   Weight: 63.8 kg (140 lb 9.6 oz)   Height: 4' 9\" (1.448 m)         Physical Exam  Constitutional   General appearance: Patient is seated and in no acute distress, well appearing and well nourished.   Head and Face   Head and face: Normal.    Eyes   Conjunctiva and lids: No erythema, swelling or discharge.  Anicteric.  Ears, Nose, Mouth, and Throat   Hearing: Normal.    Neck: Supple, trachea midline.  Pulmonary   Respiratory effort: No increased work of breathing or signs of respiratory distress.    Cardiovascular   Examination of extremities for edema and/or varicosities: Normal.    Genitourinary  Rectal: refused  Musculoskeletal   Gait and station: Normal   Skin   Skin and subcutaneous tissue: Warm, dry, and intact. No visible jaundice, lesions or rashes.  Psychiatric   Judgment and insight: Normal  Recent and remote memory:  Normal  Mood and affect: Normal      Results  No visits with results within 1 Day(s) from this visit.   Latest known visit with results is:   Appointment on 02/05/2024   Component Date Value    Calcium 02/05/2024 9.8        Radiology Results  No results found.    Orders  No orders of the defined types were placed in this encounter.      "

## 2024-02-18 LAB
APOB+LDLR+PCSK9 GENE MUT ANL BLD/T: NOT DETECTED
BRCA1+BRCA2 DEL+DUP + FULL MUT ANL BLD/T: NOT DETECTED
MLH1+MSH2+MSH6+PMS2 GN DEL+DUP+FUL M: NOT DETECTED

## 2024-03-11 DIAGNOSIS — I10 ESSENTIAL HYPERTENSION: ICD-10-CM

## 2024-03-11 RX ORDER — LOSARTAN POTASSIUM 100 MG/1
100 TABLET ORAL DAILY
Qty: 90 TABLET | Refills: 0 | Status: SHIPPED | OUTPATIENT
Start: 2024-03-11

## 2024-04-01 ENCOUNTER — TELEPHONE (OUTPATIENT)
Dept: DERMATOLOGY | Facility: CLINIC | Age: 69
End: 2024-04-01

## 2024-04-01 NOTE — TELEPHONE ENCOUNTER
Called patient and left message stating that we needed to reschedule her appointment on 4/16 due to provider not being in the office in the AM.     TEAM: patient needs to be rescheduled with any AP, at [facility] and can offer Darrin within this month.

## 2024-04-19 DIAGNOSIS — E78.2 MIXED HYPERLIPIDEMIA: ICD-10-CM

## 2024-04-19 RX ORDER — ROSUVASTATIN CALCIUM 10 MG/1
10 TABLET, COATED ORAL EVERY EVENING
Qty: 90 TABLET | Refills: 1 | Status: SHIPPED | OUTPATIENT
Start: 2024-04-19

## 2024-05-02 ENCOUNTER — TELEPHONE (OUTPATIENT)
Dept: FAMILY MEDICINE CLINIC | Facility: CLINIC | Age: 69
End: 2024-05-02

## 2024-05-02 DIAGNOSIS — Z12.31 ENCOUNTER FOR SCREENING MAMMOGRAM FOR MALIGNANT NEOPLASM OF BREAST: Primary | ICD-10-CM

## 2024-05-02 DIAGNOSIS — Z13.820 SCREENING FOR OSTEOPOROSIS: ICD-10-CM

## 2024-05-09 ENCOUNTER — OFFICE VISIT (OUTPATIENT)
Dept: DERMATOLOGY | Facility: CLINIC | Age: 69
End: 2024-05-09
Payer: COMMERCIAL

## 2024-05-09 DIAGNOSIS — Z12.83 SCREENING FOR MALIGNANT NEOPLASM OF SKIN: Primary | ICD-10-CM

## 2024-05-09 DIAGNOSIS — D48.9 NEOPLASM OF UNCERTAIN BEHAVIOR: ICD-10-CM

## 2024-05-09 PROCEDURE — 88305 TISSUE EXAM BY PATHOLOGIST: CPT | Performed by: STUDENT IN AN ORGANIZED HEALTH CARE EDUCATION/TRAINING PROGRAM

## 2024-05-09 PROCEDURE — 11102 TANGNTL BX SKIN SINGLE LES: CPT

## 2024-05-09 PROCEDURE — 99214 OFFICE O/P EST MOD 30 MIN: CPT

## 2024-05-09 NOTE — PROGRESS NOTES
"[facility] Note     Patient Name: Danielle Sandoval  Encounter Date: 5/9/24     Have you been cared for by a [facility] Dermatologist in the last 3 years and, if so, which description applies to you?    Yes.  I have been here within the last 3 years, and my medical history has NOT changed since that time.  I am FEMALE/of child-bearing potential.    REVIEW OF SYSTEMS:  Have you recently had or currently have any of the following? No changes in my recent health.   PAST MEDICAL HISTORY:  Have you personally ever had or currently have any of the following?  If \"YES,\" then please provide more detail. No changes in my medical history.   HISTORY OF IMMUNOSUPPRESSION: Do you have a history of any of the following:  Systemic Immunosuppression such as Diabetes, Biologic or Immunotherapy, Chemotherapy, Organ Transplantation, Bone Marrow Transplantation?  No     Answering \"YES\" requires the addition of the dotphrase \"IMMUNOSUPPRESSED\" as the first diagnosis of the patient's visit.   FAMILY HISTORY:  Any \"first degree relatives\" (parent, brother, sister, or child) with the following?    No changes in my family's known health.   PATIENT EXPERIENCE:    Do you want the Dermatologist to perform a COMPLETE skin exam today including a clinical examination under the \"bra and underwear\" areas?  Yes  If necessary, do we have your permission to call and leave a detailed message on your Preferred Phone number that includes your specific medical information?  Yes      Allergies   Allergen Reactions    Codeine      Reaction Date: 26May2011;     Darvon [Propoxyphene]     Demerol [Meperidine]     Valium [Diazepam]     Other Other (See Comments)     HCTZ - JOAQUIN with elevated calcium and creatinine; hospitalization 11/2022    Aspirin      Reaction Date: 26May2011;       Current Outpatient Medications:     acetaminophen (TYLENOL) 500 mg tablet, Take 2 tabs po up to every 8 hours prn pain, Disp: 60 tablet, Rfl: 0    amLODIPine " (NORVASC) 2.5 mg tablet, take 1 tablet by mouth once daily, Disp: 90 tablet, Rfl: 1    azelastine (ASTELIN) 0.1 % nasal spray, 1 spray into each nostril 2 (two) times a day Use in each nostril as directed, Disp: 1 mL, Rfl: 0    benzonatate (TESSALON) 200 MG capsule, Take 1 capsule (200 mg total) by mouth 3 (three) times a day as needed for cough, Disp: 20 capsule, Rfl: 0    losartan (COZAAR) 100 MG tablet, TAKE ONE TABLET BY MOUTH EVERY DAY, Disp: 90 tablet, Rfl: 0    Multiple Vitamin (MULTIVITAMIN) capsule, Take 1 capsule by mouth daily, Disp: , Rfl:     Prolia 60 MG/ML, , Disp: , Rfl:     rosuvastatin (CRESTOR) 10 MG tablet, TAKE ONE TABLET BY MOUTH EVERY DAY IN THE EVENING, Disp: 90 tablet, Rfl: 1    calcium carbonate-vitamin D 250 mg-3.125 mcg per tablet, Take 1 tablet by mouth daily, Disp: 90 tablet, Rfl: 3    Denosumab (PROLIA SC), Inject under the skin, Disp: , Rfl:     lidocaine (LIDODERM) 5 %, Apply 1 patch topically every 24 hours for 15 days Remove & Discard patch within 12 hours or as directed by MD, Disp: 15 patch, Rfl: 0          Whom besides the patient is providing clinical information about today's encounter?   NO ADDITIONAL HISTORIAN (patient alone provided history)    Physical Exam and Assessment/Plan by Diagnosis:    HISTORY OF MELANOMA IN SITU      Physical Exam:  Anatomic Location Affected:  Left Anterior Shin  Morphological Description of Scar:  Well healed surgical scar  Year Treated: 7/17/2019  TNM Classification: Not available   Suspected Recurrence: no  Regional adenopathy: no     Additional History of Present Condition:       Assessment and Plan:  Based on a thorough discussion of this condition and the management approach to it (including a comprehensive discussion of the known risks, side effects and potential benefits of treatment), the patient (family) agrees to implement the following specific plan:  Monitor for recurrence  When outside we recommend using a wide brim hat, sunglasses,  long sleeve and pants, sunscreen with SPF 30+ with reapplication every 2 hours, or SPF specific clothing     HISTORY OF BASAL CELL CARCINOMA     Physical Exam:  Anatomic Location Affected:  Right alar crease,   Superficial Right inner shin (calf) - Treated with Efudex 5% cream in 2020  Morphological Description of scar:  Well healed surgical scar   Suspected Recurrence: No  Pertinent Positives:  Pertinent Negatives:        Additional History of Present Condition:  History of basal cell carcinoma with no sign of recurrence. Right Alar crease - Mohs procedure performed on 5/11/2020 by Dr. Rock.      Assessment & Plan:  Monitor at 6 mo skin checks  Continue sun protection SPF 30+ or UPF protective clothing      SEBORRHEIC KERATOSES  - Relevant exam: Scattered over the trunk/extremities are waxy brown to black plaques and papules with stuck on appearance and consistent dermoscopy  - Exam and clinical history consistent with seborrheic keratoses  - Counseled that these are benign growths that do not require treatment    MELANOCYTIC NEVI  -Relevant exam: Scattered over the trunk/extremities are homogenously pigmented brown macules and papules. ELM performed and without concerning findings. No outliers unless otherwise noted in today's note  - Exam and clinical history consistent with melanocytic nevi  - Counseled to return to clinic prior to scheduled appointment should any of these lesions change or should any new lesions of concern arise  - Counseled on use of sun protection daily. Reviewed latest FDA sunscreen guidelines, including use of broad spectrum (UVA and UVB blocking) sunscreen or sun protective clothing with SPF 30-50 every 2-3 hours and reapplied after exposure to water    LENTIGINES  OTHER SKIN CHANGES DUE TO CHRONIC EXPOSURE TO NONIONIZING RADIATION  - Relevant exam: Over sun exposed areas are brown macules. ELM performed and without concerning findings.  - Exam and clinical history consistent with  "lentigines.  - Counseled to return to clinic prior to scheduled appointment should any of these lesions change or should any new lesions of concern arise.  - Recommended use of sunscreen as above and below.    CHERRY ANGIOMAS  - Relevant exam: Scattered over the trunk/extremities are red papules  - Exam and clinical history consistent with cherry angiomas  - Educated that these are benign    NEOPLASM OF UNCERTAIN BEHAVIOR OF SKIN    Physical Exam:  (Anatomic Location); (Size and Morphological Description); (Differential Diagnosis):  Specimen A: Right Lower Back, 5 mm x 9 mm pink pearly papule. Differential diagnosis: Basal cell carcinoma vs squamous cell carcinoma vs actinic keratosis      Additional History of Present Condition:  Present on exam    Assessment and Plan:  I have discussed with the patient that a sample of skin via a \"skin biopsy” would be potentially helpful to further make a specific diagnosis under the microscope.  Based on a thorough discussion of this condition and the management approach to it (including a comprehensive discussion of the known risks, side effects and potential benefits of treatment), the patient (family) agrees to implement the following specific plan:    Procedure:  Skin Biopsy.  After a thorough discussion of treatment options and risk/benefits/alternatives (including but not limited to local pain, scarring, dyspigmentation, blistering, possible superinfection, and inability to confirm a diagnosis via histopathology), verbal and written consent were obtained and portion of the rash was biopsied for tissue sample.  See below for consent that was obtained from patient and subsequent Procedure Note.    PROCEDURE TANGENTIAL (SHAVE) BIOPSY NOTE:    Performing Physician:  Laney Thakkar PA-C  Anatomic Location; Clinical Description with size (cm); Pre-Op Diagnosis:   Right Lower Back, 5 mm x 9 mm pink pearly papule. Differential diagnosis: Basal cell carcinoma vs squamous cell " "carcinoma vs actinic keratosis  Post-op diagnosis: Same     Local anesthesia: 2% Xylocaine with epi     Topical anesthesia: None    Hemostasis: Aluminum chloride       After obtaining informed consent  at which time there was a discussion about the purpose of biopsy  and low risks of infection and bleeding.  The area was prepped and draped in the usual fashion. Anesthesia was obtained with 1% lidocaine with epinephrine. A shave biopsy to an appropriate sampling depth was obtained by Shave (Dermablade or 15 blade) The resulting wound was covered with surgical ointment and bandaged appropriately.     The patient tolerated the procedure well without complications and was without signs of functional compromise.      Specimen has been sent for review by Dermatopathology.    Standard post-procedure care has been explained and has been included in written form within the patient's copy of Informed Consent.    INFORMED CONSENT DISCUSSION AND POST-OPERATIVE INSTRUCTIONS FOR PATIENT    I.  RATIONALE FOR PROCEDURE  I understand that a skin biopsy allows the Dermatologist to test a lesion or rash under the microscope to obtain a diagnosis.  It usually involves numbing the area with numbing medication and removing a small piece of skin; sometimes the area will be closed with sutures. In this specific procedure, sutures are not usually needed.  If any sutures are placed, then they are usually need to be removed in 2 weeks or less.    I understand that my Dermatologist recommends that a skin \"shave\" biopsy be performed today.  A local anesthetic, similar to the kind that a dentist uses when filling a cavity, will be injected with a very small needle into the skin area to be sampled.  The injected skin and tissue underneath \"will go to sleep” and become numb so no pain should be felt afterwards.  An instrument shaped like a tiny \"razor blade\" (shave biopsy instrument) will be used to cut a small piece of tissue and skin from the " "area so that a sample of tissue can be taken and examined more closely under the microscope.  A slight amount of bleeding will occur, but it will be stopped with direct pressure and a pressure bandage and any other appropriate methods.  I understands that a scar will form where the wound was created.  Surgical ointment will be applied to help protect the wound.  Sutures are not usually needed.    II.  RISKS AND POTENTIAL COMPLICATIONS   I understand the risks and potential complications of a skin biopsy include but are not limited to the following:  Bleeding  Infection  Pain  Scar/keloid  Skin discoloration  Incomplete Removal  Recurrence  Nerve Damage/Numbness/Loss of Function  Allergic Reaction to Anesthesia  Biopsies are diagnostic procedures and based on findings additional treatment or evaluation may be required  Loss or destruction of specimen resulting in no additional findings    My Dermatologist has explained to me the nature of the condition, the nature of the procedure, and the benefits to be reasonably expected compared with alternative approaches.  My Dermatologist has discussed the likelihood of major risks or complications of this procedure including the specific risks listed above, such as bleeding, infection, and scarring/keloid.  I understand that a scar is expected after this procedure.  I understand that my physician cannot predict if the scar will form a \"keloid,\" which extends beyond the borders of the wound that is created.  A keloid is a thick, painful, and bumpy scar.  A keloid can be difficult to treat, as it does not always respond well to therapy, which includes injecting cortisone directly into the keloid every few weeks.  While this usually reduces the pain and size of the scar, it does not eliminate it.      I understand that photographs may be taken before and after the procedure.  These will be maintained as part of the medical providers confidential records and may not be made " "available to me.  I further authorize the medical provider to use the photographs for teaching purposes or to illustrate scientific papers, books, or lectures if in his/her judgment, medical research, education, or science may benefit from its use.    I have had an opportunity to fully inquire about the risks and benefits of this procedure and its alternatives.   I have been given ample time and opportunity to ask questions and to seek a second opinion if I wished to do so.  I acknowledge that there have specifically been no guarantees as to the cosmetic results from the procedure.  I am aware that with any procedure there is always the possibility of an unexpected complication.    III. POST-PROCEDURAL CARE (WHAT YOU WILL NEED TO DO \"AFTER THE BIOPSY\" TO OPTIMIZE HEALING)    Keep the area clean and dry.  Try NOT to remove the bandage or get it wet for the first 24 hours.    Gently clean the area and apply surgical ointment (such as Vaseline petrolatum ointment, which is available \"over the counter\" and not a prescription) to the biopsy site for up to 2 weeks straight.  This acts to protect the wound from the outside world.      Sutures are not usually placed in this procedure.  If any sutures were placed, return for suture removal as instructed (generally 1 week for the face, 2 weeks for the body).      Take Acetaminophen (Tylenol) for discomfort, if no contraindications.  Ibuprofen or aspirin could make bleeding worse.    Call our office immediately for signs of infection: fever, chills, increased redness, warmth, tenderness, discomfort/pain, or pus or foul smell coming from the wound.    WHAT TO DO IF THERE IS ANY BLEEDING?  If a small amount of bleeding is noticed, place a clean cloth over the area and apply firm pressure for ten minutes.  Check the wound after 10 minutes of direct pressure.  If bleeding persists, try one more time for an additional 10 minutes of direct pressure on the area.  If the bleeding " becomes heavier or does not stop after the second attempt, or if you have any other questions about this procedure, then please call your [facility]'s Dermatologist by calling [phone] (SKIN).     I hereby acknowledge that I have reviewed and verified the site with my Dermatologist and have requested and authorized my Dermatologist to proceed with the procedure.      Scribe Attestation      I,:  Farhat Cat am acting as a scribe while in the presence of the attending physician.:       I,:  Laney Thakkar PA-C personally performed the services described in this documentation    as scribed in my presence.:

## 2024-05-09 NOTE — PATIENT INSTRUCTIONS
"BIOPSY RIGHT LOWER BACK  III. POST-PROCEDURAL CARE (WHAT YOU WILL NEED TO DO \"AFTER THE BIOPSY\" TO OPTIMIZE HEALING)    Keep the area clean and dry.  Try NOT to remove the bandage or get it wet for the first 24 hours.    Gently clean the area and apply surgical ointment (such as Vaseline petrolatum ointment, which is available \"over the counter\" and not a prescription) to the biopsy site for up to 2 weeks straight.  This acts to protect the wound from the outside world.      Sutures are not usually placed in this procedure.  If any sutures were placed, return for suture removal as instructed (generally 1 week for the face, 2 weeks for the body).      Take Acetaminophen (Tylenol) for discomfort, if no contraindications.  Ibuprofen or aspirin could make bleeding worse.    Call our office immediately for signs of infection: fever, chills, increased redness, warmth, tenderness, discomfort/pain, or pus or foul smell coming from the wound.    WHAT TO DO IF THERE IS ANY BLEEDING?  If a small amount of bleeding is noticed, place a clean cloth over the area and apply firm pressure for ten minutes.  Check the wound after 10 minutes of direct pressure.  If bleeding persists, try one more time for an additional 10 minutes of direct pressure on the area.  If the bleeding becomes heavier or does not stop after the second attempt, or if you have any other questions about this procedure, then please call your [facility]'s Dermatologist by calling [phone] (SKIN).     I hereby acknowledge that I have reviewed and verified the site with my Dermatologist and have requested and authorized my Dermatologist to proceed with the procedure.    What happens at follow-up?  The main purpose of follow-up is to detect recurrences early (metastatic melanoma), but it also offers an opportunity to diagnose a new primary melanoma at the first possible opportunity. A second invasive melanoma occurs in 5-10% of melanoma patients and a new melanoma " "in situ is diagnosed in more than 20% of melanoma patients.     Our practice makes the following recommendations for follow-up for patients with invasive melanoma.  At-least \"monthly\" self-skin examinations   Routine skin checks by a board certified dermatologist  Follow-up intervals are \"every 3 months\" within 2 years of a new melanoma diagnosis; \"every 6 months\" between 2-4 years of a new melanoma diagnosis; and \"annually\" after 4 years of a new melanoma diagnosis  Individual patient's needs should be considered before an appropriate follow-up is offered  Provide education and support to help the patient adjust to their illness     Follow-up appointments should include:  A check of the scar where the primary melanoma was removed  Checking the regional lymph nodes  A general skin examination  A full physical examination at least annually by your primary care physician     In those with more advanced primary disease, follow-up may include:  Blood tests  Imaging: ultrasound, X-ray, CT, MRI and PET scan.     Most tests are not worthwhile for patients with stage 1 or 2 melanoma unless there are signs or symptoms of disease recurrence or metastasis. No tests are necessary for healthy patients who have remained well for five years or longer after removal of their melanoma.     What is the outlook for patients with melanoma?  Melanoma in situ is cured by excision because it has no potential to spread around the body.  The risk of spread and ultimate death from invasive melanoma depends on several factors, but the main one is the Breslow thickness of the melanoma at the time it was surgically removed.  Metastases are rare for melanomas < 0.75 mm and the risk for tumours 0.75-1 mm thick is about 5%. The risk steadily increases with thickness so that melanomas > 4 mm have a risk of metastasis of about 40%.     Melanoma is a potentially serious type of skin cancer, in which there is uncontrolled growth of melanocytes (pigment " cells). Melanoma is sometimes called malignant melanoma.  Normal melanocytes are found in the basal layer of the epidermis (the outer layer of skin). Melanocytes produce a protein called melanin, which protects skin cells by absorbing ultraviolet (UV) radiation. Melanocytes are found in equal numbers in black and white skin, but melanocytes in black skin produce much more melanin. People with dark brown or black skin are very much less likely to be damaged by UV radiation than those with white skin.

## 2024-05-14 PROCEDURE — 88305 TISSUE EXAM BY PATHOLOGIST: CPT | Performed by: STUDENT IN AN ORGANIZED HEALTH CARE EDUCATION/TRAINING PROGRAM

## 2024-05-16 ENCOUNTER — TELEPHONE (OUTPATIENT)
Dept: DERMATOLOGY | Facility: CLINIC | Age: 69
End: 2024-05-16

## 2024-05-16 NOTE — TELEPHONE ENCOUNTER
Left voicemail for patient to call back to review biopsy results. Call back number provided and [facility] message sent.

## 2024-05-21 ENCOUNTER — TELEPHONE (OUTPATIENT)
Dept: DERMATOLOGY | Facility: CLINIC | Age: 69
End: 2024-05-21

## 2024-05-21 NOTE — TELEPHONE ENCOUNTER
Left voicemail for patient to confirm doing ED&C vs excision of superficial BCC. Once confirmed will send message to staffing to get scheduled

## 2024-05-22 ENCOUNTER — TELEPHONE (OUTPATIENT)
Dept: DERMATOLOGY | Facility: CLINIC | Age: 69
End: 2024-05-22

## 2024-05-22 NOTE — TELEPHONE ENCOUNTER
Mauro Santos as she needs a procedure appt scheduled for an ED&C of superficial basal cell on the right lower back, per Laney. This patient can be offered a visit with Dr. Montejo in [facility] as she has the soonest procedure appts available. Included cb number in voice message.

## 2024-05-23 NOTE — TELEPHONE ENCOUNTER
Patient scheduled for ED&C of superficial basal cell on the right lower back in [facility] with Dr. Montejo on 6/17/24

## 2024-06-12 ENCOUNTER — OFFICE VISIT (OUTPATIENT)
Dept: GASTROENTEROLOGY | Facility: MEDICAL CENTER | Age: 69
End: 2024-06-12
Payer: COMMERCIAL

## 2024-06-12 VITALS
BODY MASS INDEX: 30.2 KG/M2 | OXYGEN SATURATION: 98 % | SYSTOLIC BLOOD PRESSURE: 128 MMHG | DIASTOLIC BLOOD PRESSURE: 82 MMHG | WEIGHT: 140 LBS | TEMPERATURE: 87 F | HEART RATE: 56 BPM | HEIGHT: 57 IN

## 2024-06-12 DIAGNOSIS — R15.9 INCONTINENCE OF FECES, UNSPECIFIED FECAL INCONTINENCE TYPE: ICD-10-CM

## 2024-06-12 DIAGNOSIS — Z12.11 COLON CANCER SCREENING: ICD-10-CM

## 2024-06-12 DIAGNOSIS — R19.7 DIARRHEA, UNSPECIFIED TYPE: Primary | ICD-10-CM

## 2024-06-12 PROCEDURE — 99214 OFFICE O/P EST MOD 30 MIN: CPT | Performed by: PHYSICIAN ASSISTANT

## 2024-06-12 NOTE — PROGRESS NOTES
[facility] - Outpatient Follow-up Note  Danielle Sandoval 68 y.o. female MRN: [id number]  Encounter: [id number]      Assessment and Plan    1. IBS-D  2. Fecal incontinence   The patient was initially seen for fecal incontinence now improved after stopping Marycruz. Was offered rectal and colonoscopy at her last visit which she deferred. At her visit today she does mention she has chronic intermittent diarrhea where she can have up to 5 loose bowel movement daily triggered by certain foods such as cheese and fast food. Will get abdominal cramping prior to a bowel movement that is relieved thereafter. No alarm symptoms but does get some blood with wiping every few months  -Bleeding sounds hemorrhoidal, patient will notify us if this worsens   -Cramping and diarrhea sound consistent with IBS-D however will obtain inflammatory markers and if elevated will perform colonoscopy to r/o colitis, etc  -Avoid known food triggers  -Call with recurrent incontinence     3. Colon cancer screening  Her last colonoscopy was in 2017 normal aside for hemorrhoids. Recall was recommended 10 years later.   -Repeat colonoscopy 2027 or sooner if clinically indicated     Follow up 3 months     ______________________________________________________________________    History of Present Illness  Danielle Sandoval is a 68 y.o. female here for follow up evaluation of fecal incontinence.  Has been improved since she stopped eating marycruz. Did try fiber but this cause bloating. She does mention today that she has chronic intermittent diarrhea triggers by things such as cheese and fast food. This is associated with abdominal cramping relieved after a bowel movements. Typically she has no more than 5 bowel movements during flares. No other associated symptoms. Does get blood with wiping every few months which she believes is from hemorrhoids.      Review of Systems   Constitutional:  Negative for activity change, appetite  change, chills, fatigue, fever and unexpected weight change.   Gastrointestinal:  Negative for abdominal distention, abdominal pain, anal bleeding, blood in stool, constipation, diarrhea, nausea, rectal pain and vomiting.   Musculoskeletal:  Negative for back pain and gait problem.   Psychiatric/Behavioral:  Negative for confusion.        Past Medical History  Past Medical History:   Diagnosis Date    Arthritis     Asthma     Basal cell carcinoma 02/11/2020    Right alar groove    HL (hearing loss)     Osteoarthritis     Osteoporosis     Shingles        Past Social history  Past Surgical History:   Procedure Laterality Date    COCHLEAR IMPLANT      COLONOSCOPY  2017    COLONOSCOPY  2010    fiberoptic; follow up at age; 2017 10 year f/u    HYSTERECTOMY      age 34    MOHS SURGERY  05/11/2020     Right alar groove    OOPHORECTOMY      age 34    SKIN BIOPSY      TONSILLECTOMY       Social History     Socioeconomic History    Marital status: Single     Spouse name: Not on file    Number of children: Not on file    Years of education: Not on file    Highest education level: Not on file   Occupational History    Not on file   Tobacco Use    Smoking status: Never    Smokeless tobacco: Never   Vaping Use    Vaping status: Never Used   Substance and Sexual Activity    Alcohol use: No    Drug use: No    Sexual activity: Never   Other Topics Concern    Not on file   Social History Narrative    Caffeine use     Social Determinants of Health     Financial Resource Strain: Not on file   Food Insecurity: Not on file   Transportation Needs: Not on file   Physical Activity: Not on file   Stress: Not on file   Social Connections: Not on file   Intimate Partner Violence: Not on file   Housing Stability: Not on file     Social History     Substance and Sexual Activity   Alcohol Use No     Social History     Substance and Sexual Activity   Drug Use No     Social History     Tobacco Use   Smoking Status Never   Smokeless Tobacco Never        Past Family History  Family History   Problem Relation Age of Onset    Osteoporosis Mother     Hearing loss Mother         she just found out    Diabetes Father             Heart disease Father             Hypertension Father             Alcohol abuse Father             Dementia Father     Stroke Father             Lung cancer Maternal Grandmother         70s    Osteoporosis Maternal Grandmother             Lung cancer Maternal Grandfather 88    Hearing loss Maternal Grandfather     Heart disease Paternal Grandmother             Diabetes Paternal Grandmother             Prostate cancer Paternal Grandfather 72            Alcohol abuse Brother     Hypertension Brother     Diabetes Brother     No Known Problems Maternal Aunt     Colon cancer Other         age unknown    Pancreatic cancer Other         age unknown       Current Medications  Current Outpatient Medications   Medication Sig Dispense Refill    acetaminophen (TYLENOL) 500 mg tablet Take 2 tabs po up to every 8 hours prn pain 60 tablet 0    amLODIPine (NORVASC) 2.5 mg tablet take 1 tablet by mouth once daily 90 tablet 1    azelastine (ASTELIN) 0.1 % nasal spray 1 spray into each nostril 2 (two) times a day Use in each nostril as directed 1 mL 0    benzonatate (TESSALON) 200 MG capsule Take 1 capsule (200 mg total) by mouth 3 (three) times a day as needed for cough 20 capsule 0    Denosumab (PROLIA SC) Inject under the skin      losartan (COZAAR) 100 MG tablet TAKE ONE TABLET BY MOUTH EVERY DAY 90 tablet 0    Multiple Vitamin (MULTIVITAMIN) capsule Take 1 capsule by mouth daily      Prolia 60 MG/ML       rosuvastatin (CRESTOR) 10 MG tablet TAKE ONE TABLET BY MOUTH EVERY DAY IN THE EVENING 90 tablet 1    calcium carbonate-vitamin D 250 mg-3.125 mcg per tablet Take 1 tablet by mouth daily 90 tablet 3    lidocaine (LIDODERM) 5 % Apply 1 patch topically every 24 hours for 15 days Remove &  "Discard patch within 12 hours or as directed by MD 15 patch 0     No current facility-administered medications for this visit.       Allergies  Allergies   Allergen Reactions    Codeine      Reaction Date: 26May2011;     Darvon [Propoxyphene]     Demerol [Meperidine]     Valium [Diazepam]     Other Other (See Comments)     HCTZ - JOAQUIN with elevated calcium and creatinine; hospitalization 11/2022    Aspirin      Reaction Date: 26May2011;        The following portions of the patient's history were reviewed and updated as appropriate: allergies, current medications, past medical history, past social history, past surgical history and problem list.      Vitals  Vitals:    06/12/24 0812   BP: 128/82   BP Location: Left arm   Pulse: 56   Temp: (!) 87 °F (30.6 °C)   SpO2: 98%   Weight: 63.5 kg (140 lb)   Height: 4' 9\" (1.448 m)       Physical Exam  Constitutional   General appearance: Patient is seated and in no acute distress, well appearing and well nourished.   Head and Face   Head and face: Normal.    Eyes   Conjunctiva and lids: No erythema, swelling or discharge.  Anicteric.  Ears, Nose, Mouth, and Throat   Hearing: Normal.    Neck: Supple, trachea midline.  Pulmonary   Respiratory effort: No increased work of breathing or signs of respiratory distress.    Cardiovascular   Examination of extremities for edema and/or varicosities: Normal.    Abdomen   Abdomen: Soft, non-tender, no masses, no organomegaly.  Normal bowel sounds.   Musculoskeletal   Gait and station: Normal   Skin   Skin and subcutaneous tissue: Warm, dry, and intact. No visible jaundice, lesions or rashes.  Psychiatric   Judgment and insight: Normal  Recent and remote memory:  Normal  Mood and affect: Normal      Results  No visits with results within 1 Day(s) from this visit.   Latest known visit with results is:   Office Visit on 05/09/2024   Component Date Value    Case Report 05/09/2024                      Value:Surgical Pathology Report             "             Case: [id number]                                  Authorizing Provider:  Laney Thakkar PA-C        Collected:           05/09/2024 0857              Ordering Location:     [facility]      Received:            05/09/2024 0857                                     [place]                                                                Pathologist:           Marie Martinez MD                                                           Specimen:    Skin, Other, Specimen A: Right lower back                                                  Final Diagnosis 05/09/2024                      Value:This result contains rich text formatting which cannot be displayed here.    Additional Information 05/09/2024                      Value:This result contains rich text formatting which cannot be displayed here.    Gross Description 05/09/2024                      Value:This result contains rich text formatting which cannot be displayed here.    Clinical Information 05/09/2024                      Value:ATTENTION:  DERMPATH GROUP    SPECIMEN A; Skin; Anatomic Location: Right Lower Back; Procedure/Protocol: Skin Specimen (submit in [facility]):Tangential Biopsy (includes shave, scoop, saucerization, curette) (CPT 68367; each additional tangential biopsy is CPT 72031)   68 y.o. year old  Female with a Morphological Description: 5 mm x 9 mm pink pearly papule. Differential diagnosis: Basal cell carcinoma vs squamous cell carcinoma       Radiology Results  No results found.    Orders  No orders of the defined types were placed in this encounter.

## 2024-06-17 ENCOUNTER — PROCEDURE VISIT (OUTPATIENT)
Dept: DERMATOLOGY | Facility: CLINIC | Age: 69
End: 2024-06-17
Payer: COMMERCIAL

## 2024-06-17 VITALS
TEMPERATURE: 97.8 F | HEIGHT: 57 IN | DIASTOLIC BLOOD PRESSURE: 70 MMHG | SYSTOLIC BLOOD PRESSURE: 128 MMHG | BODY MASS INDEX: 30.85 KG/M2 | WEIGHT: 143 LBS

## 2024-06-17 DIAGNOSIS — C44.91 SUPERFICIAL BASAL CELL CARCINOMA: Primary | ICD-10-CM

## 2024-06-17 PROCEDURE — 99214 OFFICE O/P EST MOD 30 MIN: CPT | Performed by: STUDENT IN AN ORGANIZED HEALTH CARE EDUCATION/TRAINING PROGRAM

## 2024-06-17 RX ORDER — IMIQUIMOD 12.5 MG/.25G
CREAM TOPICAL
Qty: 24 EACH | Refills: 1 | Status: SHIPPED | OUTPATIENT
Start: 2024-06-17

## 2024-06-17 NOTE — PROGRESS NOTES
"    [facility] Note     Patient Name: Danielle Sandoval  Encounter Date: 6/17/24     Have you been cared for by a [facility] Dermatologist in the last 3 years and, if so, which description applies to you?    Yes.  I have been here within the last 3 years, and my medical history has NOT changed since that time.  I am FEMALE/of child-bearing potential.    REVIEW OF SYSTEMS:  Have you recently had or currently have any of the following? No changes in my recent health.   PAST MEDICAL HISTORY:  Have you personally ever had or currently have any of the following?  If \"YES,\" then please provide more detail. No changes in my medical history.   HISTORY OF IMMUNOSUPPRESSION: Do you have a history of any of the following:  Systemic Immunosuppression such as Diabetes, Biologic or Immunotherapy, Chemotherapy, Organ Transplantation, Bone Marrow Transplantation?  No     Answering \"YES\" requires the addition of the dotphrase \"IMMUNOSUPPRESSED\" as the first diagnosis of the patient's visit.   FAMILY HISTORY:  Any \"first degree relatives\" (parent, brother, sister, or child) with the following?    No changes in my family's known health.   PATIENT EXPERIENCE:    Do you want the Dermatologist to perform a COMPLETE skin exam today including a clinical examination under the \"bra and underwear\" areas?  NO  If necessary, do we have your permission to call and leave a detailed message on your Preferred Phone number that includes your specific medical information?  Yes      Allergies   Allergen Reactions    Codeine      Reaction Date: 26May2011;     Darvon [Propoxyphene]     Demerol [Meperidine]     Valium [Diazepam]     Other Other (See Comments)     HCTZ - JOAQUIN with elevated calcium and creatinine; hospitalization 11/2022    Aspirin      Reaction Date: 26May2011;       Current Outpatient Medications:     acetaminophen (TYLENOL) 500 mg tablet, Take 2 tabs po up to every 8 hours prn pain, Disp: 60 tablet, Rfl: 0    " amLODIPine (NORVASC) 2.5 mg tablet, take 1 tablet by mouth once daily, Disp: 90 tablet, Rfl: 1    azelastine (ASTELIN) 0.1 % nasal spray, 1 spray into each nostril 2 (two) times a day Use in each nostril as directed, Disp: 1 mL, Rfl: 0    benzonatate (TESSALON) 200 MG capsule, Take 1 capsule (200 mg total) by mouth 3 (three) times a day as needed for cough, Disp: 20 capsule, Rfl: 0    calcium carbonate-vitamin D 250 mg-3.125 mcg per tablet, Take 1 tablet by mouth daily, Disp: 90 tablet, Rfl: 3    Denosumab (PROLIA SC), Inject under the skin, Disp: , Rfl:     losartan (COZAAR) 100 MG tablet, TAKE ONE TABLET BY MOUTH EVERY DAY, Disp: 90 tablet, Rfl: 0    Multiple Vitamin (MULTIVITAMIN) capsule, Take 1 capsule by mouth daily, Disp: , Rfl:     Prolia 60 MG/ML, , Disp: , Rfl:     rosuvastatin (CRESTOR) 10 MG tablet, TAKE ONE TABLET BY MOUTH EVERY DAY IN THE EVENING, Disp: 90 tablet, Rfl: 1    lidocaine (LIDODERM) 5 %, Apply 1 patch topically every 24 hours for 15 days Remove & Discard patch within 12 hours or as directed by MD (Patient not taking: Reported on 6/17/2024), Disp: 15 patch, Rfl: 0          Whom besides the patient is providing clinical information about today's encounter?   NO ADDITIONAL HISTORIAN (patient alone provided history)    Physical Exam and Assessment/Plan by Diagnosis:      SUPERFICIAL BASAL CELL CARCINOMA    Physical Exam:  Anatomic Location Affected:  Right lower back  Morphological Description:  Well healed pink scar  Pertinent Positives:  Pertinent Negatives:  Prior biopsy;  If YES, prior accession or case #: [id number]    Assessment and Plan:  - Discussed options for treatment at length. Original plan to proceed with ED&C. However patient had area left open on lower leg that became infected and healed poorly. She expresses desire to avoid this or any invasive procedures if possible. As such, elected to proeced with imiquimod cream. 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 Patient provided with image on her phone to assist in identifying area for application.   - Patient opts to do imiquimod cream After area biopsied heals, apply once daily 5 days per week, prior to normal sleeping hours, for 6 weeks; leave on skin for ~8 hours, then remove with mild soap and water. Apply enough cream to cover the treatment area, including 1 cm of skin surrounding the tumor.    What is basal cell carcinoma?  Basal cell carcinoma (BCC) is a common, locally invasive, keratinocytic, or non-melanoma, skin cancer. It is also known as rodent ulcer and basalioma. Patients with BCC often develop multiple primary tumours over time.    Who gets basal cell carcinoma?  Risk factors for BCC include:  Age and sex: BCCs are particularly prevalent in elderly males. However, they also affect females and younger adults   Previous BCC or other form of skin cancer (squamous cell carcinoma, melanoma)   Sun damage (photoaging, actinic keratoses)   Repeated prior episodes of sunburn   Fair skin, blue eyes and blond or red hair--note; BCC can also affect darker skin types   Previous cutaneous injury, thermal burn, disease (eg cutaneous lupus, sebaceous naevus)   Inherited syndromes: BCC is a particular problem for families with basal cell naevus syndrome (Gorlin syndrome), Ghnhr-Jircé-Hepfnjde syndrome, Rombo syndrome, Oley syndrome and xeroderma pigmentosum   Other risk factors include ionising radiation, exposure to arsenic, and immune suppression due to disease or medicines    What causes basal cell carcinoma?  The cause of BCC is multifactorial.  Most often, there are DNA mutations in the patched (PTCH) tumour suppressor gene, part of hedgehog signalling pathway   These may be triggered by exposure to ultraviolet radiation   Various spontaneous and inherited gene defects predispose to BCC    What are the clinical features  of basal cell carcinoma?  BCC is a locally invasive skin tumour. The main characteristics are:  Slowly growing plaque or nodule   Skin coloured, pink or pigmented   Varies in size from a few millimetres to several centimetres in diameter   Spontaneous bleeding or ulceration  BCC is very rarely a threat to life. A tiny proportion of BCCs grow rapidly, invade deeply, and/or metastasise to local lymph nodes.    Types of basal cell carcinoma  There are several distinct clinical types of BCC, and over 20 histological growth patterns of BCC.  Nodular BCC  Most common type of facial BCC   Shiny or pearly nodule with a smooth surface   May have central depression or ulceration, so its edges appear rolled   Blood vessels cross its surface   Cystic variant is soft, with jelly-like contents   Micronodular, microcystic and infiltrative types are potentially aggressive subtypes   Also known as nodulocystic carcinoma  Superficial BCC  Most common type in younger adults   Most common type on upper trunk and shoulders   Slightly scaly, irregular plaque   Thin, translucent rolled border   Multiple microerosions  Morphoeaform BCC  Usually found in mid-facial sites   Waxy, scar-like plaque with indistinct borders   Wide and deep subclinical extension   May infiltrate cutaneous nerves (perineural spread)   Also known as morpheic, morphoeiform or sclerosing BCC  Basosquamous carcinoma  Mixed basal cell carcinoma (BCC) and squamous cell carcinoma (SCC)   Infiltrative growth pattern   Potentially more aggressive than other forms of BCC   Also known as basisquamous carcinoma and mixed basal-squamous cell carcinoma       Complications of basal cell carcinoma    Recurrent BCC  Recurrence of BCC after initial treatment is not uncommon. Characteristics of recurrent BCC often include:  Incomplete excision or narrow margins at primary excision   Morphoeic, micronodular, and infiltrative subtypes   Location on head and neck    Advanced  BCC  Advanced BCCs are large, often neglected tumours.  They may be several centimetres in diameter   They may be deeply infiltrating into tissues below the skin   They are difficult or impossible to treat surgically    Metastatic BCC  Very rare   Primary tumour is often large, neglected or recurrent, located on head and neck, with aggressive subtype   May have had multiple prior treatments   May arise in site exposed to ionising radiation   Can be fatal    How is basal cell carcinoma diagnosed?  BCC is diagnosed clinically by the presence of a slowly enlarging skin lesion with typical appearance. The diagnosis and  by a diagnostic biopsy or following excision.  Some typical superficial BCCs on trunk and limbs are clinically diagnosed and have non-surgical treatment without histology.    What is the treatment for primary basal cell carcinoma?  The treatment for a BCC depends on its type, size and location, the number to be treated, patient factors, and the preference or expertise of the doctor. Most BCCs are treated surgically. Long-term follow-up is recommended to check for new lesions and recurrence; the latter may be unnecessary if histology has reported wide clear margins.    Excision biopsy  Excision means the lesion is cut out and the skin stitched up.  Most appropriate treatment for nodular, infiltrative and morphoeic BCCs   Should include 3 to 5 mm margin of normal skin around the tumour   Very large lesions may require flap or skin graft to repair the defect   Pathologist will report deep and lateral margins   Further surgery is recommended for lesions that are incompletely excised    Mohs micrographically controlled excision  Mohs micrographically controlled surgery involves examining carefully marked excised tissue under the microscope, layer by layer, to ensure complete excision.  Very high cure rates achieved by trained Mohs surgeons   Used in high-risk areas of the face around eyes, lips and nose    Suitable for ill-defined, morphoeic, infiltrative and recurrent subtypes   Large defects are repaired by flap or skin graft    Superficial skin surgery  Superficial skin surgery comprises shave, curettage, and electrocautery. It is a rapid technique using local anaesthesia and does not require sutures.  Suitable for small, well-defined nodular or superficial BCCs   Lesions are usually located on trunk or limbs   Wound is left open to heal by secondary intention   Moist wound dressings lead to healing within a few weeks   Eventual scar quality variable    Cryotherapy  Cryotherapy is the treatment of a superficial skin lesion by freezing it, usually with liquid nitrogen.  Suitable for small superficial BCCs on covered areas of trunk and limbs   Best avoided for BCCs on head and neck, and distal to knees   Double freeze-thaw technique   Results in a blister that crusts over and heals within several weeks.   Leaves permanent white mendoza    Photodynamic therapy  Photodynamic therapy (PDT) refers to a technique in which BCC is treated with a photosensitising chemical, and exposed to light several hours later.  Topical photosensitisers include aminolevulinic acid lotion and methyl aminolevulinate cream   Suitable for low-risk small, superficial BCCs   Best avoided if tumour in site at high risk of recurrence   Results in inflammatory reaction, maximal 3-4 days after procedure   Treatment repeated 7 days after initial treatment   Excellent cosmetic results    Imiquimod cream  Imiquimod is an immune response modifier.  Best used for superficial BCCs less than 2 cm diameter   Applied three to five times each week, for 6-16 weeks   Results in a variable inflammatory reaction, maximal at three weeks   Minimal scarring is usual    Fluorouracil cream  5-Fluorouracil cream is a topical cytotoxic agent.  Used to treat small superficial basal cell carcinomas   Requires prolonged course, eg twice daily for 6-12 weeks   Causes  inflammatory reaction   Has high recurrence rates    Radiotherapy  Radiotherapy or X-ray treatment can be used to treat primary BCCs or as adjunctive treatment if margins are incomplete.  Mainly used if surgery is not suitable   Best avoided in young patients and in genetic conditions predisposing to skin cancer   Best cosmetic results achieved using multiple fractions   Typically, patient attends once-weekly for several weeks   Causes inflammatory reaction followed by scar   Risk of radiodermatitis, late recurrence, and new tumours    What is the treatment for advanced or metastatic basal cell carcinoma?  Locally advanced primary, recurrent or metastatic BCC requires multidisciplinary consultation. Often a combination of treatments is used.  Surgery   Radiotherapy   Targeted therapy  Targeted therapy refers to the hedgehog signalling pathway inhibitors, vismodegib and sonidegib. These drugs have some important risks and side effects.    How can basal cell carcinoma be prevented?  The most important way to prevent BCC is to avoid sunburn. This is especially important in childhood and early life. Fair skinned individuals and those with a personal or family history of BCC should protect their skin from sun exposure daily, year-round and lifelong.  Stay indoors or under the shade in the middle of the day   Wear covering clothing   Apply high protection factor SPF50+ broad-spectrum sunscreens generously to exposed skin if outdoors   Avoid indoor tanning (sun beds, solaria)  Oral nicotinamide (vitamin B3) in a dose of 500 mg twice daily may reduce the number and severity of BCCs.    What is the outlook for basal cell carcinoma?  Most BCCs are cured by treatment. Cure is most likely if treatment is undertaken when the lesion is small.  About 50% of people with BCC develop a second one within 3 years of the first. They are also at increased risk of other skin cancers, especially melanoma. Regular self-skin examinations and  long-term annual skin checks by an experienced health professional are recommended.    Treatment options for this lesion were reviewed and discussed.   It was elected to proceed with Imiquimod       Scribe Attestation      I,:  Angelina Ruiz am acting as a scribe while in the presence of the attending physician.:       I,:  Zachary Montejo MD personally performed the services described in this documentation    as scribed in my presence.:

## 2024-06-17 NOTE — PATIENT INSTRUCTIONS
SUPERFICIAL BASAL CELL CARCINOMA      Assessment and Plan:  Patient opts to do imiquimod cream. After area biopsied heals, apply once daily 5 days per week, prior to normal sleeping hours, for 6 weeks; leave on skin for ~8 hours, then remove with mild soap and water. Apply enough cream to cover the treatment area, including 1 cm of skin surrounding the tumor.    What is basal cell carcinoma?  Basal cell carcinoma (BCC) is a common, locally invasive, keratinocytic, or non-melanoma, skin cancer. It is also known as rodent ulcer and basalioma. Patients with BCC often develop multiple primary tumours over time.    Who gets basal cell carcinoma?  Risk factors for BCC include:  Age and sex: BCCs are particularly prevalent in elderly males. However, they also affect females and younger adults   Previous BCC or other form of skin cancer (squamous cell carcinoma, melanoma)   Sun damage (photoaging, actinic keratoses)   Repeated prior episodes of sunburn   Fair skin, blue eyes and blond or red hair--note; BCC can also affect darker skin types   Previous cutaneous injury, thermal burn, disease (eg cutaneous lupus, sebaceous naevus)   Inherited syndromes: BCC is a particular problem for families with basal cell naevus syndrome (Gorlin syndrome), Bvlss-Clyué-Ttrwrfrv syndrome, Rombo syndrome, Oley syndrome and xeroderma pigmentosum   Other risk factors include ionising radiation, exposure to arsenic, and immune suppression due to disease or medicines    What causes basal cell carcinoma?  The cause of BCC is multifactorial.  Most often, there are DNA mutations in the patched (PTCH) tumour suppressor gene, part of hedgehog signalling pathway   These may be triggered by exposure to ultraviolet radiation   Various spontaneous and inherited gene defects predispose to BCC    What are the clinical features of basal cell carcinoma?  BCC is a locally invasive skin tumour. The main characteristics are:  Slowly growing plaque or nodule    Skin coloured, pink or pigmented   Varies in size from a few millimetres to several centimetres in diameter   Spontaneous bleeding or ulceration  BCC is very rarely a threat to life. A tiny proportion of BCCs grow rapidly, invade deeply, and/or metastasise to local lymph nodes.    Types of basal cell carcinoma  There are several distinct clinical types of BCC, and over 20 histological growth patterns of BCC.  Nodular BCC  Most common type of facial BCC   Shiny or pearly nodule with a smooth surface   May have central depression or ulceration, so its edges appear rolled   Blood vessels cross its surface   Cystic variant is soft, with jelly-like contents   Micronodular, microcystic and infiltrative types are potentially aggressive subtypes   Also known as nodulocystic carcinoma  Superficial BCC  Most common type in younger adults   Most common type on upper trunk and shoulders   Slightly scaly, irregular plaque   Thin, translucent rolled border   Multiple microerosions  Morphoeaform BCC  Usually found in mid-facial sites   Waxy, scar-like plaque with indistinct borders   Wide and deep subclinical extension   May infiltrate cutaneous nerves (perineural spread)   Also known as morpheic, morphoeiform or sclerosing BCC  Basosquamous carcinoma  Mixed basal cell carcinoma (BCC) and squamous cell carcinoma (SCC)   Infiltrative growth pattern   Potentially more aggressive than other forms of BCC   Also known as basisquamous carcinoma and mixed basal-squamous cell carcinoma       Complications of basal cell carcinoma    Recurrent BCC  Recurrence of BCC after initial treatment is not uncommon. Characteristics of recurrent BCC often include:  Incomplete excision or narrow margins at primary excision   Morphoeic, micronodular, and infiltrative subtypes   Location on head and neck    Advanced BCC  Advanced BCCs are large, often neglected tumours.  They may be several centimetres in diameter   They may be deeply infiltrating into  tissues below the skin   They are difficult or impossible to treat surgically    Metastatic BCC  Very rare   Primary tumour is often large, neglected or recurrent, located on head and neck, with aggressive subtype   May have had multiple prior treatments   May arise in site exposed to ionising radiation   Can be fatal    How is basal cell carcinoma diagnosed?  BCC is diagnosed clinically by the presence of a slowly enlarging skin lesion with typical appearance. The diagnosis and  by a diagnostic biopsy or following excision.  Some typical superficial BCCs on trunk and limbs are clinically diagnosed and have non-surgical treatment without histology.    What is the treatment for primary basal cell carcinoma?  The treatment for a BCC depends on its type, size and location, the number to be treated, patient factors, and the preference or expertise of the doctor. Most BCCs are treated surgically. Long-term follow-up is recommended to check for new lesions and recurrence; the latter may be unnecessary if histology has reported wide clear margins.    Excision biopsy  Excision means the lesion is cut out and the skin stitched up.  Most appropriate treatment for nodular, infiltrative and morphoeic BCCs   Should include 3 to 5 mm margin of normal skin around the tumour   Very large lesions may require flap or skin graft to repair the defect   Pathologist will report deep and lateral margins   Further surgery is recommended for lesions that are incompletely excised    Mohs micrographically controlled excision  Mohs micrographically controlled surgery involves examining carefully marked excised tissue under the microscope, layer by layer, to ensure complete excision.  Very high cure rates achieved by trained Mohs surgeons   Used in high-risk areas of the face around eyes, lips and nose   Suitable for ill-defined, morphoeic, infiltrative and recurrent subtypes   Large defects are repaired by flap or skin graft    Superficial  skin surgery  Superficial skin surgery comprises shave, curettage, and electrocautery. It is a rapid technique using local anaesthesia and does not require sutures.  Suitable for small, well-defined nodular or superficial BCCs   Lesions are usually located on trunk or limbs   Wound is left open to heal by secondary intention   Moist wound dressings lead to healing within a few weeks   Eventual scar quality variable    Cryotherapy  Cryotherapy is the treatment of a superficial skin lesion by freezing it, usually with liquid nitrogen.  Suitable for small superficial BCCs on covered areas of trunk and limbs   Best avoided for BCCs on head and neck, and distal to knees   Double freeze-thaw technique   Results in a blister that crusts over and heals within several weeks.   Leaves permanent white mendoza    Photodynamic therapy  Photodynamic therapy (PDT) refers to a technique in which BCC is treated with a photosensitising chemical, and exposed to light several hours later.  Topical photosensitisers include aminolevulinic acid lotion and methyl aminolevulinate cream   Suitable for low-risk small, superficial BCCs   Best avoided if tumour in site at high risk of recurrence   Results in inflammatory reaction, maximal 3-4 days after procedure   Treatment repeated 7 days after initial treatment   Excellent cosmetic results    Imiquimod cream  Imiquimod is an immune response modifier.  Best used for superficial BCCs less than 2 cm diameter   Applied three to five times each week, for 6-16 weeks   Results in a variable inflammatory reaction, maximal at three weeks   Minimal scarring is usual    Fluorouracil cream  5-Fluorouracil cream is a topical cytotoxic agent.  Used to treat small superficial basal cell carcinomas   Requires prolonged course, eg twice daily for 6-12 weeks   Causes inflammatory reaction   Has high recurrence rates    Radiotherapy  Radiotherapy or X-ray treatment can be used to treat primary BCCs or as  adjunctive treatment if margins are incomplete.  Mainly used if surgery is not suitable   Best avoided in young patients and in genetic conditions predisposing to skin cancer   Best cosmetic results achieved using multiple fractions   Typically, patient attends once-weekly for several weeks   Causes inflammatory reaction followed by scar   Risk of radiodermatitis, late recurrence, and new tumours    What is the treatment for advanced or metastatic basal cell carcinoma?  Locally advanced primary, recurrent or metastatic BCC requires multidisciplinary consultation. Often a combination of treatments is used.  Surgery   Radiotherapy   Targeted therapy  Targeted therapy refers to the hedgehog signalling pathway inhibitors, vismodegib and sonidegib. These drugs have some important risks and side effects.    How can basal cell carcinoma be prevented?  The most important way to prevent BCC is to avoid sunburn. This is especially important in childhood and early life. Fair skinned individuals and those with a personal or family history of BCC should protect their skin from sun exposure daily, year-round and lifelong.  Stay indoors or under the shade in the middle of the day   Wear covering clothing   Apply high protection factor SPF50+ broad-spectrum sunscreens generously to exposed skin if outdoors   Avoid indoor tanning (sun beds, solaria)  Oral nicotinamide (vitamin B3) in a dose of 500 mg twice daily may reduce the number and severity of BCCs.    What is the outlook for basal cell carcinoma?  Most BCCs are cured by treatment. Cure is most likely if treatment is undertaken when the lesion is small.  About 50% of people with BCC develop a second one within 3 years of the first. They are also at increased risk of other skin cancers, especially melanoma. Regular self-skin examinations and long-term annual skin checks by an experienced health professional are recommended.    Treatment options for this lesion were reviewed and  discussed.   It was elected to proceed with Imiquimod

## 2024-06-19 DIAGNOSIS — I10 ESSENTIAL HYPERTENSION: ICD-10-CM

## 2024-06-19 RX ORDER — LOSARTAN POTASSIUM 100 MG/1
100 TABLET ORAL DAILY
Qty: 90 TABLET | Refills: 1 | Status: SHIPPED | OUTPATIENT
Start: 2024-06-19

## 2024-06-28 ENCOUNTER — APPOINTMENT (OUTPATIENT)
Dept: LAB | Age: 69
End: 2024-06-28
Payer: COMMERCIAL

## 2024-06-28 DIAGNOSIS — R19.7 DIARRHEA, UNSPECIFIED TYPE: ICD-10-CM

## 2024-06-28 LAB — CRP SERPL QL: <1 MG/L

## 2024-06-28 PROCEDURE — 36415 COLL VENOUS BLD VENIPUNCTURE: CPT

## 2024-06-28 PROCEDURE — 83993 ASSAY FOR CALPROTECTIN FECAL: CPT

## 2024-06-28 PROCEDURE — 86140 C-REACTIVE PROTEIN: CPT

## 2024-07-01 LAB — CALPROTECTIN STL-MCNT: 5 UG/G (ref 0–120)

## 2024-07-19 ENCOUNTER — TELEPHONE (OUTPATIENT)
Dept: FAMILY MEDICINE CLINIC | Facility: CLINIC | Age: 69
End: 2024-07-19

## 2024-07-19 NOTE — TELEPHONE ENCOUNTER
Prolia  [employer] [place] AUTH # [id number] FOR  PROLIA 60 MG/ML (2 VISITS) VALID 07/19/2024-01/19/2025 ASHLEY BEAN DO (NPI [id number])  [employer] [place] - N/R CPT CODE 01973. LM

## 2024-07-19 NOTE — TELEPHONE ENCOUNTER
Pt came in for [facility] to have Prolia inj. Informed we have not received it Pt requested me to contact [facility] to see if it was sent there by mistake. Should have been delivered 7/17. MR stated they do not have it.

## 2024-07-25 ENCOUNTER — TELEPHONE (OUTPATIENT)
Dept: FAMILY MEDICINE CLINIC | Facility: CLINIC | Age: 69
End: 2024-07-25

## 2024-07-25 NOTE — TELEPHONE ENCOUNTER
Please try to clarify pt's prolia rx.  It appears that it was ordered in my name - but I do not rx this med  Pt is waiting for it to arrive in our office to get next dose     I will cc  as well - as I am not sure who ordered this medication in my name.     Pt will need to establish with endo or rheum to get injections if no longer going to [facility].     Thanks

## 2024-07-30 ENCOUNTER — CLINICAL SUPPORT (OUTPATIENT)
Dept: FAMILY MEDICINE CLINIC | Facility: CLINIC | Age: 69
End: 2024-07-30
Payer: COMMERCIAL

## 2024-07-30 DIAGNOSIS — M81.0 OSTEOPOROSIS, UNSPECIFIED OSTEOPOROSIS TYPE, UNSPECIFIED PATHOLOGICAL FRACTURE PRESENCE: Primary | ICD-10-CM

## 2024-07-30 PROCEDURE — 96372 THER/PROPH/DIAG INJ SC/IM: CPT

## 2024-07-30 NOTE — PROGRESS NOTES
Pt came into office for administration of Prolia supplied by herself. Future administrations will be thru Rheumatology. Appt in November.

## 2024-08-11 DIAGNOSIS — I10 ESSENTIAL HYPERTENSION: ICD-10-CM

## 2024-08-12 RX ORDER — AMLODIPINE BESYLATE 2.5 MG/1
TABLET ORAL
Qty: 90 TABLET | Refills: 1 | Status: SHIPPED | OUTPATIENT
Start: 2024-08-12

## 2024-08-30 ENCOUNTER — PATIENT MESSAGE (OUTPATIENT)
Dept: FAMILY MEDICINE CLINIC | Facility: CLINIC | Age: 69
End: 2024-08-30

## 2024-08-30 NOTE — PATIENT COMMUNICATION
8/30 9:07am: PT walked in and dropped off her paperwork in an envelope, envelope has been placed on PCP's desk.

## 2024-09-11 ENCOUNTER — TELEPHONE (OUTPATIENT)
Dept: FAMILY MEDICINE CLINIC | Facility: CLINIC | Age: 69
End: 2024-09-11

## 2024-09-11 NOTE — TELEPHONE ENCOUNTER
Please call pt  Ask her if she is OK with me filling out her form at time of visit  Or does she need it sooner?  If so - then I would like to message her thru  [facility] with some questions I have.  Thanks

## 2024-09-11 NOTE — TELEPHONE ENCOUNTER
I did try to contact the patient to let patient know about Dr. Vazquez's message but I got mailbox again.           I called and left a voicemail. Please let patient know about Dr. Vazquez's message. Thanks!

## 2024-09-25 ENCOUNTER — OFFICE VISIT (OUTPATIENT)
Dept: DERMATOLOGY | Facility: CLINIC | Age: 69
End: 2024-09-25
Payer: COMMERCIAL

## 2024-09-25 VITALS — BODY MASS INDEX: 30.77 KG/M2 | WEIGHT: 146.6 LBS | HEIGHT: 58 IN | TEMPERATURE: 97.1 F

## 2024-09-25 DIAGNOSIS — C44.91 SUPERFICIAL BASAL CELL CARCINOMA: Primary | ICD-10-CM

## 2024-09-25 PROCEDURE — 99213 OFFICE O/P EST LOW 20 MIN: CPT | Performed by: REGISTERED NURSE

## 2024-09-25 NOTE — PROGRESS NOTES
"[facility] Dermatology Clinic Note     Patient Name: Danielle Sandoval  Encounter Date: 9/25/2024     Have you been cared for by a [facility] Dermatologist in the last 3 years and, if so, which description applies to you?    Yes.  I have been here within the last 3 years, and my medical history has NOT changed since that time.  I am FEMALE/of child-bearing potential.    REVIEW OF SYSTEMS:  Have you recently had or currently have any of the following? No changes in my recent health.   PAST MEDICAL HISTORY:  Have you personally ever had or currently have any of the following?  If \"YES,\" then please provide more detail. No changes in my medical history.   HISTORY OF IMMUNOSUPPRESSION: Do you have a history of any of the following:  Systemic Immunosuppression such as Diabetes, Biologic or Immunotherapy, Chemotherapy, Organ Transplantation, Bone Marrow Transplantation or Prednisone?  No     Answering \"YES\" requires the addition of the dotphrase \"IMMUNOSUPPRESSED\" as the first diagnosis of the patient's visit.   FAMILY HISTORY:  Any \"first degree relatives\" (parent, brother, sister, or child) with the following?    No changes in my family's known health.   PATIENT EXPERIENCE:    Do you want the Dermatologist to perform a COMPLETE skin exam today including a clinical examination under the \"bra and underwear\" areas?  NO  If necessary, do we have your permission to call and leave a detailed message on your Preferred Phone number that includes your specific medical information?  Yes      Allergies   Allergen Reactions    Codeine      Reaction Date: 26May2011;     Darvon [Propoxyphene]     Demerol [Meperidine]     Valium [Diazepam]     Other Other (See Comments)     HCTZ - JOAQUIN with elevated calcium and creatinine; hospitalization 11/2022    Aspirin      Reaction Date: 26May2011;       Current Outpatient Medications:     acetaminophen (TYLENOL) 500 mg tablet, Take 2 tabs po up to every 8 hours prn pain, Disp: 60 tablet, Rfl: " 0    amLODIPine (NORVASC) 2.5 mg tablet, take 1 tablet by mouth once daily, Disp: 90 tablet, Rfl: 1    azelastine (ASTELIN) 0.1 % nasal spray, 1 spray into each nostril 2 (two) times a day Use in each nostril as directed, Disp: 1 mL, Rfl: 0    benzonatate (TESSALON) 200 MG capsule, Take 1 capsule (200 mg total) by mouth 3 (three) times a day as needed for cough, Disp: 20 capsule, Rfl: 0    Denosumab (PROLIA SC), Inject under the skin, Disp: , Rfl:     imiquimod (ALDARA) 5 % cream, After area biopsied heals, apply once daily 5 days per week, prior to normal sleeping hours, for 6 weeks; leave on skin for ~8 hours, then remove with mild soap and water. Apply enough cream to cover the treatment area, including 1 cm of skin surrounding the tumor., Disp: 24 each, Rfl: 1    losartan (COZAAR) 100 MG tablet, TAKE ONE TABLET BY MOUTH EVERY DAY, Disp: 90 tablet, Rfl: 1    Multiple Vitamin (MULTIVITAMIN) capsule, Take 1 capsule by mouth daily, Disp: , Rfl:     Prolia 60 MG/ML, , Disp: , Rfl:     rosuvastatin (CRESTOR) 10 MG tablet, TAKE ONE TABLET BY MOUTH EVERY DAY IN THE EVENING, Disp: 90 tablet, Rfl: 1    calcium carbonate-vitamin D 250 mg-3.125 mcg per tablet, Take 1 tablet by mouth daily, Disp: 90 tablet, Rfl: 3    lidocaine (LIDODERM) 5 %, Apply 1 patch topically every 24 hours for 15 days Remove & Discard patch within 12 hours or as directed by MD (Patient not taking: Reported on 6/17/2024), Disp: 15 patch, Rfl: 0          Whom besides the patient is providing clinical information about today's encounter?   NO ADDITIONAL HISTORIAN (patient alone provided history)    Physical Exam and Assessment/Plan by Diagnosis:    SUPERFICIAL BASAL CELL CARCINOMA     Physical Exam:  Anatomic Location Affected:  Right lower back  Morphological Description:  thin pink pearly papule  Pertinent Positives:  Pertinent Negatives:  Prior biopsy;  If YES, prior accession or case #: [id number]    Additional History of Present Condition: patient  was seen by Dr. Montejo in June and was originally going to have ED&C but was concerned about infection and healing process. She and Dr. Montejo opted to treat with imiquimod 5 nights per week for 6 weeks. Lesion looks the same as it did previously. No change-patient was unable to see where she was putting the imiquimod and was treating it by feel. Lesion did not resolve. It is possible the cream was being put on one of the adjacent seborrheic keratoses.       Assessment and Plan:  - Discussed options for treatment including ED&C vs excision.   - Patient in office today for BCC re-check, she treated the area with Imiquimod cream five nights per week for 6 weeks with no response. Patient agreeable to plan for excision of lesion.  Discussed with patient to schedule an excision for removal, patient agrees to treatment of area with excision. Excision is scheduled for Oct 15, 2024     What is basal cell carcinoma?  Basal cell carcinoma (BCC) is a common, locally invasive, keratinocytic, or non-melanoma, skin cancer. It is also known as rodent ulcer and basalioma. Patients with BCC often develop multiple primary tumours over time.     Who gets basal cell carcinoma?  Risk factors for BCC include:  Age and sex: BCCs are particularly prevalent in elderly males. However, they also affect females and younger adults   Previous BCC or other form of skin cancer (squamous cell carcinoma, melanoma)   Sun damage (photoaging, actinic keratoses)   Repeated prior episodes of sunburn   Fair skin, blue eyes and blond or red hair--note; BCC can also affect darker skin types   Previous cutaneous injury, thermal burn, disease (eg cutaneous lupus, sebaceous naevus)   Inherited syndromes: BCC is a particular problem for families with basal cell naevus syndrome (Gorlin syndrome), Wntsp-Vvrdé-Dqhnibqe syndrome, Rombo syndrome, Oley syndrome and xeroderma pigmentosum   Other risk factors include ionising radiation, exposure to arsenic, and immune  suppression due to disease or medicines     What causes basal cell carcinoma?  The cause of BCC is multifactorial.  Most often, there are DNA mutations in the patched (PTCH) tumour suppressor gene, part of hedgehog signalling pathway   These may be triggered by exposure to ultraviolet radiation   Various spontaneous and inherited gene defects predispose to BCC     What are the clinical features of basal cell carcinoma?  BCC is a locally invasive skin tumour. The main characteristics are:  Slowly growing plaque or nodule   Skin coloured, pink or pigmented   Varies in size from a few millimetres to several centimetres in diameter   Spontaneous bleeding or ulceration  BCC is very rarely a threat to life. A tiny proportion of BCCs grow rapidly, invade deeply, and/or metastasise to local lymph nodes.     Types of basal cell carcinoma  There are several distinct clinical types of BCC, and over 20 histological growth patterns of BCC.  Nodular BCC  Most common type of facial BCC   Shiny or pearly nodule with a smooth surface   May have central depression or ulceration, so its edges appear rolled   Blood vessels cross its surface   Cystic variant is soft, with jelly-like contents   Micronodular, microcystic and infiltrative types are potentially aggressive subtypes   Also known as nodulocystic carcinoma  Superficial BCC  Most common type in younger adults   Most common type on upper trunk and shoulders   Slightly scaly, irregular plaque   Thin, translucent rolled border   Multiple microerosions  Morphoeaform BCC  Usually found in mid-facial sites   Waxy, scar-like plaque with indistinct borders   Wide and deep subclinical extension   May infiltrate cutaneous nerves (perineural spread)   Also known as morpheic, morphoeiform or sclerosing BCC  Basosquamous carcinoma  Mixed basal cell carcinoma (BCC) and squamous cell carcinoma (SCC)   Infiltrative growth pattern   Potentially more aggressive than other forms of BCC   Also  known as basisquamous carcinoma and mixed basal-squamous cell carcinoma         Complications of basal cell carcinoma     Recurrent BCC  Recurrence of BCC after initial treatment is not uncommon. Characteristics of recurrent BCC often include:  Incomplete excision or narrow margins at primary excision   Morphoeic, micronodular, and infiltrative subtypes   Location on head and neck     Advanced BCC  Advanced BCCs are large, often neglected tumours.  They may be several centimetres in diameter   They may be deeply infiltrating into tissues below the skin   They are difficult or impossible to treat surgically     Metastatic BCC  Very rare   Primary tumour is often large, neglected or recurrent, located on head and neck, with aggressive subtype   May have had multiple prior treatments   May arise in site exposed to ionising radiation   Can be fatal     How is basal cell carcinoma diagnosed?  BCC is diagnosed clinically by the presence of a slowly enlarging skin lesion with typical appearance. The diagnosis and  by a diagnostic biopsy or following excision.  Some typical superficial BCCs on trunk and limbs are clinically diagnosed and have non-surgical treatment without histology.     What is the treatment for primary basal cell carcinoma?  The treatment for a BCC depends on its type, size and location, the number to be treated, patient factors, and the preference or expertise of the doctor. Most BCCs are treated surgically. Long-term follow-up is recommended to check for new lesions and recurrence; the latter may be unnecessary if histology has reported wide clear margins.     Excision biopsy  Excision means the lesion is cut out and the skin stitched up.  Most appropriate treatment for nodular, infiltrative and morphoeic BCCs   Should include 3 to 5 mm margin of normal skin around the tumour   Very large lesions may require flap or skin graft to repair the defect   Pathologist will report deep and lateral margins    Further surgery is recommended for lesions that are incompletely excised     Mohs micrographically controlled excision  Mohs micrographically controlled surgery involves examining carefully marked excised tissue under the microscope, layer by layer, to ensure complete excision.  Very high cure rates achieved by trained Mohs surgeons   Used in high-risk areas of the face around eyes, lips and nose   Suitable for ill-defined, morphoeic, infiltrative and recurrent subtypes   Large defects are repaired by flap or skin graft     Superficial skin surgery  Superficial skin surgery comprises shave, curettage, and electrocautery. It is a rapid technique using local anaesthesia and does not require sutures.  Suitable for small, well-defined nodular or superficial BCCs   Lesions are usually located on trunk or limbs   Wound is left open to heal by secondary intention   Moist wound dressings lead to healing within a few weeks   Eventual scar quality variable     Cryotherapy  Cryotherapy is the treatment of a superficial skin lesion by freezing it, usually with liquid nitrogen.  Suitable for small superficial BCCs on covered areas of trunk and limbs   Best avoided for BCCs on head and neck, and distal to knees   Double freeze-thaw technique   Results in a blister that crusts over and heals within several weeks.   Leaves permanent white mendoza     Photodynamic therapy  Photodynamic therapy (PDT) refers to a technique in which BCC is treated with a photosensitising chemical, and exposed to light several hours later.  Topical photosensitisers include aminolevulinic acid lotion and methyl aminolevulinate cream   Suitable for low-risk small, superficial BCCs   Best avoided if tumour in site at high risk of recurrence   Results in inflammatory reaction, maximal 3-4 days after procedure   Treatment repeated 7 days after initial treatment   Excellent cosmetic results     Imiquimod cream  Imiquimod is an immune response modifier.  Best  used for superficial BCCs less than 2 cm diameter   Applied three to five times each week, for 6-16 weeks   Results in a variable inflammatory reaction, maximal at three weeks   Minimal scarring is usual     Fluorouracil cream  5-Fluorouracil cream is a topical cytotoxic agent.  Used to treat small superficial basal cell carcinomas   Requires prolonged course, eg twice daily for 6-12 weeks   Causes inflammatory reaction   Has high recurrence rates     Radiotherapy  Radiotherapy or X-ray treatment can be used to treat primary BCCs or as adjunctive treatment if margins are incomplete.  Mainly used if surgery is not suitable   Best avoided in young patients and in genetic conditions predisposing to skin cancer   Best cosmetic results achieved using multiple fractions   Typically, patient attends once-weekly for several weeks   Causes inflammatory reaction followed by scar   Risk of radiodermatitis, late recurrence, and new tumours     What is the treatment for advanced or metastatic basal cell carcinoma?  Locally advanced primary, recurrent or metastatic BCC requires multidisciplinary consultation. Often a combination of treatments is used.  Surgery   Radiotherapy   Targeted therapy  Targeted therapy refers to the hedgehog signalling pathway inhibitors, vismodegib and sonidegib. These drugs have some important risks and side effects.     How can basal cell carcinoma be prevented?  The most important way to prevent BCC is to avoid sunburn. This is especially important in childhood and early life. Fair skinned individuals and those with a personal or family history of BCC should protect their skin from sun exposure daily, year-round and lifelong.  Stay indoors or under the shade in the middle of the day   Wear covering clothing   Apply high protection factor SPF50+ broad-spectrum sunscreens generously to exposed skin if outdoors   Avoid indoor tanning (sun beds, solaria)  Oral nicotinamide (vitamin B3) in a dose of 500  mg twice daily may reduce the number and severity of BCCs.     What is the outlook for basal cell carcinoma?  Most BCCs are cured by treatment. Cure is most likely if treatment is undertaken when the lesion is small.  About 50% of people with BCC develop a second one within 3 years of the first. They are also at increased risk of other skin cancers, especially melanoma. Regular self-skin examinations and long-term annual skin checks by an experienced health professional are recommended.     Treatment options for this lesion were reviewed and discussed.   It was elected to proceed with Imiquimod             Scribe Attestation      I,:  Chelo Negrete MA am acting as a scribe while in the presence of the attending physician.:       I,:  Laney Quintero PA-C personally performed the services described in this documentation    as scribed in my presence.:

## 2024-10-10 ENCOUNTER — OFFICE VISIT (OUTPATIENT)
Dept: FAMILY MEDICINE CLINIC | Facility: CLINIC | Age: 69
End: 2024-10-10
Payer: COMMERCIAL

## 2024-10-10 VITALS
OXYGEN SATURATION: 97 % | BODY MASS INDEX: 30.49 KG/M2 | HEART RATE: 70 BPM | DIASTOLIC BLOOD PRESSURE: 76 MMHG | SYSTOLIC BLOOD PRESSURE: 122 MMHG | WEIGHT: 143.4 LBS

## 2024-10-10 DIAGNOSIS — M81.0 OSTEOPOROSIS, UNSPECIFIED OSTEOPOROSIS TYPE, UNSPECIFIED PATHOLOGICAL FRACTURE PRESENCE: ICD-10-CM

## 2024-10-10 DIAGNOSIS — D03.72 MELANOMA IN SITU OF LEFT LOWER LEG (HCC): ICD-10-CM

## 2024-10-10 DIAGNOSIS — Z96.21 COCHLEAR IMPLANT STATUS: ICD-10-CM

## 2024-10-10 DIAGNOSIS — E03.8 SUBCLINICAL HYPOTHYROIDISM: ICD-10-CM

## 2024-10-10 DIAGNOSIS — I10 ESSENTIAL HYPERTENSION: Primary | ICD-10-CM

## 2024-10-10 DIAGNOSIS — E83.52 HYPERCALCEMIA: ICD-10-CM

## 2024-10-10 DIAGNOSIS — E78.2 MIXED HYPERLIPIDEMIA: ICD-10-CM

## 2024-10-10 DIAGNOSIS — C44.712 BASAL CELL CARCINOMA (BCC) OF RIGHT LOWER LEG: ICD-10-CM

## 2024-10-10 PROCEDURE — 99214 OFFICE O/P EST MOD 30 MIN: CPT | Performed by: FAMILY MEDICINE

## 2024-10-10 NOTE — ASSESSMENT & PLAN NOTE
Prolia injections.  Will be going [facility]25  H/o elevated calcium  So pt cut back on calcium dosing  (Sounds like she is taking 1000 mg nightly)

## 2024-10-10 NOTE — ASSESSMENT & PLAN NOTE
Patient Instructions   Mammo and dexa as scheduled.    Try to add some exercise - 30 min 2 days of the week    Remain on calcium as you have been taking  And then get fasting labs done.

## 2024-10-10 NOTE — PATIENT INSTRUCTIONS
Mammo and dexa as scheduled.    Try to add some exercise - 30 min 2 days of the week    Remain on calcium as you have been taking  And then get fasting labs done.    Return in feb for physical and pelvic exam

## 2024-10-10 NOTE — ASSESSMENT & PLAN NOTE
Dear Tatiana Smith    Thank you for choosing [facility] ([facility]) for your infusion.  The following information is a summary of our appointment as well as important reminders.          Additional information: you received your first dose of Injectafer today.       We look forward in seeing you on your next appointment here at [facility].  Please don t hesitate to call us at [phone] to reschedule any of your appointments or to speak with one of the [facility] registered nurses.  It was a pleasure taking care of you today.    Sincerely,    [facility]  [address] [place]  [address]  Phone:  [phone]      Ferric carboxymaltose injection  Brand Name: Injectafer  What is this medicine?  FERRIC CARBOXYMALTOSE (ferr-ik car-box-ee-mol-toes) is an iron complex. Iron is used to make healthy red blood cells, which carry oxygen and nutrients throughout the body. This medicine is used to treat anemia in people with chronic kidney disease or people who cannot take iron by mouth.  How should I use this medicine?  This medicine is for infusion into a vein. It is given by a health care professional in a hospital or clinic setting.  Talk to your pediatrician regarding the use of this medicine in children. Special care may be needed.  What side effects may I notice from receiving this medicine?  Side effects that you should report to your doctor or health care professional as soon as possible:    allergic reactions like skin rash, itching or hives, swelling of the face, lips, or tongue    dizziness    facial flushing  Side effects that usually do not require medical attention (report to your doctor or health care professional if they continue or are bothersome):    changes in taste    constipation    headache    nausea, vomiting    pain, redness, or irritation at site where injected  What may  Repeat labs          interact with this medicine?  Do not take this medicine with any of the following medications:    deferoxamine    dimercaprol    other iron products  What if I miss a dose?  It is important not to miss your dose. Call your doctor or health care professional if you are unable to keep an appointment.  Where should I keep my medicine?  This drug is given in a hospital or clinic and will not be stored at home.  What should I tell my health care provider before I take this medicine?  They need to know if you have any of these conditions:    high levels of iron in the blood    liver disease    an unusual or allergic reaction to iron, other medicines, foods, dyes, or preservatives    pregnant or trying to get pregnant    breast-feeding  What should I watch for while using this medicine?  Visit your doctor or health care professional regularly. Tell your doctor if your symptoms do not start to get better or if they get worse. You may need blood work done while you are taking this medicine.  You may need to follow a special diet. Talk to your doctor. Foods that contain iron include: whole grains/cereals, dried fruits, beans, or peas, leafy green vegetables, and organ meats (liver, kidney).  NOTE:This sheet is a summary. It may not cover all possible information. If you have questions about this medicine, talk to your doctor, pharmacist, or health care provider. Copyright  2018 [employer]

## 2024-10-10 NOTE — PROGRESS NOTES
Ambulatory Visit  Name: Danielle Sandoval      : 1955      MRN: [id number]  Encounter Provider: Feli Barrios DO  Encounter Date: 10/10/2024   Encounter department: Replaced by [facility]    Assessment & Plan  Essential hypertension  Controlled.         Subclinical hypothyroidism  Urge labs.         Basal cell carcinoma (BCC) of right lower leg  Returning for wider excision.         Melanoma in situ of left lower leg (HCC)         Osteoporosis, unspecified osteoporosis type, unspecified pathological fracture presence  Prolia injections.  Will be going to [facility] in 2025  H/o elevated calcium  So pt cut back on calcium dosing  (Sounds like she is taking 1000 mg nightly)           Hypercalcemia  Repeat labs         Mixed hyperlipidemia         Cochlear implant status         Patient Instructions   Mammo and dexa as scheduled.    Try to add some exercise - 30 min 2 days of the week    Remain on calcium as you have been taking  And then get fasting labs done.          Depression Screening and Follow-up Plan: Patient was screened for depression during today's encounter. They screened negative with a PHQ-2 score of 0.      History of Present Illness   Here for routine visit  No complaints      HPI    History obtained from : patient  Review of Systems   Constitutional:  Positive for unexpected weight change. Negative for chills, fatigue and fever.        Lost 5 #     Respiratory:  Negative for cough, shortness of breath and wheezing.    Cardiovascular:  Negative for chest pain and palpitations.   Gastrointestinal:  Negative for abdominal pain.   Skin:         Goes regularly for skin exams     Psychiatric/Behavioral:  Negative for dysphoric mood, self-injury and suicidal ideas. The patient is not nervous/anxious.            Objective     /76 (BP Location: Right arm, Patient Position: Sitting, Cuff Size: Adult)   Pulse 70   Wt 65 kg (143 lb 6.4 oz)   LMP 1989 (Exact Date)  Comment: hysterectomy, total  SpO2 97%   BMI 30.49 kg/m²     Physical Exam  Vitals and nursing note reviewed.   Constitutional:       General: She is not in acute distress.     Appearance: Normal appearance. She is not ill-appearing or toxic-appearing.   Neck:      Vascular: No carotid bruit.   Cardiovascular:      Rate and Rhythm: Normal rate and regular rhythm.      Pulses: Normal pulses.      Heart sounds: Normal heart sounds. No murmur heard.  Pulmonary:      Effort: Pulmonary effort is normal. No respiratory distress.      Breath sounds: Normal breath sounds. No wheezing, rhonchi or rales.   Musculoskeletal:      Cervical back: Neck supple. No tenderness.      Right lower leg: No edema.      Left lower leg: No edema.   Lymphadenopathy:      Cervical: No cervical adenopathy.   Neurological:      General: No focal deficit present.      Mental Status: She is alert and oriented to person, place, and time.   Psychiatric:         Mood and Affect: Mood normal.         Behavior: Behavior normal.         Thought Content: Thought content normal.         Judgment: Judgment normal.

## 2024-10-14 ENCOUNTER — HOSPITAL ENCOUNTER (OUTPATIENT)
Dept: BONE DENSITY | Facility: CLINIC | Age: 69
Discharge: HOME | End: 2024-10-14
Payer: COMMERCIAL

## 2024-10-14 DIAGNOSIS — Z13.820 SCREENING FOR OSTEOPOROSIS: ICD-10-CM

## 2024-10-14 PROCEDURE — 77080 DXA BONE DENSITY AXIAL: CPT

## 2024-10-15 ENCOUNTER — PROCEDURE VISIT (OUTPATIENT)
Age: 69
End: 2024-10-15
Payer: COMMERCIAL

## 2024-10-15 VITALS
WEIGHT: 145.2 LBS | TEMPERATURE: 98.1 F | BODY MASS INDEX: 30.88 KG/M2 | DIASTOLIC BLOOD PRESSURE: 78 MMHG | SYSTOLIC BLOOD PRESSURE: 164 MMHG

## 2024-10-15 DIAGNOSIS — C44.91 SUPERFICIAL BASAL CELL CARCINOMA: Primary | ICD-10-CM

## 2024-10-15 PROCEDURE — 12032 INTMD RPR S/A/T/EXT 2.6-7.5: CPT | Performed by: REGISTERED NURSE

## 2024-10-15 PROCEDURE — 11602 EXC TR-EXT MAL+MARG 1.1-2 CM: CPT | Performed by: REGISTERED NURSE

## 2024-10-15 PROCEDURE — 88305 TISSUE EXAM BY PATHOLOGIST: CPT | Performed by: STUDENT IN AN ORGANIZED HEALTH CARE EDUCATION/TRAINING PROGRAM

## 2024-10-15 NOTE — LETTER
October 15, 2024     Patient: Danielle Sandoval  YOB: 1955  Date of Visit: 10/15/2024      To Whom it May Concern:    Danielle Sandoval is under my professional care. Danielle was seen in my office on 10/15/2024. Danielle may return to work on 10/19/24 .    If you have any questions or concerns, please don't hesitate to call.         Sincerely,          Seun Palmer MD        CC: No Recipients

## 2024-10-15 NOTE — PROGRESS NOTES
"[facility] Dermatology Clinic Note     Patient Name: Danielle Sandoval  Encounter Date: 10/15/24     Have you been cared for by a [facility] Dermatologist in the last 3 years and, if so, which description applies to you?    Yes.  I have been here within the last 3 years, and my medical history has NOT changed since that time.  I am FEMALE/of child-bearing potential.    REVIEW OF SYSTEMS:  Have you recently had or currently have any of the following? No changes in my recent health.   PAST MEDICAL HISTORY:  Have you personally ever had or currently have any of the following?  If \"YES,\" then please provide more detail. No changes in my medical history.   HISTORY OF IMMUNOSUPPRESSION: Do you have a history of any of the following:  Systemic Immunosuppression such as Diabetes, Biologic or Immunotherapy, Chemotherapy, Organ Transplantation, Bone Marrow Transplantation or Prednisone?  No     Answering \"YES\" requires the addition of the dotphrase \"IMMUNOSUPPRESSED\" as the first diagnosis of the patient's visit.   FAMILY HISTORY:  Any \"first degree relatives\" (parent, brother, sister, or child) with the following?    No changes in my family's known health.   PATIENT EXPERIENCE:    Do you want the Dermatologist to perform a COMPLETE skin exam today including a clinical examination under the \"bra and underwear\" areas?  NO  If necessary, do we have your permission to call and leave a detailed message on your Preferred Phone number that includes your specific medical information?  Yes      Allergies   Allergen Reactions    Codeine      Reaction Date: 26May2011;     Darvon [Propoxyphene]     Demerol [Meperidine]     Valium [Diazepam]     Other Other (See Comments)     HCTZ - JOAQUIN with elevated calcium and creatinine; hospitalization 11/2022    Aspirin      Reaction Date: 26May2011;       Current Outpatient Medications:     acetaminophen (TYLENOL) 500 mg tablet, Take 2 tabs po up to every 8 hours prn pain, Disp: 60 tablet, Rfl: 0   " " amLODIPine (NORVASC) 2.5 mg tablet, take 1 tablet by mouth once daily, Disp: 90 tablet, Rfl: 1    azelastine (ASTELIN) 0.1 % nasal spray, 1 spray into each nostril 2 (two) times a day Use in each nostril as directed, Disp: 1 mL, Rfl: 0    benzonatate (TESSALON) 200 MG capsule, Take 1 capsule (200 mg total) by mouth 3 (three) times a day as needed for cough, Disp: 20 capsule, Rfl: 0    calcium carbonate-vitamin D 250 mg-3.125 mcg per tablet, Take 1 tablet by mouth daily, Disp: 90 tablet, Rfl: 3    Denosumab (PROLIA SC), Inject under the skin, Disp: , Rfl:     imiquimod (ALDARA) 5 % cream, After area biopsied heals, apply once daily 5 days per week, prior to normal sleeping hours, for 6 weeks; leave on skin for ~8 hours, then remove with mild soap and water. Apply enough cream to cover the treatment area, including 1 cm of skin surrounding the tumor., Disp: 24 each, Rfl: 1    lidocaine (LIDODERM) 5 %, Apply 1 patch topically every 24 hours for 15 days Remove & Discard patch within 12 hours or as directed by MD (Patient not taking: Reported on 6/17/2024), Disp: 15 patch, Rfl: 0    losartan (COZAAR) 100 MG tablet, TAKE ONE TABLET BY MOUTH EVERY DAY, Disp: 90 tablet, Rfl: 1    Multiple Vitamin (MULTIVITAMIN) capsule, Take 1 capsule by mouth daily, Disp: , Rfl:     Prolia 60 MG/ML, , Disp: , Rfl:     rosuvastatin (CRESTOR) 10 MG tablet, TAKE ONE TABLET BY MOUTH EVERY DAY IN THE EVENING, Disp: 90 tablet, Rfl: 1          Whom besides the patient is providing clinical information about today's encounter?   NO ADDITIONAL HISTORIAN (patient alone provided history)    Physical Exam and Assessment/Plan by Diagnosis:      PROCEDURE:  EXCISION NOTE     Procedural Plan:     Attending:  MD Spencer  Assistant:  GENIE Lara  Lesion Anatomic Location (use description from previous biopsy if applicable): right lower back  Pre-Op Diagnosis: superficial basal cell carcinoma  Lesion is being treated as \"benign\" or \"MALIGNANT\": " "MALIGNANT; final PATHOLOGICAL STAGING (use \"N/A\" if not reported in Path Report) :  [id number]  Accession Number of any associated previous biopsy/excision: [id number]     Written and verbal (witnessed) informed consent was obtained. We discussed that \"excision\" is a method of removing lesions, both benign and malignant lesions.  A portion of normal skin is often taken to ensure completeness of removal.  I reviewed that this procedure will include numbing the area, cutting around and under the skin lesion, undermining (\"freeing up\") surrounding tissue, and closing the wound with sutures (stitches) both inside and out.  Risks include and are not limited to the following:  Bleeding, pain, infection, scarring, recurrence, more required treatment, no additional information, numbness and/or loss of function (if nerves are damaged).  These risks were considered against the benefits that we discussed, and the patient opted to continue with the procedure. It was discussed with patient that every effort is made to minimize scarring, but scarring is influenced also by extrinsic factor such as location, age and genetics.     Procedural Time Out:      Correct patient? yes  Correct site per Clinic Report? yes  Correct site per previous Path Report? yes  Correct site per Patient's recollection? yes    Anesthesia:      Local anesthesia: 1% xylocaine with epi     Excision Description:      Post-Op Diagnosis: Same as Pre-Op Diagnosis (above)  Pre-op Size: 1 cm  Margins (enter \"0\" for lipoma/cyst or similar diagnosis): .4 cm  TOTAL POSTOPERATIVE DEFECT SIZE (I.e., Pre-op Size + Margins on both sides):  1.8 cm    The patient was seated in the procedure/exam room, anesthetized locally, prepped and draped in the usual fashion. Using a #15 blade with a scalpel, the lesion was excised in elliptical fashion.     REQUIRED Margarita MELANOMA DATA      This procedure was not performed to treat primary cutaneous melanoma through wide local " "excision      Closure Description:      The specific type of closure that was utilized:     INTERMEDIATE Closure  INTERMEDIATE CLOSURE     The patient was brought back into the procedure room, anesthetized locally, prepped and draped in the usual fashion. Using a #15 blade with a scalpel, the lesion was excised in elliptical fashion. The wound was  undermined in the fascial plane.  Purpose of undermining was to decrease wound tension and facilitate closure. Hemostasis was achieved with light electrocoagulation.    The wound was closed with subcutaneous sutures as follows:    Deep Suture Throw, Size, and Type (select all that apply):  6 Interrupted Deeps; 3-0; vicryl     Epidermal edge closure was accomplished with superficial sutures as follows:    Superficial Suture Throw, Size and Type (select all that apply):  Simple Interrupted; 3-0; Prolene    FINAL LENGTH OF CLOSURE (please enter a length even for lipoma/cyst or similar diagnosis): 5 cm       Postoperative Care:      The wound was cleaned with sterile saline, dried off, surgical vaseline ointment was applied, and the wound was covered. A pressure dressing was applied for stabilization and light pressure.    Estimated blood loss:  Less than 3ml.  Complications: none  Post-op medications: none  Additional notes: none    Discharge Plans:      Discharge plans: Plan for return to us for suture removal, as scheduled in 14 days.   Patient condition at discharge: STABLE    The patient was given detailed oral and written instructions on postoperative care as detailed in consent. We urged the patient to call us if any problems or question should arise.         Please complete this section and then \"cut and paste\" it into the Patient Instructions section.  These notes should be printed and shared directly with the patient for review PRIOR TO leaving our office.         YOUR \"AFTER SURGERY\" REVIEW & INSTRUCTIONS    What to Know About Your Procedure  An \"excision\" was " "performed today to allow the dermatologist to remove a skin lesion. The procedure involves a local numbing medication and removing the entire lesion (or as much as possible). Typically, the lesion is being removed because it does not look \"normal,\" because it is becoming irritated and traumatized, or for significant appearance reasons.  The skin was cut deeply and then repaired - usually with sutures (stitches).  The removed tissue has been sent to the pathologist who will confirm the diagnosis and verify if the lesion has been completely removed.  Surgical “Vaseline-type” ointment has been applied after the procedure to help create a barrier between your wound and the outside world.     The advantage of using sutures (stitches) to repair a skin excision is that it allows the lesion to heal as quickly as possible with the least amount of scarring and risk of infection, Still, there are some risks and potential complications that you should watch out for that include but are not limited to the following:    Some bleeding is normal at the time of procedure and some bleeding on the gauze bandage after the procedure is normal for the first few days after surgery.  Profuse bleeding or bleeding with swelling and pain is NOT normal and should be reported as detailed below.  Infection is uncommon after skin surgery.  Infection should be reported and is indicated by pain, redness, and discharge of purulent material.  Some pain may occur initially the day after surgery.  Persistent pain or increasing pain days after surgery is not expected and should be reported.  Every effort is made to minimize scar, but location, size, and genetics do play a role in scar appearance.  A surgical wound does not achieve its optimal appearance until 6 months.  There are several treatments available if scarring would be problematic including scar creams, silicone pad, laser and scar revision.  Skin discoloration can occur especially in people " "of color.  Its important to avoid sun on wound in first 6 months after surgery.  Treatment is available if pigment is problematic.  Incomplete removal of the lesion or recurrence of lesion can occur and - depending on the lesion - this would then require further treatment and more surgery.  Nerve damage/numbness and/or loss of function is very rare, but is most likely to occur if the lesion being removed is large or if it is in a \"high risk\" location.  Allergic reaction to lidocaine is rare.  More commonly, epinephrine is used with the lidocaine, and, occasionally, epinephrine (a.k.a., adrenalin) may cause a brief feeling of anxiety or jitteriness.  The person at the microscope (pathologist) may provide additional information that was unexpected. This unexpected finding could prompt the need for additional treatment or evaluation.    At-Home Wound Care  Try NOT to remove the pressure bandage for 48 hours. Keep the area clean and dry while this bandage is on.   After removing the bandage for the first time, gently clean the area with soap and water. If the bandage is difficult to remove, getting the bandage wet in the shower will sometimes help soften the adhesive and allow it to be removed more easily.   You will now need to cleanse this area daily in the shower with gentle soap. There is no need to scrub the area. You will need to apply plain Vaseline ointment (this is over the counter and not a prescription) to the site for up to 2 weeks followed by a clean appropriately sized bandage to area.  Non stick dressing and paper tape (or Hypafix) are recommended for sensitive skin but a bandaid is fine if it covers the area well.  In general, sutures (stitches) are removed in about 5-7 days for face wounds and in about 12-14 days for the body wounds.  Your dermatologist wants you to return for suture removal in 14 DAYS.     General Restrictions  For TWO (2) DAYS:  You will need to take it very easy as this time is " "highest risk for bleeding. Being a \"couch potato\" during these two days is generally recommended.   For surgeries on the face/neck/scalp: Avoid leaning down to pick things up off the floor as this brings blood up to your head. Instead, squat down to pick things up.     For TWO WEEKS (14 DAYS):   No heavy lifting (anything greater than 10 pounds)   You can start to do slow, gentle activities such as slow walking but nothing to increase your heart rate and blood pressure too much (such as cardiovascular exercise).  It is important to take it easy as there is still a risk for bleeding and a high risk popping of stitches open during this time.     Site Specific Restrictions  If we did surgery near your eyes (including the nose, forehead, front part of your scalp, cheeks): It is VERY common to get a large amount of swelling around your eyes (puffy eyes). Although less frequent, this can be enough to swell your eyes shut and can also come along with bruising. This should not hurt and is very expected and normal. It is typically worst at ~ 3 days out from your surgery and dramatically better 1 week post-operatively.   If we did surgery around your nose: No blowing your nose as this puts you at higher risk of popping stitches durign this time. Instead dab under your nose with a tissue or use a Q-tip inside your nose.  If we did surgery on the skin above or below your lip or your lip itself: No sipping from straws as this uses a lot of the muscles around your mouth and increases the risk of popping stitches during this time.    Managing Your Pain After Surgery  You can expect to have some pain after surgery. This is normal. The pain is typically worse the first two days after surgery, and quickly begins to get better.   You can use heating pads or ice packs on your incisions to help reduce your pain.   The best strategy for controlling your pain after surgery is \"around the clock\" pain control. You can take " "\"over-the-counter\" (non-prescription) Acetaminophen (Tylenol) for discomfort, unless you have been told not to by your physician.  If you are taking Tylenol at the maximum dose, you can alternate Tylenol with Advil/Motrin (ibuprofen) as long as there are no contraindications.  Alternating these medications with each other (I.e., Tylenol followed by Motrin/Advil) allows you to maximize your pain control.  To alternate these medications properly, you will take a dose of pain medication every three hours, alternating Tylenol (acetaminophen) and Advil/Motrin (ibuprofen).  Start by taking 650 mg of Tylenol (2 pills of 325 mg)  3 hours later take 600 mg of Motrin (3 pills of 200 mg)  3 hours after taking the Motrin take 650 mg of Tylenol  3 hours after that take 600 mg of Motrin.    As an example, if your first dose of Tylenol (acetaminophen) is at 12:00 PM, then you would alternate with Motrin as directed below, continuing usually for no more than a total of 48 hours straight:     12:00 PM  Tylenol 650 mg (2 pills of 325 mg)    3:00 PM  Motrin 600 mg (3 pills of 200 mg)    6:00 PM  Tylenol 650 mg (2 pills of 325 mg)    9:00 PM  Motrin 600 mg (3 pills of 200 mg)      WARNING:  Do NOT take more than 4000 mg of Tylenol or 3200 mg of Motrin in a \"24-hour\" period.       What if you still have pain?    If you have pain that is not controlled with the over-the-counter pain medications (Tylenol and Motrin/Advil), do not hesitate to call our staff using the number provided. We will help make sure you are managing your pain in the best way possible, and if necessary, we can provide a prescription for additional pain medication.     Call our office IMMEDIATELY with any signs of wound infection.  This includes fever, chills, increasing redness, warmth, tenderness, severe discomfort/pain, or pus or foul smell coming from the wound. [facility] can be directly at [phone] (SKIN) and ask for the on-call Dermatologist " covering surgery/Mohs.    If Bleeding is Noticed  If bleeding is soaking through the bandage, remove the bandage and see where the bleeding is coming from.  Place a clean cloth over the area and apply firm pressure directly to the area that is bleeding for thirty minutes.    Check the wound ONLY after 30 minutes of direct pressure; do not cheat and sneak a peak, as that does not count (i.e., resets the clock back to zero).  If bleeding persists after 30 minutes of legitimate direct pressure, then try one more round of direct pressure to the area.    Should bleeding become heavier or not stop after the second application of direct pressure for 30 minutes, then call [facility] directly at [phone] (SKIN) and ask to speak with the on-call Dermatologist covering surgery/Mohs.  If after hours, go to your nearest Emergency Room or Urgent Care and have the team call [facility] directly at [phone] (SKIN); you will be connected to our after hours team.            Scribe Attestation      I,:  Gladys Callahan MA am acting as a scribe while in the presence of the attending physician.:       I,:  Seun Palmer MD personally performed the services described in this documentation    as scribed in my presence.:

## 2024-10-17 ENCOUNTER — HOSPITAL ENCOUNTER (OUTPATIENT)
Dept: MAMMOGRAPHY | Facility: MEDICAL CENTER | Age: 69
Discharge: HOME | End: 2024-10-17
Payer: COMMERCIAL

## 2024-10-17 ENCOUNTER — LAB (OUTPATIENT)
Dept: LAB | Age: 69
End: 2024-10-17
Payer: COMMERCIAL

## 2024-10-17 DIAGNOSIS — E78.2 MIXED HYPERLIPIDEMIA: ICD-10-CM

## 2024-10-17 DIAGNOSIS — Z12.31 ENCOUNTER FOR SCREENING MAMMOGRAM FOR MALIGNANT NEOPLASM OF BREAST: ICD-10-CM

## 2024-10-17 DIAGNOSIS — E03.8 SUBCLINICAL HYPOTHYROIDISM: ICD-10-CM

## 2024-10-17 DIAGNOSIS — E21.3 HYPERPARATHYROIDISM (HCC): ICD-10-CM

## 2024-10-17 DIAGNOSIS — R07.9 CHEST PAIN, UNSPECIFIED TYPE: ICD-10-CM

## 2024-10-17 LAB
ALBUMIN SERPL BCG-MCNC: 4.3 G/DL (ref 3.5–5)
ALP SERPL-CCNC: 59 U/L (ref 34–104)
ALT SERPL W P-5'-P-CCNC: 47 U/L (ref 7–52)
ANION GAP SERPL CALCULATED.3IONS-SCNC: 12 MMOL/L (ref 4–13)
AST SERPL W P-5'-P-CCNC: 29 U/L (ref 13–39)
BASOPHILS # BLD AUTO: 0.03 THOUSANDS/ΜL (ref 0–0.1)
BASOPHILS NFR BLD AUTO: 0 % (ref 0–1)
BILIRUB SERPL-MCNC: 0.67 MG/DL (ref 0.2–1)
BUN SERPL-MCNC: 15 MG/DL (ref 5–25)
CALCIUM SERPL-MCNC: 9.1 MG/DL (ref 8.4–10.2)
CHLORIDE SERPL-SCNC: 106 MMOL/L (ref 96–108)
CHOLEST SERPL-MCNC: 172 MG/DL
CO2 SERPL-SCNC: 24 MMOL/L (ref 21–32)
CREAT SERPL-MCNC: 0.79 MG/DL (ref 0.6–1.3)
EOSINOPHIL # BLD AUTO: 0.03 THOUSAND/ΜL (ref 0–0.61)
EOSINOPHIL NFR BLD AUTO: 0 % (ref 0–6)
ERYTHROCYTE [DISTWIDTH] IN BLOOD BY AUTOMATED COUNT: 12.2 % (ref 11.6–15.1)
GFR SERPL CREATININE-BSD FRML MDRD: 77 ML/MIN/1.73SQ M
GLUCOSE P FAST SERPL-MCNC: 106 MG/DL (ref 65–99)
HCT VFR BLD AUTO: 39.5 % (ref 34.8–46.1)
HDLC SERPL-MCNC: 79 MG/DL
HGB BLD-MCNC: 12.6 G/DL (ref 11.5–15.4)
IMM GRANULOCYTES # BLD AUTO: 0.03 THOUSAND/UL (ref 0–0.2)
IMM GRANULOCYTES NFR BLD AUTO: 0 % (ref 0–2)
LDLC SERPL CALC-MCNC: 74 MG/DL (ref 0–100)
LYMPHOCYTES # BLD AUTO: 2.48 THOUSANDS/ΜL (ref 0.6–4.47)
LYMPHOCYTES NFR BLD AUTO: 30 % (ref 14–44)
MCH RBC QN AUTO: 30.7 PG (ref 26.8–34.3)
MCHC RBC AUTO-ENTMCNC: 31.9 G/DL (ref 31.4–37.4)
MCV RBC AUTO: 96 FL (ref 82–98)
MONOCYTES # BLD AUTO: 0.58 THOUSAND/ΜL (ref 0.17–1.22)
MONOCYTES NFR BLD AUTO: 7 % (ref 4–12)
NEUTROPHILS # BLD AUTO: 5.1 THOUSANDS/ΜL (ref 1.85–7.62)
NEUTS SEG NFR BLD AUTO: 63 % (ref 43–75)
NONHDLC SERPL-MCNC: 93 MG/DL
NRBC BLD AUTO-RTO: 0 /100 WBCS
PLATELET # BLD AUTO: 286 THOUSANDS/UL (ref 149–390)
PMV BLD AUTO: 11.2 FL (ref 8.9–12.7)
POTASSIUM SERPL-SCNC: 4.3 MMOL/L (ref 3.5–5.3)
PROT SERPL-MCNC: 7.1 G/DL (ref 6.4–8.4)
RBC # BLD AUTO: 4.1 MILLION/UL (ref 3.81–5.12)
SODIUM SERPL-SCNC: 142 MMOL/L (ref 135–147)
TRIGL SERPL-MCNC: 94 MG/DL
TSH SERPL DL<=0.05 MIU/L-ACNC: 2.31 UIU/ML (ref 0.45–4.5)
WBC # BLD AUTO: 8.25 THOUSAND/UL (ref 4.31–10.16)

## 2024-10-17 PROCEDURE — 84443 ASSAY THYROID STIM HORMONE: CPT

## 2024-10-17 PROCEDURE — 80061 LIPID PANEL: CPT

## 2024-10-17 PROCEDURE — 77067 SCR MAMMO BI INCL CAD: CPT

## 2024-10-17 PROCEDURE — 85025 COMPLETE CBC W/AUTO DIFF WBC: CPT

## 2024-10-17 PROCEDURE — 77063 BREAST TOMOSYNTHESIS BI: CPT

## 2024-10-17 PROCEDURE — 36415 COLL VENOUS BLD VENIPUNCTURE: CPT

## 2024-10-17 PROCEDURE — 80053 COMPREHEN METABOLIC PANEL: CPT

## 2024-10-18 NOTE — RESULT ENCOUNTER NOTE
Good morning  All of your labs look good - other than a very slightly elevated glucose  Try to cut back on simple sugars, dessert items, white floured products and sweetened beverages  As well as - try to incorporate some exercise into weekly schedule.  Have a great weekend!  Dr cast

## 2024-10-20 NOTE — RESULT ENCOUNTER NOTE
Good morning  Your dexa scan shows overall improvement in bone density compared to prior imaging in 2022  In fact the change is significant.  Have a great rest of your weekend  Dr cast.

## 2024-10-21 PROCEDURE — 88305 TISSUE EXAM BY PATHOLOGIST: CPT | Performed by: STUDENT IN AN ORGANIZED HEALTH CARE EDUCATION/TRAINING PROGRAM

## 2024-10-21 NOTE — RESULT ENCOUNTER NOTE
DERMATOPATHOLOGY RESULT NOTE    Results reviewed by ordering physician.  Called patient to personally discuss results. No answer, left voicemail with result.      Instructions for Clinical Derm Team:   (remember to route Result Note to appropriate staff):    None    Result & Plan by Specimen:    Specimen A: right lower back, residual BCC but no BCC on examined margins.   Plan: examined margins clear      A. Skin, right lower back, excision:    -Residual BASAL CELL CARCINOMA (SUPERFICIAL TYPE); not seen at examined inked specimen margins.    -Prior procedure site changes present.    Electronically signed by Marie Martinez MD on 10/21/2024 at  1:58 PM

## 2024-10-22 NOTE — PROGRESS NOTES
Ambulatory Visit  Name: Danielle Sandoval      : 1955      MRN: [id number]  Encounter Provider: Charley Heath PA-C  Encounter Date: 10/23/2024   Encounter department: [facility] RHEUMATOLOGY [facility]    Assessment & Plan  Age-related osteoporosis without current pathological fracture  Osteoporosis previously treated with Fosamax now on Prolia since .  She will be due for her next dose at the end of January.  She is up-to-date with her DEXA scan and will be due for an updated DEXA in 2026.  We will continue to monitor her bone density every 2 years.  Labs as ordered to monitor for medication side effects and toxicity.  We will schedule for next Prolia dose at the end of January and plan for follow-up office visit with subsequent Prolia dose.    Orders:    Basic metabolic panel; Future    Vitamin D 25 hydroxy; Future    Vitamin D deficiency    Orders:    Vitamin D 25 hydroxy; Future      History of Present Illness     Danielle Sandoval is a 69 y.o. female.  She is here for new patient evaluation for history of osteoporosis.  She is a previous patient of Dr. Gordon.  She has previously been treated with hormone replacement therapy (Evista) as well as Fosamax.  She is currently being treated with Prolia for her osteoporosis, which she started in 2016.  She has no history of fracture.  Her most recent DEXA scan was done on 10/14/2024.  Lumbar spine T-score +0.4.  This has increased 3.5% as compared to her previous scan.  Left total hip T-score -0.9, femoral neck -2.2.    She has a history of generalized osteoarthritis and lumbar degenerative disc disease.  She does get some pain in her lower back on the right side.  She has seen a chiropractor in the past which did provide significant relief for her.  She also gets some stiffness in her knees.    She has a history of subclinical hypothyroidism.  She has a history of sensorineural hearing loss status post left  cochlear implant.  Reviewed labs done on 10/17/2024.  CBC unremarkable.  Glucose 106.  Creatinine 0.79, GFR 77.      Review of Systems   Constitutional:  Negative for chills, fatigue and fever.   HENT:  Positive for hearing loss. Negative for sore throat and tinnitus.    Eyes:  Negative for pain and visual disturbance.   Respiratory:  Negative for cough and shortness of breath.    Cardiovascular:  Negative for chest pain and palpitations.   Gastrointestinal:  Negative for abdominal pain, nausea and vomiting.   Genitourinary:  Negative for difficulty urinating.   Musculoskeletal:  Positive for arthralgias and back pain. Negative for gait problem, joint swelling, myalgias, neck pain and neck stiffness.   Skin:  Negative for rash.   Neurological:  Negative for dizziness, seizures, weakness, numbness and headaches.   Psychiatric/Behavioral:  Negative for confusion, decreased concentration and sleep disturbance.      Current Outpatient Medications on File Prior to Visit   Medication Sig Dispense Refill    acetaminophen (TYLENOL) 500 mg tablet Take 2 tabs po up to every 8 hours prn pain 60 tablet 0    amLODIPine (NORVASC) 2.5 mg tablet take 1 tablet by mouth once daily 90 tablet 1    azelastine (ASTELIN) 0.1 % nasal spray 1 spray into each nostril 2 (two) times a day Use in each nostril as directed 1 mL 0    benzonatate (TESSALON) 200 MG capsule Take 1 capsule (200 mg total) by mouth 3 (three) times a day as needed for cough 20 capsule 0    Denosumab (PROLIA SC) Inject under the skin      imiquimod (ALDARA) 5 % cream After area biopsied heals, apply once daily 5 days per week, prior to normal sleeping hours, for 6 weeks; leave on skin for ~8 hours, then remove with mild soap and water. Apply enough cream to cover the treatment area, including 1 cm of skin surrounding the tumor. 24 each 1    losartan (COZAAR) 100 MG tablet TAKE ONE TABLET BY MOUTH EVERY DAY 90 tablet 1    Multiple Vitamin (MULTIVITAMIN) capsule Take 1  "capsule by mouth daily      Prolia 60 MG/ML       rosuvastatin (CRESTOR) 10 MG tablet TAKE ONE TABLET BY MOUTH EVERY DAY IN THE EVENING 90 tablet 1    calcium carbonate-vitamin D 250 mg-3.125 mcg per tablet Take 1 tablet by mouth daily 90 tablet 3    lidocaine (LIDODERM) 5 % Apply 1 patch topically every 24 hours for 15 days Remove & Discard patch within 12 hours or as directed by MD (Patient not taking: Reported on 6/17/2024) 15 patch 0     No current facility-administered medications on file prior to visit.          Objective     /60 (BP Location: Right arm, Patient Position: Sitting, Cuff Size: Adult)   Pulse 64   Temp (!) 97.3 °F (36.3 °C) (Temporal)   Ht 4' 10.5\" (1.486 m)   Wt 65.8 kg (145 lb)   LMP 06/01/1989 (Exact Date) Comment: hysterectomy, total  SpO2 99%   BMI 29.79 kg/m²     Physical Exam  Constitutional:       Appearance: Normal appearance.   Cardiovascular:      Rate and Rhythm: Normal rate and regular rhythm.   Pulmonary:      Breath sounds: Normal breath sounds.   Musculoskeletal:         General: No swelling or tenderness.   Skin:     General: Skin is warm and dry.   Neurological:      General: No focal deficit present.      Mental Status: She is alert.           Dragon Dictation software was used to dictate this note. It may contain errors with dictating incorrect words/spelling. Please contact provider directly for any questions.    " Home

## 2024-10-23 ENCOUNTER — OFFICE VISIT (OUTPATIENT)
Age: 69
End: 2024-10-23
Payer: COMMERCIAL

## 2024-10-23 VITALS
BODY MASS INDEX: 29.23 KG/M2 | TEMPERATURE: 97.3 F | WEIGHT: 145 LBS | HEIGHT: 59 IN | OXYGEN SATURATION: 99 % | DIASTOLIC BLOOD PRESSURE: 60 MMHG | SYSTOLIC BLOOD PRESSURE: 120 MMHG | HEART RATE: 64 BPM

## 2024-10-23 DIAGNOSIS — E55.9 VITAMIN D DEFICIENCY: ICD-10-CM

## 2024-10-23 DIAGNOSIS — M81.0 AGE-RELATED OSTEOPOROSIS WITHOUT CURRENT PATHOLOGICAL FRACTURE: Primary | ICD-10-CM

## 2024-10-23 PROCEDURE — 99204 OFFICE O/P NEW MOD 45 MIN: CPT | Performed by: PHYSICIAN ASSISTANT

## 2024-10-23 NOTE — ASSESSMENT & PLAN NOTE
Osteoporosis previously treated with Fosamax now on Prolia since 2016.  She will be due for her next dose at the end of January.  She is up-to-date with her DEXA scan and will be due for an updated DEXA in October 2026.  We will continue to monitor her bone density every 2 years.  Labs as ordered to monitor for medication side effects and toxicity.  We will schedule for next Prolia dose at the end of January and plan for follow-up office visit with subsequent Prolia dose.    Orders:    Basic metabolic panel; Future    Vitamin D 25 hydroxy; Future

## 2024-10-28 ENCOUNTER — OFFICE VISIT (OUTPATIENT)
Age: 69
End: 2024-10-28
Payer: COMMERCIAL

## 2024-10-28 ENCOUNTER — APPOINTMENT (OUTPATIENT)
Age: 69
End: 2024-10-28
Payer: COMMERCIAL

## 2024-10-28 VITALS — HEIGHT: 59 IN | WEIGHT: 145 LBS | BODY MASS INDEX: 29.23 KG/M2

## 2024-10-28 DIAGNOSIS — M54.2 NECK PAIN: ICD-10-CM

## 2024-10-28 DIAGNOSIS — M51.360 DEGENERATION OF INTERVERTEBRAL DISC OF LUMBAR REGION WITH DISCOGENIC BACK PAIN: ICD-10-CM

## 2024-10-28 DIAGNOSIS — M54.50 CHRONIC RIGHT-SIDED LOW BACK PAIN WITHOUT SCIATICA: Primary | ICD-10-CM

## 2024-10-28 DIAGNOSIS — G89.29 CHRONIC RIGHT-SIDED LOW BACK PAIN WITHOUT SCIATICA: Primary | ICD-10-CM

## 2024-10-28 DIAGNOSIS — M50.30 DDD (DEGENERATIVE DISC DISEASE), CERVICAL: ICD-10-CM

## 2024-10-28 PROCEDURE — 72110 X-RAY EXAM L-2 SPINE 4/>VWS: CPT

## 2024-10-28 PROCEDURE — 72050 X-RAY EXAM NECK SPINE 4/5VWS: CPT

## 2024-10-28 PROCEDURE — 99203 OFFICE O/P NEW LOW 30 MIN: CPT | Performed by: CHIROPRACTOR

## 2024-10-28 NOTE — PROGRESS NOTES
Assessment/Plan:    1. Chronic right-sided low back pain without sciatica  XR spine lumbar minimum 4 views non injury      2. Neck pain  XR spine cervical complete 4 or 5 vw non injury      3. DDD (degenerative disc disease), cervical        4. Degeneration of intervertebral disc of lumbar region with discogenic back pain                Review of Diagnostic Studies and Office Notes:    The patient's cervical spine x-rays, lumbar spine x-rays, lumbar CT scan were reviewed prior to the chiropractic evaluation today.      Results for orders placed during the hospital encounter of 07/13/22    XR spine cervical complete 4 or 5 vw non injury    Narrative  CERVICAL SPINE    INDICATION:   M54.2: Cervicalgia  M54.59: Other low back pain.    COMPARISON:  CT cervical spine 12/5/2021    VIEWS:  XR SPINE CERVICAL COMPLETE 4 OR 5 VW NON INJURY      FINDINGS:    No fracture.    Stable straightening of the usual cervical lordosis and grade 1 anterolisthesis of C3 on C4.    Marginal osteophytes at C4-C7 most prominent at C4 and C5. Moderate to severe disc height loss at C4-5 and C5-6. Multilevel facet and uncovertebral degenerative changes.    Multilevel bilateral neural foraminal narrowing most prominent on the right at C6-7 and on the left at C5-6 and C6-7.    The prevertebral soft tissues are within normal limits.    The lung apices are clear.    Impression  No acute osseous abnormality.    Degenerative changes as above.      Workstation performed: [id number]      XR spine lumbar minimum 4 views non injury    Narrative  LUMBAR SPINE    INDICATION:   M54.2: Cervicalgia  M54.59: Other low back pain.    COMPARISON:  Lumbar spine radiographs 12/1/2021    VIEWS:  XR SPINE LUMBAR MINIMUM 4 VIEWS NON INJURY      FINDINGS:    There are 5 non rib bearing lumbar vertebral bodies.    There is no evidence of acute fracture or destructive osseous lesion.    Stable grade 1 retrolisthesis of L4 on L5.    Tiny marginal osteophytes L3-L5 and  at T11-12. Moderate disc height loss at T11-12. Mild disc height loss at L2-3, L4-5, and L5-S1. Moderate facet degenerative changes at L5-S1. Overall, no significant interval change.    The pedicles appear intact.    Soft tissues are unremarkable.    Impression  No acute osseous abnormality.    Degenerative changes as described.      Workstation performed: [id number]      Results for orders placed during the hospital encounter of 01/10/23    CT spine lumbar wo contrast    Narrative  CT LUMBAR SPINE WITHOUT CONTRAST    INDICATION:   M54.50: Low back pain, unspecified.    COMPARISON: X-ray dated 7/13/2022.    TECHNIQUE:  Contiguous axial images through the lumbar spine were obtained. Sagittal and coronal reconstructions were performed.    IV Contrast: No contrast    Radiation dose length product (DLP) for this visit:  798 mGy-cm .  This examination, like all CT scans performed in the [facility], was performed utilizing techniques to minimize radiation dose exposure, including the use of iterative  reconstruction and automated exposure control.    IMAGE QUALITY:  Diagnostic.    FINDINGS:    ALIGNMENT:  There are 5 lumbar type vertebral bodies.  Alignment unchanged.  Minimal degenerative anterolisthesis at L2-3.  Grade 1 retrolisthesis at L4-5.  No spondylolisthesis.  No scoliosis.    VERTEBRAL BODIES:  No fracture.  No lytic or blastic lesion.    DEGENERATIVE CHANGES:    Lower Thoracic spine:  Mild lower thoracic degenerative disc disease with mild annular bulging.    L1-2:  Disc space narrowing.  Small disc bulge.  No significant canal or foraminal stenosis.    L2-3:  Disc space narrowing.  Small disc bulge and marginal osteophyte.  Mild facet arthropathy.  No significant canal or foraminal stenosis.    L3-4:  Mild disc space narrowing.  Disc bulge with small superimposed left central protrusion.   Mild canal stenosis.  No significant foraminal stenosis.    L4-5:  Disc space narrowing.  Grade 1  retrolisthesis.  No significant canal stenosis.  Mild foraminal stenosis.    L5-S1:  Small disc bulge.  No significant canal stenosis.  Mild right foraminal stenosis.    PARASPINAL SOFT TISSUES:   Normal.    Impression  Degenerative spondylosis as described.    Workstation performed: [id number]            Decision and Treatment Plan:    I reviewed my findings with the patient today.  Patient was interested in receiving chiropractic care.  We will be awaiting the results of the cervical and lumbar spine x-rays.  We will treat the patient 2x/week for the after cleared by her dermatologist three weeks and re-evaluate. If there is improvement in symptoms and objective findings, frequency will be reduced.       The patient will be treated with conservative chiropractic care including myofascial release, joint mobilization, and a home stretching and icing program.    Patient Instructions:    Return for treatment in 1-2 weeks.  Will await her to get her stitches out and be cleared by her dermatologist before we start mobilizing lower back.  Will also await results of the x-rays.    TIME/Reviewed with Patient:  Time:  At least 33 minutes of time was spent with the patient during the consultation including the patient's history/current complaints, physical exam, reviewing the diagnostic report, reviewing home instruction/reducing risk factors with the patient, reviewing my findings with the patient, and discussing the treatment with the patient.       No treatment rendered due to stitches in right LB related to skin lesion removal.    Review of Systems   Constitutional:  Negative for chills, fever and unexpected weight change.   Gastrointestinal:  Negative for constipation and diarrhea.   Genitourinary:  Negative for difficulty urinating and urgency.       Subjective:      Danielle Sandoval is a 69 y.o. female p she has a chief complaint of resents today for chiropractic evaluation and possible treatment.  Lower back  "pain.  She states she has had low back pain for many years.  She states she has done chiropractic care in the past but that was so well over 5 years ago.  She states she did physical therapy about a year and a half ago and that did give her some temporary relief.  She denies pain radiating down the legs, numbness or tingling in the lower extremities.  She gets increased pain when bending over which can trigger sharp pain.  Low back pain is more right-sided.    She also complains of neck pain.  She also reports having neck pain on and off for many years.  She denies pain radiating down the arms.  She states she does get occasional tingling in her hands in the mornings but that soon as she moves around it goes away.  She does take Tylenol.    She did have a CT scan of the lumbar spine done in January 2023.  She also had x-rays done of the cervical spine and lumbar spine in 2022.  I did not see any flexion-extension views although these did show a spondylolisthesis in the cervical and lumbar region.  She states she has not had any injections.    She denies fever, chills, night sweats, recent unexplained weight loss, changes in bowel/bladder habits, urinary incontinence or retention.     Objective:     Ht 4' 10.5\" (1.486 m)   Wt 65.8 kg (145 lb)   LMP 06/01/1989 (Exact Date) Comment: hysterectomy, total  BMI 29.79 kg/m²     Appearance: Well developed, well nourished, and in no acute distress    Alert and oriented x 3    Mood/Affect: calm and pleasant    Gait/Posture:    Gait: Normal    Posture: Mild rounding of the shoulders and anterior head carriage is noted.    Lordosis: Cervical- decreased    Lordosis: Lumbar- normal    Kyphosis: Thoracic- normal    Upper extremity reflexes:    Deep tendon reflexes were normal and symmetrical in the upper extremities.    Upper Extremity Muscle Testing:    Muscle testing of the bilateral upper extremities was normal and graded +5/5.    Oates's was negative " bilaterally.    Cervical Orthopedic Tests:    Maximal foraminal Compression on the Right: negative .    Maximal foraminal Compression on the Left: negative .    Phalen's test: negative  bilaterally    Active Cervical Ranges of Motion:    Cervical range of motion examination shows flexion is 20% restricted with pulling noted at the end range.  Extension is not painful.  Left rotation is 20% restricted with pulling noted on the right side.  Right rotation is 20% restricted with pulling noted on the left side.    Lower extremity reflexes:    Deep tendon reflexes were normal and symmetrical in the bilateral lower extremities.    Babinski was negative bilaterally.    Lumbar Orthopedic Tests:    Seated Straight Leg Raise was negative  on the Right.    Seated Straight Leg Raise was negative  on the Left.    Supine Straight Leg Raise was negative  on the Right.    Supine Straight Leg Raise was negative  on the Left.    Active Lumbar Ranges of Motion:    Lumbar range of motion examination shows flexion to be 30% restricted with pain.  Extension is 40% restricted with pain.  Left lateral bending is 20% restricted with pain.  Right lateral bending is 20% restricted with pain.    Palpation:  Large 3 inch scar noted in the right upper lumbar region with stitches present.    Moderate spasm and tenderness is noted in the lumbar paraspinals, right quadratus lumborum, right gluteus medius.  Moderate spasm and tenderness is noted in the    Bilateral upper trapezius, levator scapula, rhomboids bilaterally.    Decreased mobility and tenderness is noted at the right sacroiliac joint, L5-S1, T3-4, T5-6, C6-7.      Dictation voice to text software has been used in the creation of this document. Please consider this in light of any contextual or grammatical errors.

## 2024-10-29 ENCOUNTER — HOSPITAL ENCOUNTER (EMERGENCY)
Facility: HOSPITAL | Age: 69
Discharge: HOME | End: 2024-10-29
Attending: EMERGENCY MEDICINE
Payer: COMMERCIAL

## 2024-10-29 VITALS
OXYGEN SATURATION: 100 % | RESPIRATION RATE: 18 BRPM | BODY MASS INDEX: 29.79 KG/M2 | WEIGHT: 145 LBS | TEMPERATURE: 98.9 F | HEART RATE: 70 BPM | SYSTOLIC BLOOD PRESSURE: 179 MMHG | DIASTOLIC BLOOD PRESSURE: 90 MMHG

## 2024-10-29 DIAGNOSIS — Z48.02 VISIT FOR SUTURE REMOVAL: Primary | ICD-10-CM

## 2024-10-29 PROCEDURE — 15853 REMOVAL SUTR/STAPL XREQ ANES: CPT | Performed by: PHYSICIAN ASSISTANT

## 2024-10-29 PROCEDURE — 99283 EMERGENCY DEPT VISIT LOW MDM: CPT | Performed by: PHYSICIAN ASSISTANT

## 2024-10-29 PROCEDURE — 99281 EMR DPT VST MAYX REQ PHY/QHP: CPT

## 2024-10-29 NOTE — ED PROVIDER NOTES
Time reflects when diagnosis was documented in both MDM as applicable and the Disposition within this note       Time User Action Codes Description Comment    10/29/2024  6:45 AM Anum Hood Add [Z48.02] Visit for suture removal           ED Disposition       ED Disposition   Discharge    Condition   Stable    Date/Time   Tue Oct 29, 2024  6:45 AM    Comment   Danielle Sandoval discharge to home/self care.                   Assessment & Plan       Medical Decision Making  Suture was removed by myself.  Procedure documented.  Well-tolerated.  Patient was advised that the area appears red and that she requires follow-up.  Patient was afebrile.  Demonstrates understanding of appropriate follow-up.  No purulent drainage or weeping or dehiscence noted today.  Given strict return precautions.  Demonstrates understanding.  Discharged home.             Medications - No data to display    ED Risk Strat Scores                                               History of Present Illness       Chief Complaint   Patient presents with    Suture / Staple Removal     Pt came for suture removal        Past Medical History:   Diagnosis Date    Arthritis     Asthma     Basal cell carcinoma 2020    Right alar groove    HL (hearing loss)     Osteoarthritis     Osteoporosis     Shingles       Past Surgical History:   Procedure Laterality Date    COCHLEAR IMPLANT      COLONOSCOPY      COLONOSCOPY      fiberoptic; follow up at age; 2017 10 year f/u    HYSTERECTOMY      age 34    MOHS SURGERY  2020     Right alar groove    OOPHORECTOMY      age 34    SKIN BIOPSY      SKIN CANCER EXCISION  10/15/2024    Basal    TONSILLECTOMY        Family History   Problem Relation Age of Onset    Osteoporosis Mother     Hearing loss Mother         she just found out    Diabetes Father             Heart disease Father             Hypertension Father             Alcohol abuse Father             Dementia  Father     Stroke Father             Lung cancer Maternal Grandmother         70s    Osteoporosis Maternal Grandmother             Lung cancer Maternal Grandfather 88    Hearing loss Maternal Grandfather     Heart disease Paternal Grandmother             Diabetes Paternal Grandmother             Prostate cancer Paternal Grandfather 72            Alcohol abuse Brother     Hypertension Brother     Diabetes Brother     No Known Problems Maternal Aunt     Colon cancer Other         age unknown    Pancreatic cancer Other         age unknown      Social History     Tobacco Use    Smoking status: Never    Smokeless tobacco: Never   Vaping Use    Vaping status: Never Used   Substance Use Topics    Alcohol use: No    Drug use: No      E-Cigarette/Vaping    E-Cigarette Use Never User       E-Cigarette/Vaping Substances    Nicotine No     THC No     CBD No     Flavoring No     Other No     Unknown No       I have reviewed and agree with the history as documented.     69-year-old female with history of recent excision of basal cell carcinoma presents for suture removal.  Patient had sutures placed 2 weeks ago and denies any complaints.  Denies any bleeding draining or weeping.  Patient states the area appears red but she has a follow-up appointment with her doctor this week.  Denies fevers.  Denies any other complaints.      History provided by:  Patient   used: No        Review of Systems   Constitutional: Negative.  Negative for chills and fatigue.   HENT:  Negative for ear pain and sore throat.    Eyes:  Negative for photophobia and redness.   Respiratory:  Negative for apnea, cough and shortness of breath.    Cardiovascular:  Negative for chest pain.   Gastrointestinal:  Negative for abdominal pain, nausea and vomiting.   Genitourinary:  Negative for dysuria.   Musculoskeletal:  Negative for arthralgias, neck pain and neck stiffness.   Skin:  Positive for wound.  "Negative for rash.   Neurological:  Negative for dizziness, tremors, syncope and weakness.   Psychiatric/Behavioral:  Negative for suicidal ideas.            Objective       ED Triage Vitals [10/29/24 0645]   Temperature Pulse Blood Pressure Respirations SpO2 Patient Position - Orthostatic VS   98.9 °F (37.2 °C) 70 (!) 179/90 18 100 % Standing      Temp Source Heart Rate Source BP Location FiO2 (%) Pain Score    Oral Monitor Left arm -- --      Vitals      Date and Time Temp Pulse SpO2 Resp BP Pain Score FACES Pain Rating User   10/29/24 0645 98.9 °F (37.2 °C) 70 100 % 18 179/90 -- -- VD            Physical Exam  Constitutional:       General: She is not in acute distress.     Appearance: She is well-developed. She is not diaphoretic.   Eyes:      Pupils: Pupils are equal, round, and reactive to light.   Cardiovascular:      Rate and Rhythm: Normal rate and regular rhythm.   Pulmonary:      Effort: Pulmonary effort is normal. No respiratory distress.      Breath sounds: Normal breath sounds.   Abdominal:      General: Bowel sounds are normal. There is no distension.      Palpations: Abdomen is soft.   Musculoskeletal:         General: Normal range of motion.      Cervical back: Normal range of motion and neck supple.   Skin:     General: Skin is warm and dry.      Comments: Erythematous nonpurulent nonbloody well-healing wound to the right lumbar back with intact sutures.   Neurological:      Mental Status: She is alert and oriented to person, place, and time.         Results Reviewed       None            No orders to display       Suture removal    Date/Time: 10/29/2024 6:44 AM    Performed by: Anum Hood PA-C  Authorized by: Anum Hood PA-C  [place] Protocol:  Procedure performed by:  Consent: Verbal consent obtained.  Risks and benefits: risks, benefits and alternatives were discussed  Consent given by: patient  Time out: Immediately prior to procedure a \"time out\" was called to verify the " correct patient, procedure, equipment, support staff and site/side marked as required.  Patient understanding: patient states understanding of the procedure being performed  Patient consent: the patient's understanding of the procedure matches consent given  Procedure consent: procedure consent matches procedure scheduled  Relevant documents: relevant documents present and verified  Test results: test results available and properly labeled  Site marked: the operative site was marked  Radiology Images displayed and confirmed. If images not available, report reviewed: imaging studies available  Required items: required blood products, implants, devices, and special equipment available  Patient identity confirmed: verbally with patient      Patient location:  ED  Location:     Laterality:  Right    Location:  Trunk    Trunk location:  Back  Procedure details:     Tools used:  Suture removal kit    Wound appearance:  Red    Number of sutures removed:  8  Post-procedure details:     Post-removal:  No dressing applied    Patient tolerance of procedure:  Tolerated well, no immediate complications      ED Medication and Procedure Management   Prior to Admission Medications   Prescriptions Last Dose Informant Patient Reported? Taking?   Denosumab (PROLIA SC)  Self Yes No   Sig: Inject under the skin   Multiple Vitamin (MULTIVITAMIN) capsule  Self Yes No   Sig: Take 1 capsule by mouth daily   Prolia 60 MG/ML  Self Yes No   acetaminophen (TYLENOL) 500 mg tablet  Self No No   Sig: Take 2 tabs po up to every 8 hours prn pain   amLODIPine (NORVASC) 2.5 mg tablet  Self No No   Sig: take 1 tablet by mouth once daily   azelastine (ASTELIN) 0.1 % nasal spray  Self No No   Si spray into each nostril 2 (two) times a day Use in each nostril as directed   benzonatate (TESSALON) 200 MG capsule  Self No No   Sig: Take 1 capsule (200 mg total) by mouth 3 (three) times a day as needed for cough   calcium carbonate-vitamin D 250 mg-3.125  mcg per tablet  Self No No   Sig: Take 1 tablet by mouth daily   imiquimod (ALDARA) 5 % cream  Self No No   Sig: After area biopsied heals, apply once daily 5 days per week, prior to normal sleeping hours, for 6 weeks; leave on skin for ~8 hours, then remove with mild soap and water. Apply enough cream to cover the treatment area, including 1 cm of skin surrounding the tumor.   lidocaine (LIDODERM) 5 %   No No   Sig: Apply 1 patch topically every 24 hours for 15 days Remove & Discard patch within 12 hours or as directed by MD   Patient not taking: Reported on 6/17/2024   losartan (COZAAR) 100 MG tablet  Self No No   Sig: TAKE ONE TABLET BY MOUTH EVERY DAY   rosuvastatin (CRESTOR) 10 MG tablet  Self No No   Sig: TAKE ONE TABLET BY MOUTH EVERY DAY IN THE EVENING      Facility-Administered Medications: None     Patient's Medications   Discharge Prescriptions    No medications on file     No discharge procedures on file.  ED SEPSIS DOCUMENTATION   Time reflects when diagnosis was documented in both MDM as applicable and the Disposition within this note       Time User Action Codes Description Comment    10/29/2024  6:45 AM Anum Hood Add [Z48.02] Visit for suture removal                  Anum Hood PA-C  10/29/24 0647

## 2024-10-29 NOTE — DISCHARGE INSTRUCTIONS
Keep area clean.  Return for new or worsening complaints including signs of infection.  Follow-up with your primary doctor.

## 2024-10-30 ENCOUNTER — OFFICE VISIT (OUTPATIENT)
Age: 69
End: 2024-10-30
Payer: COMMERCIAL

## 2024-10-30 VITALS — HEIGHT: 59 IN | BODY MASS INDEX: 29.23 KG/M2 | WEIGHT: 145 LBS | TEMPERATURE: 98 F

## 2024-10-30 DIAGNOSIS — Z51.89 VISIT FOR WOUND CHECK: Primary | ICD-10-CM

## 2024-10-30 PROCEDURE — 99212 OFFICE O/P EST SF 10 MIN: CPT | Performed by: REGISTERED NURSE

## 2024-10-30 NOTE — PROGRESS NOTES
"[facility] Note     Patient Name: Danielle Sandoval  Encounter Date: 10/30/2024     Have you been cared for by a [facility] Dermatologist in the last 3 years and, if so, which description applies to you?    Yes.  I have been here within the last 3 years, and my medical history has NOT changed since that time.  I am FEMALE/of child-bearing potential.    REVIEW OF SYSTEMS:  Have you recently had or currently have any of the following? No changes in my recent health.   PAST MEDICAL HISTORY:  Have you personally ever had or currently have any of the following?  If \"YES,\" then please provide more detail. No changes in my medical history.   HISTORY OF IMMUNOSUPPRESSION: Do you have a history of any of the following:  Systemic Immunosuppression such as Diabetes, Biologic or Immunotherapy, Chemotherapy, Organ Transplantation, Bone Marrow Transplantation or Prednisone?  No     Answering \"YES\" requires the addition of the dotphrase \"IMMUNOSUPPRESSED\" as the first diagnosis of the patient's visit.   FAMILY HISTORY:  Any \"first degree relatives\" (parent, brother, sister, or child) with the following?    No changes in my family's known health.   PATIENT EXPERIENCE:    Do you want the Dermatologist to perform a COMPLETE skin exam today including a clinical examination under the \"bra and underwear\" areas?  NO  If necessary, do we have your permission to call and leave a detailed message on your Preferred Phone number that includes your specific medical information?  Yes      Allergies   Allergen Reactions    Codeine      Reaction Date: 26May2011;     Darvon [Propoxyphene]     Demerol [Meperidine]     Valium [Diazepam]     Adhesive [Medical Tape] Other (See Comments)     Skin irritated     Other Other (See Comments)     HCTZ - JOAQUIN with elevated calcium and creatinine; hospitalization 11/2022    Aspirin      Reaction Date: 26May2011;       Current Outpatient Medications:     acetaminophen (TYLENOL) 500 mg tablet, " Take 2 tabs po up to every 8 hours prn pain, Disp: 60 tablet, Rfl: 0    amLODIPine (NORVASC) 2.5 mg tablet, take 1 tablet by mouth once daily, Disp: 90 tablet, Rfl: 1    azelastine (ASTELIN) 0.1 % nasal spray, 1 spray into each nostril 2 (two) times a day Use in each nostril as directed, Disp: 1 mL, Rfl: 0    benzonatate (TESSALON) 200 MG capsule, Take 1 capsule (200 mg total) by mouth 3 (three) times a day as needed for cough, Disp: 20 capsule, Rfl: 0    calcium carbonate-vitamin D 250 mg-3.125 mcg per tablet, Take 1 tablet by mouth daily, Disp: 90 tablet, Rfl: 3    Denosumab (PROLIA SC), Inject under the skin, Disp: , Rfl:     imiquimod (ALDARA) 5 % cream, After area biopsied heals, apply once daily 5 days per week, prior to normal sleeping hours, for 6 weeks; leave on skin for ~8 hours, then remove with mild soap and water. Apply enough cream to cover the treatment area, including 1 cm of skin surrounding the tumor., Disp: 24 each, Rfl: 1    lidocaine (LIDODERM) 5 %, Apply 1 patch topically every 24 hours for 15 days Remove & Discard patch within 12 hours or as directed by MD (Patient not taking: Reported on 6/17/2024), Disp: 15 patch, Rfl: 0    losartan (COZAAR) 100 MG tablet, TAKE ONE TABLET BY MOUTH EVERY DAY, Disp: 90 tablet, Rfl: 1    Multiple Vitamin (MULTIVITAMIN) capsule, Take 1 capsule by mouth daily, Disp: , Rfl:     Prolia 60 MG/ML, , Disp: , Rfl:     rosuvastatin (CRESTOR) 10 MG tablet, TAKE ONE TABLET BY MOUTH EVERY DAY IN THE EVENING, Disp: 90 tablet, Rfl: 1          Whom besides the patient is providing clinical information about today's encounter?   NO ADDITIONAL HISTORIAN (patient alone provided history)    Physical Exam and Assessment/Plan by Diagnosis:      WOUND CHECK    Physical Exam:  Anatomic Location Affected:  Right lower back  Description of wound: linear scar with surrounding erythema   Closure Type: Intermediate     Additional History of Present Condition:  Patient came in for follow  up for a wound check. Basal cell carcinoma removed on 10/15/24.  She had her sutures removed in the ER yesterday. Patient is concerned about redness around the scar. No pain, no drainage, denies fever, chills and or diaphoresis.     Assessment and Plan:  Based on a thorough discussion of this condition and the management approach to it (including a comprehensive discussion of the known risks, side effects and potential benefits of treatment), the patient (family) agrees to implement the following specific plan:  Reassured patient that on exams today site doesn't appear to be infected.   Discussed that redness is likely due to reaction from the sutures and or from the adhesive that she's using  Advised to use hypoallergenic adhesive as dressing   Advised to only apply vaseline to site  Advised to reach out to us for further evaluation if she has increasing pain, drainage, spreading of the erythema, fever, chills and or diaphoresis.                     Scribe Attestation      I,:  Leah Loomis MA am acting as a scribe while in the presence of the attending physician.:       I,:  Seun Palmer MD personally performed the services described in this documentation    as scribed in my presence.:

## 2024-11-04 DIAGNOSIS — E78.2 MIXED HYPERLIPIDEMIA: ICD-10-CM

## 2024-11-05 RX ORDER — ROSUVASTATIN CALCIUM 10 MG/1
10 TABLET, COATED ORAL EVERY EVENING
Qty: 90 TABLET | Refills: 1 | Status: SHIPPED | OUTPATIENT
Start: 2024-11-05

## 2024-11-06 ENCOUNTER — OFFICE VISIT (OUTPATIENT)
Dept: GASTROENTEROLOGY | Facility: MEDICAL CENTER | Age: 69
End: 2024-11-06
Payer: COMMERCIAL

## 2024-11-06 ENCOUNTER — PROCEDURE VISIT (OUTPATIENT)
Age: 69
End: 2024-11-06
Payer: COMMERCIAL

## 2024-11-06 VITALS
TEMPERATURE: 98.4 F | DIASTOLIC BLOOD PRESSURE: 74 MMHG | BODY MASS INDEX: 30.69 KG/M2 | SYSTOLIC BLOOD PRESSURE: 131 MMHG | HEART RATE: 62 BPM | WEIGHT: 149.4 LBS

## 2024-11-06 VITALS — HEIGHT: 59 IN | WEIGHT: 149 LBS | BODY MASS INDEX: 30.04 KG/M2

## 2024-11-06 DIAGNOSIS — K58.0 IRRITABLE BOWEL SYNDROME WITH DIARRHEA: ICD-10-CM

## 2024-11-06 DIAGNOSIS — M43.16 SPONDYLOLISTHESIS OF LUMBAR REGION: ICD-10-CM

## 2024-11-06 DIAGNOSIS — Z12.11 COLON CANCER SCREENING: Primary | ICD-10-CM

## 2024-11-06 DIAGNOSIS — M51.360 DEGENERATION OF INTERVERTEBRAL DISC OF LUMBAR REGION WITH DISCOGENIC BACK PAIN: ICD-10-CM

## 2024-11-06 DIAGNOSIS — G89.29 CHRONIC RIGHT-SIDED LOW BACK PAIN WITHOUT SCIATICA: Primary | ICD-10-CM

## 2024-11-06 DIAGNOSIS — M54.50 CHRONIC RIGHT-SIDED LOW BACK PAIN WITHOUT SCIATICA: Primary | ICD-10-CM

## 2024-11-06 DIAGNOSIS — M54.2 NECK PAIN: ICD-10-CM

## 2024-11-06 DIAGNOSIS — M50.30 DDD (DEGENERATIVE DISC DISEASE), CERVICAL: ICD-10-CM

## 2024-11-06 DIAGNOSIS — M43.12 SPONDYLOLISTHESIS OF CERVICAL REGION: ICD-10-CM

## 2024-11-06 PROCEDURE — 98941 CHIROPRACT MANJ 3-4 REGIONS: CPT | Performed by: CHIROPRACTOR

## 2024-11-06 PROCEDURE — 99214 OFFICE O/P EST MOD 30 MIN: CPT | Performed by: PHYSICIAN ASSISTANT

## 2024-11-06 NOTE — PROGRESS NOTES
Assessment:     1. Chronic right-sided low back pain without sciatica        2. Neck pain        3. DDD (degenerative disc disease), cervical        4. Degeneration of intervertebral disc of lumbar region with discogenic back pain        5. Spondylolisthesis of cervical region        6. Spondylolisthesis of lumbar region            Condition is the same    PLAN/TREATMENT:    Return for treatment twice next week.     Continue using ice 15 minutes as needed and twice per day.     Treatment: She was treated today as follows a myofascial release was performed to the lumbar paraspinals, right quadratus lumborum, right gluteus medius, levator scapulae, rhomboids, upper trapezius and cervical paraspinals.      Gentle mobilization/manipulation was performed of the cervical spine at C5-6, C6-7 utilizing stairstepping technique and to the thoracic spine at T3-4, T5-6 utilizing activator technique.  Manipulation was performed to the right SI joint, L4-5, L5-S1 utilizing gentle side-lying flexion distraction.  No HVLA was performed.  Avoided the area where the scar was in the right upper lumbar paraspinal region.    Subjective:  Danielle is here today with complaints of neck pain and LBP.  Got x-rays done.  Feels about the same.     She went to the ED for suture removal and  had the sutures removed from the right upper lumbar paraspinal region.  Was then seen by dermatology. She states that the scar feels good and she was told is healing well. She had basal cell carcinoma.     Objective:  Moderate spasm and tenderness is noted in the lumbar paraspinals, right quadratus lumborum, right gluteus medius.  Moderate spasm and tenderness is noted in the     Bilateral upper trapezius, levator scapula, rhomboids bilaterally.     Decreased mobility and tenderness is noted at the right sacroiliac joint, L5-S1, T3-4, T5-6, C6-7.       Imaging:    Study Result    Narrative & Impression   XR SPINE CERVICAL COMPLETE 4 OR 5 VW NON INJURY      INDICATION: M54.2: Cervicalgia. No recent trauma.     COMPARISON: Cervical spine radiographs 7/13/2022; CT cervical spine 12/5/2021     FINDINGS:     No fracture.     Chronic reversal of the usual cervical lordosis of the upper segment.     Intervertebral disc height loss is severe at C4-5 and C5-6 and moderate at C3-4 and C6-7 with anterior endplate osteophytes at most prominent at C4-5.     Unchanged multilevel spondylolisthesis without accentuation with flexion/extension views.     Hypertrophic uncovertebral joints at C4-5. Mild facet joint arthropathy.     Normal prevertebral soft tissues. Bilateral hearing aids project over the mastoid air cells.     Clear lung apices.     IMPRESSION:     No acute osseous abnormality.     Degenerative changes as above, no significant change in spondylolisthesis.        Resident: Mor Su I, the attending radiologist, have reviewed the images and agree with the final report above.     Workstation performed: [id number]        Study Result    Narrative & Impression   XR SPINE LUMBAR MINIMUM 4 VIEWS NON INJURY     INDICATION: M54.50: Low back pain, unspecified  G89.29: Other chronic pain.     COMPARISON: Lumbar spine radiograph; lumbar spine CT 1/10/2023     FINDINGS:     No acute fracture. Intact pedicles.     Five non-rib-bearing lumbar vertebral bodies.     Straightening of the usual lumbar lordosis. Minimal anterolisthesis L2-3 and retrolisthesis L3-4. Stable grade 1 retrolisthesis L4-5.     Minimal anterior endplate osteophytosis of T11, T12 and L2-4. Mild facet arthropathy L2-3 and L3-4. Degenerative changes have not significantly advanced.     Unremarkable soft tissues.     IMPRESSION:     No acute osseous abnormality.     Degenerative changes and spinal alignment as above further described.        Computerized Assisted Algorithm (CAA) may have been used to analyze all applicable images.        Resident: Mor Su I, the attending radiologist,  have reviewed the images and agree with the final report above.      Reviewed images and reports with patient today.

## 2024-11-06 NOTE — PROGRESS NOTES
Ambulatory Visit  Name: Danielle Sandoval      : 1955      MRN: [id number]  Encounter Provider: Tanna Mcdowell PA-C  Encounter Date: 2024   Encounter department: [facility] [place]    Assessment & Plan  Irritable bowel syndrome with diarrhea  The patient was initially seen for fecal incontinence now improved after stopping Marycruz. Continues to have chronic intermittent diarrhea where she can have up to 5 loose bowel movement daily triggered by certain foods such as cheese and fast food. Will get abdominal cramping prior to a bowel movement that is relieved thereafter. No alarm symptoms but does get some blood with wiping every few months which she states is hemorrhoidal related. Inflammatory markers negative. Rectal and colonoscopy previously deferred. Will continue to avoid known triggers and call with worsening symptoms        Colon cancer screening  Her last colonoscopy was in 2017 normal aside for hemorrhoids. Recall was recommended 10 years later. Repeat colonoscopy 2027 or sooner if clinically indicated        Follow up as needed, colon recall placed for 2027    History of Present Illness     Danielle Sandoval is a 69 y.o. female who presents for follow-up evaluation of diarrhea.  The patient has chronic diarrhea triggered by certain foods such as cheese and fast food.  Prior to the bowel movement she will have abdominal cramping which will resolve thereafter.  C-reactive protein and fecal calprotectin were obtained and normal.  Has also had normal CBC, CMP, and TSH.  Last colonoscopy 2017 normal aside for hemorrhoids.  No other GI symptoms or complaints.      Review of Systems   Constitutional:  Negative for activity change, appetite change, chills, fatigue, fever and unexpected weight change.   Gastrointestinal:  Positive for abdominal pain and diarrhea. Negative for abdominal distention, anal bleeding, blood in stool, constipation, nausea, rectal  pain and vomiting.       Medical History Reviewed by provider this encounter:  Meds       Current Outpatient Medications on File Prior to Visit   Medication Sig Dispense Refill    acetaminophen (TYLENOL) 500 mg tablet Take 2 tabs po up to every 8 hours prn pain 60 tablet 0    amLODIPine (NORVASC) 2.5 mg tablet take 1 tablet by mouth once daily 90 tablet 1    azelastine (ASTELIN) 0.1 % nasal spray 1 spray into each nostril 2 (two) times a day Use in each nostril as directed 1 mL 0    benzonatate (TESSALON) 200 MG capsule Take 1 capsule (200 mg total) by mouth 3 (three) times a day as needed for cough 20 capsule 0    Denosumab (PROLIA SC) Inject under the skin      imiquimod (ALDARA) 5 % cream After area biopsied heals, apply once daily 5 days per week, prior to normal sleeping hours, for 6 weeks; leave on skin for ~8 hours, then remove with mild soap and water. Apply enough cream to cover the treatment area, including 1 cm of skin surrounding the tumor. 24 each 1    losartan (COZAAR) 100 MG tablet TAKE ONE TABLET BY MOUTH EVERY DAY 90 tablet 1    Multiple Vitamin (MULTIVITAMIN) capsule Take 1 capsule by mouth daily      Prolia 60 MG/ML       rosuvastatin (CRESTOR) 10 MG tablet TAKE ONE TABLET BY MOUTH EVERY DAY IN THE EVENING 90 tablet 1    calcium carbonate-vitamin D 250 mg-3.125 mcg per tablet Take 1 tablet by mouth daily 90 tablet 3    lidocaine (LIDODERM) 5 % Apply 1 patch topically every 24 hours for 15 days Remove & Discard patch within 12 hours or as directed by MD (Patient not taking: Reported on 6/17/2024) 15 patch 0     No current facility-administered medications on file prior to visit.      Social History     Tobacco Use    Smoking status: Never    Smokeless tobacco: Never   Vaping Use    Vaping status: Never Used   Substance and Sexual Activity    Alcohol use: No    Drug use: No    Sexual activity: Never         Objective     /74   Pulse 62   Temp 98.4 °F (36.9 °C)   Wt 67.8 kg (149 lb 6.4 oz)    LMP 06/01/1989 (Exact Date) Comment: hysterectomy, total  BMI 30.69 kg/m²       Physical Exam  Constitutional:       General: She is not in acute distress.     Appearance: Normal appearance. She is normal weight. She is not ill-appearing, toxic-appearing or diaphoretic.   HENT:      Head: Normocephalic and atraumatic.      Nose: No congestion or rhinorrhea.   Eyes:      General:         Right eye: No discharge.         Left eye: No discharge.   Pulmonary:      Effort: Pulmonary effort is normal.   Musculoskeletal:         General: Normal range of motion.      Cervical back: Normal range of motion.   Skin:     Coloration: Skin is not jaundiced.   Neurological:      Mental Status: She is alert.   Psychiatric:         Mood and Affect: Mood normal.         Behavior: Behavior normal.         Thought Content: Thought content normal.         Judgment: Judgment normal.

## 2024-11-08 ENCOUNTER — OFFICE VISIT (OUTPATIENT)
Dept: DERMATOLOGY | Facility: CLINIC | Age: 69
End: 2024-11-08
Payer: COMMERCIAL

## 2024-11-08 VITALS — BODY MASS INDEX: 30.88 KG/M2 | TEMPERATURE: 97.1 F | HEIGHT: 58 IN | WEIGHT: 147.1 LBS

## 2024-11-08 DIAGNOSIS — L82.1 SEBORRHEIC KERATOSIS: Primary | ICD-10-CM

## 2024-11-08 DIAGNOSIS — L81.4 LENTIGINES: ICD-10-CM

## 2024-11-08 DIAGNOSIS — D18.01 CHERRY ANGIOMA: ICD-10-CM

## 2024-11-08 DIAGNOSIS — Z86.006 HISTORY OF MELANOMA IN SITU: ICD-10-CM

## 2024-11-08 DIAGNOSIS — Z85.828 HISTORY OF BASAL CELL CARCINOMA: ICD-10-CM

## 2024-11-08 DIAGNOSIS — D22.9 MULTIPLE MELANOCYTIC NEVI: ICD-10-CM

## 2024-11-08 PROCEDURE — 99213 OFFICE O/P EST LOW 20 MIN: CPT

## 2024-11-08 NOTE — PATIENT INSTRUCTIONS
"HISTORY OF SUPERFICIAL BASAL CELL CARCINOMA        Assessment and Plan:  Based on a thorough discussion of this condition and the management approach to it (including a comprehensive discussion of the known risks, side effects and potential benefits of treatment), the patient (family) agrees to implement the following specific plan:  Will continue to monitor for reoccurrence every 6 months    How can basal cell carcinoma be prevented?  The most important way to prevent BCC is to avoid sunburn. This is especially important in childhood and early life. Fair skinned individuals and those with a personal or family history of BCC should protect their skin from sun exposure daily, year-round and lifelong.  Stay indoors or under the shade in the middle of the day   Wear covering clothing   Apply high protection factor SPF50+ broad-spectrum sunscreens generously to exposed skin if outdoors   Avoid indoor tanning (sun beds, solaria)  Oral nicotinamide (vitamin B3) in a dose of 500 mg twice daily may reduce the number and severity of BCCs.    What is the outlook for basal cell carcinoma?  Most BCCs are cured by treatment. Cure is most likely if treatment is undertaken when the lesion is small.  About 50% of people with BCC develop a second one within 3 years of the first. They are also at increased risk of other skin cancers, especially melanoma. Regular self-skin examinations and long-term annual skin checks by an experienced health professional are recommended.       LINDSEY ANGIOMAS        Assessment and Plan:  Based on a thorough discussion of this condition and the management approach to it (including a comprehensive discussion of the known risks, side effects and potential benefits of treatment), the patient (family) agrees to implement the following specific plan:  Reassure benign         MULTIPLE MELANOCYTIC NEVI (\"Moles\")        Assessment and Plan:  Based on a thorough discussion of this condition and the management " approach to it (including a comprehensive discussion of the known risks, side effects and potential benefits of treatment), the patient (family) agrees to implement the following specific plan:  Reassure benign  Monitor for changes  Use sun protection.  Apply SPF 30 or higher at least three times a day.  Wear sun protecting clothing and hats.       Worrisome signs of skin malignancy discussed, questions answered. Regular self-skin check discussed. Advised to call or return to office if patient notices any spots of concern, rapidly growing/changing lesions, bleeding lesions, non-healing lesions. Advised regular SPF use.   SOLAR LENTIGINES   OTHER SKIN CHANGES DUE TO CHRONIC EXPOSURE TO NONIONIZING RADIATION            Assessment and Plan:  Based on a thorough discussion of this condition and the management approach to it (including a comprehensive discussion of the known risks, side effects and potential benefits of treatment), the patient (family) agrees to implement the following specific plan:  Reassure benign  Use sun protection.  Apply SPF 30 or higher at least three times a day.  Wear sun protecting clothing and hats.

## 2024-11-13 ENCOUNTER — PROCEDURE VISIT (OUTPATIENT)
Age: 69
End: 2024-11-13
Payer: COMMERCIAL

## 2024-11-13 VITALS — WEIGHT: 147 LBS | HEIGHT: 59 IN | BODY MASS INDEX: 29.64 KG/M2

## 2024-11-13 DIAGNOSIS — M50.30 DDD (DEGENERATIVE DISC DISEASE), CERVICAL: ICD-10-CM

## 2024-11-13 DIAGNOSIS — M51.360 DEGENERATION OF INTERVERTEBRAL DISC OF LUMBAR REGION WITH DISCOGENIC BACK PAIN: ICD-10-CM

## 2024-11-13 DIAGNOSIS — M54.2 NECK PAIN: ICD-10-CM

## 2024-11-13 DIAGNOSIS — M43.16 SPONDYLOLISTHESIS OF LUMBAR REGION: ICD-10-CM

## 2024-11-13 DIAGNOSIS — M43.12 SPONDYLOLISTHESIS OF CERVICAL REGION: ICD-10-CM

## 2024-11-13 DIAGNOSIS — M54.50 CHRONIC RIGHT-SIDED LOW BACK PAIN WITHOUT SCIATICA: Primary | ICD-10-CM

## 2024-11-13 DIAGNOSIS — G89.29 CHRONIC RIGHT-SIDED LOW BACK PAIN WITHOUT SCIATICA: Primary | ICD-10-CM

## 2024-11-13 PROCEDURE — 98941 CHIROPRACT MANJ 3-4 REGIONS: CPT | Performed by: CHIROPRACTOR

## 2024-11-13 NOTE — PROGRESS NOTES
Assessment:     1. Chronic right-sided low back pain without sciatica        2. Neck pain        3. DDD (degenerative disc disease), cervical        4. Degeneration of intervertebral disc of lumbar region with discogenic back pain        5. Spondylolisthesis of cervical region        6. Spondylolisthesis of lumbar region            Condition is the same    PLAN/TREATMENT:    Return for treatment later this week.     Continue using ice 15 minutes as needed and twice per day.     Treatment: Myofascial release was performed to the lumbar paraspinals, right quadratus lumborum, right gluteus medius, levator scapulae, rhomboids, upper trapezius and cervical paraspinals.    Gentle mobilization/manipulation was performed of the cervical spine at C5-6, C6-7 utilizing stairstepping technique and to the thoracic spine at T3-4, T5-6 utilizing activator technique.  Manipulation was performed to the right SI joint, L4-5, L5-S1 utilizing gentle side-lying flexion distraction.  No HVLA was performed.  Avoided the area where the scar was in the right upper lumbar paraspinal region.    Subjective:  Danielle is here today with complaints of neck pain and LBP.    Had some soreness after last visit for 1-2 days. Otherwise feels the same.     Objective:  Moderate spasm and tenderness is noted in the lumbar paraspinals, right quadratus lumborum, right gluteus medius.  Moderate spasm and tenderness is noted in the     Bilateral upper trapezius, levator scapula, rhomboids bilaterally.     Decreased mobility and tenderness is noted at the right sacroiliac joint, L5-S1, T3-4, T5-6, C6-7.

## 2024-11-15 ENCOUNTER — PROCEDURE VISIT (OUTPATIENT)
Age: 69
End: 2024-11-15
Payer: COMMERCIAL

## 2024-11-15 VITALS — WEIGHT: 147 LBS | BODY MASS INDEX: 29.64 KG/M2 | HEIGHT: 59 IN

## 2024-11-15 DIAGNOSIS — G89.29 CHRONIC RIGHT-SIDED LOW BACK PAIN WITHOUT SCIATICA: Primary | ICD-10-CM

## 2024-11-15 DIAGNOSIS — M51.360 DEGENERATION OF INTERVERTEBRAL DISC OF LUMBAR REGION WITH DISCOGENIC BACK PAIN: ICD-10-CM

## 2024-11-15 DIAGNOSIS — M43.12 SPONDYLOLISTHESIS OF CERVICAL REGION: ICD-10-CM

## 2024-11-15 DIAGNOSIS — M50.30 DDD (DEGENERATIVE DISC DISEASE), CERVICAL: ICD-10-CM

## 2024-11-15 DIAGNOSIS — M54.2 NECK PAIN: ICD-10-CM

## 2024-11-15 DIAGNOSIS — M43.16 SPONDYLOLISTHESIS OF LUMBAR REGION: ICD-10-CM

## 2024-11-15 DIAGNOSIS — M54.50 CHRONIC RIGHT-SIDED LOW BACK PAIN WITHOUT SCIATICA: Primary | ICD-10-CM

## 2024-11-15 PROCEDURE — 98941 CHIROPRACT MANJ 3-4 REGIONS: CPT | Performed by: CHIROPRACTOR

## 2024-11-15 NOTE — PROGRESS NOTES
Assessment:     1. Chronic right-sided low back pain without sciatica        2. Neck pain        3. DDD (degenerative disc disease), cervical        4. Degeneration of intervertebral disc of lumbar region with discogenic back pain        5. Spondylolisthesis of cervical region        6. Spondylolisthesis of lumbar region            Condition is the same overall.     PLAN/TREATMENT:    Return for treatment twice next week.     Continue using ice 15 minutes as needed and twice per day.     Treatment: Myofascial release was performed to the lumbar paraspinals, right quadratus lumborum, right gluteus medius, levator scapulae, rhomboids, upper trapezius and cervical paraspinals.    Gentle mobilization/manipulation was performed of the cervical spine at C5-6, C6-7 utilizing stairstepping technique and to the thoracic spine at T3-4, T5-6 utilizing activator technique.  Manipulation was performed to the right SI joint, L4-5, L5-S1 utilizing gentle side-lying flexion distraction.  No HVLA was performed.  Avoided the area where the scar was in the right upper lumbar paraspinal region.    Subjective:  Danielle is here today with complaints of neck pain and LBP.    Feels the same overall.     Objective:  Moderate spasm and tenderness is noted in the lumbar paraspinals, right quadratus lumborum, right gluteus medius.  Moderate spasm and tenderness is noted in the     Bilateral upper trapezius, levator scapula, rhomboids bilaterally.     Decreased mobility and tenderness is noted at the right sacroiliac joint, L5-S1, T3-4, T5-6, C6-7.

## 2024-11-18 ENCOUNTER — TELEPHONE (OUTPATIENT)
Dept: OTHER | Facility: OTHER | Age: 69
End: 2024-11-18

## 2024-11-18 NOTE — TELEPHONE ENCOUNTER
Patient is calling regarding cancelling an appointment.    Date/Time: 11/18 8a    Patient was rescheduled: YES [] NO [x]    Patient requesting call back to reschedule: YES [x] NO []

## 2024-11-20 ENCOUNTER — OFFICE VISIT (OUTPATIENT)
Dept: AUDIOLOGY | Age: 69
End: 2024-11-20
Payer: COMMERCIAL

## 2024-11-20 DIAGNOSIS — H90.3 SENSORY HEARING LOSS, BILATERAL: Primary | ICD-10-CM

## 2024-11-20 NOTE — PROGRESS NOTES
Progress Note    Name:  Danielle Sandoval  :  1955  Age:  69 y.o.  MRN:  [id number]  Date of Evaluation: 24     Danielle was seen today to inquire about accessories for her landline phone at work. She is currently wearing an Oticon Xceed 3 BTE and a cochlear implant. Recommended a Connectclip and phone adapter. Patient paid $375.00 today and accessories were ordered.     Rachael Long CCC-A  Clinical Audiologist

## 2024-11-21 NOTE — PROGRESS NOTES
Progress Note    Name:  Danielle Sandoval  :  1955  Age:  69 y.o.  MRN:  [id number]  Date of Evaluation: 24     Connect Clip # [id number] and Connect Line Phone 2# [phone] arrived  Warranty 25  Left  to schedule HAV with Rachael Cunningham  Clinical Audiologist

## 2024-11-27 ENCOUNTER — PROCEDURE VISIT (OUTPATIENT)
Age: 69
End: 2024-11-27
Payer: COMMERCIAL

## 2024-11-27 ENCOUNTER — PATIENT MESSAGE (OUTPATIENT)
Dept: FAMILY MEDICINE CLINIC | Facility: CLINIC | Age: 69
End: 2024-11-27

## 2024-11-27 VITALS — BODY MASS INDEX: 30.86 KG/M2 | WEIGHT: 147 LBS | HEIGHT: 58 IN

## 2024-11-27 DIAGNOSIS — M43.16 SPONDYLOLISTHESIS OF LUMBAR REGION: ICD-10-CM

## 2024-11-27 DIAGNOSIS — G89.29 CHRONIC BILATERAL LOW BACK PAIN WITHOUT SCIATICA: Primary | ICD-10-CM

## 2024-11-27 DIAGNOSIS — G89.29 CHRONIC RIGHT-SIDED LOW BACK PAIN WITHOUT SCIATICA: Primary | ICD-10-CM

## 2024-11-27 DIAGNOSIS — M51.360 DEGENERATION OF INTERVERTEBRAL DISC OF LUMBAR REGION WITH DISCOGENIC BACK PAIN: ICD-10-CM

## 2024-11-27 DIAGNOSIS — M43.12 SPONDYLOLISTHESIS OF CERVICAL REGION: ICD-10-CM

## 2024-11-27 DIAGNOSIS — M54.2 NECK PAIN: ICD-10-CM

## 2024-11-27 DIAGNOSIS — M54.50 CHRONIC BILATERAL LOW BACK PAIN WITHOUT SCIATICA: Primary | ICD-10-CM

## 2024-11-27 DIAGNOSIS — M54.50 CHRONIC RIGHT-SIDED LOW BACK PAIN WITHOUT SCIATICA: Primary | ICD-10-CM

## 2024-11-27 DIAGNOSIS — M50.30 DDD (DEGENERATIVE DISC DISEASE), CERVICAL: ICD-10-CM

## 2024-11-27 PROCEDURE — 98941 CHIROPRACT MANJ 3-4 REGIONS: CPT | Performed by: CHIROPRACTOR

## 2024-11-27 NOTE — PROGRESS NOTES
Assessment:     1. Chronic right-sided low back pain without sciatica        2. Neck pain        3. DDD (degenerative disc disease), cervical        4. Degeneration of intervertebral disc of lumbar region with discogenic back pain        5. Spondylolisthesis of cervical region        6. Spondylolisthesis of lumbar region            Condition is the same in cervical and thoracic region. Lumbar spine improving.     PLAN/TREATMENT:    Return for treatment once next week.   Continue using ice 15 minutes as needed and twice per day.   Discussed trying PT as an option for strengthening/conditioning.  She defers on seeing pain management.  Not interested ion medication or injections.     Treatment: Myofascial release was performed to the lumbar paraspinals, right quadratus lumborum, right gluteus medius, levator scapulae, rhomboids, upper trapezius and cervical paraspinals.    Gentle mobilization/manipulation was performed of the cervical spine at C5-6, C6-7 utilizing stairstepping technique and to the thoracic spine at T3-4, T5-6 utilizing activator technique.  Manipulation was performed to the right SI joint, L4-5, L5-S1 utilizing gentle side-lying flexion distraction.  No HVLA was performed.  Avoided the area where the scar was in the right upper lumbar paraspinal region.    Subjective:  Danielle is here today with complaints of neck pain and LBP.    Feels ongoing neck/UB pain. Lb is a little better. Neck/UB felt a little better for a day or so after last visit.  Pain in neck/UB returned.  LB is still doing better overall.    Objective:  Moderate spasm and tenderness is noted in the lumbar paraspinals, right quadratus lumborum, right gluteus medius.  Moderate spasm and tenderness is noted in the cervical paraspinals and suboccipitals musculature, rhomboids, levator scapula.  Trigger points are noted in the rhomboids and suboccipital muscles.  Mild spasm of the lumbar paraspinals is noted bilaterally as well as the left  Q/L.      Decreased mobility and tenderness is noted at the right sacroiliac joint, L5-S1, T3-4, T5-6, C6-7.

## 2024-12-10 ENCOUNTER — EVALUATION (OUTPATIENT)
Age: 69
End: 2024-12-10
Payer: COMMERCIAL

## 2024-12-10 DIAGNOSIS — M54.50 CHRONIC BILATERAL LOW BACK PAIN WITHOUT SCIATICA: ICD-10-CM

## 2024-12-10 DIAGNOSIS — G89.29 CHRONIC BILATERAL LOW BACK PAIN WITHOUT SCIATICA: ICD-10-CM

## 2024-12-10 DIAGNOSIS — M54.2 NECK PAIN: Primary | ICD-10-CM

## 2024-12-10 PROCEDURE — 97140 MANUAL THERAPY 1/> REGIONS: CPT | Performed by: PHYSICAL THERAPIST

## 2024-12-10 PROCEDURE — 97163 PT EVAL HIGH COMPLEX 45 MIN: CPT | Performed by: PHYSICAL THERAPIST

## 2024-12-10 PROCEDURE — 97110 THERAPEUTIC EXERCISES: CPT | Performed by: PHYSICAL THERAPIST

## 2024-12-10 NOTE — PROGRESS NOTES
PT Evaluation   Today's date: 12/10/2024  Patient name: Danielle Sandoval  : 1955  MRN: [id number]  Referring provider: Feli Barrios DO  Dx:   Encounter Diagnosis     ICD-10-CM    1. Neck pain  M54.2       2. Chronic bilateral low back pain without sciatica  M54.50     G89.29         Start Time: 0745  Stop Time: 0845  Total time in clinic (min): 60 minutes    Assessment  Impairments: abnormal coordination, abnormal muscle firing, abnormal or restricted ROM, activity intolerance, impaired physical strength, lacks appropriate home exercise program, pain with function and poor body mechanics  Symptom irritability: moderate    Assessment details: Danielle Sandoval is a 69 y.o. female who presents with complaints of  Neck pain  (primary encounter diagnosis) and chronic bilateral low back pain without sciatica.  No further referral appears necessary at this time based upon examination results.  Patient presents to PT with impaired strength, impaired ROM, decreased flexibility and impaired ability to complete IADLs.  Prognosis is good given HEP compliance and PT 2-3x/wk.  Positive prognostic indicators include positive attitude toward recovery.   Please contact me if you have any questions or recommendations.  Thank you for the opportunity to share in  [facility].       Barriers to therapy: Complex PMH (see Episode Tab)   Understanding of Dx/Px/POC: good     Prognosis: good    Goals  Short Term:  Pt will report decreased levels of pain by at least 2 subjective ratings in 4 weeks  Pt will demonstrate improved ROM by at least 10 degrees in 4 weeks  Pt will demonstrate improved strength by 1/2 grade  Long Term:   Pt will be independent in their HEP in 8 weeks  Pt will be be pain free with IADL's  Pt will demonstrate improved FOTO, > 80         Plan  Patient would benefit from: skilled physical therapy  Planned modality interventions: thermotherapy: hydrocollator packs, cryotherapy, electrical  "stimulation/Macedonian stimulation and low level laser therapy    Planned therapy interventions: activity modification, joint mobilization, manual therapy, motor coordination training, neuromuscular re-education, patient education, self care, therapeutic activities, therapeutic exercise, graded activity, home exercise program, abdominal trunk stabilization, IASTM, balance, flexibility, functional ROM exercises, stretching and strengthening    Frequency: 2x week  Plan of Care beginning date: 12/10/2024  Plan of Care expiration date: 3/10/2025  Treatment plan discussed with: patient  Plan details: Prognosis above is given PT services 2x/week tapering to 1x/week over the next 3 months and home program adherence.      Subjective Evaluation    History of Present Illness  Mechanism of injury: Patient has been dealing with neck and back pain for 30 years.  She said that she feels like she has been experiencing increased pain recently.  Patient reports she went to the chiropractor.  He told her he wanted to avoid manipulations because of her osteoporosis.  She said he did some massage and it helped.  Patient reports she has some radiculopathy in the LUE.  She said she will experience radiating symptoms down the LLE when she sits on the edge of table or on a hard surface.  She said when she walks her pain goes away.    Pain   Neck  Currently 4/10  At Best 1-2/10  At Worst 8/10  Patient described the pain as a strong pull in the neck.    Low Back   Currently 9-10/10  At Best 0/10  At Worst 9-10/10  Patient described the pain as a strong pull in the back as well.    AGGS: bending over  EASES: walking, moving  Patient's Goal: \"I want to be able to sit without pain.\"        Objective     General Comments:      Cervical/Thoracic Comments  UQS: WNL     ROM   Cervical AROM   Flexion limited 50%   Extension limited 50%   Rotation limited bilaterally 25%   SB limited 50% bilaterally   Shoulder AROM   Flexion 160* bilaterally with pain " in the L shoulder   Extension 55* bilaterally   Abduction 160* bilaterally with pain in the L shoulder  ER R T2 L Suboccipitals    IR T7 bilaterally     Joint Mobility   Hypomobile CSG bilaterally       Precautions: Complex PMH includes essential HTN, hypothyroidism, hyperparathyroidism and osteoporosis    Daily Treatment Log  Manuals             CSG             1st Rib Mobs             2nd Rib Mobs             3rd Rib Mobs             T/S PA Mobs             C/S Musculature Stretching                                       Neuro Re-Ed             Pball Crushers             Pball Diagonals             MF Press             MF Twist                                                    Ther Ex             Bike             UBE             Resisted SS             SLR x 4                                                                  Ther Activity                                       Gait Training                                       Modalities

## 2024-12-16 ENCOUNTER — OFFICE VISIT (OUTPATIENT)
Age: 69
End: 2024-12-16
Payer: COMMERCIAL

## 2024-12-16 DIAGNOSIS — G89.29 CHRONIC BILATERAL LOW BACK PAIN WITHOUT SCIATICA: ICD-10-CM

## 2024-12-16 DIAGNOSIS — M54.50 CHRONIC BILATERAL LOW BACK PAIN WITHOUT SCIATICA: ICD-10-CM

## 2024-12-16 DIAGNOSIS — M54.2 NECK PAIN: Primary | ICD-10-CM

## 2024-12-16 PROCEDURE — 97110 THERAPEUTIC EXERCISES: CPT

## 2024-12-16 PROCEDURE — 97140 MANUAL THERAPY 1/> REGIONS: CPT

## 2024-12-16 NOTE — PROGRESS NOTES
"Daily Note     Today's date: 2024  Patient name: Danielle Sandoval  : 1955  MRN: [id number]  Referring provider: Feli Barrios DO  Dx:   Encounter Diagnosis     ICD-10-CM    1. Neck pain  M54.2       2. Chronic bilateral low back pain without sciatica  M54.50     G89.29           Start Time: 0741          Subjective: Patient reports she has noted symptoms onset with crossing legs.       Objective: See treatment diary below      Assessment: Danielle Sandoval tolerated treatment well. She appeared to gain relief from manual work although she appears sensitive to deep palpation. Gentle manual inidcated at this time. Continued treatment indicated.       Plan: Continue per plan of care.      Precautions: Complex PMH includes essential HTN, hypothyroidism, hyperparathyroidism and osteoporosis    Daily Treatment Log  Manuals             CSG             1st Rib Mobs GRC            2nd Rib Mobs             3rd Rib Mobs             T/S PA Mobs GRC            C/S Musculature Stretching             STM UT, piriformis SJ                         Neuro Re-Ed             Pball Crushers 5\"x20            Pball Diagonals 5\"x20            MF Press             MF Twist                                                    Ther Ex             Bike NuStep 8' L1            UBE             Resisted SS             SLR x 4  2x10 ea                                                                Ther Activity                                       Gait Training                                       Modalities                                            " Dutasteride Pregnancy And Lactation Text: This medication is absolutely contraindicated in women, especially during pregnancy and breast feeding. Feminization of male fetuses is possible if taking while pregnant.

## 2024-12-18 ENCOUNTER — OFFICE VISIT (OUTPATIENT)
Age: 69
End: 2024-12-18
Payer: COMMERCIAL

## 2024-12-18 DIAGNOSIS — G89.29 CHRONIC BILATERAL LOW BACK PAIN WITHOUT SCIATICA: ICD-10-CM

## 2024-12-18 DIAGNOSIS — M54.2 NECK PAIN: Primary | ICD-10-CM

## 2024-12-18 DIAGNOSIS — M54.50 CHRONIC BILATERAL LOW BACK PAIN WITHOUT SCIATICA: ICD-10-CM

## 2024-12-18 PROCEDURE — 97110 THERAPEUTIC EXERCISES: CPT | Performed by: PHYSICAL THERAPIST

## 2024-12-18 PROCEDURE — 97140 MANUAL THERAPY 1/> REGIONS: CPT | Performed by: PHYSICAL THERAPIST

## 2024-12-18 PROCEDURE — 97112 NEUROMUSCULAR REEDUCATION: CPT | Performed by: PHYSICAL THERAPIST

## 2024-12-26 ENCOUNTER — OFFICE VISIT (OUTPATIENT)
Age: 69
End: 2024-12-26
Payer: COMMERCIAL

## 2024-12-26 DIAGNOSIS — M54.2 NECK PAIN: Primary | ICD-10-CM

## 2024-12-26 DIAGNOSIS — M54.50 CHRONIC BILATERAL LOW BACK PAIN WITHOUT SCIATICA: ICD-10-CM

## 2024-12-26 DIAGNOSIS — G89.29 CHRONIC BILATERAL LOW BACK PAIN WITHOUT SCIATICA: ICD-10-CM

## 2024-12-26 PROCEDURE — 97110 THERAPEUTIC EXERCISES: CPT | Performed by: PHYSICAL THERAPIST

## 2024-12-26 PROCEDURE — 97140 MANUAL THERAPY 1/> REGIONS: CPT | Performed by: PHYSICAL THERAPIST

## 2024-12-26 PROCEDURE — 97112 NEUROMUSCULAR REEDUCATION: CPT | Performed by: PHYSICAL THERAPIST

## 2024-12-29 NOTE — PROGRESS NOTES
"Daily Note     Today's date: 2024  Patient name: Danielle Sandoval  : 1955  MRN: [id number]  Referring provider: Feli Barrios DO  Dx:   Encounter Diagnosis     ICD-10-CM    1. Neck pain  M54.2       2. Chronic bilateral low back pain without sciatica  M54.50     G89.29                      Subjective: Patient reports she has noted symptoms onset with crossing legs.       Objective: See treatment diary below      Assessment: Danielle Sandoval tolerated treatment well. She appeared to gain relief from manual work although she appears sensitive to deep palpation. Gentle manual inidcated at this time. Continued treatment indicated.       Plan: Continue per plan of care.      Precautions: Complex PMH includes essential HTN, hypothyroidism, hyperparathyroidism and osteoporosis    Daily Treatment Log  Manuals             CSG             1st Rib Mobs GRC            2nd Rib Mobs             3rd Rib Mobs             T/S PA Mobs GRC            C/S Musculature Stretching             STM UT, piriformis SJ                         Neuro Re-Ed             Pball Crushers 5\"x20            Pball Diagonals 5\"x20            MF Press             MF Twist                                                    Ther Ex             Bike NuStep 8' L1            UBE             Resisted SS             SLR x 4  2x10 ea                                                                Ther Activity                                       Gait Training                                       Modalities                                            "

## 2025-01-07 ENCOUNTER — OFFICE VISIT (OUTPATIENT)
Dept: FAMILY MEDICINE CLINIC | Facility: CLINIC | Age: 70
End: 2025-01-07
Payer: COMMERCIAL

## 2025-01-07 VITALS
WEIGHT: 142.6 LBS | OXYGEN SATURATION: 98 % | TEMPERATURE: 98 F | SYSTOLIC BLOOD PRESSURE: 130 MMHG | HEIGHT: 58 IN | DIASTOLIC BLOOD PRESSURE: 88 MMHG | BODY MASS INDEX: 29.93 KG/M2 | HEART RATE: 73 BPM

## 2025-01-07 DIAGNOSIS — J06.9 UPPER RESPIRATORY TRACT INFECTION, UNSPECIFIED TYPE: Primary | ICD-10-CM

## 2025-01-07 DIAGNOSIS — I10 ESSENTIAL HYPERTENSION: ICD-10-CM

## 2025-01-07 PROCEDURE — 99214 OFFICE O/P EST MOD 30 MIN: CPT | Performed by: FAMILY MEDICINE

## 2025-01-07 RX ORDER — AZITHROMYCIN 250 MG/1
TABLET, FILM COATED ORAL
Qty: 6 TABLET | Refills: 0 | Status: SHIPPED | OUTPATIENT
Start: 2025-01-07 | End: 2025-01-12

## 2025-01-07 RX ORDER — BENZONATATE 100 MG/1
100 CAPSULE ORAL 3 TIMES DAILY PRN
Qty: 20 CAPSULE | Refills: 0 | Status: SHIPPED | OUTPATIENT
Start: 2025-01-07

## 2025-01-07 NOTE — PATIENT INSTRUCTIONS
Tessalon perles as needed for cough - up to 3x/day    Antibiotic if not improving/worsening into next 2 days    Recommendation to increase fluids - particularly water, rest, saline nasal spray and humidified air

## 2025-01-07 NOTE — PROGRESS NOTES
"Name: Danielle Sandoval      : 1955      MRN: [id number]  Encounter Provider: Feli Barrios DO  Encounter Date: 2025   Encounter department: [facility]  :  Assessment & Plan  Essential hypertension  Bp slightly elevated today  Pt is drinking coffee during encounter         Upper respiratory tract infection, unspecified type  Pt does not tolerate otc meds due to adr's  Patient Instructions   Tessalon perles as needed for cough - up to 3x/day    Antibiotic if not improving/worsening into next 2 days    Recommendation to increase fluids - particularly water, rest, saline nasal spray and humidified air        Orders:    benzonatate (TESSALON PERLES) 100 mg capsule; Take 1 capsule (100 mg total) by mouth 3 (three) times a day as needed for cough    azithromycin (ZITHROMAX) 250 mg tablet; Take 2 tabs po day 1, then take 1 tab po days 2-5;  eat yogurt while taking; hold crestor X 10 days           History of Present Illness     Cough  Associated symptoms include postnasal drip and wheezing. Pertinent negatives include no chills, fever or shortness of breath.   Fever  Associated symptoms include congestion and coughing. Pertinent negatives include no abdominal pain, chills, diaphoresis, fever, nausea or vomiting.     Here for acute concern  Started with symptoms X about a week ago  Coughing/congestion  Not taking anything otc      Review of Systems   Constitutional:  Negative for chills, diaphoresis and fever.        Fever in the beginning  But not now.     HENT:  Positive for congestion, postnasal drip, sinus pressure and sinus pain.    Respiratory:  Positive for cough and wheezing. Negative for shortness of breath.    Gastrointestinal:  Negative for abdominal pain, diarrhea, nausea and vomiting.       Objective   /88   Pulse 73   Temp 98 °F (36.7 °C)   Ht 4' 10\" (1.473 m)   Wt 64.7 kg (142 lb 9.6 oz)   LMP 1989 (Exact Date) Comment: hysterectomy, total  SpO2 " 98%   BMI 29.80 kg/m²      Physical Exam  Vitals and nursing note reviewed.   Constitutional:       General: She is not in acute distress.     Appearance: Normal appearance. She is not ill-appearing or toxic-appearing.   HENT:      Right Ear: Tympanic membrane normal.      Left Ear: Tympanic membrane normal.      Nose: Nose normal.      Mouth/Throat:      Mouth: Mucous membranes are moist.      Pharynx: No oropharyngeal exudate or posterior oropharyngeal erythema.   Cardiovascular:      Rate and Rhythm: Normal rate and regular rhythm.   Pulmonary:      Effort: Pulmonary effort is normal. No respiratory distress.      Breath sounds: Normal breath sounds. No wheezing, rhonchi or rales.      Comments: No use of accessory respiratory muscles

## 2025-01-09 ENCOUNTER — APPOINTMENT (OUTPATIENT)
Age: 70
End: 2025-01-09
Payer: COMMERCIAL

## 2025-01-16 ENCOUNTER — APPOINTMENT (OUTPATIENT)
Age: 70
End: 2025-01-16
Payer: COMMERCIAL

## 2025-01-20 ENCOUNTER — APPOINTMENT (OUTPATIENT)
Age: 70
End: 2025-01-20
Payer: COMMERCIAL

## 2025-01-21 ENCOUNTER — CLINICAL SUPPORT (OUTPATIENT)
Age: 70
End: 2025-01-21
Payer: COMMERCIAL

## 2025-01-21 VITALS
BODY MASS INDEX: 30.47 KG/M2 | HEART RATE: 66 BPM | WEIGHT: 145.8 LBS | OXYGEN SATURATION: 98 % | TEMPERATURE: 97.7 F | DIASTOLIC BLOOD PRESSURE: 72 MMHG | SYSTOLIC BLOOD PRESSURE: 130 MMHG

## 2025-01-21 DIAGNOSIS — M81.0 AGE-RELATED OSTEOPOROSIS WITHOUT CURRENT PATHOLOGICAL FRACTURE: Primary | ICD-10-CM

## 2025-01-21 PROCEDURE — 96372 THER/PROPH/DIAG INJ SC/IM: CPT

## 2025-01-21 NOTE — PROGRESS NOTES
Assessment/Plan:    Danielle Sandvoal came into the [facility] Office today 01/21/25 to receive Prolia injection.      Verbal consent obtained.  Consent given by: patient    patient states patient has been medically healthy with no underlining concerns/complications.      Danielle Sandoval presents with no symptoms today.       All insturctions were reviewed with the patient.    If the patient should have any questions/concerns, advised patient to contacted [facility].       Subjective:     History provided by: patient    Patient ID: Danielle Sandoval is a 69 y.o. female      Objective:    Vitals:    01/21/25 0828 01/21/25 0832   BP: 142/72 130/72   BP Location: Left arm Right arm   Patient Position: Sitting Sitting   Cuff Size: Adult Adult   Pulse: 66    Temp: 97.7 °F (36.5 °C)    TempSrc: Tympanic    SpO2: 98%    Weight: 66.1 kg (145 lb 12.8 oz)        Patient tolerated the injection well without any complications.  Injection site/s upper right arm.  Medication was provided by office.    Patient signed consent form yes   Patient signed ABN form no (If no patient is not a medicare patient).   Patient waited 15 minutes after injection yes (This only applies to patient's receiving first time injection).       Last Visit: 10/23/2024  Next visit:Visit date not found

## 2025-01-22 DIAGNOSIS — I10 ESSENTIAL HYPERTENSION: ICD-10-CM

## 2025-01-22 RX ORDER — LOSARTAN POTASSIUM 100 MG/1
100 TABLET ORAL DAILY
Qty: 90 TABLET | Refills: 1 | Status: SHIPPED | OUTPATIENT
Start: 2025-01-22

## 2025-01-23 ENCOUNTER — APPOINTMENT (OUTPATIENT)
Age: 70
End: 2025-01-23
Payer: COMMERCIAL

## 2025-01-27 ENCOUNTER — OFFICE VISIT (OUTPATIENT)
Age: 70
End: 2025-01-27
Payer: COMMERCIAL

## 2025-01-27 DIAGNOSIS — M54.50 CHRONIC BILATERAL LOW BACK PAIN WITHOUT SCIATICA: ICD-10-CM

## 2025-01-27 DIAGNOSIS — M54.2 NECK PAIN: Primary | ICD-10-CM

## 2025-01-27 DIAGNOSIS — G89.29 CHRONIC BILATERAL LOW BACK PAIN WITHOUT SCIATICA: ICD-10-CM

## 2025-01-27 PROCEDURE — 97110 THERAPEUTIC EXERCISES: CPT | Performed by: PHYSICAL THERAPIST

## 2025-01-27 PROCEDURE — 97112 NEUROMUSCULAR REEDUCATION: CPT | Performed by: PHYSICAL THERAPIST

## 2025-01-27 NOTE — PROGRESS NOTES
"Daily Note   Today's date: 2025  Patient name: Danielle Sandoval  : 1955  MRN: [id number]  Referring provider: Feli Barrios DO  Dx:   Encounter Diagnosis     ICD-10-CM    1. Neck pain  M54.2       2. Chronic bilateral low back pain without sciatica  M54.50     G89.29         Start Time: 0745  Stop Time: 0830  Total time in clinic (min): 45 minutes    Subjective: Patient apologizes for missing several appointments.    Objective: See treatment diary below    Assessment: Tolerated treatment well.  PT had patient restart exercises.  Patient demonstrated fatigue post treatment, exhibited good technique with therapeutic exercises, and would benefit from continued PT    Plan: Continue per plan of care.      Precautions: Complex PMH includes essential HTN, hypothyroidism, hyperparathyroidism and osteoporosis    Daily Treatment Log  Manuals          CSG             1st Rib Mobs GRC  GRC          2nd Rib Mobs             3rd Rib Mobs             T/S PA Mobs GRC  Prone   Gentle  GRC          C/S Musculature Stretching             STM UT, piriformis SJ            Piriformis Stretching   GRC          Neuro Re-Ed          Pball Crushers 5\"x20 5\"x30 5\"x30 5\"x30         Pball Diagonals 5\"x20 5\"x20 ea 5\"x20 ea 5\"20 ea         MF Press  Black  30x ea Black 30x ea          MF Twist  Black 30x ea Black 30x ea          Butterflies     15x5\"          Chin Tucks    15x5\"         Serratus Punches    20x5\"                       Ther Ex          Bike NuStep 8' L1 Nu Step  8'   L1  Nu Step  8'   L1          UBE             Resisted SS             SLR x 4  2x10 ea 2x10 ea 2x10 ea          Pball Rollout   10x10\" ea 10x10\" ea 10x10\" ea         Rows Seated  Purple  20x  Purple  20x           Sh Ext Seated  Purple   20x  Purple   20x           Seated T/S Ext    15x5\"                                   Ther Activity                                       Gait " Training                                       Modalities

## 2025-02-07 DIAGNOSIS — I10 ESSENTIAL HYPERTENSION: ICD-10-CM

## 2025-02-07 RX ORDER — AMLODIPINE BESYLATE 2.5 MG/1
2.5 TABLET ORAL DAILY
Qty: 90 TABLET | Refills: 0 | Status: SHIPPED | OUTPATIENT
Start: 2025-02-07

## 2025-02-18 ENCOUNTER — OFFICE VISIT (OUTPATIENT)
Dept: FAMILY MEDICINE CLINIC | Facility: CLINIC | Age: 70
End: 2025-02-18
Payer: COMMERCIAL

## 2025-02-18 VITALS
DIASTOLIC BLOOD PRESSURE: 72 MMHG | WEIGHT: 145.2 LBS | OXYGEN SATURATION: 98 % | HEIGHT: 58 IN | HEART RATE: 55 BPM | BODY MASS INDEX: 30.48 KG/M2 | SYSTOLIC BLOOD PRESSURE: 126 MMHG | TEMPERATURE: 97.2 F

## 2025-02-18 DIAGNOSIS — E78.2 MIXED HYPERLIPIDEMIA: ICD-10-CM

## 2025-02-18 DIAGNOSIS — Z96.21 COCHLEAR IMPLANT STATUS: ICD-10-CM

## 2025-02-18 DIAGNOSIS — Z00.00 PHYSICAL EXAM: Primary | ICD-10-CM

## 2025-02-18 DIAGNOSIS — C44.712 BASAL CELL CARCINOMA (BCC) OF RIGHT LOWER LEG: ICD-10-CM

## 2025-02-18 DIAGNOSIS — D03.72 MELANOMA IN SITU OF LEFT LOWER LEG (HCC): ICD-10-CM

## 2025-02-18 DIAGNOSIS — I10 ESSENTIAL HYPERTENSION: ICD-10-CM

## 2025-02-18 DIAGNOSIS — H90.3 SENSORINEURAL HEARING LOSS (SNHL) OF BOTH EARS: ICD-10-CM

## 2025-02-18 DIAGNOSIS — E03.8 SUBCLINICAL HYPOTHYROIDISM: ICD-10-CM

## 2025-02-18 DIAGNOSIS — R79.89 ELEVATED PTHRP LEVEL: ICD-10-CM

## 2025-02-18 DIAGNOSIS — M81.0 OSTEOPOROSIS, UNSPECIFIED OSTEOPOROSIS TYPE, UNSPECIFIED PATHOLOGICAL FRACTURE PRESENCE: ICD-10-CM

## 2025-02-18 PROCEDURE — 99214 OFFICE O/P EST MOD 30 MIN: CPT | Performed by: FAMILY MEDICINE

## 2025-02-18 PROCEDURE — 99396 PREV VISIT EST AGE 40-64: CPT | Performed by: FAMILY MEDICINE

## 2025-02-18 NOTE — PROGRESS NOTES
Name: Danielle Sandoval      : 1955      MRN: [id number]  Encounter Provider: Feli Barrios DO  Encounter Date: 2025   Encounter department: [facility]  :  Assessment & Plan  Physical exam  Anticipatory guidance and preventative medicine discussed  Reviewed labs done 10/2204  Mildly elevated glucose  Lipids well controlled.  Normal tsh and cbc  Vaccines up to date  Consider RSV vaccines  Dexa 10/2024  Colonoscopy due   Declines pelvic exam  Mammo up to date     Patient Instructions   Schedule stress test   Since you do not see gyn - ask derm to do a full exam including genital region.    Return in 6 mos                   Essential hypertension  Urge pt to get stress test as perviously ordered.  Controlled.  Previously was elevated  Pt notices that if BP high - she does get a headache.           Subclinical hypothyroidism  Stable TSH         Sensorineural hearing loss (SNHL) of both ears         Basal cell carcinoma (BCC) of right lower leg  Goes to derm regularly         Melanoma in situ of left lower leg (HCC)         Osteoporosis, unspecified osteoporosis type, unspecified pathological fracture presence         Cochlear implant status  Vaccines up to date         Elevated PTHrP level  Calcium has been normal         Mixed hyperlipidemia  As above.               Depression Screening and Follow-up Plan: Patient was screened for depression during today's encounter. They screened negative with a PHQ-2 score of 0.        History of Present Illness   HPI  Here for AWV    Review of Systems   Constitutional:  Negative for chills, diaphoresis and fever.   Respiratory:  Negative for cough, shortness of breath and wheezing.    Cardiovascular:  Negative for chest pain, palpitations and leg swelling.   Gastrointestinal:  Negative for abdominal pain, blood in stool, constipation, diarrhea, nausea and vomiting.        Has IBS  But no changes from pt's norm     Genitourinary:   "Negative for dysuria and vaginal bleeding.   Neurological:  Positive for headaches. Negative for dizziness and syncope.        Occasional headaches     Psychiatric/Behavioral:  Positive for sleep disturbance. Negative for dysphoric mood, self-injury and suicidal ideas. The patient is not nervous/anxious.         Chronci difficulty sleeping  Not new  Works night shift         Objective   /72   Pulse 55   Temp (!) 97.2 °F (36.2 °C)   Ht 4' 10\" (1.473 m)   Wt 65.9 kg (145 lb 3.2 oz)   LMP 06/01/1989 (Exact Date) Comment: hysterectomy, total  SpO2 98%   BMI 30.35 kg/m²      Physical Exam  Vitals and nursing note reviewed.   Constitutional:       General: She is not in acute distress.     Appearance: Normal appearance. She is not ill-appearing or toxic-appearing.   HENT:      Right Ear: Tympanic membrane normal. There is no impacted cerumen.      Left Ear: Tympanic membrane normal. There is no impacted cerumen.      Nose: Nose normal. No congestion.      Mouth/Throat:      Mouth: Mucous membranes are moist.      Pharynx: No oropharyngeal exudate or posterior oropharyngeal erythema.   Eyes:      General: No scleral icterus.     Extraocular Movements: Extraocular movements intact.      Pupils: Pupils are equal, round, and reactive to light.   Neck:      Vascular: No carotid bruit.   Cardiovascular:      Rate and Rhythm: Normal rate and regular rhythm.      Pulses: Normal pulses.      Heart sounds: Normal heart sounds. No murmur heard.  Pulmonary:      Effort: Pulmonary effort is normal. No respiratory distress.      Breath sounds: Normal breath sounds. No wheezing, rhonchi or rales.   Abdominal:      General: There is no distension.      Palpations: Abdomen is soft. There is no mass.      Tenderness: There is no abdominal tenderness. There is no guarding or rebound.   Musculoskeletal:      Cervical back: Neck supple.      Right lower leg: No edema.      Left lower leg: No edema.   Lymphadenopathy:      " Cervical: No cervical adenopathy.   Skin:     General: Skin is warm.      Coloration: Skin is not jaundiced.      Findings: No rash.      Comments: Large scar right posterior flank from prior skin cancer removal.     Neurological:      General: No focal deficit present.      Mental Status: She is alert and oriented to person, place, and time.   Psychiatric:         Mood and Affect: Mood normal.         Behavior: Behavior normal.         Thought Content: Thought content normal.         Judgment: Judgment normal.

## 2025-02-18 NOTE — ASSESSMENT & PLAN NOTE
Urge pt to get stress test as perviously ordered.  Controlled.  Previously was elevated  Pt notices that if BP high - she does get a headache.

## 2025-02-18 NOTE — PATIENT INSTRUCTIONS
Schedule stress test   Since you do not see gyn - ask derm to do a full exam including genital region.    Return in 6 mos

## 2025-04-24 ENCOUNTER — HOSPITAL ENCOUNTER (OUTPATIENT)
Dept: NON INVASIVE DIAGNOSTICS | Facility: HOSPITAL | Age: 70
Discharge: HOME | End: 2025-04-24
Attending: FAMILY MEDICINE
Payer: COMMERCIAL

## 2025-04-24 VITALS — HEIGHT: 58 IN | BODY MASS INDEX: 30.44 KG/M2 | WEIGHT: 145 LBS

## 2025-04-24 DIAGNOSIS — R07.9 CHEST PAIN, UNSPECIFIED TYPE: ICD-10-CM

## 2025-04-24 LAB
CHEST PAIN STATEMENT: NORMAL
MAX DIASTOLIC BP: 76 MMHG
MAX PREDICTED HEART RATE: 153 BPM
PROTOCOL NAME: NORMAL
REASON FOR TERMINATION: NORMAL
STRESS POST EXERCISE DUR MIN: 2 MIN
STRESS POST EXERCISE DUR SEC: 59 SEC
STRESS POST PEAK HR: 117 BPM
STRESS POST PEAK SYSTOLIC BP: 190 MMHG
TARGET HR FORMULA: NORMAL
TEST INDICATION: NORMAL

## 2025-04-24 PROCEDURE — 93016 CV STRESS TEST SUPVJ ONLY: CPT | Performed by: INTERNAL MEDICINE

## 2025-04-24 PROCEDURE — 93017 CV STRESS TEST TRACING ONLY: CPT

## 2025-04-24 PROCEDURE — 93018 CV STRESS TEST I&R ONLY: CPT | Performed by: INTERNAL MEDICINE

## 2025-04-25 LAB
MAX HR PERCENT: 76 %
MAX HR: 117 BPM
RATE PRESSURE PRODUCT: NORMAL
SL CV STRESS RECOVERY BP: NORMAL MMHG
SL CV STRESS RECOVERY HR: 53 BPM
SL CV STRESS RECOVERY O2 SAT: 99 %
SL CV STRESS STAGE REACHED: 1
STRESS ANGINA INDEX: 0
STRESS BASELINE BP: NORMAL MMHG
STRESS BASELINE HR: 58 BPM
STRESS O2 SAT REST: 97 %
STRESS PEAK HR: 117 BPM
STRESS POST ESTIMATED WORKLOAD: 4.6 METS
STRESS POST EXERCISE DUR MIN: 2 MIN
STRESS POST EXERCISE DUR SEC: 59 SEC
STRESS POST O2 SAT PEAK: 98 %
STRESS POST PEAK BP: 190 MMHG

## 2025-05-06 ENCOUNTER — RESULTS FOLLOW-UP (OUTPATIENT)
Dept: FAMILY MEDICINE CLINIC | Facility: CLINIC | Age: 70
End: 2025-05-06

## 2025-05-06 DIAGNOSIS — R07.9 CHEST PAIN, UNSPECIFIED TYPE: Primary | ICD-10-CM

## 2025-05-06 NOTE — RESULT ENCOUNTER NOTE
Good morning  Your stress test was not diagnostic due to not achieving the targeted predicted maximum heart rate  I would like to refer you to cardiology to determine what the best study would be.  If in the interim any chest pains, shortness of breath out of the ordinary ,... - please call 911  Thanks  Dr cast

## 2025-05-08 NOTE — RESULT ENCOUNTER NOTE
Good morning  I see that you have an appt with cardio but not until August.  If at any point from now until then you experience return of chest pain, worsening shortness of breath,... - please return to the ER    Enjoy the sunshine!  Dr cast.

## 2025-05-16 ENCOUNTER — OFFICE VISIT (OUTPATIENT)
Dept: DERMATOLOGY | Facility: CLINIC | Age: 70
End: 2025-05-16
Payer: COMMERCIAL

## 2025-05-16 VITALS — HEIGHT: 58 IN | BODY MASS INDEX: 30.42 KG/M2 | TEMPERATURE: 98.1 F | WEIGHT: 144.9 LBS

## 2025-05-16 DIAGNOSIS — Z86.006 HISTORY OF MELANOMA IN SITU: ICD-10-CM

## 2025-05-16 DIAGNOSIS — Z85.828 HISTORY OF BASAL CELL CARCINOMA: Primary | ICD-10-CM

## 2025-05-16 DIAGNOSIS — L82.0 INFLAMED SEBORRHEIC KERATOSIS: ICD-10-CM

## 2025-05-16 DIAGNOSIS — D23.9 DERMATOFIBROMA: ICD-10-CM

## 2025-05-16 DIAGNOSIS — D22.9 MULTIPLE MELANOCYTIC NEVI: ICD-10-CM

## 2025-05-16 DIAGNOSIS — L81.4 LENTIGINES: ICD-10-CM

## 2025-05-16 DIAGNOSIS — D18.01 CHERRY ANGIOMA: ICD-10-CM

## 2025-05-16 DIAGNOSIS — L82.1 SEBORRHEIC KERATOSIS: ICD-10-CM

## 2025-05-16 PROCEDURE — 17110 DESTRUCTION B9 LES UP TO 14: CPT

## 2025-05-16 PROCEDURE — 99214 OFFICE O/P EST MOD 30 MIN: CPT

## 2025-05-16 NOTE — PATIENT INSTRUCTIONS
HISTORY OF BASAL CELL CARCINOMA          Assessment and Plan:  Based on a thorough discussion of this condition and the management approach to it (including a comprehensive discussion of the known risks, side effects and potential benefits of treatment), the patient (family) agrees to implement the following specific plan:  Will continue to monitor for reoccurrence.  6 month skin exams.   Wear SPF 30+ when outdoors     HISTORY OF MELANOMA IN SITU           Assessment and Plan:  Based on a thorough discussion of this condition and the management approach to it (including a comprehensive discussion of the known risks, side effects and potential benefits of treatment), the patient (family) agrees to implement the following specific plan:  Monitor for recurrence  When outside we recommend using a wide brim hat, sunglasses, long sleeve and pants, sunscreen with SPF 30+ with reapplication every 2 hours, or SPF specific clothing   SEBORRHEIC KERATOSES  - Relevant exam: Scattered over the trunk/extremities are waxy brown to black plaques and papules with stuck on appearance and consistent dermoscopy  - Exam and clinical history consistent with seborrheic keratoses  - Counseled that these are benign growths that do not require treatment     MELANOCYTIC NEVI  -Relevant exam: Scattered over the trunk/extremities are homogenously pigmented brown macules and papules. ELM performed and without concerning findings. No outliers unless otherwise noted in today's note  - Exam and clinical history consistent with melanocytic nevi  - Counseled to return to clinic prior to scheduled appointment should any of these lesions change or should any new lesions of concern arise  - Counseled on use of sun protection daily. Reviewed latest FDA sunscreen guidelines, including use of broad spectrum (UVA and UVB blocking) sunscreen or sun protective clothing with SPF 30-50 every 2-3 hours and reapplied after exposure to water      LENTIGINES  OTHER SKIN CHANGES DUE TO CHRONIC EXPOSURE TO NONIONIZING RADIATION  - Relevant exam: Over sun exposed areas are brown macules. ELM performed and without concerning findings.  - Exam and clinical history consistent with lentigines.  - Counseled to return to clinic prior to scheduled appointment should any of these lesions change or should any new lesions of concern arise.  - Recommended use of sunscreen as above and below.     CHERRY ANGIOMAS  - Relevant exam: Scattered over the trunk/extremities are red papules  - Exam and clinical history consistent with cherry angiomas  - Educated that these are benign            INFLAMED SEBORRHEIC KERATOSIS          Assessment and Plan:  Based on a thorough discussion of this condition and the management approach to it (including a comprehensive discussion of the known risks, side effects and potential benefits of treatment), the patient (family) agrees to implement the following specific plan:  Cryotherapy given symptoms and inflammation  After care discussed     PROCEDURE:  DESTRUCTION OF BENIGN LESIONS  After a thorough discussion of treatment options and risk/benefits/alternatives (including but not limited to local pain, scarring, dyspigmentation, blistering, and possible superinfection), verbal and written consent were obtained and the aforementioned lesions were treated on with cryotherapy using liquid nitrogen x 1 cycle for 5-10 seconds.     TOTAL NUMBER of 3 lesions were treated today on the ANATOMIC LOCATION: right upper chest, left upper chest, mid upper back     The patient tolerated the procedure well, and after-care instructions were provided.     DERMATOFIBROMA       Assessment and Plan:  Based on a thorough discussion of this condition and the management approach to it (including a comprehensive discussion of the known risks, side effects and potential benefits of treatment), the patient (family) agrees to implement the following specific  "plan:  Reassured benign     Assessment and Plan:  A dermatofibroma is a common benign fibrous nodule that most often arises on the skin of the lower legs.  A dermatofibroma is also called a \"cutaneous fibrous histiocytoma.\"  Dermatofibromas occur at all ages and in people of every ethnicity. They are more common in women than in men.     It is not clear if dermatofibroma is a reactive process or if it is a neoplasm. The lesions are made up of proliferating fibroblasts. Histiocytes may also be involved.  They are sometimes attributed to an insect bite or ingrownhair or local trauma, but not consistently. They may be more numerous in patients with altered immunity.     Dermatofibromas most often occur on the legs and arms, but may also arise on the trunk or any site of the body.  Typical clinical features include the following:  People may have 1 or up to 15 lesions.  Size varies from 0.5-1.5 cm diameter; most lesions are 7-10 mm diameter.  They are firm nodules tethered to the skin surface and mobile over subcutaneous tissue.  The skin \"dimples\" on pinching the lesion.  Color may be pink to light brown in white skin, and dark brown to black in dark skin; some appear paler in the center.  They do not usually cause symptoms, but they are sometimes painful or itchy.  Because they are often raised lesions, they may be traumatized, for example by a razor.  Occasionally dozens may erupt within a few months, usually in the setting of immunosuppression (for example autoimmune disease, cancer or certain medications).  Dermatofibroma does not give rise to cancer. However, occasionally, it may be mistaken for dermatofibrosarcoma or desmoplastic melanoma.     A dermatofibroma is harmless and seldom causes any symptoms. Usually, only reassurance is needed. If it is nuisance or causing concern, the lesion can be removed surgically, resulting in a scar that is, by definition, usually longer in diameter than the widest portion of " the dermatofibroma.  Cryotherapy, shave biopsy and laser surgery are rarely completely successful.  Skin punch biopsy or incisional biopsy may be undertaken if there is an atypical feature such as recent enlargement, ulceration, or asymmetrical structures and colours on dermatoscopy.

## 2025-05-16 NOTE — PROGRESS NOTES
"[facility] Dermatology Clinic Note     Patient Name: Danielle Sandoval  Encounter Date: 5/16/25       Have you been cared for by a [facility] Dermatologist in the last 3 years and, if so, which description applies to you? Yes. I have been here within the last 3 years, and my medical history has NOT changed since that time. I am not of child-bearing potential.     REVIEW OF SYSTEMS:  Have you recently had or currently have any of the following? No changes in my recent health.   PAST MEDICAL HISTORY:  Have you personally ever had or currently have any of the following?  If \"YES,\" then please provide more detail. No changes in my medical history.   HISTORY OF IMMUNOSUPPRESSION: Do you have a history of any of the following:  Systemic Immunosuppression such as Diabetes, Biologic or Immunotherapy, Chemotherapy, Organ Transplantation, Bone Marrow Transplantation or Prednisone?  No     Answering \"YES\" requires the addition of the dotphrase \"IMMUNOSUPPRESSED\" as the first diagnosis of the patient's visit.   FAMILY HISTORY:  Any \"first degree relatives\" (parent, brother, sister, or child) with the following?    No changes in my family's known health.   PATIENT EXPERIENCE:    Do you want the Dermatologist to perform a COMPLETE skin exam today including a clinical examination under the \"bra and underwear\" areas?  Yes  If necessary, do we have your permission to call and leave a detailed message on your Preferred Phone number that includes your specific medical information?  Yes      Allergies[1] Current Medications[2]        Patient present for 6 month skin check, hx of BCC and melanoma.        Whom besides the patient is providing clinical information about today's encounter?   NO ADDITIONAL HISTORIAN (patient alone provided history)    Physical Exam and Assessment/Plan by Diagnosis:    HISTORY OF BASAL CELL CARCINOMA     Physical Exam:  Anatomic Location Affected:  right lower back, right medial shin  Morphological " "Description of scar:  well healed  Suspected Recurrence: No       Additional History of Present Condition:  History of superficial basal cell carcinoma treated with an excision on Oct 15, 2024. [id number]. Patient had previously attempted to treat lesion with imiquimod but was unsuccessful and decision was made to excise.      Assessment and Plan:  Based on a thorough discussion of this condition and the management approach to it (including a comprehensive discussion of the known risks, side effects and potential benefits of treatment), the patient (family) agrees to implement the following specific plan:  Will continue to monitor for reoccurrence.  6 month skin exams.   Wear SPF 30+ when outdoors     HISTORY OF MELANOMA IN SITU      Physical Exam:  Anatomic Location Affected:  Left anterior shin  Morphological Description of Scar:  Well healed surgical scar  Year Treated: 7/17/2019  TNM Classification: Not available   Suspected Recurrence: no  Regional adenopathy: no     Additional History of Present Condition:  History of melanoma in situ, no signs of recurrence. She is up to date on her dental and eye exams.      Assessment and Plan:  Based on a thorough discussion of this condition and the management approach to it (including a comprehensive discussion of the known risks, side effects and potential benefits of treatment), the patient (family) agrees to implement the following specific plan:  Monitor for recurrence  When outside we recommend using a wide brim hat, sunglasses, long sleeve and pants, sunscreen with SPF 30+ with reapplication every 2 hours, or SPF specific clothing       MELANOCYTIC NEVI (\"Moles\")    Physical Exam:  Anatomic Location Affected:   Mostly on sun-exposed areas of the trunk and extremities   Morphological Description:  Scattered, 1-4mm round to ovoid, symmetrical-appearing, even bordered, skin colored to dark brown macules/papules      Additional History of Present Condition:  Present on " "exam. Denies any pain, itch, bleeding. Present for years. Denies actively changing or growing moles.     Assessment and Plan:  Based on a thorough discussion of this condition and the management approach to it (including a comprehensive discussion of the known risks, side effects and potential benefits of treatment), the patient (family) agrees to implement the following specific plan:  Reassured benign.   Monitor for changes. ABCDE's of melanoma handout provided.   Practice sun protection. Apply broad spectrum (UVA and UVB) sunscreen, SPF 30 or higher every 2 hours. Wear sun protective clothing, hats, and sunglasses.     LENTIGO    Physical Exam:  Anatomic Location Affected:  sun exposed areas of face, trunk and extremities   Morphological Description:  multiple scattered tan to brown evenly pigmented macules      Additional History of Present Condition:  Present on exam.     Assessment and Plan:  Based on a thorough discussion of this condition and the management approach to it (including a comprehensive discussion of the known risks, side effects and potential benefits of treatment), the patient (family) agrees to implement the following specific plan:  Reassured benign.   Practice sun protection. Apply broad spectrum (UVA and UVB) sunscreen, SPF 30 or higher every 2 hours. Wear sun protective clothing, hats, and sunglasses.     SEBORRHEIC KERATOSIS; NON-INFLAMED    Physical Exam:  Anatomic Location Affected:face, trunk and extremities   Morphological Description:  Flat and raised, waxy, smooth to warty textured, yellow to brownish-grey to dark brown to blackish, discrete, \"stuck-on\" appearing papules.      Additional History of Present Condition:  Present on exam. Patient denies any itching.     Assessment and Plan:  Based on a thorough discussion of this condition and the management approach to it (including a comprehensive discussion of the known risks, side effects and potential benefits of treatment), the " "patient (family) agrees to implement the following specific plan:  Reassured benign.     ANGIOMA (\"CHERRY ANGIOMA\")     Physical Exam:  Anatomic Location: scattered across trunk and extremities   Morphologic Description: 1-2 mm firm red to maroon to purples to blue discrete papule, on dermoscopy lacunae  by white septae seen       Additional History of Present Condition:  Present on exam.     Plan:  Reassured benign.        INFLAMED SEBORRHEIC KERATOSIS    Physical Exam:  Anatomic Location Affected & Morphological Description:  Waxy well demarcated sessile stuck on brown papule/s with erythema/crusting on the right upper chest, left upper chest, mid upper back.      Additional History of Present Condition:  Present for years, recently has become painful, irritated, bleeding    Assessment and Plan:  Based on a thorough discussion of this condition and the management approach to it (including a comprehensive discussion of the known risks, side effects and potential benefits of treatment), the patient (family) agrees to implement the following specific plan:  Cryotherapy given symptoms and inflammation  After care discussed    PROCEDURE:  DESTRUCTION OF BENIGN LESIONS  After a thorough discussion of treatment options and risk/benefits/alternatives (including but not limited to local pain, scarring, dyspigmentation, blistering, and possible superinfection), verbal and written consent were obtained and the aforementioned lesions were treated on with cryotherapy using liquid nitrogen x 1 cycle for 5-10 seconds.    TOTAL NUMBER of 3 lesions were treated today on the ANATOMIC LOCATION: right upper chest, left upper chest, mid upper back    The patient tolerated the procedure well, and after-care instructions were provided.    DERMATOFIBROMA    Physical Exam:  Anatomic Location Affected:  Left anterior shin  Morphological Description:  firm pink papule with peripheral reticular network, + dimple sign "       Additional History of Present Condition:  Present upon skin exam    Assessment and Plan:  Based on a thorough discussion of this condition and the management approach to it (including a comprehensive discussion of the known risks, side effects and potential benefits of treatment), the patient (family) agrees to implement the following specific plan:  Reassured benign    Follow-up: 6 months for skin exam.   Scribe Attestation      I,:  Angelina Yuanrolf am acting as a scribe while in the presence of the attending physician.:       I,:  Supriya Masterson PA-C personally performed the services described in this documentation    as scribed in my presence.:                  [1]   Allergies  Allergen Reactions    Codeine      Reaction Date: 26May2011;     Darvon [Propoxyphene]     Demerol [Meperidine]     Valium [Diazepam]     Adhesive [Medical Tape] Other (See Comments)     Skin irritated     Other Other (See Comments)     HCTZ - JOAQUIN with elevated calcium and creatinine; hospitalization 11/2022    Aspirin      Reaction Date: 26May2011;    [2]   Current Outpatient Medications:     acetaminophen (TYLENOL) 500 mg tablet, Take 2 tabs po up to every 8 hours prn pain, Disp: 60 tablet, Rfl: 0    amLODIPine (NORVASC) 2.5 mg tablet, TAKE ONE TABLET BY MOUTH DAILY, Disp: 90 tablet, Rfl: 0    calcium carbonate-vitamin D 250 mg-3.125 mcg per tablet, Take 1 tablet by mouth daily, Disp: 90 tablet, Rfl: 3    Denosumab (PROLIA SC), Inject under the skin, Disp: , Rfl:     losartan (COZAAR) 100 MG tablet, TAKE ONE TABLET BY MOUTH EVERY DAY, Disp: 90 tablet, Rfl: 1    Multiple Vitamin (MULTIVITAMIN) capsule, Take 1 capsule by mouth in the morning., Disp: , Rfl:     Prolia 60 MG/ML, , Disp: , Rfl:     rosuvastatin (CRESTOR) 10 MG tablet, TAKE ONE TABLET BY MOUTH EVERY DAY IN THE EVENING, Disp: 90 tablet, Rfl: 1    azelastine (ASTELIN) 0.1 % nasal spray, 1 spray into each nostril 2 (two) times a day Use in each nostril as directed  (Patient not taking: Reported on 5/16/2025), Disp: 1 mL, Rfl: 0    benzonatate (TESSALON PERLES) 100 mg capsule, Take 1 capsule (100 mg total) by mouth 3 (three) times a day as needed for cough, Disp: 20 capsule, Rfl: 0    benzonatate (TESSALON) 200 MG capsule, Take 1 capsule (200 mg total) by mouth 3 (three) times a day as needed for cough (Patient not taking: Reported on 5/16/2025), Disp: 20 capsule, Rfl: 0    imiquimod (ALDARA) 5 % cream, After area biopsied heals, apply once daily 5 days per week, prior to normal sleeping hours, for 6 weeks; leave on skin for ~8 hours, then remove with mild soap and water. Apply enough cream to cover the treatment area, including 1 cm of skin surrounding the tumor., Disp: 24 each, Rfl: 1    lidocaine (LIDODERM) 5 %, Apply 1 patch topically every 24 hours for 15 days Remove & Discard patch within 12 hours or as directed by MD (Patient not taking: Reported on 5/16/2025), Disp: 15 patch, Rfl: 0

## 2025-06-13 ENCOUNTER — OFFICE VISIT (OUTPATIENT)
Dept: FAMILY MEDICINE CLINIC | Facility: CLINIC | Age: 70
End: 2025-06-13
Payer: COMMERCIAL

## 2025-06-13 VITALS
TEMPERATURE: 96 F | HEART RATE: 55 BPM | DIASTOLIC BLOOD PRESSURE: 74 MMHG | BODY MASS INDEX: 30.05 KG/M2 | SYSTOLIC BLOOD PRESSURE: 130 MMHG | OXYGEN SATURATION: 98 % | WEIGHT: 143.8 LBS

## 2025-06-13 DIAGNOSIS — J01.00 ACUTE NON-RECURRENT MAXILLARY SINUSITIS: Primary | ICD-10-CM

## 2025-06-13 PROCEDURE — 99213 OFFICE O/P EST LOW 20 MIN: CPT | Performed by: FAMILY MEDICINE

## 2025-06-13 RX ORDER — AMOXICILLIN 875 MG/1
875 TABLET, COATED ORAL 2 TIMES DAILY
Qty: 14 TABLET | Refills: 0 | Status: SHIPPED | OUTPATIENT
Start: 2025-06-13 | End: 2025-06-20

## 2025-06-13 NOTE — PROGRESS NOTES
Name: Danielle Sandoval      : 1955      MRN: [id number]  Encounter Provider: Steven Rasmussen MD  Encounter Date: 2025   Encounter department: [facility]  :  Assessment & Plan  Acute non-recurrent maxillary sinusitis    Viral vs bacterial, given left sided only possible bacterial, start amoxicillin if not improving over next 3 days.              History of Present Illness   Sore Throat   Associated symptoms include headaches. Pertinent negatives include no abdominal pain, coughing, ear pain, shortness of breath or vomiting.       69 year old presenting for left sided sinus pressure, and left sided sore throat, for the past 3 days.    Reports symptoms worsening, has not tried any medicaitons.    No fevers.          Review of Systems   Constitutional:  Negative for chills and fever.   HENT:  Positive for sore throat. Negative for ear pain.    Eyes:  Negative for pain and visual disturbance.   Respiratory:  Negative for cough and shortness of breath.    Cardiovascular:  Negative for chest pain and palpitations.   Gastrointestinal:  Negative for abdominal pain and vomiting.   Genitourinary:  Negative for dysuria and hematuria.   Musculoskeletal:  Negative for arthralgias and back pain.   Skin:  Negative for color change and rash.   Neurological:  Positive for headaches. Negative for seizures and syncope.   All other systems reviewed and are negative.      Objective   /74 (BP Location: Left arm, Patient Position: Sitting, Cuff Size: Large)   Pulse 55   Temp (!) 96 °F (35.6 °C) (Tympanic)   Wt 65.2 kg (143 lb 12.8 oz)   LMP 1989 (Exact Date) Comment: hysterectomy, total  SpO2 98%   BMI 30.05 kg/m²      Physical Exam  Vitals and nursing note reviewed.   Constitutional:       General: She is not in acute distress.     Appearance: She is well-developed.   HENT:      Head: Normocephalic and atraumatic.     Eyes:      Conjunctiva/sclera: Conjunctivae  normal.       Cardiovascular:      Rate and Rhythm: Normal rate and regular rhythm.      Heart sounds: No murmur heard.  Pulmonary:      Effort: Pulmonary effort is normal. No respiratory distress.      Breath sounds: Normal breath sounds.   Abdominal:      Palpations: Abdomen is soft.      Tenderness: There is no abdominal tenderness.     Musculoskeletal:         General: No swelling.      Cervical back: Neck supple.     Skin:     General: Skin is warm and dry.      Capillary Refill: Capillary refill takes less than 2 seconds.     Neurological:      Mental Status: She is alert.     Psychiatric:         Mood and Affect: Mood normal.

## 2025-06-13 NOTE — PATIENT INSTRUCTIONS
Start antibiotics if not improving over the weekend.    1. Acute non-recurrent maxillary sinusitis  -     amoxicillin (AMOXIL) 875 mg tablet; Take 1 tablet (875 mg total) by mouth 2 (two) times a day for 7 days

## 2025-06-21 DIAGNOSIS — E78.2 MIXED HYPERLIPIDEMIA: ICD-10-CM

## 2025-06-22 RX ORDER — ROSUVASTATIN CALCIUM 10 MG/1
10 TABLET, COATED ORAL EVERY EVENING
Qty: 90 TABLET | Refills: 1 | Status: SHIPPED | OUTPATIENT
Start: 2025-06-22

## 2025-07-17 DIAGNOSIS — I10 ESSENTIAL HYPERTENSION: ICD-10-CM

## 2025-07-18 RX ORDER — AMLODIPINE BESYLATE 2.5 MG/1
2.5 TABLET ORAL DAILY
Qty: 90 TABLET | Refills: 1 | Status: SHIPPED | OUTPATIENT
Start: 2025-07-18

## 2025-07-22 ENCOUNTER — OFFICE VISIT (OUTPATIENT)
Age: 70
End: 2025-07-22
Payer: COMMERCIAL

## 2025-07-22 VITALS
HEIGHT: 58 IN | SYSTOLIC BLOOD PRESSURE: 120 MMHG | BODY MASS INDEX: 29.72 KG/M2 | HEART RATE: 60 BPM | WEIGHT: 141.6 LBS | DIASTOLIC BLOOD PRESSURE: 70 MMHG | OXYGEN SATURATION: 97 % | TEMPERATURE: 97.7 F

## 2025-07-22 DIAGNOSIS — E55.9 VITAMIN D DEFICIENCY: ICD-10-CM

## 2025-07-22 DIAGNOSIS — M81.0 AGE-RELATED OSTEOPOROSIS WITHOUT CURRENT PATHOLOGICAL FRACTURE: Primary | ICD-10-CM

## 2025-07-22 PROCEDURE — 99214 OFFICE O/P EST MOD 30 MIN: CPT | Performed by: PHYSICIAN ASSISTANT

## 2025-07-22 PROCEDURE — 96372 THER/PROPH/DIAG INJ SC/IM: CPT

## 2025-07-22 NOTE — PROGRESS NOTES
Name: Danielle Sandoval      : 1955      MRN: [id number]  Encounter Provider: Charley Heath PA-C  Encounter Date: 2025   Encounter [facility]: [facility]  :  Assessment & Plan  Age-related osteoporosis without current pathological fracture  Osteoporosis currently being treated with Prolia.  She will receive her next dose today.  She is up-to-date with her DEXA scan and will be due for an updated DEXA in 2026.  We will continue to monitor her bone density every 2 years.  Labs as ordered to monitor for medication side effects and toxicity.  Plan to follow-up in 6 months for Prolia in 1 year for office visit or sooner if needed.    Orders:    Basic metabolic panel    Vitamin D 25 hydroxy    denosumab (PROLIA) subcutaneous injection 60 mg    Vitamin D deficiency    Orders:    Vitamin D 25 hydroxy        History of Present Illness   Danielle Sandoval is a 69 y.o. female.  She is here for follow up of osteoporosis.  She is a previous patient of Dr. Gordon.  She has previously been treated with hormone replacement therapy (Evista) as well as Fosamax.  She is currently being treated with Prolia for her osteoporosis, which she started in 2016.  She is due for her next dose today.  She has no history of fracture.  Her most recent DEXA scan was done on 10/14/2024.  Lumbar spine T-score +0.4.  This has increased 3.5% as compared to her previous scan.  Left total hip T-score -0.9, femoral neck -2.2.     She has a history of generalized osteoarthritis and lumbar degenerative disc disease.  She does get some pain in her lower back on the right side.  She has seen a chiropractor in the past which did provide significant relief for her.  She also gets some stiffness in her knees.     She has a history of subclinical hypothyroidism.  She has a history of sensorineural hearing loss status post left cochlear implant.              Review of Systems  "  Constitutional:  Negative for chills, fatigue and fever.   HENT:  Positive for hearing loss. Negative for sore throat and tinnitus.    Eyes:  Negative for pain and visual disturbance.   Respiratory:  Negative for cough and shortness of breath.    Cardiovascular:  Negative for chest pain and palpitations.   Gastrointestinal:  Negative for abdominal pain, nausea and vomiting.   Genitourinary:  Negative for difficulty urinating.   Musculoskeletal:  Positive for arthralgias and back pain. Negative for gait problem, joint swelling, myalgias, neck pain and neck stiffness.   Skin:  Negative for rash.   Neurological:  Negative for dizziness, seizures, weakness, numbness and headaches.   Psychiatric/Behavioral:  Negative for confusion, decreased concentration and sleep disturbance.      Medications Ordered Prior to Encounter[1]      Objective   /70 (BP Location: Right arm, Patient Position: Sitting, Cuff Size: Adult)   Pulse 60   Temp 97.7 °F (36.5 °C) (Tympanic)   Ht 4' 9.5\" (1.461 m)   Wt 64.2 kg (141 lb 9.6 oz)   LMP 06/01/1989 (Exact Date) Comment: hysterectomy, total  SpO2 97%   BMI 30.11 kg/m²      Physical Exam  Constitutional:       Appearance: Normal appearance.     Cardiovascular:      Rate and Rhythm: Normal rate and regular rhythm.   Pulmonary:      Breath sounds: Normal breath sounds.     Musculoskeletal:         General: No swelling or tenderness.     Skin:     General: Skin is warm and dry.     Neurological:      General: No focal deficit present.      Mental Status: She is alert.             Dragon Dictation software was used to dictate this note. It may contain errors with dictating incorrect words/spelling. Please contact provider directly for any questions.         [1]   Current Outpatient Medications on File Prior to Visit   Medication Sig Dispense Refill    acetaminophen (TYLENOL) 500 mg tablet Take 2 tabs po up to every 8 hours prn pain 60 tablet 0    amLODIPine (NORVASC) 2.5 mg tablet " TAKE ONE TABLET BY MOUTH DAILY 90 tablet 1    Denosumab (PROLIA SC) Inject under the skin      losartan (COZAAR) 100 MG tablet TAKE ONE TABLET BY MOUTH EVERY DAY 90 tablet 1    Multiple Vitamin (MULTIVITAMIN) capsule Take 1 capsule by mouth in the morning.      Prolia 60 MG/ML       rosuvastatin (CRESTOR) 10 MG tablet TAKE ONE TABLET BY MOUTH EVERY DAY IN THE EVENING 90 tablet 1    azelastine (ASTELIN) 0.1 % nasal spray 1 spray into each nostril 2 (two) times a day Use in each nostril as directed (Patient not taking: Reported on 5/16/2025) 1 mL 0    benzonatate (TESSALON PERLES) 100 mg capsule Take 1 capsule (100 mg total) by mouth 3 (three) times a day as needed for cough 20 capsule 0    benzonatate (TESSALON) 200 MG capsule Take 1 capsule (200 mg total) by mouth 3 (three) times a day as needed for cough (Patient not taking: Reported on 5/16/2025) 20 capsule 0    calcium carbonate-vitamin D 250 mg-3.125 mcg per tablet Take 1 tablet by mouth daily 90 tablet 3    imiquimod (ALDARA) 5 % cream After area biopsied heals, apply once daily 5 days per week, prior to normal sleeping hours, for 6 weeks; leave on skin for ~8 hours, then remove with mild soap and water. Apply enough cream to cover the treatment area, including 1 cm of skin surrounding the tumor. 24 each 1    lidocaine (LIDODERM) 5 % Apply 1 patch topically every 24 hours for 15 days Remove & Discard patch within 12 hours or as directed by MD (Patient not taking: Reported on 5/16/2025) 15 patch 0     No current facility-administered medications on file prior to visit.

## 2025-07-22 NOTE — ASSESSMENT & PLAN NOTE
Osteoporosis currently being treated with Prolia.  She will receive her next dose today.  She is up-to-date with her DEXA scan and will be due for an updated DEXA in October 2026.  We will continue to monitor her bone density every 2 years.  Labs as ordered to monitor for medication side effects and toxicity.  Plan to follow-up in 6 months for Prolia in 1 year for office visit or sooner if needed.    Orders:    Basic metabolic panel    Vitamin D 25 hydroxy    denosumab (PROLIA) subcutaneous injection 60 mg

## 2025-07-22 NOTE — PROGRESS NOTES
"Assessment/Plan:    Danielle Sandoval came into the [facility] Office today 07/22/25 to receive Prolia injection.      Verbal consent obtained.  Consent given by: patient    patient states patient has been medically healthy with no underlining concerns/complications.      Danielle Sandoval presents with no symptoms today.       All insturctions were reviewed with the patient.    If the patient should have any questions/concerns, advised patient to contacted [facility].       Subjective:     History provided by: patient    Patient ID: Danielle Sandoval is a 69 y.o. female      Objective:    Vitals:    07/22/25 0846   BP: 120/70   BP Location: Right arm   Patient Position: Sitting   Cuff Size: Adult   Pulse: 60   Temp: 97.7 °F (36.5 °C)   TempSrc: Tympanic   SpO2: 97%   Weight: 64.2 kg (141 lb 9.6 oz)   Height: 4' 9.5\" (1.461 m)       Patient tolerated the injection well without any complications.  Injection site/s upper right arm.  Number of Dose 2  Medication was provided by office.  Specialty Pharmacy Name n/a         Patient signed consent form yes   Patient signed ABN form no (If no patient is not a medicare patient).   Patient waited 15 minutes after injection no (This only applies to patient's receiving first time injection).       Last Visit: Visit date not found  Next visit:Visit date not found      "

## 2025-08-01 ENCOUNTER — OFFICE VISIT (OUTPATIENT)
Dept: CARDIOLOGY CLINIC | Facility: CLINIC | Age: 70
End: 2025-08-01
Payer: COMMERCIAL

## 2025-08-01 VITALS
SYSTOLIC BLOOD PRESSURE: 120 MMHG | WEIGHT: 142 LBS | DIASTOLIC BLOOD PRESSURE: 70 MMHG | BODY MASS INDEX: 30.2 KG/M2 | HEART RATE: 57 BPM

## 2025-08-01 DIAGNOSIS — I10 ESSENTIAL HYPERTENSION: ICD-10-CM

## 2025-08-01 DIAGNOSIS — E78.2 MIXED HYPERLIPIDEMIA: ICD-10-CM

## 2025-08-01 DIAGNOSIS — R07.9 CHEST PAIN, UNSPECIFIED TYPE: ICD-10-CM

## 2025-08-01 DIAGNOSIS — R94.39 EQUIVOCAL STRESS TEST: Primary | ICD-10-CM

## 2025-08-01 DIAGNOSIS — R01.1 CARDIAC MURMUR: ICD-10-CM

## 2025-08-01 PROCEDURE — 99244 OFF/OP CNSLTJ NEW/EST MOD 40: CPT | Performed by: STUDENT IN AN ORGANIZED HEALTH CARE EDUCATION/TRAINING PROGRAM

## 2025-08-01 PROCEDURE — 93000 ELECTROCARDIOGRAM COMPLETE: CPT | Performed by: STUDENT IN AN ORGANIZED HEALTH CARE EDUCATION/TRAINING PROGRAM

## 2025-08-01 RX ORDER — METOPROLOL TARTRATE 25 MG/1
25 TABLET, FILM COATED ORAL ONCE
Qty: 1 TABLET | Refills: 0 | Status: SHIPPED | OUTPATIENT
Start: 2025-08-01 | End: 2025-08-01

## 2025-08-05 ENCOUNTER — APPOINTMENT (OUTPATIENT)
Dept: LAB | Age: 70
End: 2025-08-05
Payer: COMMERCIAL

## 2025-08-05 DIAGNOSIS — E55.9 VITAMIN D DEFICIENCY: ICD-10-CM

## 2025-08-05 DIAGNOSIS — M81.0 AGE-RELATED OSTEOPOROSIS WITHOUT CURRENT PATHOLOGICAL FRACTURE: ICD-10-CM

## 2025-08-05 LAB
25(OH)D3 SERPL-MCNC: 63.4 NG/ML (ref 30–100)
25(OH)D3 SERPL-MCNC: 68.2 NG/ML (ref 30–100)
ANION GAP SERPL CALCULATED.3IONS-SCNC: 6 MMOL/L (ref 4–13)
ANION GAP SERPL CALCULATED.3IONS-SCNC: 8 MMOL/L (ref 4–13)
BUN SERPL-MCNC: 13 MG/DL (ref 5–25)
BUN SERPL-MCNC: 13 MG/DL (ref 5–25)
CALCIUM SERPL-MCNC: 9.2 MG/DL (ref 8.4–10.2)
CALCIUM SERPL-MCNC: 9.2 MG/DL (ref 8.4–10.2)
CHLORIDE SERPL-SCNC: 105 MMOL/L (ref 96–108)
CHLORIDE SERPL-SCNC: 105 MMOL/L (ref 96–108)
CO2 SERPL-SCNC: 30 MMOL/L (ref 21–32)
CO2 SERPL-SCNC: 31 MMOL/L (ref 21–32)
CREAT SERPL-MCNC: 0.81 MG/DL (ref 0.6–1.3)
CREAT SERPL-MCNC: 0.82 MG/DL (ref 0.6–1.3)
GFR SERPL CREATININE-BSD FRML MDRD: 74 ML/MIN/1.73SQ M
GFR SERPL CREATININE-BSD FRML MDRD: 75 ML/MIN/1.73SQ M
GLUCOSE P FAST SERPL-MCNC: 95 MG/DL (ref 65–99)
GLUCOSE P FAST SERPL-MCNC: 97 MG/DL (ref 65–99)
POTASSIUM SERPL-SCNC: 4 MMOL/L (ref 3.5–5.3)
POTASSIUM SERPL-SCNC: 4.1 MMOL/L (ref 3.5–5.3)
SODIUM SERPL-SCNC: 142 MMOL/L (ref 135–147)
SODIUM SERPL-SCNC: 143 MMOL/L (ref 135–147)

## 2025-08-05 PROCEDURE — 82306 VITAMIN D 25 HYDROXY: CPT

## 2025-08-05 PROCEDURE — 80048 BASIC METABOLIC PNL TOTAL CA: CPT

## 2025-08-21 ENCOUNTER — OFFICE VISIT (OUTPATIENT)
Dept: FAMILY MEDICINE CLINIC | Facility: CLINIC | Age: 70
End: 2025-08-21
Payer: COMMERCIAL

## 2025-08-21 VITALS
HEART RATE: 56 BPM | OXYGEN SATURATION: 98 % | WEIGHT: 141.4 LBS | BODY MASS INDEX: 29.68 KG/M2 | HEIGHT: 58 IN | DIASTOLIC BLOOD PRESSURE: 70 MMHG | SYSTOLIC BLOOD PRESSURE: 140 MMHG

## 2025-08-21 DIAGNOSIS — E21.3 HYPERPARATHYROIDISM (HCC): ICD-10-CM

## 2025-08-21 DIAGNOSIS — D03.72 MELANOMA IN SITU OF LEFT LOWER LEG (HCC): ICD-10-CM

## 2025-08-21 DIAGNOSIS — M81.0 OSTEOPOROSIS, UNSPECIFIED OSTEOPOROSIS TYPE, UNSPECIFIED PATHOLOGICAL FRACTURE PRESENCE: ICD-10-CM

## 2025-08-21 DIAGNOSIS — Z12.31 ENCOUNTER FOR SCREENING MAMMOGRAM FOR BREAST CANCER: ICD-10-CM

## 2025-08-21 DIAGNOSIS — E78.2 MIXED HYPERLIPIDEMIA: ICD-10-CM

## 2025-08-21 DIAGNOSIS — R94.39 EQUIVOCAL STRESS TEST: Primary | ICD-10-CM

## 2025-08-21 DIAGNOSIS — I10 ESSENTIAL HYPERTENSION: ICD-10-CM

## 2025-08-21 DIAGNOSIS — Z96.21 COCHLEAR IMPLANT STATUS: ICD-10-CM

## 2025-08-21 DIAGNOSIS — C44.712 BASAL CELL CARCINOMA (BCC) OF RIGHT LOWER LEG: ICD-10-CM

## 2025-08-21 PROCEDURE — 99214 OFFICE O/P EST MOD 30 MIN: CPT | Performed by: FAMILY MEDICINE
